# Patient Record
Sex: MALE | Race: WHITE | NOT HISPANIC OR LATINO | Employment: OTHER | URBAN - METROPOLITAN AREA
[De-identification: names, ages, dates, MRNs, and addresses within clinical notes are randomized per-mention and may not be internally consistent; named-entity substitution may affect disease eponyms.]

---

## 2023-02-27 LAB — SARS-COV-2 RESULT: DETECTED

## 2023-04-12 ENCOUNTER — OFFICE VISIT (OUTPATIENT)
Dept: PRIMARY CARE | Facility: CLINIC | Age: 63
End: 2023-04-12
Payer: COMMERCIAL

## 2023-04-12 VITALS
OXYGEN SATURATION: 98 % | WEIGHT: 209 LBS | HEIGHT: 73 IN | DIASTOLIC BLOOD PRESSURE: 80 MMHG | HEART RATE: 68 BPM | SYSTOLIC BLOOD PRESSURE: 132 MMHG | BODY MASS INDEX: 27.7 KG/M2 | RESPIRATION RATE: 14 BRPM

## 2023-04-12 DIAGNOSIS — Z12.5 SCREENING FOR PROSTATE CANCER: ICD-10-CM

## 2023-04-12 DIAGNOSIS — I25.10 ARTERIOSCLEROTIC CORONARY ARTERY DISEASE: ICD-10-CM

## 2023-04-12 DIAGNOSIS — I50.22 CHRONIC SYSTOLIC HEART FAILURE (MULTI): Primary | ICD-10-CM

## 2023-04-12 DIAGNOSIS — I10 PRIMARY HYPERTENSION: ICD-10-CM

## 2023-04-12 DIAGNOSIS — I48.91 ATRIAL FIBRILLATION, UNSPECIFIED TYPE (MULTI): ICD-10-CM

## 2023-04-12 PROBLEM — R03.0 ELEVATED BLOOD PRESSURE READING: Status: ACTIVE | Noted: 2023-04-12

## 2023-04-12 PROBLEM — R94.31 ABNORMAL EKG: Status: ACTIVE | Noted: 2023-04-12

## 2023-04-12 PROBLEM — K76.89 LIVER CYST: Status: ACTIVE | Noted: 2023-04-12

## 2023-04-12 PROBLEM — I21.9 SILENT MYOCARDIAL INFARCTION (MULTI): Status: ACTIVE | Noted: 2023-04-12

## 2023-04-12 PROBLEM — R05.9 COUGH: Status: ACTIVE | Noted: 2023-04-12

## 2023-04-12 PROBLEM — J06.9 VIRAL URI WITH COUGH: Status: ACTIVE | Noted: 2023-04-12

## 2023-04-12 PROBLEM — S80.10XA HEMATOMA OF LEG: Status: ACTIVE | Noted: 2023-04-12

## 2023-04-12 PROBLEM — M48.50XA VERTEBRAL COMPRESSION FRACTURE (MULTI): Status: ACTIVE | Noted: 2023-04-12

## 2023-04-12 PROBLEM — E78.2 COMBINED HYPERLIPIDEMIA: Status: ACTIVE | Noted: 2023-04-12

## 2023-04-12 PROBLEM — S91.309A WOUND OF FOOT: Status: ACTIVE | Noted: 2023-04-12

## 2023-04-12 PROBLEM — I49.9 IRREGULAR HEART BEAT: Status: ACTIVE | Noted: 2023-04-12

## 2023-04-12 PROBLEM — I34.0 MILD MITRAL REGURGITATION: Status: ACTIVE | Noted: 2023-04-12

## 2023-04-12 PROBLEM — J00 COMMON COLD: Status: ACTIVE | Noted: 2023-04-12

## 2023-04-12 PROBLEM — M79.606 LEG PAIN: Status: ACTIVE | Noted: 2023-04-12

## 2023-04-12 PROBLEM — R06.00 DYSPNEA: Status: ACTIVE | Noted: 2023-04-12

## 2023-04-12 PROBLEM — B34.9 NONSPECIFIC SYNDROME SUGGESTIVE OF VIRAL ILLNESS: Status: ACTIVE | Noted: 2023-04-12

## 2023-04-12 PROBLEM — M54.16 LUMBAR RADICULOPATHY, ACUTE: Status: ACTIVE | Noted: 2023-04-12

## 2023-04-12 PROBLEM — M54.9 BACK PAIN, ACUTE: Status: ACTIVE | Noted: 2023-04-12

## 2023-04-12 PROBLEM — I21.9 HEART ATTACK (MULTI): Status: ACTIVE | Noted: 2023-04-12

## 2023-04-12 PROBLEM — E55.9 MILD VITAMIN D DEFICIENCY: Status: ACTIVE | Noted: 2023-04-12

## 2023-04-12 PROCEDURE — 99396 PREV VISIT EST AGE 40-64: CPT | Performed by: INTERNAL MEDICINE

## 2023-04-12 PROCEDURE — 1036F TOBACCO NON-USER: CPT | Performed by: INTERNAL MEDICINE

## 2023-04-12 PROCEDURE — 3079F DIAST BP 80-89 MM HG: CPT | Performed by: INTERNAL MEDICINE

## 2023-04-12 PROCEDURE — 3075F SYST BP GE 130 - 139MM HG: CPT | Performed by: INTERNAL MEDICINE

## 2023-04-12 RX ORDER — ATORVASTATIN CALCIUM 20 MG/1
1 TABLET, FILM COATED ORAL NIGHTLY
COMMUNITY
Start: 2019-06-24 | End: 2023-04-17 | Stop reason: SDUPTHER

## 2023-04-12 RX ORDER — LISINOPRIL 10 MG/1
10 TABLET ORAL DAILY
COMMUNITY
End: 2023-04-12 | Stop reason: ALTCHOICE

## 2023-04-12 RX ORDER — NAPROXEN SODIUM 220 MG
2 TABLET ORAL DAILY
COMMUNITY

## 2023-04-12 RX ORDER — RIVAROXABAN 20 MG/1
1 TABLET, FILM COATED ORAL DAILY
COMMUNITY
Start: 2022-04-28 | End: 2023-04-17 | Stop reason: SDUPTHER

## 2023-04-12 RX ORDER — METOPROLOL SUCCINATE 50 MG/1
1 TABLET, EXTENDED RELEASE ORAL 2 TIMES DAILY
COMMUNITY
Start: 2019-06-13 | End: 2023-04-17 | Stop reason: SDUPTHER

## 2023-04-12 RX ORDER — MULTIVITAMIN
1 TABLET ORAL DAILY
COMMUNITY
End: 2023-12-04 | Stop reason: ALTCHOICE

## 2023-04-12 RX ORDER — SACUBITRIL AND VALSARTAN 24; 26 MG/1; MG/1
1 TABLET, FILM COATED ORAL 2 TIMES DAILY
COMMUNITY
Start: 2022-10-25 | End: 2023-04-17 | Stop reason: SDUPTHER

## 2023-04-12 RX ORDER — ASPIRIN 81 MG/1
TABLET ORAL
COMMUNITY
Start: 2023-02-01

## 2023-04-12 ASSESSMENT — PATIENT HEALTH QUESTIONNAIRE - PHQ9
2. FEELING DOWN, DEPRESSED OR HOPELESS: NOT AT ALL
SUM OF ALL RESPONSES TO PHQ9 QUESTIONS 1 AND 2: 0
1. LITTLE INTEREST OR PLEASURE IN DOING THINGS: NOT AT ALL

## 2023-04-12 NOTE — PATIENT INSTRUCTIONS
FASTING LABS ARE ORDERED FOR YOU    2.  WILL DEFER COLONOSCOPY UNTIL NEXT YEAR, DUE TO NEGATIVE SCOPE IN 2014    3.  PLEASE CALL IF REFILLS ARE NEEDED    4.  YEARLY EYE EXAMS AS YOU ARE DOING    5.  ONGOING FOLLOW UP CARDIOLOGY    6.  REGULAR PHYSICAL EXERCISE IS RECOMMENDED FOR GENERAL CARDIOVASCULAR HEALTH    6.  FOLLOW UP 1 YEAR OR AS NEEDED

## 2023-04-12 NOTE — PROGRESS NOTES
Subjective   Link Inman is a 62 y.o. male and is here for a comprehensive physical exam. The patient reports no problems. LEGS FEELING BETTER, GOT OFF THE FOG FROM COVID 19 FROM FEBRUARY    JUST SAW NEW CARDIOLOGIST, ECHO SCHEDULED    ABLE TO DO ACTIVITIES WITHOUT SHORT OF BREATH    COLONOSCOPY AT 55 Y.O. WAS DONE AT [facility]. 2014     GETS EYES CHECKED REGULAR;Y    Do you take any herbs or supplements that were not prescribed by a doctor? no  Are you taking calcium supplements? no  Are you taking aspirin daily? yes      History:  Patient receives prostate care outside our clinic  Date last prostate exam:  NO  Date last PSA:  2021    Do you have pain that bothers you in your daily life? yes  LEFT LEG STILL ACHES    Review of Systems   Constitutional:  Negative for chills, diaphoresis and fever.   Respiratory:  Negative for cough and shortness of breath.    Cardiovascular:  Negative for chest pain and leg swelling.   Gastrointestinal:  Negative for constipation, diarrhea, nausea and vomiting.   Musculoskeletal:  Negative for joint swelling and myalgias.        Objective   Physical Exam  Vitals reviewed.   Constitutional:       General: He is not in acute distress.     Appearance: He is not ill-appearing.   Cardiovascular:      Rate and Rhythm: Normal rate and regular rhythm.      Pulses: Normal pulses.      Heart sounds:      No gallop.   Pulmonary:      Breath sounds: Normal breath sounds. No wheezing, rhonchi or rales.   Abdominal:      General: Abdomen is flat. Bowel sounds are normal.      Palpations: Abdomen is soft.      Tenderness: There is no guarding or rebound.   Musculoskeletal:      Right lower leg: No edema.      Left lower leg: No edema.         Assessment/Plan   Healthy male exam. STABLE FROM CARDIAC STANDPOINT     1. SEE PATIENT INSTRUCTIONS  2. Patient Counseling:  --Nutrition: Stressed importance of moderation in sodium/caffeine intake, saturated fat and cholesterol, caloric balance,  sufficient intake of fresh fruits, vegetables, fiber, calcium, iron, and 1 mg of folate supplement per day (for females capable of pregnancy).  --Discussed the issue of estrogen replacement, calcium supplement, and the daily use of baby aspirin.  --Exercise: Stressed the importance of regular exercise.   --Substance Abuse: Discussed cessation/primary prevention of tobacco, alcohol, or other drug use; driving or other dangerous activities under the influence; availability of treatment for abuse.    --Sexuality: Discussed sexually transmitted diseases, partner selection, use of condoms, avoidance of unintended pregnancy  and contraceptive alternatives.   --Injury prevention: Discussed safety belts, safety helmets, smoke detector, smoking near bedding or upholstery.   --Dental health: Discussed importance of regular tooth brushing, flossing, and dental visits.  --Immunizations reviewed.  --Discussed benefits of screening colonoscopy.  --After hours service discussed with patient  3. Discussed the patient's BMI with him.  The BMI is in the acceptable range.  4. Follow up next physical in 1 year    Patient Instructions    FASTING LABS ARE ORDERED FOR YOU    2.  WILL DEFER COLONOSCOPY UNTIL NEXT YEAR, DUE TO NEGATIVE SCOPE IN 2014    3.  PLEASE CALL IF REFILLS ARE NEEDED    4.  YEARLY EYE EXAMS AS YOU ARE DOING    5.  ONGOING FOLLOW UP CARDIOLOGY    6.  REGULAR PHYSICAL EXERCISE IS RECOMMENDED FOR GENERAL CARDIOVASCULAR HEALTH    6.  FOLLOW UP 1 YEAR OR AS NEEDED

## 2023-04-17 ENCOUNTER — TELEPHONE (OUTPATIENT)
Dept: PRIMARY CARE | Facility: CLINIC | Age: 63
End: 2023-04-17

## 2023-04-17 ENCOUNTER — LAB (OUTPATIENT)
Dept: LAB | Facility: LAB | Age: 63
End: 2023-04-17
Payer: COMMERCIAL

## 2023-04-17 DIAGNOSIS — I25.10 ARTERIOSCLEROTIC CORONARY ARTERY DISEASE: ICD-10-CM

## 2023-04-17 DIAGNOSIS — I10 PRIMARY HYPERTENSION: ICD-10-CM

## 2023-04-17 DIAGNOSIS — I48.91 ATRIAL FIBRILLATION, UNSPECIFIED TYPE (MULTI): Primary | ICD-10-CM

## 2023-04-17 DIAGNOSIS — Z12.5 SCREENING FOR PROSTATE CANCER: ICD-10-CM

## 2023-04-17 DIAGNOSIS — I48.91 ATRIAL FIBRILLATION, UNSPECIFIED TYPE (MULTI): ICD-10-CM

## 2023-04-17 DIAGNOSIS — I50.22 CHRONIC SYSTOLIC HEART FAILURE (MULTI): ICD-10-CM

## 2023-04-17 LAB
ALANINE AMINOTRANSFERASE (SGPT) (U/L) IN SER/PLAS: 20 U/L (ref 10–52)
ALBUMIN (G/DL) IN SER/PLAS: 4.1 G/DL (ref 3.4–5)
ALKALINE PHOSPHATASE (U/L) IN SER/PLAS: 90 U/L (ref 33–136)
ANION GAP IN SER/PLAS: 12 MMOL/L (ref 10–20)
ASPARTATE AMINOTRANSFERASE (SGOT) (U/L) IN SER/PLAS: 19 U/L (ref 9–39)
BILIRUBIN TOTAL (MG/DL) IN SER/PLAS: 0.9 MG/DL (ref 0–1.2)
CALCIUM (MG/DL) IN SER/PLAS: 9 MG/DL (ref 8.6–10.3)
CARBON DIOXIDE, TOTAL (MMOL/L) IN SER/PLAS: 28 MMOL/L (ref 21–32)
CHLORIDE (MMOL/L) IN SER/PLAS: 105 MMOL/L (ref 98–107)
CHOLESTEROL (MG/DL) IN SER/PLAS: 147 MG/DL (ref 0–199)
CHOLESTEROL IN HDL (MG/DL) IN SER/PLAS: 46.6 MG/DL
CHOLESTEROL/HDL RATIO: 3.2
COBALAMIN (VITAMIN B12) (PG/ML) IN SER/PLAS: 463 PG/ML (ref 211–911)
CREATININE (MG/DL) IN SER/PLAS: 0.85 MG/DL (ref 0.5–1.3)
CREATININE (MG/DL) IN URINE: 136 MG/DL (ref 20–370)
ERYTHROCYTE DISTRIBUTION WIDTH (RATIO) BY AUTOMATED COUNT: 12.2 % (ref 11.5–14.5)
ERYTHROCYTE MEAN CORPUSCULAR HEMOGLOBIN CONCENTRATION (G/DL) BY AUTOMATED: 33.3 G/DL (ref 32–36)
ERYTHROCYTE MEAN CORPUSCULAR VOLUME (FL) BY AUTOMATED COUNT: 96 FL (ref 80–100)
ERYTHROCYTES (10*6/UL) IN BLOOD BY AUTOMATED COUNT: 5.26 X10E12/L (ref 4.5–5.9)
GFR MALE: >90 ML/MIN/1.73M2
GLUCOSE (MG/DL) IN SER/PLAS: 99 MG/DL (ref 74–99)
HEMATOCRIT (%) IN BLOOD BY AUTOMATED COUNT: 50.5 % (ref 41–52)
HEMOGLOBIN (G/DL) IN BLOOD: 16.8 G/DL (ref 13.5–17.5)
LDL: 78 MG/DL (ref 0–99)
LEUKOCYTES (10*3/UL) IN BLOOD BY AUTOMATED COUNT: 7.1 X10E9/L (ref 4.4–11.3)
PLATELETS (10*3/UL) IN BLOOD AUTOMATED COUNT: 280 X10E9/L (ref 150–450)
POTASSIUM (MMOL/L) IN SER/PLAS: 4.2 MMOL/L (ref 3.5–5.3)
PROSTATE SPECIFIC ANTIGEN,SCREEN: 1.09 NG/ML (ref 0–4)
PROTEIN (MG/DL) IN URINE: 11 MG/DL (ref 5–25)
PROTEIN TOTAL: 6.5 G/DL (ref 6.4–8.2)
PROTEIN/CREATININE (MG/MG) IN URINE: 0.08 MG/MG CREAT (ref 0–0.17)
SODIUM (MMOL/L) IN SER/PLAS: 141 MMOL/L (ref 136–145)
THYROTROPIN (MIU/L) IN SER/PLAS BY DETECTION LIMIT <= 0.05 MIU/L: 1.92 MIU/L (ref 0.44–3.98)
TRIGLYCERIDE (MG/DL) IN SER/PLAS: 112 MG/DL (ref 0–149)
UREA NITROGEN (MG/DL) IN SER/PLAS: 16 MG/DL (ref 6–23)
VLDL: 22 MG/DL (ref 0–40)

## 2023-04-17 PROCEDURE — 84153 ASSAY OF PSA TOTAL: CPT

## 2023-04-17 PROCEDURE — 82607 VITAMIN B-12: CPT

## 2023-04-17 PROCEDURE — 84443 ASSAY THYROID STIM HORMONE: CPT

## 2023-04-17 PROCEDURE — 85027 COMPLETE CBC AUTOMATED: CPT

## 2023-04-17 PROCEDURE — 80061 LIPID PANEL: CPT

## 2023-04-17 PROCEDURE — 80053 COMPREHEN METABOLIC PANEL: CPT

## 2023-04-17 PROCEDURE — 82570 ASSAY OF URINE CREATININE: CPT

## 2023-04-17 PROCEDURE — 36415 COLL VENOUS BLD VENIPUNCTURE: CPT

## 2023-04-17 PROCEDURE — 84156 ASSAY OF PROTEIN URINE: CPT

## 2023-04-17 RX ORDER — ATORVASTATIN CALCIUM 20 MG/1
20 TABLET, FILM COATED ORAL NIGHTLY
Qty: 90 TABLET | Refills: 3 | Status: SHIPPED | OUTPATIENT
Start: 2023-04-17 | End: 2024-01-22 | Stop reason: SDUPTHER

## 2023-04-17 RX ORDER — RIVAROXABAN 20 MG/1
20 TABLET, FILM COATED ORAL DAILY
Qty: 90 TABLET | Refills: 3 | Status: SHIPPED | OUTPATIENT
Start: 2023-04-17 | End: 2024-04-01 | Stop reason: SDUPTHER

## 2023-04-17 RX ORDER — SACUBITRIL AND VALSARTAN 24; 26 MG/1; MG/1
1 TABLET, FILM COATED ORAL 2 TIMES DAILY
Qty: 180 TABLET | Refills: 3 | Status: SHIPPED | OUTPATIENT
Start: 2023-04-17 | End: 2024-04-01 | Stop reason: SDUPTHER

## 2023-04-17 RX ORDER — METOPROLOL SUCCINATE 50 MG/1
50 TABLET, EXTENDED RELEASE ORAL 2 TIMES DAILY
Qty: 180 TABLET | Refills: 3 | Status: SHIPPED | OUTPATIENT
Start: 2023-04-17 | End: 2024-02-09 | Stop reason: SDUPTHER

## 2023-04-17 NOTE — TELEPHONE ENCOUNTER
Refill 90 day supply w/Refills  to [facility] MAIL ORDER      -Xarelto 20 mg tablet Take 1 tablet (20 mg) by mouth once daily.     -Entresto 24-26 mg tablet Take 1 tablet by mouth in the morning and 1 tablet before bedtime.     -metoprolol succinate XL (Toprol-XL) 50 mg 24 hr tablet Take 1 tablet (50 mg) by mouth in the morning and 1 tablet (50 mg) before bedtime.     -atorvastatin (Lipitor) 20 mg tablet Take 1 tablet (20 mg) by mouth once daily at bedtime.

## 2023-04-18 ASSESSMENT — ENCOUNTER SYMPTOMS
FEVER: 0
SHORTNESS OF BREATH: 0
COUGH: 0
MYALGIAS: 0
VOMITING: 0
NAUSEA: 0
CONSTIPATION: 0
DIAPHORESIS: 0
CHILLS: 0
JOINT SWELLING: 0
DIARRHEA: 0

## 2023-12-04 ENCOUNTER — OFFICE VISIT (OUTPATIENT)
Dept: PRIMARY CARE | Facility: CLINIC | Age: 63
End: 2023-12-04
Payer: COMMERCIAL

## 2023-12-04 DIAGNOSIS — M54.16 LUMBAR RADICULOPATHY, ACUTE: Primary | ICD-10-CM

## 2023-12-04 PROCEDURE — 99214 OFFICE O/P EST MOD 30 MIN: CPT | Performed by: INTERNAL MEDICINE

## 2023-12-04 PROCEDURE — 1036F TOBACCO NON-USER: CPT | Performed by: INTERNAL MEDICINE

## 2023-12-04 RX ORDER — PREDNISONE 20 MG/1
TABLET ORAL
Qty: 18 TABLET | Refills: 0 | Status: SHIPPED | OUTPATIENT
Start: 2023-12-04 | End: 2023-12-13

## 2023-12-04 RX ORDER — CYCLOBENZAPRINE HCL 10 MG
10 TABLET ORAL NIGHTLY PRN
Qty: 30 TABLET | Refills: 0 | Status: SHIPPED | OUTPATIENT
Start: 2023-12-04 | End: 2024-01-26 | Stop reason: ALTCHOICE

## 2023-12-04 ASSESSMENT — PATIENT HEALTH QUESTIONNAIRE - PHQ9
1. LITTLE INTEREST OR PLEASURE IN DOING THINGS: NOT AT ALL
2. FEELING DOWN, DEPRESSED OR HOPELESS: NOT AT ALL
SUM OF ALL RESPONSES TO PHQ9 QUESTIONS 1 AND 2: 0

## 2023-12-04 NOTE — PROGRESS NOTES
"Subjective   Link Inman is a 62 y.o. male who presents for NEW CONCERN LEFT OUTER LEG PAIN UNABLE TO TOLERATE WITH WALKS X 8 MONTHS    SEEN 2 YRS AGO FOR SAME ISSUES SAYS HAD us NO CLOT WENT ON STEROIDS HELPED    HAS NOT HAD FLU SHOT WILL THINK ABOUT IT       HPI   ILITY TO WALK    BETTER TODAY, HAVEN'T BEEN DOING MUCH    2 YEARS AGO, LOOKED FOR BLOOD CLOT, NEG, TRIED STEROIDS TWICE AND IT DIDN'T HELP    HAVE LIVED WITH IT, BUT SEEMS TO BE GETTING WORSE    BEGINNING NOVEMBER HELPED DAUGHTER WITH CHORES IN NEW HOUSE    HAS A COMPRESSION FX T11 OLD, FELL OFF LADDER IN THE PAST    TAKING ALEVE 2 PILLS DAILY.      SOMETIMES FEEL LEFT LEG IS WEAK    EVENTUALLY 2 YEARS AK DID SELF PT AND IT MADE IT GO AWAY    BACK NOT KEEPING HIM UP MUCH, BUT AFFECT ABILITY TO WALK.      Review of Systems   Constitutional:  Negative for chills, diaphoresis and fever.   Respiratory:  Negative for cough and shortness of breath.    Cardiovascular:  Negative for chest pain and leg swelling.   Gastrointestinal:  Negative for constipation, diarrhea, nausea and vomiting.   Musculoskeletal:  Positive for arthralgias, back pain and gait problem. Negative for joint swelling and myalgias.       Objective   /80   Pulse 87   Resp 14   Ht 1.854 m (6' 1\")   Wt 88 kg (194 lb)   SpO2 97%   BMI 25.60 kg/m²     Physical Exam  Vitals reviewed.   Constitutional:       General: He is not in acute distress.     Appearance: He is not ill-appearing.   Cardiovascular:      Rate and Rhythm: Normal rate and regular rhythm.      Pulses: Normal pulses.      Heart sounds:      No gallop.   Pulmonary:      Breath sounds: Normal breath sounds. No wheezing, rhonchi or rales.   Abdominal:      General: Abdomen is flat. Bowel sounds are normal.      Palpations: Abdomen is soft.      Tenderness: There is no guarding or rebound.   Musculoskeletal:      Right lower leg: No edema.      Left lower leg: No edema.         Assessment/Plan   Problem List Items " Addressed This Visit       Lumbar radiculopathy, acute - Primary    Relevant Medications    predniSONE (Deltasone) 20 mg tablet    cyclobenzaprine (Flexeril) 10 mg tablet     Patient Instructions    PREDNISONE TAPER AND CYCLOBENZAPRINE MUSCLE RELAXER FOR NIGHT USE ARE SENT IN FOR YOU    2.  HEAT FOR SHORT PERIODS, 1/2 HOUR, CAN BE HELPFUL TO LOOSEN THE BACK    3.  SELF-GUIDED BACK STRETCHING AND STRENGTHENING AS YOU DID BEFORE WILL LIKELY HELP YOU TO RECOVER.    4.  IF YOU FIND THAT YOU CANNOT RECOVER AND FIND YOURSELF STILL NEEDING REGULAR ALEVE, THEN PLEASE LET ME KNOW AND WE WILL NEED TO START YOU ON A REGULAR DAILY DOSE OF STOMACH-PROTECTING ACID BLOCKER TO PREVENT STOMACH ULCER SINCE YOU ARE TAKING XARELTO.  TYLENOL DOES NOT CARRY THE RISK OF STOMACH ISSUES, AND CAN BE USED LONGER TERM WITHOUT WORRY IF NEEDED.    5.  ALSO, IF YOUR BACK CANNOT GET BETTER, THEN LET ME KNOW SO I CAN CHECK AN EMG OF THE NERVES.  IF THIS SHOWS DEFINITE NERVE COMPRESSION THEN WILL NEED MRI AS SUGGESTED AS A POSSIBILITY BY THE ORTHOPEDIC SURGEON FROM 2 YEARS AGO    6.  FOLLOW UP ROUTINELY OR AS NEEDED IF NO BETTER

## 2023-12-04 NOTE — PATIENT INSTRUCTIONS
PREDNISONE TAPER AND CYCLOBENZAPRINE MUSCLE RELAXER FOR NIGHT USE ARE SENT IN FOR YOU    2.  HEAT FOR SHORT PERIODS, 1/2 HOUR, CAN BE HELPFUL TO LOOSEN THE BACK    3.  SELF-GUIDED BACK STRETCHING AND STRENGTHENING AS YOU DID BEFORE WILL LIKELY HELP YOU TO RECOVER.    4.  IF YOU FIND THAT YOU CANNOT RECOVER AND FIND YOURSELF STILL NEEDING REGULAR ALEVE, THEN PLEASE LET ME KNOW AND WE WILL NEED TO START YOU ON A REGULAR DAILY DOSE OF STOMACH-PROTECTING ACID BLOCKER TO PREVENT STOMACH ULCER SINCE YOU ARE TAKING XARELTO.  TYLENOL DOES NOT CARRY THE RISK OF STOMACH ISSUES, AND CAN BE USED LONGER TERM WITHOUT WORRY IF NEEDED.    5.  ALSO, IF YOUR BACK CANNOT GET BETTER, THEN LET ME KNOW SO I CAN CHECK AN EMG OF THE NERVES.  IF THIS SHOWS DEFINITE NERVE COMPRESSION THEN WILL NEED MRI AS SUGGESTED AS A POSSIBILITY BY THE ORTHOPEDIC SURGEON FROM 2 YEARS AGO    6.  FOLLOW UP ROUTINELY OR AS NEEDED IF NO BETTER

## 2023-12-07 VITALS
RESPIRATION RATE: 14 BRPM | SYSTOLIC BLOOD PRESSURE: 130 MMHG | WEIGHT: 194 LBS | HEART RATE: 87 BPM | HEIGHT: 73 IN | DIASTOLIC BLOOD PRESSURE: 80 MMHG | BODY MASS INDEX: 25.71 KG/M2 | OXYGEN SATURATION: 97 %

## 2023-12-07 ASSESSMENT — ENCOUNTER SYMPTOMS
MYALGIAS: 0
DIAPHORESIS: 0
BACK PAIN: 1
VOMITING: 0
ARTHRALGIAS: 1
JOINT SWELLING: 0
COUGH: 0
NAUSEA: 0
CHILLS: 0
SHORTNESS OF BREATH: 0
CONSTIPATION: 0
FEVER: 0
DIARRHEA: 0

## 2024-01-22 DIAGNOSIS — I25.10 ARTERIOSCLEROTIC CORONARY ARTERY DISEASE: ICD-10-CM

## 2024-01-22 RX ORDER — ATORVASTATIN CALCIUM 20 MG/1
20 TABLET, FILM COATED ORAL NIGHTLY
Qty: 90 TABLET | Refills: 3 | Status: SHIPPED | OUTPATIENT
Start: 2024-01-22

## 2024-01-24 ENCOUNTER — APPOINTMENT (OUTPATIENT)
Dept: RADIOLOGY | Facility: HOSPITAL | Age: 64
End: 2024-01-24
Payer: MEDICARE

## 2024-01-24 ENCOUNTER — APPOINTMENT (OUTPATIENT)
Dept: CARDIOLOGY | Facility: HOSPITAL | Age: 64
End: 2024-01-24
Payer: MEDICARE

## 2024-01-24 ENCOUNTER — OFFICE VISIT (OUTPATIENT)
Dept: PRIMARY CARE | Facility: CLINIC | Age: 64
End: 2024-01-24
Payer: MEDICARE

## 2024-01-24 ENCOUNTER — HOSPITAL ENCOUNTER (EMERGENCY)
Facility: HOSPITAL | Age: 64
Discharge: HOME | End: 2024-01-24
Attending: EMERGENCY MEDICINE
Payer: MEDICARE

## 2024-01-24 VITALS
RESPIRATION RATE: 18 BRPM | OXYGEN SATURATION: 94 % | BODY MASS INDEX: 28.1 KG/M2 | TEMPERATURE: 97.2 F | WEIGHT: 213 LBS | DIASTOLIC BLOOD PRESSURE: 78 MMHG | HEART RATE: 84 BPM | SYSTOLIC BLOOD PRESSURE: 132 MMHG

## 2024-01-24 VITALS
DIASTOLIC BLOOD PRESSURE: 91 MMHG | HEIGHT: 73 IN | OXYGEN SATURATION: 95 % | BODY MASS INDEX: 28.36 KG/M2 | RESPIRATION RATE: 20 BRPM | WEIGHT: 214 LBS | TEMPERATURE: 97 F | SYSTOLIC BLOOD PRESSURE: 141 MMHG | HEART RATE: 101 BPM

## 2024-01-24 DIAGNOSIS — J40 BRONCHITIS: Primary | ICD-10-CM

## 2024-01-24 DIAGNOSIS — R07.9 CHEST PAIN, UNSPECIFIED TYPE: ICD-10-CM

## 2024-01-24 DIAGNOSIS — R68.89 FLU-LIKE SYMPTOMS: Primary | ICD-10-CM

## 2024-01-24 LAB
ALBUMIN SERPL BCP-MCNC: 4.1 G/DL (ref 3.4–5)
ALP SERPL-CCNC: 89 U/L (ref 33–136)
ALT SERPL W P-5'-P-CCNC: 22 U/L (ref 10–52)
ANION GAP SERPL CALC-SCNC: 11 MMOL/L (ref 10–20)
AST SERPL W P-5'-P-CCNC: 20 U/L (ref 9–39)
BASOPHILS # BLD AUTO: 0.03 X10*3/UL (ref 0–0.1)
BASOPHILS NFR BLD AUTO: 0.4 %
BILIRUB SERPL-MCNC: 0.8 MG/DL (ref 0–1.2)
BUN SERPL-MCNC: 22 MG/DL (ref 6–23)
CALCIUM SERPL-MCNC: 8.9 MG/DL (ref 8.6–10.3)
CARDIAC TROPONIN I PNL SERPL HS: 3 NG/L (ref 0–20)
CARDIAC TROPONIN I PNL SERPL HS: 3 NG/L (ref 0–20)
CHLORIDE SERPL-SCNC: 108 MMOL/L (ref 98–107)
CO2 SERPL-SCNC: 25 MMOL/L (ref 21–32)
CREAT SERPL-MCNC: 1.14 MG/DL (ref 0.5–1.3)
EGFRCR SERPLBLD CKD-EPI 2021: 71 ML/MIN/1.73M*2
EOSINOPHIL # BLD AUTO: 0.41 X10*3/UL (ref 0–0.7)
EOSINOPHIL NFR BLD AUTO: 5.7 %
ERYTHROCYTE [DISTWIDTH] IN BLOOD BY AUTOMATED COUNT: 12.8 % (ref 11.5–14.5)
GLUCOSE SERPL-MCNC: 109 MG/DL (ref 74–99)
HCT VFR BLD AUTO: 52.5 % (ref 41–52)
HGB BLD-MCNC: 17.5 G/DL (ref 13.5–17.5)
IMM GRANULOCYTES # BLD AUTO: 0.01 X10*3/UL (ref 0–0.7)
IMM GRANULOCYTES NFR BLD AUTO: 0.1 % (ref 0–0.9)
LYMPHOCYTES # BLD AUTO: 1.47 X10*3/UL (ref 1.2–4.8)
LYMPHOCYTES NFR BLD AUTO: 20.3 %
MAGNESIUM SERPL-MCNC: 2.01 MG/DL (ref 1.6–2.4)
MCH RBC QN AUTO: 31.5 PG (ref 26–34)
MCHC RBC AUTO-ENTMCNC: 33.3 G/DL (ref 32–36)
MCV RBC AUTO: 95 FL (ref 80–100)
MONOCYTES # BLD AUTO: 0.82 X10*3/UL (ref 0.1–1)
MONOCYTES NFR BLD AUTO: 11.3 %
NEUTROPHILS # BLD AUTO: 4.51 X10*3/UL (ref 1.2–7.7)
NEUTROPHILS NFR BLD AUTO: 62.2 %
NRBC BLD-RTO: 0 /100 WBCS (ref 0–0)
PLATELET # BLD AUTO: 207 X10*3/UL (ref 150–450)
POC BINAX EXPIRATION: NORMAL
POC BINAX NOW COVID SERIAL NUMBER: NORMAL
POC RAPID INFLUENZA A: NEGATIVE
POC RAPID INFLUENZA B: NEGATIVE
POC SARS-COV-2 AG BINAX: NORMAL
POTASSIUM SERPL-SCNC: 3.8 MMOL/L (ref 3.5–5.3)
PROT SERPL-MCNC: 6.5 G/DL (ref 6.4–8.2)
RBC # BLD AUTO: 5.55 X10*6/UL (ref 4.5–5.9)
SODIUM SERPL-SCNC: 140 MMOL/L (ref 136–145)
WBC # BLD AUTO: 7.3 X10*3/UL (ref 4.4–11.3)

## 2024-01-24 PROCEDURE — 99285 EMERGENCY DEPT VISIT HI MDM: CPT | Performed by: EMERGENCY MEDICINE

## 2024-01-24 PROCEDURE — 85025 COMPLETE CBC W/AUTO DIFF WBC: CPT | Performed by: EMERGENCY MEDICINE

## 2024-01-24 PROCEDURE — 93000 ELECTROCARDIOGRAM COMPLETE: CPT | Performed by: NURSE PRACTITIONER

## 2024-01-24 PROCEDURE — 36415 COLL VENOUS BLD VENIPUNCTURE: CPT | Performed by: EMERGENCY MEDICINE

## 2024-01-24 PROCEDURE — 84484 ASSAY OF TROPONIN QUANT: CPT | Performed by: EMERGENCY MEDICINE

## 2024-01-24 PROCEDURE — 1159F MED LIST DOCD IN RCRD: CPT | Performed by: NURSE PRACTITIONER

## 2024-01-24 PROCEDURE — 87804 INFLUENZA ASSAY W/OPTIC: CPT | Performed by: NURSE PRACTITIONER

## 2024-01-24 PROCEDURE — 1036F TOBACCO NON-USER: CPT | Performed by: NURSE PRACTITIONER

## 2024-01-24 PROCEDURE — 94640 AIRWAY INHALATION TREATMENT: CPT

## 2024-01-24 PROCEDURE — 2500000002 HC RX 250 W HCPCS SELF ADMINISTERED DRUGS (ALT 637 FOR MEDICARE OP, ALT 636 FOR OP/ED): Performed by: EMERGENCY MEDICINE

## 2024-01-24 PROCEDURE — 93005 ELECTROCARDIOGRAM TRACING: CPT

## 2024-01-24 PROCEDURE — 87811 SARS-COV-2 COVID19 W/OPTIC: CPT | Performed by: NURSE PRACTITIONER

## 2024-01-24 PROCEDURE — 83735 ASSAY OF MAGNESIUM: CPT | Performed by: EMERGENCY MEDICINE

## 2024-01-24 PROCEDURE — 80053 COMPREHEN METABOLIC PANEL: CPT | Performed by: EMERGENCY MEDICINE

## 2024-01-24 PROCEDURE — 87636 SARSCOV2 & INF A&B AMP PRB: CPT

## 2024-01-24 PROCEDURE — 2500000005 HC RX 250 GENERAL PHARMACY W/O HCPCS: Performed by: STUDENT IN AN ORGANIZED HEALTH CARE EDUCATION/TRAINING PROGRAM

## 2024-01-24 PROCEDURE — 93010 ELECTROCARDIOGRAM REPORT: CPT | Performed by: EMERGENCY MEDICINE

## 2024-01-24 PROCEDURE — 99284 EMERGENCY DEPT VISIT MOD MDM: CPT | Mod: 25

## 2024-01-24 PROCEDURE — 71045 X-RAY EXAM CHEST 1 VIEW: CPT

## 2024-01-24 PROCEDURE — 1125F AMNT PAIN NOTED PAIN PRSNT: CPT | Performed by: NURSE PRACTITIONER

## 2024-01-24 PROCEDURE — 99214 OFFICE O/P EST MOD 30 MIN: CPT | Performed by: NURSE PRACTITIONER

## 2024-01-24 PROCEDURE — 71045 X-RAY EXAM CHEST 1 VIEW: CPT | Performed by: RADIOLOGY

## 2024-01-24 RX ORDER — IPRATROPIUM BROMIDE AND ALBUTEROL SULFATE 2.5; .5 MG/3ML; MG/3ML
3 SOLUTION RESPIRATORY (INHALATION)
Status: DISCONTINUED | OUTPATIENT
Start: 2024-01-24 | End: 2024-01-24 | Stop reason: HOSPADM

## 2024-01-24 RX ORDER — ALBUTEROL SULFATE 90 UG/1
2 AEROSOL, METERED RESPIRATORY (INHALATION) EVERY 6 HOURS PRN
Qty: 18 G | Refills: 0 | Status: SHIPPED | OUTPATIENT
Start: 2024-01-24 | End: 2025-01-23

## 2024-01-24 RX ORDER — MULTIVIT-MIN/IRON FUM/FOLIC AC 7.5 MG-4
1 TABLET ORAL DAILY
COMMUNITY
End: 2024-01-26 | Stop reason: ALTCHOICE

## 2024-01-24 RX ORDER — LIDOCAINE 560 MG/1
1 PATCH PERCUTANEOUS; TOPICAL; TRANSDERMAL ONCE
Status: DISCONTINUED | OUTPATIENT
Start: 2024-01-24 | End: 2024-01-24 | Stop reason: HOSPADM

## 2024-01-24 RX ADMIN — IPRATROPIUM BROMIDE AND ALBUTEROL SULFATE 3 ML: .5; 3 SOLUTION RESPIRATORY (INHALATION) at 19:44

## 2024-01-24 RX ADMIN — LIDOCAINE 1 PATCH: 4 PATCH TOPICAL at 19:00

## 2024-01-24 ASSESSMENT — ENCOUNTER SYMPTOMS
APPETITE CHANGE: 0
FATIGUE: 1
CHANGE IN BOWEL HABIT: 0
FLU SYMPTOMS: 1
SORE THROAT: 1
VISUAL CHANGE: 0
SINUS PAIN: 0
ABDOMINAL PAIN: 0
SLEEP DISTURBANCE: 1
SINUS PRESSURE: 1
FEVER: 0
CHILLS: 0
ACTIVITY CHANGE: 0
VOMITING: 0
DIARRHEA: 0
NAUSEA: 0
HEADACHES: 1
COUGH: 1

## 2024-01-24 ASSESSMENT — HEART SCORE
HEART SCORE: 4
TROPONIN: LESS THAN OR EQUAL TO NORMAL LIMIT
HISTORY: SLIGHTLY SUSPICIOUS
ECG: NORMAL
AGE: 65+
RISK FACTORS: >2 RISK FACTORS OR HX OF ATHEROSCLEROTIC DISEASE

## 2024-01-24 ASSESSMENT — COLUMBIA-SUICIDE SEVERITY RATING SCALE - C-SSRS
1. IN THE PAST MONTH, HAVE YOU WISHED YOU WERE DEAD OR WISHED YOU COULD GO TO SLEEP AND NOT WAKE UP?: NO
6. HAVE YOU EVER DONE ANYTHING, STARTED TO DO ANYTHING, OR PREPARED TO DO ANYTHING TO END YOUR LIFE?: NO
2. HAVE YOU ACTUALLY HAD ANY THOUGHTS OF KILLING YOURSELF?: NO

## 2024-01-24 ASSESSMENT — PAIN SCALES - GENERAL
PAINLEVEL_OUTOF10: 1
PAINLEVEL_OUTOF10: 5 - MODERATE PAIN

## 2024-01-24 ASSESSMENT — LIFESTYLE VARIABLES
EVER FELT BAD OR GUILTY ABOUT YOUR DRINKING: NO
REASON UNABLE TO ASSESS: NO
HAVE YOU EVER FELT YOU SHOULD CUT DOWN ON YOUR DRINKING: NO
EVER HAD A DRINK FIRST THING IN THE MORNING TO STEADY YOUR NERVES TO GET RID OF A HANGOVER: NO
HAVE PEOPLE ANNOYED YOU BY CRITICIZING YOUR DRINKING: NO

## 2024-01-24 ASSESSMENT — PAIN - FUNCTIONAL ASSESSMENT: PAIN_FUNCTIONAL_ASSESSMENT: 0-10

## 2024-01-24 NOTE — PROGRESS NOTES
Subjective   Patient ID: Link Inman is a 63 y.o. male who presents for Flu Symptoms.    Cold symptoms x4-5 days  (Started on Saturday night)  Flew home last night from [place]  Was skiing with family: did not feel well all of   Coughing  Nasal congestion  Nasal drainage  Sore throat  Fatigue  No fever or chills  No GI issues      OTC- cough drops/ aleve      Flu Symptoms  This is a new problem. The current episode started in the past 7 days. The problem occurs constantly. The problem has been gradually worsening. Associated symptoms include congestion, coughing, fatigue, headaches and a sore throat. Pertinent negatives include no abdominal pain, change in bowel habit, chills, fever, nausea, rash, urinary symptoms, visual change or vomiting. Treatments tried: Aleve. The treatment provided no relief.        Review of Systems   Constitutional:  Positive for fatigue. Negative for activity change, appetite change, chills and fever.   HENT:  Positive for congestion, ear pain, sinus pressure and sore throat. Negative for sinus pain.    Respiratory:  Positive for cough.    Gastrointestinal:  Negative for abdominal pain, change in bowel habit, diarrhea, nausea and vomiting.   Skin:  Negative for rash.   Neurological:  Positive for headaches.   Psychiatric/Behavioral:  Positive for sleep disturbance.        Objective   /78 (BP Location: Left arm, Patient Position: Sitting, BP Cuff Size: Adult)   Pulse 84   Temp 36.2 °C (97.2 °F) (Temporal)   Resp 18   Wt 96.6 kg (213 lb)   SpO2 94%   BMI 28.10 kg/m²     Physical Exam  Vitals reviewed.   Constitutional:       Appearance: Normal appearance.   HENT:      Head: Normocephalic.      Right Ear: Tympanic membrane, ear canal and external ear normal.      Left Ear: Tympanic membrane, ear canal and external ear normal.      Nose: Mucosal edema and congestion present.      Right Turbinates: Swollen.      Left Turbinates: Swollen.      Mouth/Throat:      Lips: Pink.       Mouth: Mucous membranes are moist.   Eyes:      Extraocular Movements: Extraocular movements intact.      Conjunctiva/sclera: Conjunctivae normal.      Pupils: Pupils are equal, round, and reactive to light.   Cardiovascular:      Rate and Rhythm: Normal rate. Rhythm irregular.      Pulses: Normal pulses.      Heart sounds: Normal heart sounds.   Pulmonary:      Effort: Pulmonary effort is normal.      Breath sounds: Normal breath sounds.   Musculoskeletal:      Cervical back: Normal range of motion.      Right lower leg: No edema.      Left lower leg: No edema.   Skin:     General: Skin is warm.      Capillary Refill: Capillary refill takes less than 2 seconds.   Neurological:      General: No focal deficit present.      Mental Status: He is alert and oriented to person, place, and time.   Psychiatric:         Mood and Affect: Mood normal.         Behavior: Behavior normal.         Assessment/Plan   Problem List Items Addressed This Visit             ICD-10-CM    Flu-like symptoms - Primary R68.89     IO ECG indicates possible Afib  Advised to go to hospital: Declines ambulance transport  Pt agreeable to Wife picking up to transport   Report called to [facility]         Relevant Orders    POCT Influenza A/B manually resulted (Completed)    POCT [facility] Covid-19 Ag Card manually resulted (Completed)    Influenza A, and B PCR (Completed)    Sars-CoV-2 PCR, Symptomatic (Completed)

## 2024-01-25 ENCOUNTER — TELEPHONE (OUTPATIENT)
Dept: PRIMARY CARE | Facility: CLINIC | Age: 64
End: 2024-01-25
Payer: MEDICARE

## 2024-01-25 PROBLEM — R68.89 FLU-LIKE SYMPTOMS: Status: ACTIVE | Noted: 2023-04-12

## 2024-01-25 LAB
FLUAV RNA RESP QL NAA+PROBE: NOT DETECTED
FLUBV RNA RESP QL NAA+PROBE: NOT DETECTED
SARS-COV-2 RNA RESP QL NAA+PROBE: NOT DETECTED

## 2024-01-25 NOTE — ASSESSMENT & PLAN NOTE
IO Flu/Covid negative  Flu/Covid PCR to be sent  IO ECG indicates possible Afib  Advised to go to hospital: Declines ambulance transport  Pt agreeable to Wife picking up to transport   Report called to [facility] ER

## 2024-01-25 NOTE — ED PROVIDER NOTES
HPI   Chief Complaint   Patient presents with    Chest Pain       63-year-old male presenting to the ED for evaluation of chest pain and cough.  Patient states 5 days ago he developed lightheadedness and nausea.  Over the weekend his family traveled to [facility] and at that time he developed difficulty breathing with the high altitude.  He also had left-sided dull chest pain that is nonradiating and cough/congestion that started at that time.  After returning home his dyspnea has improved but he continues to have a cough with headache and left-sided chest pain that has been persistent for the past few days.  His symptoms do not worsen with exertion.  He went to urgent care today and was sent to the ED for evaluation based on an abnormal EKG.  Patient has a history of atrial fibrillation and follows with Dr. Enamorado.  He is currently on beta-blockers and anticoagulation reports he is compliant.  He was cardioverted 1 year ago and has not had any complications with atrial fibrillation since then.      History provided by:  Patient                      Humble Coma Scale Score: 15   HEART Score: 4                Patient History   Past Medical History:   Diagnosis Date    Personal history of (healed) traumatic fracture 03/28/2021    History of compression fracture of spine    Personal history of other diseases of the circulatory system 05/11/2019    History of pericarditis     Past Surgical History:   Procedure Laterality Date    OTHER SURGICAL HISTORY  02/12/2021    No history of surgery     Family History   Problem Relation Name Age of Onset    Other (cardiac disorder) Other      Hypertension Other       Social History     Tobacco Use    Smoking status: Never    Smokeless tobacco: Never   Substance Use Topics    Alcohol use: Yes     Comment: MODERATE USE    Drug use: Not Currently       Physical Exam   ED Triage Vitals [01/24/24 1626]   Temperature Heart Rate Respirations BP   36.1 °C (97 °F) 68 18 161/86       Pulse Ox Temp Source Heart Rate Source Patient Position   99 % Temporal Monitor Sitting      BP Location FiO2 (%)     Right arm --       Physical Exam  Vitals and nursing note reviewed.   Constitutional:       General: He is not in acute distress.     Appearance: He is not ill-appearing or toxic-appearing.   HENT:      Head: Normocephalic and atraumatic.      Nose: Nose normal.      Mouth/Throat:      Mouth: Mucous membranes are moist.   Eyes:      Extraocular Movements: Extraocular movements intact.      Conjunctiva/sclera: Conjunctivae normal.   Cardiovascular:      Rate and Rhythm: Normal rate. Rhythm irregular.      Pulses: Normal pulses.           Radial pulses are 2+ on the right side and 2+ on the left side.        Dorsalis pedis pulses are 2+ on the right side and 2+ on the left side.      Heart sounds: Normal heart sounds.   Pulmonary:      Effort: Pulmonary effort is normal.      Breath sounds: Wheezing present.   Abdominal:      General: There is no distension.      Palpations: Abdomen is soft.      Tenderness: There is no abdominal tenderness.   Musculoskeletal:         General: Normal range of motion.      Cervical back: Normal range of motion and neck supple.      Right lower leg: No edema.      Left lower leg: No edema.   Skin:     General: Skin is warm and dry.      Capillary Refill: Capillary refill takes less than 2 seconds.   Neurological:      General: No focal deficit present.      Mental Status: He is alert and oriented to person, place, and time. Mental status is at baseline.         ED Course & MDM   Diagnoses as of 01/24/24 2107   Bronchitis   Chest pain, unspecified type       Medical Decision Making  63-year-old male the past medical history of atrial fibrillation presenting to the ED for cough, headache and chest pain for the past several days.  Patient was found to be in atrial fibrillation but rate is controlled.  He is hemodynamically stable.  Cardiac labs were obtained with  normal troponin x 2.  EKG does not show ischemic findings.  Electrolytes are normal.  No evidence of infection, pulmonary edema or cardiomegaly on chest x-ray.    Case discussed with patient's cardiologist, Dr. Enamorado.  Agree that he is stable to be discharged home at this time given that he is in rate controlled atrial fibrillation and is hemodynamically stable with normal cardiac enzymes.  Suspect that he developed a viral syndrome that triggered his atrial fibrillation.  He was treated with DuoNebs for wheezing on exam and felt better on reevaluation.  He did have some mild tachycardia but suspect this is due to the albuterol.  Patient will be discharged home with cardiology follow-up and was advised to return to the ED if he has worsening chest pain, dyspnea, dizziness or other concerning symptoms.  Patient and his wife are comfortable with this plan.  =================Attending note===============    The patient was seen by the resident/fellow.  I have personally performed a substantive portion of the encounter.  I have seen and examined the patient; agree with the workup, evaluation, MDM,   management and diagnosis.  The care plan has been discussed with the resident; I have reviewed the resident's note and agree with the documented findings.      This is a 63 y.o. male who presents to ER with chest pain he describes as pressure that started on Thursday.  He is also been having some lightheadedness and nausea.  He went to [place] on Friday or Saturday after the symptoms started.  He thought he may have had mountain sickness when he was out there.  Has been having some fatigue and he developed a cough on Saturday.  The cough is the primary concern today.  Patient does have a history of A-fib and he is on Xarelto and metoprolol.  He was cardioverted about a year and a half ago.  Does not think has been in A-fib since then.  He does have hypertension and hyperlipidemia.  Does have a family history of cardiac  disease.  No diabetes.  No tobacco.    Heart is regular.  There is a few scattered wheezes.  DuoNebs were ordered.  Abdomen soft and nontender.  Is awake and alert and conversant.    Initial troponin negative.  Repeat troponin negative.  Chemistry unremarkable.  No leukocytosis.  No significant anemia.  Chest x-ray is no acute abnormalities.    EKG: Atrial fibrillation with controlled rate of 84.  QTc 441.  No ST elevations.    Repeat EKG: Atrial fibrillation with a ventricular rate of 91.  QTc 440.  No ST elevation.  There is a PVC.    Wheezing did improve after DuoNeb.  Patient is given prescription for albuterol.  Heart rate did go up slightly with albuterol.    Case is discussed with Dr. Enamorado who is the patient's cardiologist.  We did discuss that he does have a heart score of 4, but he has 2 negative troponins and his symptoms started Thursday.  He will see the patient in the office tomorrow the next day.  Patient is comfortable being discharged home.  He feels improved after the breathing treatment.  He is given prescription for albuterol.  He will return to the nearest ER for any new or worsening symptoms.    ==========================================          Procedure  Procedures     Kavon Leblanc,   Resident  01/24/24 2107       Nahum Phillips,   01/24/24 2150

## 2024-01-25 NOTE — DISCHARGE INSTRUCTIONS
Seek immediate medical attention if you develop: worsening chest pain, new chest pain, racing heart, nausea, vomiting, weakness, numbness, tingling, excessive sweating, shortness of breath, difficulty breathing, loss of motion in your arms or legs, or any new or worsening symptoms.    Seek immediate medical attention if you develop: worsening cough, difficulty breathing, shortness of breath, nausea, vomiting, diarrhea, fever, weakness, dizziness,  chest pain, or any new or worsening symptoms.

## 2024-01-25 NOTE — TELEPHONE ENCOUNTER
Pt was seen in ER 1/23 for A fib. He has sciatica pain that makes it difficult to walk.  Was instructed to ask PCP for referral for PT.

## 2024-01-26 ENCOUNTER — TELEPHONE (OUTPATIENT)
Dept: CARDIOLOGY | Facility: CLINIC | Age: 64
End: 2024-01-26

## 2024-01-26 ENCOUNTER — OFFICE VISIT (OUTPATIENT)
Dept: CARDIOLOGY | Facility: CLINIC | Age: 64
End: 2024-01-26
Payer: MEDICARE

## 2024-01-26 VITALS
BODY MASS INDEX: 28.36 KG/M2 | HEIGHT: 73 IN | OXYGEN SATURATION: 96 % | WEIGHT: 214 LBS | SYSTOLIC BLOOD PRESSURE: 129 MMHG | HEART RATE: 81 BPM | DIASTOLIC BLOOD PRESSURE: 83 MMHG

## 2024-01-26 DIAGNOSIS — I48.19 PERSISTENT ATRIAL FIBRILLATION (MULTI): Primary | ICD-10-CM

## 2024-01-26 DIAGNOSIS — I25.10 ARTERIOSCLEROTIC CORONARY ARTERY DISEASE: ICD-10-CM

## 2024-01-26 DIAGNOSIS — I50.22 CHRONIC SYSTOLIC HEART FAILURE (MULTI): ICD-10-CM

## 2024-01-26 DIAGNOSIS — M54.30 SCIATICA, UNSPECIFIED LATERALITY: Primary | ICD-10-CM

## 2024-01-26 PROCEDURE — 1126F AMNT PAIN NOTED NONE PRSNT: CPT | Performed by: INTERNAL MEDICINE

## 2024-01-26 PROCEDURE — 1159F MED LIST DOCD IN RCRD: CPT | Performed by: INTERNAL MEDICINE

## 2024-01-26 PROCEDURE — 1036F TOBACCO NON-USER: CPT | Performed by: INTERNAL MEDICINE

## 2024-01-26 PROCEDURE — 99215 OFFICE O/P EST HI 40 MIN: CPT | Performed by: INTERNAL MEDICINE

## 2024-01-26 PROCEDURE — 1160F RVW MEDS BY RX/DR IN RCRD: CPT | Performed by: INTERNAL MEDICINE

## 2024-01-26 ASSESSMENT — ENCOUNTER SYMPTOMS
OCCASIONAL FEELINGS OF UNSTEADINESS: 1
LOSS OF SENSATION IN FEET: 0
DEPRESSION: 1

## 2024-01-26 ASSESSMENT — PAIN SCALES - GENERAL: PAINLEVEL: 0-NO PAIN

## 2024-01-26 NOTE — PROGRESS NOTES
Name : Link Inman   : 1960   MRN : [id number]   ENC Date : 2024      Assessment and Plan:  Persistent and recurrent atrial fibrillation: Patient's heart rate control is probably okay.  I made no medication changes today.  Given he is symptomatic and has a history of cardiomyopathy I would recommend we be fairly aggressive at getting him back into sinus rhythm.  I gave him multiple options including a cardioversion and seeing how he does with follow-up EP consultation, admission to the hospital for dofetilide loading followed by cardioversion if he does not spontaneously convert.  Patient would like to move ahead with a cardioversion and then see electrophysiology to consider ablation down the road.  This is perfectly reasonable.  We will set this up in the near future.  As patient has coronary disease we cannot use flecainide.  Given his relatively young age I do not think going directly to amiodarone is appropriate.  If he does want antiarrhythmic therapy as opposed to ablation dofetilide really is the best option for him.  Again he wants to move ahead with simply getting a cardioversion back to sinus rhythm and discuss further options with electrophysiology.  Chronic systolic heart failure: LVEF had normalized.  Once we get him back into sinus rhythm we will repeat an echocardiogram to make sure LVEF has not dropped.    Disp: Determine follow-up based on results of attempted cardioversion    HPI:  Patient is seen following ER visit yesterday for shortness of breath and chest discomfort.  He was on a trip to [place] and his family noticed that he was considerably more short of breath and fatigue.  When evaluated yesterday his cardiac enzymes were negative for infarct.  However he has gone back into atrial fibrillation.  He could not tell when he went into atrial fibrillation.  He has been taking his Xarelto regularly as well as his other medications.  In retrospect he is probably felt poorly for  more than a month or so.  He remains in atrial fibrillation today.    Problem list overview:   Patient Active Problem List   Diagnosis    Abnormal EKG    Arteriosclerotic coronary artery disease    Atrial fibrillation (CMS/HCC)    Back pain, acute    Chronic systolic heart failure (CMS/HCC)    Combined hyperlipidemia    Common cold    Viral URI with cough    Cough    Dyspnea    Elevated blood pressure reading    Hematoma of leg    Irregular heart beat    Leg pain    Liver cyst    Lumbar radiculopathy, acute    Mild mitral regurgitation    Mild vitamin D deficiency    Flu-like symptoms    Silent myocardial infarction (CMS/HCC)    Vertebral compression fracture (CMS/HCC)    Wound of foot    Heart attack (CMS/HCC)       Meds:   Current Outpatient Medications on File Prior to Visit   Medication Sig Dispense Refill    albuterol 90 mcg/actuation inhaler Inhale 2 puffs every 6 hours if needed for wheezing. 18 g 0    aspirin 81 mg EC tablet Take by mouth.      atorvastatin (Lipitor) 20 mg tablet Take 1 tablet (20 mg) by mouth once daily at bedtime. 90 tablet 3    Entresto 24-26 mg tablet Take 1 tablet by mouth in the morning and 1 tablet before bedtime. 180 tablet 3    metoprolol succinate XL (Toprol-XL) 50 mg 24 hr tablet Take 1 tablet (50 mg) by mouth in the morning and 1 tablet (50 mg) before bedtime. 180 tablet 3    naproxen sodium (Aleve) 220 mg tablet Take 2 tablets (440 mg) by mouth once daily.      Xarelto 20 mg tablet Take 1 tablet (20 mg) by mouth once daily. 90 tablet 3    [DISCONTINUED] atorvastatin (Lipitor) 20 mg tablet Take 1 tablet (20 mg) by mouth once daily at bedtime. 90 tablet 3    [DISCONTINUED] cyclobenzaprine (Flexeril) 10 mg tablet Take 1 tablet (10 mg) by mouth as needed at bedtime for muscle spasms. (Patient not taking: Reported on 1/26/2024) 30 tablet 0    [DISCONTINUED] multivitamin with minerals tablet Take 1 tablet by mouth once daily.       No current facility-administered medications on file  "prior to visit.        VS:  /83 (BP Location: Right arm, Patient Position: Sitting)   Pulse 81   Ht 1.854 m (6' 1\")   Wt 97.1 kg (214 lb)   SpO2 96%   BMI 28.23 kg/m²     Vitals reviewed.   Neck:      Vascular: No JVD.   Pulmonary:      Breath sounds: Normal breath sounds.   Cardiovascular:      Normal rate. Irregularly irregular rhythm.      Murmurs: There is no murmur.      No gallop.    Edema:     Peripheral edema absent.   Abdominal:      General: Abdomen is flat.      Palpations: Abdomen is soft.   Skin:     General: Skin is warm.           Umberto Enamorado MD  "

## 2024-01-26 NOTE — TELEPHONE ENCOUNTER
Please schedule outpatient cardioversion with anesthesia no SHERWIN needed as soon as possible.    SDH

## 2024-01-26 NOTE — H&P (VIEW-ONLY)
Name : Link Inman   : 1960   MRN : [id number]   ENC Date : 2024      Assessment and Plan:  Persistent and recurrent atrial fibrillation: Patient's heart rate control is probably okay.  I made no medication changes today.  Given he is symptomatic and has a history of cardiomyopathy I would recommend we be fairly aggressive at getting him back into sinus rhythm.  I gave him multiple options including a cardioversion and seeing how he does with follow-up EP consultation, admission to the hospital for dofetilide loading followed by cardioversion if he does not spontaneously convert.  Patient would like to move ahead with a cardioversion and then see electrophysiology to consider ablation down the road.  This is perfectly reasonable.  We will set this up in the near future.  As patient has coronary disease we cannot use flecainide.  Given his relatively young age I do not think going directly to amiodarone is appropriate.  If he does want antiarrhythmic therapy as opposed to ablation dofetilide really is the best option for him.  Again he wants to move ahead with simply getting a cardioversion back to sinus rhythm and discuss further options with electrophysiology.  Chronic systolic heart failure: LVEF had normalized.  Once we get him back into sinus rhythm we will repeat an echocardiogram to make sure LVEF has not dropped.    Disp: Determine follow-up based on results of attempted cardioversion    HPI:  Patient is seen following ER visit yesterday for shortness of breath and chest discomfort.  He was on a trip to [place] and his family noticed that he was considerably more short of breath and fatigue.  When evaluated yesterday his cardiac enzymes were negative for infarct.  However he has gone back into atrial fibrillation.  He could not tell when he went into atrial fibrillation.  He has been taking his Xarelto regularly as well as his other medications.  In retrospect he is probably felt poorly for  more than a month or so.  He remains in atrial fibrillation today.    Problem list overview:   Patient Active Problem List   Diagnosis    Abnormal EKG    Arteriosclerotic coronary artery disease    Atrial fibrillation (CMS/HCC)    Back pain, acute    Chronic systolic heart failure (CMS/HCC)    Combined hyperlipidemia    Common cold    Viral URI with cough    Cough    Dyspnea    Elevated blood pressure reading    Hematoma of leg    Irregular heart beat    Leg pain    Liver cyst    Lumbar radiculopathy, acute    Mild mitral regurgitation    Mild vitamin D deficiency    Flu-like symptoms    Silent myocardial infarction (CMS/HCC)    Vertebral compression fracture (CMS/HCC)    Wound of foot    Heart attack (CMS/HCC)       Meds:   Current Outpatient Medications on File Prior to Visit   Medication Sig Dispense Refill    albuterol 90 mcg/actuation inhaler Inhale 2 puffs every 6 hours if needed for wheezing. 18 g 0    aspirin 81 mg EC tablet Take by mouth.      atorvastatin (Lipitor) 20 mg tablet Take 1 tablet (20 mg) by mouth once daily at bedtime. 90 tablet 3    Entresto 24-26 mg tablet Take 1 tablet by mouth in the morning and 1 tablet before bedtime. 180 tablet 3    metoprolol succinate XL (Toprol-XL) 50 mg 24 hr tablet Take 1 tablet (50 mg) by mouth in the morning and 1 tablet (50 mg) before bedtime. 180 tablet 3    naproxen sodium (Aleve) 220 mg tablet Take 2 tablets (440 mg) by mouth once daily.      Xarelto 20 mg tablet Take 1 tablet (20 mg) by mouth once daily. 90 tablet 3    [DISCONTINUED] atorvastatin (Lipitor) 20 mg tablet Take 1 tablet (20 mg) by mouth once daily at bedtime. 90 tablet 3    [DISCONTINUED] cyclobenzaprine (Flexeril) 10 mg tablet Take 1 tablet (10 mg) by mouth as needed at bedtime for muscle spasms. (Patient not taking: Reported on 1/26/2024) 30 tablet 0    [DISCONTINUED] multivitamin with minerals tablet Take 1 tablet by mouth once daily.       No current facility-administered medications on file  "prior to visit.        VS:  /83 (BP Location: Right arm, Patient Position: Sitting)   Pulse 81   Ht 1.854 m (6' 1\")   Wt 97.1 kg (214 lb)   SpO2 96%   BMI 28.23 kg/m²     Vitals reviewed.   Neck:      Vascular: No JVD.   Pulmonary:      Breath sounds: Normal breath sounds.   Cardiovascular:      Normal rate. Irregularly irregular rhythm.      Murmurs: There is no murmur.      No gallop.    Edema:     Peripheral edema absent.   Abdominal:      General: Abdomen is flat.      Palpations: Abdomen is soft.   Skin:     General: Skin is warm.           Umberto Enamorado MD  "

## 2024-01-30 NOTE — TELEPHONE ENCOUNTER
I spoke with Mr. Inman and he is going to check on a supplemental insurance coverage as he only has Medicare at the moment.  I checked with Dr. Enamorado and he is ok with waiting a short time for the CV.

## 2024-02-01 ENCOUNTER — OFFICE VISIT (OUTPATIENT)
Dept: PRIMARY CARE | Facility: CLINIC | Age: 64
End: 2024-02-01
Payer: MEDICARE

## 2024-02-01 VITALS
DIASTOLIC BLOOD PRESSURE: 74 MMHG | SYSTOLIC BLOOD PRESSURE: 122 MMHG | HEART RATE: 84 BPM | BODY MASS INDEX: 28.37 KG/M2 | WEIGHT: 215 LBS | OXYGEN SATURATION: 98 % | TEMPERATURE: 98.2 F | RESPIRATION RATE: 18 BRPM

## 2024-02-01 DIAGNOSIS — J20.9 ACUTE BRONCHITIS WITH WHEEZING: Primary | ICD-10-CM

## 2024-02-01 PROCEDURE — 1160F RVW MEDS BY RX/DR IN RCRD: CPT | Performed by: FAMILY MEDICINE

## 2024-02-01 PROCEDURE — 99214 OFFICE O/P EST MOD 30 MIN: CPT | Performed by: FAMILY MEDICINE

## 2024-02-01 PROCEDURE — 1036F TOBACCO NON-USER: CPT | Performed by: FAMILY MEDICINE

## 2024-02-01 PROCEDURE — 1159F MED LIST DOCD IN RCRD: CPT | Performed by: FAMILY MEDICINE

## 2024-02-01 PROCEDURE — 1126F AMNT PAIN NOTED NONE PRSNT: CPT | Performed by: FAMILY MEDICINE

## 2024-02-01 RX ORDER — AMOXICILLIN AND CLAVULANATE POTASSIUM 875; 125 MG/1; MG/1
875 TABLET, FILM COATED ORAL 2 TIMES DAILY
Qty: 20 TABLET | Refills: 0 | Status: SHIPPED | OUTPATIENT
Start: 2024-02-01 | End: 2024-02-11

## 2024-02-01 ASSESSMENT — ENCOUNTER SYMPTOMS
DIARRHEA: 0
DIFFICULTY URINATING: 0
HEADACHES: 1
FEVER: 0
COUGH: 1
FATIGUE: 1
NAUSEA: 0
VOMITING: 0
WHEEZING: 1
SINUS PRESSURE: 1
MYALGIAS: 1
SORE THROAT: 1
SHORTNESS OF BREATH: 0
HEMATURIA: 0

## 2024-02-01 NOTE — ASSESSMENT & PLAN NOTE
Pt to take atbx as directed  Cont albuterol as needed  Avoid decongestants, may use claritin and flonase for nasal congestion  Call card office and inform of dx and they can decide on card ablation date  F/up if no improvement

## 2024-02-01 NOTE — PROGRESS NOTES
Subjective   Patient ID: Link Inman is a 63 y.o. male who presents for Sinusitis.    Sinusitis  This is a new problem. The current episode started 1 to 4 weeks ago. The problem is unchanged. There has been no fever. Associated symptoms include congestion, coughing, headaches, sinus pressure and a sore throat. Pertinent negatives include no ear pain or shortness of breath. (wheezing) Treatments tried: Sudafed. The treatment provided mild relief.        Review of Systems   Constitutional:  Positive for fatigue. Negative for fever.   HENT:  Positive for congestion, sinus pressure and sore throat. Negative for ear pain.    Respiratory:  Positive for cough and wheezing. Negative for shortness of breath.         Seen at [facility] 1/24/24  ,sent to [facility] er for a fib. Pt had nebulizer tx, card consult. Pt pau for card ablation.  Has Persistent cough, pt has mucousy cough  Pt has some wheezing   Gastrointestinal:  Negative for diarrhea, nausea and vomiting.   Genitourinary:  Negative for difficulty urinating and hematuria.   Musculoskeletal:  Positive for myalgias.   Neurological:  Positive for headaches.       Objective   /74   Pulse 84   Temp 36.8 °C (98.2 °F) (Temporal)   Resp 18   Wt 97.5 kg (215 lb)   SpO2 98%   BMI 28.37 kg/m²     Physical Exam  Vitals and nursing note reviewed.   Constitutional:       General: He is not in acute distress.     Appearance: Normal appearance.   HENT:      Head: Normocephalic and atraumatic.      Right Ear: Tympanic membrane, ear canal and external ear normal.      Left Ear: Tympanic membrane, ear canal and external ear normal.      Nose: Nose normal.      Mouth/Throat:      Mouth: Mucous membranes are moist.      Pharynx: Oropharynx is clear.   Cardiovascular:      Rate and Rhythm: Normal rate. Rhythm irregular.   Pulmonary:      Effort: Pulmonary effort is normal.      Breath sounds: Normal breath sounds.   Musculoskeletal:      Cervical back: Neck supple.    Lymphadenopathy:      Cervical: No cervical adenopathy.   Skin:     General: Skin is warm and dry.   Neurological:      Mental Status: He is alert.         Assessment/Plan   Problem List Items Addressed This Visit             ICD-10-CM    Acute bronchitis with wheezing - Primary J20.9     Pt to take atbx as directed  Cont albuterol as needed  Avoid decongestants, may use claritin and flonase for nasal congestion  Call card office and inform of dx and they can decide on card ablation date  F/up if no improvement         Relevant Medications    amoxicillin-pot clavulanate (Augmentin) 875-125 mg tablet

## 2024-02-06 ENCOUNTER — TELEPHONE (OUTPATIENT)
Dept: RADIOLOGY | Facility: HOSPITAL | Age: 64
End: 2024-02-06

## 2024-02-07 ENCOUNTER — HOSPITAL ENCOUNTER (OUTPATIENT)
Dept: CARDIOLOGY | Facility: HOSPITAL | Age: 64
Discharge: HOME | End: 2024-02-07
Payer: MEDICARE

## 2024-02-07 ENCOUNTER — ANESTHESIA EVENT (OUTPATIENT)
Dept: CARDIOLOGY | Facility: HOSPITAL | Age: 64
End: 2024-02-07
Payer: MEDICARE

## 2024-02-07 ENCOUNTER — ANESTHESIA (OUTPATIENT)
Dept: CARDIOLOGY | Facility: HOSPITAL | Age: 64
End: 2024-02-07
Payer: MEDICARE

## 2024-02-07 VITALS
HEIGHT: 73 IN | HEART RATE: 97 BPM | SYSTOLIC BLOOD PRESSURE: 137 MMHG | WEIGHT: 204 LBS | BODY MASS INDEX: 27.04 KG/M2 | RESPIRATION RATE: 18 BRPM | OXYGEN SATURATION: 97 % | DIASTOLIC BLOOD PRESSURE: 90 MMHG

## 2024-02-07 DIAGNOSIS — I48.19 PERSISTENT ATRIAL FIBRILLATION (MULTI): ICD-10-CM

## 2024-02-07 PROCEDURE — 93005 ELECTROCARDIOGRAM TRACING: CPT

## 2024-02-07 PROCEDURE — 3700000012 HC SEDATION LEVEL 5+ TIME - INITIAL 15 MINUTES 5/> YEARS

## 2024-02-07 PROCEDURE — 7100000010 HC PHASE TWO TIME - EACH INCREMENTAL 1 MINUTE

## 2024-02-07 PROCEDURE — 3700000002 HC GENERAL ANESTHESIA TIME - EACH INCREMENTAL 1 MINUTE

## 2024-02-07 PROCEDURE — 3700000001 HC GENERAL ANESTHESIA TIME - INITIAL BASE CHARGE

## 2024-02-07 PROCEDURE — A92960 PR CARDIOVERSION, ELECTIVE;EXTERN: Performed by: NURSE ANESTHETIST, CERTIFIED REGISTERED

## 2024-02-07 PROCEDURE — 7100000001 HC RECOVERY ROOM TIME - INITIAL BASE CHARGE

## 2024-02-07 PROCEDURE — 2500000004 HC RX 250 GENERAL PHARMACY W/ HCPCS (ALT 636 FOR OP/ED): Performed by: NURSE ANESTHETIST, CERTIFIED REGISTERED

## 2024-02-07 PROCEDURE — 93010 ELECTROCARDIOGRAM REPORT: CPT | Performed by: INTERNAL MEDICINE

## 2024-02-07 PROCEDURE — 92960 CARDIOVERSION ELECTRIC EXT: CPT | Performed by: INTERNAL MEDICINE

## 2024-02-07 PROCEDURE — 92960 CARDIOVERSION ELECTRIC EXT: CPT | Mod: 59

## 2024-02-07 PROCEDURE — 7100000009 HC PHASE TWO TIME - INITIAL BASE CHARGE

## 2024-02-07 PROCEDURE — A92960 PR CARDIOVERSION, ELECTIVE;EXTERN: Performed by: ANESTHESIOLOGY

## 2024-02-07 PROCEDURE — 7100000002 HC RECOVERY ROOM TIME - EACH INCREMENTAL 1 MINUTE

## 2024-02-07 RX ORDER — SODIUM CHLORIDE, SODIUM LACTATE, POTASSIUM CHLORIDE, CALCIUM CHLORIDE 600; 310; 30; 20 MG/100ML; MG/100ML; MG/100ML; MG/100ML
INJECTION, SOLUTION INTRAVENOUS CONTINUOUS PRN
Status: DISCONTINUED | OUTPATIENT
Start: 2024-02-07 | End: 2024-02-07

## 2024-02-07 RX ORDER — PROPOFOL 10 MG/ML
INJECTION, EMULSION INTRAVENOUS AS NEEDED
Status: DISCONTINUED | OUTPATIENT
Start: 2024-02-07 | End: 2024-02-07

## 2024-02-07 RX ADMIN — SODIUM CHLORIDE, POTASSIUM CHLORIDE, SODIUM LACTATE AND CALCIUM CHLORIDE: 600; 310; 30; 20 INJECTION, SOLUTION INTRAVENOUS at 07:38

## 2024-02-07 RX ADMIN — PROPOFOL 70 MG: 10 INJECTION, EMULSION INTRAVENOUS at 07:42

## 2024-02-07 SDOH — HEALTH STABILITY: MENTAL HEALTH: CURRENT SMOKER: 0

## 2024-02-07 ASSESSMENT — PAIN - FUNCTIONAL ASSESSMENT: PAIN_FUNCTIONAL_ASSESSMENT: 0-10

## 2024-02-07 ASSESSMENT — PAIN SCALES - GENERAL
PAINLEVEL_OUTOF10: 0 - NO PAIN

## 2024-02-07 ASSESSMENT — COLUMBIA-SUICIDE SEVERITY RATING SCALE - C-SSRS
2. HAVE YOU ACTUALLY HAD ANY THOUGHTS OF KILLING YOURSELF?: NO
1. IN THE PAST MONTH, HAVE YOU WISHED YOU WERE DEAD OR WISHED YOU COULD GO TO SLEEP AND NOT WAKE UP?: NO
6. HAVE YOU EVER DONE ANYTHING, STARTED TO DO ANYTHING, OR PREPARED TO DO ANYTHING TO END YOUR LIFE?: NO

## 2024-02-07 NOTE — ANESTHESIA PREPROCEDURE EVALUATION
Patient: Link Inman    Procedure Information       Date/Time: 02/07/24 0730    Procedure: CARDIOVERSION EXTERNAL    Location: [facility]            Relevant Problems   Cardiovascular   (+) Abnormal EKG   (+) Arteriosclerotic coronary artery disease   (+) Atrial fibrillation (CMS/HCC)   (+) Chronic systolic heart failure (CMS/HCC)   (+) Combined hyperlipidemia   (+) Heart attack (CMS/HCC)   (+) Mild mitral regurgitation   (+) Silent myocardial infarction (CMS/HCC)      /Renal   (+) Liver cyst      Neuro/Psych   (+) Lumbar radiculopathy, acute      Infectious Disease   (+) Common cold   (+) Viral URI with cough       Clinical information reviewed:   Tobacco  Allergies  Meds  Problems  Med Hx  Surg Hx   Fam Hx  Soc   Hx        NPO Detail:  NPO/Void Status  Date of Last Liquid: 02/07/24  Time of Last Liquid: 0600  Date of Last Solid: 02/06/24  Time of Last Solid: 1800         Physical Exam    Airway  Mallampati: II  TM distance: <3 FB  Neck ROM: full     Cardiovascular - normal exam  Rhythm: irregular     Dental    Pulmonary   (+) decreased breath sounds     Abdominal - normal exam  (+) obese             Anesthesia Plan    History of general anesthesia?: yes  History of complications of general anesthesia?: no    ASA 3     general   (TIVA)  The patient is not a current smoker.  Patient was previously instructed to abstain from smoking on day of procedure.  Patient did not smoke on day of procedure.    intravenous induction   Anesthetic plan and risks discussed with patient.  Use of blood products discussed with patient who.    Plan discussed with CRNA and attending.

## 2024-02-07 NOTE — POST-PROCEDURE NOTE
Physician Transition of Care Summary  Invasive Cardiovascular Lab    After adequate conscious sedation was achieved, we delivered one 200J biphasic synchronized shock successfully converting patient to NSR.    Umberto Enamorado MD

## 2024-02-07 NOTE — DISCHARGE INSTRUCTIONS
FOLLOW UP WITH     CALL FOR APPOINTMENT IF NEEDED.  NO DRIVING FOR 24 HOURS.  NO ALCOHOL FOR 24 HOURS.  MAY  SHOWER.

## 2024-02-07 NOTE — ANESTHESIA POSTPROCEDURE EVALUATION
Patient: Link Inman    Procedure Summary       Date: 02/07/24 Room / Location: [facility]    Anesthesia Start: 0740 Anesthesia Stop: 0755    Procedure: CARDIOVERSION EXTERNAL Diagnosis: Persistent atrial fibrillation (CMS/HCC)    Scheduled Providers:  Responsible Provider: Seun Calle MD    Anesthesia Type: general ASA Status: 3            Anesthesia Type: general    Vitals Value Taken Time   /71 02/07/24 0754   Temp 36.0 02/07/24 0759   Pulse 86 02/07/24 0754   Resp 16 02/07/24 0754   SpO2 96 % 02/07/24 0754       Anesthesia Post Evaluation    Patient location during evaluation: bedside  Patient participation: complete - patient participated  Level of consciousness: sleepy but conscious  Pain management: adequate  Airway patency: patent  Cardiovascular status: acceptable  Respiratory status: acceptable, nasal cannula, nonlabored ventilation, spontaneous ventilation and unassisted  Hydration status: acceptable  Postoperative Nausea and Vomiting: none  Comments: Handoff to Cath lab RN        No notable events documented.

## 2024-02-07 NOTE — INTERVAL H&P NOTE
H&P reviewed. The patient was examined and there are no changes to the H&P.  Umberto Enamorado MD

## 2024-02-07 NOTE — NURSING NOTE
Discharge instructions reviewed with patient and wife who verbalize understanding. Written copy received.

## 2024-02-09 DIAGNOSIS — I50.22 CHRONIC SYSTOLIC HEART FAILURE (MULTI): ICD-10-CM

## 2024-02-09 LAB
ATRIAL RATE: 78 BPM
P AXIS: 41 DEGREES
P OFFSET: 190 MS
P ONSET: 117 MS
PR INTERVAL: 192 MS
Q ONSET: 213 MS
QRS COUNT: 13 BEATS
QRS DURATION: 84 MS
QT INTERVAL: 386 MS
QTC CALCULATION(BAZETT): 440 MS
QTC FREDERICIA: 421 MS
R AXIS: 5 DEGREES
T AXIS: 27 DEGREES
T OFFSET: 406 MS
VENTRICULAR RATE: 78 BPM

## 2024-02-09 RX ORDER — METOPROLOL SUCCINATE 50 MG/1
50 TABLET, EXTENDED RELEASE ORAL 2 TIMES DAILY
Qty: 180 TABLET | Refills: 3 | Status: SHIPPED | OUTPATIENT
Start: 2024-02-09 | End: 2025-02-08

## 2024-02-12 LAB
ATRIAL RATE: 357 BPM
ATRIAL RATE: 416 BPM
Q ONSET: 217 MS
Q ONSET: 220 MS
QRS COUNT: 14 BEATS
QRS COUNT: 15 BEATS
QRS DURATION: 84 MS
QRS DURATION: 86 MS
QT INTERVAL: 358 MS
QT INTERVAL: 374 MS
QTC CALCULATION(BAZETT): 440 MS
QTC CALCULATION(BAZETT): 441 MS
QTC FREDERICIA: 411 MS
QTC FREDERICIA: 418 MS
R AXIS: 27 DEGREES
R AXIS: 46 DEGREES
T AXIS: 41 DEGREES
T AXIS: 46 DEGREES
T OFFSET: 396 MS
T OFFSET: 407 MS
VENTRICULAR RATE: 84 BPM
VENTRICULAR RATE: 91 BPM

## 2024-02-16 LAB — BODY SURFACE AREA: 2.18 M2

## 2024-02-19 ENCOUNTER — APPOINTMENT (OUTPATIENT)
Dept: CARDIOLOGY | Facility: HOSPITAL | Age: 64
End: 2024-02-19
Payer: MEDICARE

## 2024-03-07 ENCOUNTER — EVALUATION (OUTPATIENT)
Dept: PHYSICAL THERAPY | Facility: CLINIC | Age: 64
End: 2024-03-07
Payer: MEDICARE

## 2024-03-07 DIAGNOSIS — M54.9 BACK PAIN, ACUTE: Primary | ICD-10-CM

## 2024-03-07 DIAGNOSIS — M54.30 SCIATICA, UNSPECIFIED LATERALITY: ICD-10-CM

## 2024-03-07 PROCEDURE — 97110 THERAPEUTIC EXERCISES: CPT | Mod: GP

## 2024-03-07 PROCEDURE — 97161 PT EVAL LOW COMPLEX 20 MIN: CPT | Mod: GP

## 2024-03-07 ASSESSMENT — PAIN - FUNCTIONAL ASSESSMENT
PAIN_FUNCTIONAL_ASSESSMENT: 0-10
PAIN_FUNCTIONAL_ASSESSMENT: 0-10

## 2024-03-07 ASSESSMENT — PAIN SCALES - GENERAL: PAINLEVEL_OUTOF10: 3

## 2024-03-07 NOTE — PROGRESS NOTES
Physical Therapy Evaluation and Treatment      Patient Name: Link Inman  MRN: [id number]  Today's Date: 3/7/2024  Time Calculation  Start Time: 1253  Stop Time: 1338  Time Calculation (min): 45 min  Visit Number: 1  Approved Visits:SALINA Moon required: no  Medicare Certification Date Range: 3/7/2024 to 5/6/2024    Assessment:  PT Assessment  Rehab Prognosis: Good  Evaluation/Treatment Tolerance: Patient tolerated treatment well   Patient presents with chief complaint of back and BLE pain that has been present since he fell and suffered a T11 compression fracture in 2011. Patient notes that walking, working overhead tends to aggravate his sxs. We started him on a gentle lumbopelvic mobility and strength program, which he tolerated well today and reported improved discomfort post session. He will work on things at home and we will plan to keep his chart open until his certification period is up. Patient demonstrates decreased lumbar ROM, decreased lumbopelvic control, decreased hip strength, decreased functional strength, and increased back/leg pain, which limits his current functional ability. Patient would likely benefit from skilled outpatient PT in order to address the above deficits and return to PLOF.   Plan:  OP PT Plan  Treatment/Interventions: Cryotherapy, Education/ Instruction, Electrical stimulation, Gait training, Hot pack, Manual therapy, Neuromuscular re-education, Self care/ home management, Taping techniques, Therapeutic activities, Therapeutic exercises  PT Plan: Skilled PT  PT Frequency: 1 time per week  Duration: 6-8 weeks  Certification Period Start Date: 03/07/24  Certification Period End Date: 05/06/24  Number of Treatments Authorized: MN  Rehab Potential: Good  Plan of Care Agreement: Patient    Current Problem:   1. Back pain, acute  Follow Up In Physical Therapy      2. Sciatica, unspecified laterality  Referral to Physical Therapy          Subjective    General:  General  Reason for  "Referral: back and BLE pain  Referred By: Dr. Lopez  Chief complaint: back and BLE pain that has been present since he fractured his spine in . States that he fell off a ladder and landed on a hard floor and was found to have compression fracture of T11. States that he feels most of the leg sxs in the L leg along the lateral aspect down to the lower leg, but will also feel it in R side. Will also feel pain in the low back in the middle. Reports that he played pickleball recently and had increased pain for about 3 days post. States that working over his head, as well as increased activity really seems to aggravate his sxs. He notes that laying flat on the floor can sometimes help. Will take Aleve for pain occasionally. Will notice tingling in the leg. Feels weak with getting up out of low surface. He would like to be able to return to a regular walking program with his wife and daughters -- feels that he can only walk about .5 miles. Has tried steroids without much relief in the past. Has tried chiropractic care without much relief. Pain does not really affect sleep. 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 Imaging from  reveals compression fracture deformity T11, multilevel spondylosis and moderate facet disease  PMHx: A-Fib, CAD, heart failur, HLD, dyspnea, hx MI, hx compression fracture in   : verified with patient  PLOF: independent without restrictions  Medications: see chart for full medication list  Patient goals for PT: reduce pain, improve strength/activity tolerance  Medical Screening: Patient denies bowel/bladder changes, pulsatile mass in abdomen, recent infection/illness, drastic weight change, hx cancer, saddle anesthesia,    \"Benoit\"  Precautions:  Precautions  Precautions Comment: none; low fall risk  Pain:  Pain Assessment  Pain Assessment: 0-10  Pain Score: 3 (THerapist scored)      Objective   Functional Performance:   STS transfer: slight " increased effort with deeper range; increased trunk flexion to complete  Stairs negotiation: not assessed this date  Gait Analysis: WNL  Lumbar ROM:    Flexion: 50%   Extension: 40% (+) familiar pain   SB: R+L: 50% slight (+) pain  Lumbar quadrant: slight (+) pain B  Hip ROM: mod limitation all planes --slight (+) pain with IR B  Neuro Exam:   Dermatome exam: WNL   Myotome exam: WNL   Reflexes: not assessed this date  MMT:   Hip abduction: R:4-/5 L:4-/5 TFL contribution noted B   Prone hip extension: R:3+/5 L:3+/5 lumbar compensation noted B   Knee extension: R:5/5 L:5/5   Knee flexion: R:5/5 L:5/5  Special Tests:   SLR:    L: slight (+) ~45* with familiar pain    R: tightness, but no pain   BARBRA: (+) pain/tightness B    FADDIR: tightness, but no pain   External derotation test: (-) B  Palpation: hypertonicity noted L>R lumbar paraspinals  Joint Play Assessment: hypomobility noted throughout lumbar spine with no familiar sxs    Outcome Measures:  Other Measures  Lower Extremity Funtional Score (LEFS): 52  Oswestry Disablity Index (EVE): 19     Treatments:  LTR  Supine posterior capsule stretch  DL bridge GTB  FIS    EDUCATION:  Outpatient Education  Individual(s) Educated: Patient  Education Provided: Home Exercise Program  Risk and Benefits Discussed with Patient/Caregiver/Other: yes  Patient/Caregiver Demonstrated Understanding: yes  Plan of Care Discussed and Agreed Upon: yes  Patient Response to Education: Patient/Caregiver Verbalized Understanding of Information  Education Comment: review HEP    Goals:  Patient will demonstrate improvement in EVE by at least 12.8 points in order to meet MCID  Patient will demonstrate full lumbar AROM without pain or compensation  Patient will demonstrate negative SLR test  Patient will demonstrate full hip ROM without pain   Patient will demonstrate at least 4+/5 glute med/max strength without pain  Patient will be able to ambulate community distances without pain or  compensation  Patient will demonstrate I with HEP  Patient will be able to return to regular walking program and pickleball activities without pain or compensation  Patient will demonstrate good lumbopelvic control with dynamic movements/exercise

## 2024-03-22 ENCOUNTER — TREATMENT (OUTPATIENT)
Dept: PHYSICAL THERAPY | Facility: CLINIC | Age: 64
End: 2024-03-22
Payer: MEDICARE

## 2024-03-22 DIAGNOSIS — M54.9 BACK PAIN, ACUTE: Primary | ICD-10-CM

## 2024-03-22 PROCEDURE — 97110 THERAPEUTIC EXERCISES: CPT | Mod: GP

## 2024-03-22 ASSESSMENT — PAIN - FUNCTIONAL ASSESSMENT: PAIN_FUNCTIONAL_ASSESSMENT: 0-10

## 2024-03-22 ASSESSMENT — PAIN SCALES - GENERAL: PAINLEVEL_OUTOF10: 2

## 2024-03-22 NOTE — PROGRESS NOTES
"Physical Therapy Evaluation and Treatment      Patient Name: Link Inman  MRN: [id number]  Today's Date: 3/22/2024  Time Calculation  Start Time: 0745  Stop Time: 0828  Time Calculation (min): 43 min  Visit Number: 2  Approved Visits:SALINA Moon required: no  Medicare Certification Date Range: 3/7/2024 to 5/6/2024    Assessment:  Patient presents with reports of relatively adherence to HEP, but does note some relief when he does the exercises, particularly FIS. Discussed continuing with this and trying to ramp up exercises a bit to build on progress made thus far. Tolerates tx well this date with no increase in sxs. Requires some cueing to prevent pelvic rotation with clamshell exercise, but able to correct.   Plan:  Progress lumbopelvic stability/mobility as tolerated    Current Problem:   1. Back pain, acute  Follow Up In Physical Therapy          Subjective    Patient notes that he has not done his exercises are much as he probably should, but feels that they are helpful, especially the FIS exercise. Feels that he can walk a little bit longer at this time.  \"Benoit\"  Precautions:  Precautions  Precautions Comment: none; low fall risk  Pain:  Pain Assessment  Pain Assessment: 0-10  Pain Score: 2      Objective   Eval Objective:  Functional Performance:   STS transfer: slight increased effort with deeper range; increased trunk flexion to complete  Stairs negotiation: not assessed this date  Gait Analysis: WNL  Lumbar ROM:    Flexion: 50%   Extension: 40% (+) familiar pain   SB: R+L: 50% slight (+) pain  Lumbar quadrant: slight (+) pain B  Hip ROM: mod limitation all planes --slight (+) pain with IR B  Neuro Exam:   Dermatome exam: WNL   Myotome exam: WNL   Reflexes: not assessed this date  MMT:   Hip abduction: R:4-/5 L:4-/5 TFL contribution noted B   Prone hip extension: R:3+/5 L:3+/5 lumbar compensation noted B   Knee extension: R:5/5 L:5/5   Knee flexion: R:5/5 L:5/5  Special Tests:   SLR:    L: slight (+) ~45* with " familiar pain    R: tightness, but no pain   BARBRA: (+) pain/tightness B    FADDIR: tightness, but no pain   External derotation test: (-) B  Palpation: hypertonicity noted L>R lumbar paraspinals  Joint Play Assessment: hypomobility noted throughout lumbar spine with no familiar sxs    Outcome Measures:      Not assessed this date     Treatments:  Therapeutic Exercise:  LTR  SKTC  Supine posterior capsule stretch  Supine hip ER stretch  DL bridge BTB  Clamshells BTB  FIS  Lat pulldowns cable column 12.5lbs     EDUCATION:  Added clamshell BTB, supine hip ER stretch to HEP    Goals:  Patient will demonstrate improvement in EVE by at least 12.8 points in order to meet MCID  Patient will demonstrate full lumbar AROM without pain or compensation  Patient will demonstrate negative SLR test  Patient will demonstrate full hip ROM without pain   Patient will demonstrate at least 4+/5 glute med/max strength without pain  Patient will be able to ambulate community distances without pain or compensation  Patient will demonstrate I with HEP  Patient will be able to return to regular walking program and pickleball activities without pain or compensation  Patient will demonstrate good lumbopelvic control with dynamic movements/exercise

## 2024-04-01 ENCOUNTER — APPOINTMENT (OUTPATIENT)
Dept: PHYSICAL THERAPY | Facility: CLINIC | Age: 64
End: 2024-04-01
Payer: MEDICARE

## 2024-04-01 ENCOUNTER — TELEPHONE (OUTPATIENT)
Dept: CARDIOLOGY | Facility: CLINIC | Age: 64
End: 2024-04-01
Payer: MEDICARE

## 2024-04-01 DIAGNOSIS — I48.91 ATRIAL FIBRILLATION, UNSPECIFIED TYPE (MULTI): ICD-10-CM

## 2024-04-01 DIAGNOSIS — I50.22 CHRONIC SYSTOLIC HEART FAILURE (MULTI): ICD-10-CM

## 2024-04-01 RX ORDER — RIVAROXABAN 20 MG/1
20 TABLET, FILM COATED ORAL DAILY
Qty: 90 TABLET | Refills: 3 | Status: SHIPPED | OUTPATIENT
Start: 2024-04-01

## 2024-04-01 RX ORDER — SACUBITRIL AND VALSARTAN 24; 26 MG/1; MG/1
1 TABLET, FILM COATED ORAL 2 TIMES DAILY
Qty: 180 TABLET | Refills: 3 | Status: SHIPPED | OUTPATIENT
Start: 2024-04-01

## 2024-04-01 NOTE — TELEPHONE ENCOUNTER
Pt requesting refills for Entresto 24-26 mg tablet and Xarelto 20 mg tablet 90 day supply to be sent to [facility]

## 2024-04-05 ENCOUNTER — TELEPHONE (OUTPATIENT)
Dept: PRIMARY CARE | Facility: CLINIC | Age: 64
End: 2024-04-05
Payer: MEDICARE

## 2024-04-05 NOTE — TELEPHONE ENCOUNTER
TYLER---Pt stopped in office on Friday, 4/5/24 stating he did some work around the house and may have over did it and now he is not feeling well.  He has hx of AFIB.      Suggested to go to the ER or to our Urgent Care to be checked out.

## 2024-04-15 ENCOUNTER — OFFICE VISIT (OUTPATIENT)
Dept: PRIMARY CARE | Facility: CLINIC | Age: 64
End: 2024-04-15
Payer: MEDICARE

## 2024-04-15 VITALS
RESPIRATION RATE: 14 BRPM | WEIGHT: 209 LBS | BODY MASS INDEX: 27.7 KG/M2 | HEIGHT: 73 IN | OXYGEN SATURATION: 95 % | HEART RATE: 83 BPM | DIASTOLIC BLOOD PRESSURE: 78 MMHG | SYSTOLIC BLOOD PRESSURE: 130 MMHG

## 2024-04-15 DIAGNOSIS — G47.33 OSA (OBSTRUCTIVE SLEEP APNEA): ICD-10-CM

## 2024-04-15 DIAGNOSIS — Z23 NEED FOR VACCINATION: ICD-10-CM

## 2024-04-15 DIAGNOSIS — Z00.00 MEDICARE ANNUAL WELLNESS VISIT, INITIAL: Primary | ICD-10-CM

## 2024-04-15 DIAGNOSIS — E55.9 MILD VITAMIN D DEFICIENCY: ICD-10-CM

## 2024-04-15 DIAGNOSIS — E78.2 COMBINED HYPERLIPIDEMIA: ICD-10-CM

## 2024-04-15 DIAGNOSIS — R35.1 NOCTURIA: ICD-10-CM

## 2024-04-15 DIAGNOSIS — Z00.00 ROUTINE GENERAL MEDICAL EXAMINATION AT HEALTH CARE FACILITY: ICD-10-CM

## 2024-04-15 DIAGNOSIS — Z00.00 PREVENTATIVE HEALTH CARE: ICD-10-CM

## 2024-04-15 PROCEDURE — 1036F TOBACCO NON-USER: CPT | Performed by: INTERNAL MEDICINE

## 2024-04-15 PROCEDURE — 1124F ACP DISCUSS-NO DSCNMKR DOCD: CPT | Performed by: INTERNAL MEDICINE

## 2024-04-15 PROCEDURE — 1159F MED LIST DOCD IN RCRD: CPT | Performed by: INTERNAL MEDICINE

## 2024-04-15 PROCEDURE — 1170F FXNL STATUS ASSESSED: CPT | Performed by: INTERNAL MEDICINE

## 2024-04-15 PROCEDURE — 1160F RVW MEDS BY RX/DR IN RCRD: CPT | Performed by: INTERNAL MEDICINE

## 2024-04-15 PROCEDURE — 99213 OFFICE O/P EST LOW 20 MIN: CPT | Performed by: INTERNAL MEDICINE

## 2024-04-15 PROCEDURE — G0439 PPPS, SUBSEQ VISIT: HCPCS | Performed by: INTERNAL MEDICINE

## 2024-04-15 ASSESSMENT — ENCOUNTER SYMPTOMS
MYALGIAS: 0
JOINT SWELLING: 0
CONSTIPATION: 0
COUGH: 0
FEVER: 0
DIARRHEA: 0
VOMITING: 0
NAUSEA: 0
SHORTNESS OF BREATH: 0
DIAPHORESIS: 0
CHILLS: 0

## 2024-04-15 ASSESSMENT — ACTIVITIES OF DAILY LIVING (ADL)
TAKING_MEDICATION: INDEPENDENT
GROCERY_SHOPPING: INDEPENDENT
DRESSING: INDEPENDENT
MANAGING_FINANCES: INDEPENDENT
DOING_HOUSEWORK: INDEPENDENT
BATHING: INDEPENDENT

## 2024-04-15 NOTE — PATIENT INSTRUCTIONS
FASTING LABS ARE ORDERED FOR AFTER 4/17/24    2.  SHINGIX IMMUNIZATION IS RECOMMENDED FOR YOU, I PRINTED OUT SCRIPT FOR YOU TO GET AT LOCAL PHARMACY    3.  PREVNAR-20 PNEUMONIA IMMUNIZATION IS RECOMMENDED FOR YOU ALSO CAN BE GOTTEN AT THE LOCAL PHARMACY    4.  RECOMMEND TRIAL OF GETTING OFF NAPROXEN AND TRIAL TYLENOL AS NEEDED FOR BACK AND OVERALL PAIN.  IF YOU MUST REMAIN ON NAPROXEN TO REMAIN FUNCTIONAL, THEN WE CAN START LOW DOSE OF AN ACID BLOCKER TO PROTECT YOUR STOMACH FROM RISK OF STOMACH INFLAMMATION - SINCE YOU NEED TO REMAIN ON XARELTO BLOOD THINNER    5.  REGULAR EXERCISE, HEALTHY DIET RECOMMENDED FOR YOU    6.  REGULAR YEARLY EYE EXAMS RECOMMENDED    7.  FOLLOW UP 6-12 MONTHS OR AS NEEDED.    8.  ONGOING FOLLOW UP WITH DR. STONE.  PERHAPS IF YOUR HEART FUNCTION RECOVERS AS YOU GET FURTHER AWAY FROM AFIB, THEN PERHAPS ENTRESTO CAN BE DECREASED OR D/C'D    9.  CONSIDER CARDIA-MOBILE DEVICE THAT CAN BE USED TO DETECT ATRIAL FIBRILLATION AT A [facility].    10.  HOME SLEEP APNEA TEST IS ORDERED TO SEE IF SLEEP APNEA - SLEEP APNEA HAS A VERY CLOSE CORRELATION WITH ATRIAL FIBRILLATION.

## 2024-04-15 NOTE — PROGRESS NOTES
"SIDE Subjective   Reason for Visit: Link Inman is an 63 y.o. male here for a WELCOME TO  Medicare Wellness visit.   SEE EYE DR. YEARLY     Past Medical, Surgical, and Family History reviewed and updated in chart.    Reviewed all medications by prescribing practitioner or clinical pharmacist (such as prescriptions, OTCs, herbal therapies and supplements) and documented in the medical record.    HPI  HAD DONE SOME WORK EARLIER IN THE WEEK THAT STRETCHED CORE MUSCLES.  JUST HAD SOME ACHES AND PAINS.  HAD BEEN WORRIED ABOUT AFIB.  WENT TO URGENT CARE, AND THEY LISTENED TO HIM AND NO AFIB.      TAKE 2 NAPROXEN DAILY.  IF DON'T TAKE IT BACK HURTS A LITTLE.  CONSIDERING SWITCH TO TYLENOL.      SNORES SELF AWAKE    GETTING PT FOR BACK.  ALSO HIS LEG IS GETTING SOME BENEFIT FROM THE PT.    NEVER SMOKER    COLONSCOPY NEXT DUE 2028    HAD CARDIOVERSION EARLIER THIS YEAR, OCCASIONALLY CONCERNED THAT HE HAS GONE BACK IN TO AFIB.       Patient Care Team:  Sukhdeep Lopez MD as PCP - General (Internal Medicine)  Sukhdeep Lopez MD as PCP - [facility] PCP     Review of Systems   Constitutional:  Negative for chills, diaphoresis and fever.   Respiratory:  Negative for cough and shortness of breath.    Cardiovascular:  Negative for chest pain and leg swelling.        SEE HPI   Gastrointestinal:  Negative for constipation, diarrhea, nausea and vomiting.   Musculoskeletal:  Negative for joint swelling and myalgias.       Objective   Vitals:  /78   Pulse 83   Resp 14   Ht 1.854 m (6' 1\")   Wt 94.8 kg (209 lb)   SpO2 95%   BMI 27.57 kg/m²       Physical Exam  Vitals reviewed.   Constitutional:       General: He is not in acute distress.     Appearance: He is not ill-appearing.   Cardiovascular:      Rate and Rhythm: Normal rate and regular rhythm.      Pulses: Normal pulses.      Heart sounds:      No gallop.   Pulmonary:      Breath sounds: Normal breath sounds. No wheezing, rhonchi or rales.   Abdominal:     "  General: Abdomen is flat. Bowel sounds are normal.      Palpations: Abdomen is soft.      Tenderness: There is no guarding or rebound.   Musculoskeletal:      Right lower leg: No edema.      Left lower leg: No edema.   Skin:     General: Skin is warm and dry.   Neurological:      General: No focal deficit present.      Mental Status: He is oriented to person, place, and time.   Psychiatric:         Mood and Affect: Mood normal.         Behavior: Behavior normal.         Assessment/Plan   Problem List Items Addressed This Visit       Combined hyperlipidemia    Relevant Orders    Vitamin B12    Lipid Panel    TSH with reflex to Free T4 if abnormal    Comprehensive Metabolic Panel    CBC    Mild vitamin D deficiency    Relevant Orders    Vitamin D 25-Hydroxy,Total (for eval of Vitamin D levels)    Medicare annual wellness visit, initial - Primary     Other Visit Diagnoses       Nocturia        Relevant Orders    Prostate Specific Antigen, Screen    Preventative health care        Relevant Orders    Hepatitis C Antibody    Need for vaccination        Relevant Medications    zoster vaccine-recombinant adjuvanted (Shingrix) 50 mcg/0.5 mL vaccine    Routine general medical examination at health care facility        HECTOR (obstructive sleep apnea)        Relevant Orders    Home sleep apnea test (HSAT)          Patient Instructions    FASTING LABS ARE ORDERED FOR AFTER 4/17/24    2.  SHINGIX IMMUNIZATION IS RECOMMENDED FOR YOU, I PRINTED OUT SCRIPT FOR YOU TO GET AT LOCAL PHARMACY    3.  PREVNAR-20 PNEUMONIA IMMUNIZATION IS RECOMMENDED FOR YOU ALSO CAN BE GOTTEN AT THE LOCAL PHARMACY    4.  RECOMMEND TRIAL OF GETTING OFF NAPROXEN AND TRIAL TYLENOL AS NEEDED FOR BACK AND OVERALL PAIN.  IF YOU MUST REMAIN ON NAPROXEN TO REMAIN FUNCTIONAL, THEN WE CAN START LOW DOSE OF AN ACID BLOCKER TO PROTECT YOUR STOMACH FROM RISK OF STOMACH INFLAMMATION - SINCE YOU NEED TO REMAIN ON XARELTO BLOOD THINNER    5.  REGULAR EXERCISE, HEALTHY  DIET RECOMMENDED FOR YOU    6.  REGULAR YEARLY EYE EXAMS RECOMMENDED    7.  FOLLOW UP 6-12 MONTHS OR AS NEEDED.    8.  ONGOING FOLLOW UP WITH DR. STONE.  PERHAPS IF YOUR HEART FUNCTION RECOVERS AS YOU GET FURTHER AWAY FROM AFIB, THEN PERHAPS ENTRESTO CAN BE DECREASED OR D/C'D    9.  CONSIDER CARDIA-MOBILE DEVICE THAT CAN BE USED TO DETECT ATRIAL FIBRILLATION AT A [facility].    10.  HOME SLEEP APNEA TEST IS ORDERED TO SEE IF SLEEP APNEA - SLEEP APNEA HAS A VERY CLOSE CORRELATION WITH ATRIAL FIBRILLATION.

## 2024-04-29 ENCOUNTER — LAB (OUTPATIENT)
Dept: LAB | Facility: LAB | Age: 64
End: 2024-04-29
Payer: MEDICARE

## 2024-04-29 DIAGNOSIS — E55.9 MILD VITAMIN D DEFICIENCY: ICD-10-CM

## 2024-04-29 DIAGNOSIS — E78.2 COMBINED HYPERLIPIDEMIA: ICD-10-CM

## 2024-04-29 DIAGNOSIS — Z00.00 PREVENTATIVE HEALTH CARE: ICD-10-CM

## 2024-04-29 DIAGNOSIS — R35.1 NOCTURIA: ICD-10-CM

## 2024-04-29 LAB
25(OH)D3 SERPL-MCNC: 24 NG/ML (ref 30–100)
ALBUMIN SERPL BCP-MCNC: 4.2 G/DL (ref 3.4–5)
ALP SERPL-CCNC: 90 U/L (ref 33–136)
ALT SERPL W P-5'-P-CCNC: 27 U/L (ref 10–52)
ANION GAP SERPL CALC-SCNC: 9 MMOL/L (ref 10–20)
AST SERPL W P-5'-P-CCNC: 21 U/L (ref 9–39)
BILIRUB SERPL-MCNC: 0.9 MG/DL (ref 0–1.2)
BUN SERPL-MCNC: 16 MG/DL (ref 6–23)
CALCIUM SERPL-MCNC: 8.8 MG/DL (ref 8.6–10.3)
CHLORIDE SERPL-SCNC: 109 MMOL/L (ref 98–107)
CHOLEST SERPL-MCNC: 165 MG/DL (ref 0–199)
CHOLESTEROL/HDL RATIO: 3.7
CO2 SERPL-SCNC: 27 MMOL/L (ref 21–32)
CREAT SERPL-MCNC: 0.89 MG/DL (ref 0.5–1.3)
EGFRCR SERPLBLD CKD-EPI 2021: >90 ML/MIN/1.73M*2
ERYTHROCYTE [DISTWIDTH] IN BLOOD BY AUTOMATED COUNT: 12.5 % (ref 11.5–14.5)
GLUCOSE SERPL-MCNC: 101 MG/DL (ref 74–99)
HCT VFR BLD AUTO: 51 % (ref 41–52)
HCV AB SER QL: NONREACTIVE
HDLC SERPL-MCNC: 44.6 MG/DL
HGB BLD-MCNC: 17.2 G/DL (ref 13.5–17.5)
LDLC SERPL CALC-MCNC: 97 MG/DL
MCH RBC QN AUTO: 32.1 PG (ref 26–34)
MCHC RBC AUTO-ENTMCNC: 33.7 G/DL (ref 32–36)
MCV RBC AUTO: 95 FL (ref 80–100)
NON HDL CHOLESTEROL: 120 MG/DL (ref 0–149)
NRBC BLD-RTO: 0 /100 WBCS (ref 0–0)
PLATELET # BLD AUTO: 259 X10*3/UL (ref 150–450)
POTASSIUM SERPL-SCNC: 4.5 MMOL/L (ref 3.5–5.3)
PROT SERPL-MCNC: 6.5 G/DL (ref 6.4–8.2)
PSA SERPL-MCNC: 1.14 NG/ML
RBC # BLD AUTO: 5.36 X10*6/UL (ref 4.5–5.9)
SODIUM SERPL-SCNC: 140 MMOL/L (ref 136–145)
TRIGL SERPL-MCNC: 117 MG/DL (ref 0–149)
TSH SERPL-ACNC: 1.54 MIU/L (ref 0.44–3.98)
VIT B12 SERPL-MCNC: 297 PG/ML (ref 211–911)
VLDL: 23 MG/DL (ref 0–40)
WBC # BLD AUTO: 5.6 X10*3/UL (ref 4.4–11.3)

## 2024-04-29 PROCEDURE — 85027 COMPLETE CBC AUTOMATED: CPT

## 2024-04-29 PROCEDURE — 86803 HEPATITIS C AB TEST: CPT

## 2024-04-29 PROCEDURE — 80053 COMPREHEN METABOLIC PANEL: CPT

## 2024-04-29 PROCEDURE — 80061 LIPID PANEL: CPT

## 2024-04-29 PROCEDURE — 36415 COLL VENOUS BLD VENIPUNCTURE: CPT

## 2024-04-29 PROCEDURE — 84443 ASSAY THYROID STIM HORMONE: CPT

## 2024-04-29 PROCEDURE — 82607 VITAMIN B-12: CPT

## 2024-04-29 PROCEDURE — 82306 VITAMIN D 25 HYDROXY: CPT

## 2024-04-29 PROCEDURE — 84153 ASSAY OF PSA TOTAL: CPT

## 2024-07-09 ENCOUNTER — TELEPHONE (OUTPATIENT)
Dept: CARDIOLOGY | Facility: CLINIC | Age: 64
End: 2024-07-09
Payer: MEDICARE

## 2024-07-09 NOTE — TELEPHONE ENCOUNTER
Patient is calling because he is out of entresto and he hasn't had it since last Friday and he also needs xarelto. But he is calling because he is wondering if there is different medications that can replace those because they are too expensive. He is on medicare and they're asking for 490 for entresto, and for xarelto 406. The prices went up compared to when he first started the medication. He doesn't want to stop taking them but he can't afford those anymore.

## 2024-07-10 DIAGNOSIS — I50.22 CHRONIC SYSTOLIC HEART FAILURE (MULTI): Primary | ICD-10-CM

## 2024-07-10 NOTE — TELEPHONE ENCOUNTER
Clinical pharmacy referral order pended to Dr. Enamorado to assist in finding copay assistance for patient. Patient aware. Lucy Brennan RN

## 2024-07-12 NOTE — TELEPHONE ENCOUNTER
Spoke to patient. He has an appointment to speak to clinical pharmacy on 7/15. He will reach out to office with any further issues. Lucy Brennan RN

## 2024-07-15 ENCOUNTER — TELEPHONE (OUTPATIENT)
Dept: CARDIOLOGY | Facility: CLINIC | Age: 64
End: 2024-07-15

## 2024-07-15 ENCOUNTER — APPOINTMENT (OUTPATIENT)
Dept: PHARMACY | Facility: HOSPITAL | Age: 64
End: 2024-07-15
Payer: MEDICARE

## 2024-07-15 DIAGNOSIS — I50.22 CHRONIC SYSTOLIC HEART FAILURE (MULTI): ICD-10-CM

## 2024-07-15 NOTE — TELEPHONE ENCOUNTER
Spoke to patient. Advised him that per Dr. Enamorado's previous notes, patient's Entresto can be changed to Losartan. Advised patient that per Dr Enamorado, he must remain on anticoagulation, and advised patient that the only cheaper alternative to Xarelto and Eliquis would be warfarin. Patient wishes to think about his options, and will contact office at a later date. Lucy Brennan RN   Rhombic Flap Text: The defect edges were debeveled with a #15 scalpel blade.  Given the location of the defect and the proximity to free margins a rhombic flap was deemed most appropriate.  Using a sterile surgical marker, an appropriate rhombic flap was drawn incorporating the defect.    The area thus outlined was incised deep to adipose tissue with a #15 scalpel blade.  The skin margins were undermined to an appropriate distance in all directions utilizing iris scissors.

## 2024-07-15 NOTE — TELEPHONE ENCOUNTER
Pt says he does not qualify for the  Pharmacy program for Entresto and Xorelto. He's requesting something cheaper.

## 2024-07-15 NOTE — Clinical Note
Anthony Enamorado, I spoke with Link today regarding his Xarelto and Entresto being cost prohibitive. Unfortunately, he is not eligible for [facility] as his income is too high at this time. I did send him information regarding the Xarelto  coverage gap assistance to help decrease the copay to $89/30 days. Regarding the Entresto, I recommended he evaluate other Medicare part D plans during open enrollment later this year to determine if this is able to be more cost effective for him. We will follow up in 1 month to assess for any further needs. Thank you!

## 2024-07-15 NOTE — PROGRESS NOTES
Pharmacist Clinic: Cardiology Management    Link Inman is a 63 y.o. male was referred to Clinical Pharmacy Team for anticoagulation and heart failure/cardiomyopathy management.     Referring Provider: Umberto Enamorado MD    THIS IS A NEW PATIENT APPOINTMENT. PATIENT WILL BE ESTABLISHING CARE WITH CLINICAL PHARMACY.    Appointment was completed by the patient who was reached at .    Allergies Reviewed? Yes    Allergies   Allergen Reactions    Tree Nuts Anaphylaxis    Sulfa (Sulfonamide Antibiotics) Rash    Sulfamethazine Rash       Past Medical History:   Diagnosis Date    Arrhythmia     atrial fibrillation    Personal history of (healed) traumatic fracture 03/28/2021    History of compression fracture of spine    Personal history of other diseases of the circulatory system 05/11/2019    History of pericarditis       Current Outpatient Medications on File Prior to Visit   Medication Sig Dispense Refill    aspirin 81 mg EC tablet Take 1 tablet (81 mg) by mouth once daily.      atorvastatin (Lipitor) 20 mg tablet Take 1 tablet (20 mg) by mouth once daily at bedtime. 90 tablet 3    Entresto 24-26 mg tablet Take 1 tablet by mouth 2 times a day. 180 tablet 3    metoprolol succinate XL (Toprol-XL) 50 mg 24 hr tablet Take 1 tablet (50 mg) by mouth 2 times a day. 180 tablet 3    naproxen sodium (Aleve) 220 mg tablet Take 2 tablets (440 mg) by mouth once daily.      Xarelto 20 mg tablet Take 1 tablet (20 mg) by mouth once daily. 90 tablet 3    albuterol 90 mcg/actuation inhaler Inhale 2 puffs every 6 hours if needed for wheezing. (Patient not taking: Reported on 4/15/2024) 18 g 0     No current facility-administered medications on file prior to visit.         RELEVANT LAB RESULTS  Lab Results   Component Value Date    BILITOT 0.9 04/29/2024    CALCIUM 8.8 04/29/2024    CO2 27 04/29/2024     (H) 04/29/2024    CREATININE 0.89 04/29/2024    GLUCOSE 101 (H) 04/29/2024    ALKPHOS 90 04/29/2024    K 4.5  "04/29/2024    PROT 6.5 04/29/2024     04/29/2024    AST 21 04/29/2024    ALT 27 04/29/2024    BUN 16 04/29/2024    ANIONGAP 9 (L) 04/29/2024    MG 2.01 01/24/2024    ALBUMIN 4.2 04/29/2024    GFRMALE >90 04/17/2023     Lab Results   Component Value Date    TRIG 117 04/29/2024    CHOL 165 04/29/2024    LDLCALC 97 04/29/2024    HDL 44.6 04/29/2024     No results found for: \"BMCBC\", \"CBCDIF\"     PHARMACEUTICAL ASSESSMENT    MEDICATION RECONCILIATION    Home Pharmacy Reviewed? Yes, describe: [facility], [place], [place]    Changed:  - Aspirin 81 mg: once daily    Medication Documentation Review Audit       Reviewed by Kelli Rouse CMA (Medical Assistant) on 04/15/24 at 0921      Medication Order Taking? Sig Documenting Provider Last Dose Status   albuterol 90 mcg/actuation inhaler [id number] No Inhale 2 puffs every 6 hours if needed for wheezing.   Patient not taking: Reported on 4/15/2024    Nahum Phillips, DO Not Taking Active   aspirin 81 mg EC tablet [id number] Yes Take by mouth. Historical Provider, MD Taking Active   atorvastatin (Lipitor) 20 mg tablet [id number] Yes Take 1 tablet (20 mg) by mouth once daily at bedtime. Umberto Enamorado MD Taking Active   Entresto 24-26 mg tablet [id number] Yes Take 1 tablet by mouth 2 times a day. Umberto Enamorado MD Taking Active   metoprolol succinate XL (Toprol-XL) 50 mg 24 hr tablet [id number] Yes Take 1 tablet (50 mg) by mouth 2 times a day. Umberto Enamorado MD Taking Active   naproxen sodium (Aleve) 220 mg tablet [id number] Yes Take 2 tablets (440 mg) by mouth once daily. Historical Provider, MD Taking Active   Xarelto 20 mg tablet [id number] Yes Take 1 tablet (20 mg) by mouth once daily. Umberto Enamorado MD Taking Active                    DISEASE MANAGEMENT ASSESSMENT:     ANTICOAGULATION ASSESSMENT    The ASCVD Risk score (Braden DK, et al., 2019) failed to calculate for the following reasons:    The patient has a prior MI or stroke diagnosis    DIAGNOSIS: prevention of nonvalvular " atrial fibrilliation stroke and systemic embolism  - Patient is projected to be on anticoagulation long term  - [id number] Score: [3] (only included if diagnosis is atrial fibrillation)   Age: [<65 (0)] [65-74 (+1)] [> 75 (+2)]: 1  Sex: [Male/Female (+1)]: 0  CHF history: [No/Yes(+1)]: 1  Hypertension history: [No/Yes(+1)]: 0  Stroke/TIA/thromboembolism history: [No/Yes(+2)]: 0  Vascular disease history (prior MI, peripheral artery disease, aortic plaque): [No/Yes(+1)]: 1  Diabetes history: [No/Yes(+1)]: 0    CURRENT PHARMACOTHERAPY:    Xarelto 20 mg   Age 63 years  Weight 94.8 kg  Height 185.4 cm  Scr 0.89 mg/dL  CrCl 100 mL/min    RELEVANT PAST MEDICAL HISTORY:   CHF, A-fib, Hx of MI    Affordability/Accessibility: Entresto and Xarelto are cost [facility]  Adherence/Organization: No issues per patient uses medication box  Adverse Reactions: none per patient, broke blood vessel in his eye which happens occasionally per patient   Recent Hospitalizations: none  Recent Falls/Trauma: none  Tobacco or Alcohol Intake:   Tobacco: none  Alcohol: none    CHF ASSESSMENT     Symptom/Staging:  -Most recent ejection fraction: 50-55%  -NYHA Stage: Unknown    Results for orders placed in visit on 04/20/23    ECHOCARDIOGRAM    Narrative  Ordered by an unspecified provider.      Guideline-Directed Medical Therapy:  -ARNI: Yes, describe: Entresto 24-26 mg  -Beta Blocker: Yes, describe: Metoprolol succinate 50 mg  -MRA: No  -SGLT2i: No    Secondary Prevention:  -The ASCVD Risk score (Braden DK, et al., 2019) failed to calculate for the following reasons:    The patient has a prior MI or stroke diagnosis   -Aspirin 81mg? yes  -Statin?: Yes, describe: Atorvastatin 20 mg  -HTN?: No    CURRENT PHARMACOTHERAPY:   Entresto 24-26 mg BID  Metoprolol succinate 50 mg BID    Affordability: Entresto is cost prohibitive for the patient (>$400 at last fill due to coverage gap). Was able to get samples  from cardiology office for now  Adherence/Compliance: No concerns at this time  Adverse Effects: None per patient    Monitoring Weights at Home: Yes  Home Weight Recordings: 208 lbs (consistent per patient, no acute fluctuations)    Past In Office Weight Readings:   Wt Readings from Last 6 Encounters:   04/15/24 94.8 kg (209 lb)   02/07/24 92.5 kg (204 lb)   02/01/24 97.5 kg (215 lb)   01/26/24 97.1 kg (214 lb)   01/24/24 97.1 kg (214 lb)   01/24/24 96.6 kg (213 lb)       Monitoring Blood Pressure at Home: No  Home BP Recordings: None    Past In Office BP Readings:   BP Readings from Last 6 Encounters:   04/15/24 130/78   02/07/24 137/90   02/01/24 122/74   01/26/24 129/83   01/24/24 (!) 141/91   01/24/24 132/78       HEALTH MANAGEMENT    Maintaining fluid restriction (<2 L/day): N/A  Edema/swelling: No  Shortness of breath: No  Trouble sleeping/lying down: No  Dry/hacking cough: No  Recent Hospitalizations: No    EDUCATION/COUNSELING:   - Counseled patient on MOA, expectations, duration of therapy, contraindications, administration, and monitoring parameters  - Counseled patient on lifestyle modifications that can decrease your risk of having complications (smoking cessation, losing weight, daily weights, vaccines)  - Counseled patient on fluid intake and weight management. Recommended to not consume more than 2 liters of fliuids per day. If they have gained more than 2-3 pounds within a 24 hours period (or 5 pounds in a week), contact their cardiologist  - Answered all patient questions and concerns   - Counseled patient on MOA, expectations, duration of therapy, contraindications, administration, and monitoring parameters  - Counseled patient of side effects that are indicative of bleeding such as dark tarry stool, unexplainable bruising, or vomiting up a coffee ground like substance    Unfortunately, at this time, Link HECTOR Jo-Annivania is ineligible to receive financial assistance through  Patient Assistance Program  because income is too high.    Patient was notified of ineligibility and given the following options: evaluate other Medicare part D plans during open enrollment for cost efficacy, and discuss alternatives with cardiologist. Patient was also emailed information for XareltoWithMe coverage gap assistance ($89 per 30 day supply through ).     Referring provider will be notified of denial.        DISCUSSION/NOTES:   Patient is doing well on heart failure and anticoagulation pharmacotherapy at this time. Does note recently breaking a blood vessel in his eye, but patient states this occurs every now and again and is not concerned. Denies active eye bleeding or other symptoms of bleeding. Advised to have eyes evaluated.   Unfortunately, patient is not eligible for [facility] PAP at this time due to income being too high. Information regarding XareltoWithMe copay assistance has been emailed to the patient. Advised to evaluate other Medicare Part D plans as well for Entresto and Xarelto during open enrollment. Will follow up in 1 month to ensure coverage gap assistance for Xarelto is affordable for patient.    ASSESSMENT AND PLAN:    Assessment/Plan   Problem List Items Addressed This Visit       Chronic systolic heart failure (Multi)    Relevant Orders    Follow Up In Clinical Pharmacy         RECOMMENDATIONS/PLAN    Continue:   Xarelto 20 mg every day   Entresto 24-26 mg BID   Follow Up: 1 month    Next Cardiology Appointment: To be scheduled, last appointment 1/26/2024  Clinical Pharmacist follow up: 8/26/2024  VAF/Application Expiration: No  Type of Encounter: Sushila Patrick PharmD    Verbal consent to manage patient's drug therapy was obtained from the patient . They were informed they may decline to participate or withdraw from participation in pharmacy services at any time.    Continue all meds under the continuation of care with the referring provider and clinical pharmacy team.

## 2024-08-26 ENCOUNTER — APPOINTMENT (OUTPATIENT)
Dept: PHARMACY | Facility: HOSPITAL | Age: 64
End: 2024-08-26
Payer: MEDICARE

## 2024-08-26 DIAGNOSIS — I50.22 CHRONIC SYSTOLIC HEART FAILURE (MULTI): ICD-10-CM

## 2024-08-26 DIAGNOSIS — I48.91 ATRIAL FIBRILLATION, UNSPECIFIED TYPE (MULTI): Primary | ICD-10-CM

## 2024-08-26 NOTE — ASSESSMENT & PLAN NOTE
No adjustment to current medication list needed based on patient's kidney and liver function.  Potential change in therapy to ACEi/ARB if needed for assistance with cost.

## 2024-08-26 NOTE — Clinical Note
Hi Dr. Enamorado.  · Patient report that he did not sign up for the assistance with XareltoWithMe. Reminded this can help bring cost down to $89 a month. Also discussed switching medication therapy to Pradaxa ($73 for 90 day supply) with the patient and Dr Umberto Enamorado. Patient reports he is more comfortable staying on Xarelto therapy at this time. · Discussed that lisinopril or another ACEi/ARB therapy would be used in place of Entresto to help with cost if needed. Reported he will discuss with his cardiology team and decide if he will need to stop Entresto in the future.

## 2024-08-26 NOTE — PROGRESS NOTES
Pharmacist Clinic: Cardiology Management    Link Inman is a 63 y.o. male was referred to Clinical Pharmacy Team for Anticoagulation and Heart Failure management.     Referring Provider: Umberto Enamorado MD    THIS IS A FOLLOW UP PATIENT APPOINTMENT. AT LAST VISIT ON 7/15/24 WITH PHARMACIST (Annamaria Patrick).    Appointment was completed by Link who was reached at primary number.    REVIEW OF PAST APPNT (IF APPLICABLE):   During last appointment Link was screened for [facility] P[facility] and did not qualify due to his income being over the limit.  Given information about XareltoWithMe program to help with medication cost when in coverage gap.    Allergies   Allergen Reactions    Tree Nuts Anaphylaxis    Sulfa (Sulfonamide Antibiotics) Rash    Sulfamethazine Rash       Past Medical History:   Diagnosis Date    Arrhythmia     atrial fibrillation    Personal history of (healed) traumatic fracture 03/28/2021    History of compression fracture of spine    Personal history of other diseases of the circulatory system 05/11/2019    History of pericarditis       Current Outpatient Medications on File Prior to Visit   Medication Sig Dispense Refill    albuterol 90 mcg/actuation inhaler Inhale 2 puffs every 6 hours if needed for wheezing. (Patient not taking: Reported on 4/15/2024) 18 g 0    aspirin 81 mg EC tablet Take 1 tablet (81 mg) by mouth once daily.      atorvastatin (Lipitor) 20 mg tablet Take 1 tablet (20 mg) by mouth once daily at bedtime. 90 tablet 3    Entresto 24-26 mg tablet Take 1 tablet by mouth 2 times a day. 180 tablet 3    metoprolol succinate XL (Toprol-XL) 50 mg 24 hr tablet Take 1 tablet (50 mg) by mouth 2 times a day. 180 tablet 3    naproxen sodium (Aleve) 220 mg tablet Take 2 tablets (440 mg) by mouth once daily.      Xarelto 20 mg tablet Take 1 tablet (20 mg) by mouth once daily. 90 tablet 3     No current facility-administered medications on file prior to visit.         RELEVANT LAB RESULTS  Lab Results  "  Component Value Date    BILITOT 0.9 04/29/2024    CALCIUM 8.8 04/29/2024    CO2 27 04/29/2024     (H) 04/29/2024    CREATININE 0.89 04/29/2024    GLUCOSE 101 (H) 04/29/2024    ALKPHOS 90 04/29/2024    K 4.5 04/29/2024    PROT 6.5 04/29/2024     04/29/2024    AST 21 04/29/2024    ALT 27 04/29/2024    BUN 16 04/29/2024    ANIONGAP 9 (L) 04/29/2024    MG 2.01 01/24/2024    ALBUMIN 4.2 04/29/2024    GFRMALE >90 04/17/2023     Lab Results   Component Value Date    TRIG 117 04/29/2024    CHOL 165 04/29/2024    LDLCALC 97 04/29/2024    HDL 44.6 04/29/2024     No results found for: \"BMCBC\", \"CBCDIF\"     PHARMACEUTICAL ASSESSMENT    Medication Documentation Review Audit       Reviewed by Kelli Rouse CMA (Medical Assistant) on 04/15/24 at 0921      Medication Order Taking? Sig Documenting Provider Last Dose Status   albuterol 90 mcg/actuation inhaler [id number] No Inhale 2 puffs every 6 hours if needed for wheezing.   Patient not taking: Reported on 4/15/2024    Nahum Phillips, DO Not Taking Active   aspirin 81 mg EC tablet [id number] Yes Take by mouth. Historical Provider, MD Taking Active   atorvastatin (Lipitor) 20 mg tablet [id number] Yes Take 1 tablet (20 mg) by mouth once daily at bedtime. Umberto Enamorado MD Taking Active   Entresto 24-26 mg tablet [id number] Yes Take 1 tablet by mouth 2 times a day. Umberto Enamorado MD Taking Active   metoprolol succinate XL (Toprol-XL) 50 mg 24 hr tablet [id number] Yes Take 1 tablet (50 mg) by mouth 2 times a day. Umberto Enamorado MD Taking Active   naproxen sodium (Aleve) 220 mg tablet [id number] Yes Take 2 tablets (440 mg) by mouth once daily. Historical Provider, MD Taking Active   Xarelto 20 mg tablet [id number] Yes Take 1 tablet (20 mg) by mouth once daily. Umberto Enamorado MD Taking Active                    DISEASE MANAGEMENT ASSESSMENT:     ANTICOAGULATION ASSESSMENT    The ASCVD Risk score (Vienna DK, et al., 2019) failed to calculate for the following reasons:    The " patient has a prior MI or stroke diagnosis    DIAGNOSIS: prevention of nonvalvular atrial fibrilliation stroke and systemic embolism  - Patient is projected to be on anticoagulation long term  - [id number] Score: [3] (only included if diagnosis is atrial fibrillation)   Age: [<65 (0)] [65-74 (+1)] [> 75 (+2)]: 1  Sex: [Male/Female (+1)]: 0  CHF history: [No/Yes(+1)]: 1  Hypertension history: [No/Yes(+1)]: 0  Stroke/TIA/thromboembolism history: [No/Yes(+2)]: 0  Vascular disease history (prior MI, peripheral artery disease, aortic plaque): [No/Yes(+1)]: 1  Diabetes history: [No/Yes(+1)]: 0    CURRENT PHARMACOTHERAPY:    Xarelto 20mg daily which is appropriate for a patient whose CrCl is >100ml/min    Affordability/Accessibility: Xarelto[employer]iquid[employer] copay assistance for coverage gap.  Adherence/Organization: reports adherence  Adverse Reactions: bruises easily  Recent Hospitalizations: none reported  Recent Falls/Trauma: none reported  Changes in Tobacco or Alcohol Intake:   Tobacco: does not use  Alcohol: does not use    EDUCATION/COUNSELING:   - Counseled patient on MOA, expectations, duration of therapy, contraindications, administration, and monitoring parameters  - Counseled patient of side effects that are indicative of bleeding such as dark tarry stool, unexplainable bruising, or vomiting up a coffee ground like substance    CHF ASSESSMENT     Symptom/Staging:  -Most recent ejection fraction: 50-55%  -NYHA Stage: unknown    Results for orders placed in visit on 04/20/23    ECHOCARDIOGRAM    Narrative  Ordered by an unspecified provider.      Guideline-Directed Medical Therapy:  -ARNI: Yes, describe: Entresto 24-26 mg  -Beta Blocker: Yes, describe: Metoprolol succinate 50 mg  -MRA: No  -SGLT2i: No     Secondary Prevention:  -The ASCVD Risk score (Braden MOLINA, et al., 2019) failed to calculate for the following reasons:    The patient has a prior MI or stroke diagnosis   -Aspirin 81mg? yes  -Statin?: Yes, describe:  Atorvastatin 20 mg  -HTN?: No    CURRENT PHARMACOTHERAPY:    Entresto 24-26mg BID  Metoprolol succinate 50mg daily    Affordability: Currently paying for Entresto  Adherence/Compliance: reports adherence  Adverse Effects: none reported    Monitoring Weights at Home: Yes  Home Weight Recordings: 208-209lbs.    Past In Office Weight Readings:   Wt Readings from Last 6 Encounters:   04/15/24 94.8 kg (209 lb)   02/07/24 92.5 kg (204 lb)   02/01/24 97.5 kg (215 lb)   01/26/24 97.1 kg (214 lb)   01/24/24 97.1 kg (214 lb)   01/24/24 96.6 kg (213 lb)       Monitoring Blood Pressure at Home: No    Past In Office BP Readings:   BP Readings from Last 6 Encounters:   04/15/24 130/78   02/07/24 137/90   02/01/24 122/74   01/26/24 129/83   01/24/24 (!) 141/91   01/24/24 132/78       HEALTH MANAGEMENT    Maintaining fluid restriction (<2 L/day): N/A  Edema/swelling: No  Shortness of breath: No  Trouble sleeping/lying down: No  Dry/hacking cough: No  Recent Hospitalizations: No    EDUCATION/COUNSELING:   - Counseled patient on MOA, expectations, duration of therapy, contraindications, administration, and monitoring parameters  - Counseled patient on lifestyle modifications that can decrease your risk of having complications (smoking cessation, losing weight, daily weights, vaccines)  - Counseled patient on fluid intake and weight management. Recommended to not consume more than 2 liters of fliuids per day. If they have gained more than 2-3 pounds within a 24 hours period (or 5 pounds in a week), contact their cardiologist  - Answered all patient questions and concerns       DISCUSSION/NOTES:   Patient report that he did not sign up for the assistance with XareltoWithMe. Reminded this can help bring cost down to $89 a month. Also discussed switching medication therapy to Pradaxa ($73 for 90 day supply) with the patient and Dr Umberto Enamorado. Patient reports he is more comfortable staying on Xarelto therapy at this time.  Discussed  that lisinopril or another ACEi/ARB therapy would be used in place of Entresto to help with cost if needed. Reported he will discuss with his cardiology team and decide if he will need to stop Entresto in the future.    ASSESSMENT AND PLAN:    Assessment/Plan   Problem List Items Addressed This Visit       Atrial fibrillation (Multi) - Primary     No dose adjustment needed for Xarelto based on patient's age, weight, and kidney function.         Chronic systolic heart failure (Multi)     No adjustment to current medication list needed based on patient's kidney and liver function.  Potential change in therapy to ACEi/ARB if needed for assistance with cost.              RECOMMENDATIONS/PLAN    CONTINUE  Xarelto 20mg daily  Entresto 24-26 BID  Metoprolol succinate 50mg daily    Next Cardiology Appointment: 4/16/25  Clinical Pharmacist follow up: as needed per patient or provider request.  VAF/Application Expiration: No  Type of Encounter: Sushila Mckeon PharmD    Verbal consent to manage patient's drug therapy was obtained from the patient . They were informed they may decline to participate or withdraw from participation in pharmacy services at any time.    Continue all meds under the continuation of care with the referring provider and clinical pharmacy team.

## 2024-08-26 NOTE — PROGRESS NOTES
I reviewed the progress note and agree with the resident’s findings and plans as written.       Chyna Shaver, PharmD

## 2024-09-16 ENCOUNTER — OFFICE VISIT (OUTPATIENT)
Dept: CARDIOLOGY | Facility: CLINIC | Age: 64
End: 2024-09-16
Payer: MEDICARE

## 2024-09-16 VITALS
DIASTOLIC BLOOD PRESSURE: 88 MMHG | WEIGHT: 220 LBS | OXYGEN SATURATION: 95 % | HEART RATE: 85 BPM | BODY MASS INDEX: 29.16 KG/M2 | HEIGHT: 73 IN | SYSTOLIC BLOOD PRESSURE: 130 MMHG

## 2024-09-16 DIAGNOSIS — I50.22 CHRONIC SYSTOLIC HEART FAILURE: Primary | ICD-10-CM

## 2024-09-16 PROCEDURE — 1159F MED LIST DOCD IN RCRD: CPT | Performed by: INTERNAL MEDICINE

## 2024-09-16 PROCEDURE — 99214 OFFICE O/P EST MOD 30 MIN: CPT | Performed by: INTERNAL MEDICINE

## 2024-09-16 PROCEDURE — 1036F TOBACCO NON-USER: CPT | Performed by: INTERNAL MEDICINE

## 2024-09-16 PROCEDURE — 3008F BODY MASS INDEX DOCD: CPT | Performed by: INTERNAL MEDICINE

## 2024-09-16 NOTE — PROGRESS NOTES
Name : Link Inman   : 1960   MRN : [id number]   ENC Date : 2024      Assessment and Plan:  Chronic systolic heart failure with recovered EF: Patient's symptoms seem out of proportion to his LVEF.  This could suggest a noncardiac cause for his symptoms or possibly some component of overmedication especially with the beta-blocker.  Artery going against this is the fact that his heart rate is not particularly low.  I offered to repeat echocardiogram to make sure we were not missing worsening cardiomyopathy or new valvular heart disease but due to cost patient would like to defer on this for right now.  This is certainly reasonable as I think it is probably going to look okay.  We again discussed the cost of his medications and I would have no problem switching Entresto to losartan but for now he wants to stick with the Entresto.  Lightheadedness: This is almost certainly a side effect of the Entresto and metoprolol.  He has not had any syncopal events.  I do not think he needs a cardiac monitor.  Cautioned him on quick changes in position and to make sure he stays as hydrated as possible.  Myalgias: Symptoms do not seem consistent with peripheral arterial disease.  It could be a side effect of atorvastatin or some other musculoskeletal issue.  I told him to stop the atorvastatin for 30 days to see if there is any change in his symptoms.  Disp: MyChart message regarding symptoms in a month.    HPI:  Patient returns a doing reasonably well.  He has a number of complaints including shortness of breath when exerting himself.  No chest discomfort.  He does state that he has some stiffness in his hips and thighs that sometimes gets better with motion and sometimes gets worse with activity.  No syncopal events.  He does have some lightheadedness with quick changes in position but again no syncopal events.  No palpitations.  No orthopnea nor PND.  No lower extremity edema.    Problem list overview:   Patient  "Active Problem List   Diagnosis    Abnormal EKG    Arteriosclerotic coronary artery disease    Atrial fibrillation (Multi)    Back pain, acute    Chronic systolic heart failure (Multi)    Combined hyperlipidemia    Common cold    Viral URI with cough    Cough    Dyspnea    Elevated blood pressure reading    Hematoma of leg    Irregular heart beat    Leg pain    Liver cyst    Lumbar radiculopathy, acute    Mild mitral regurgitation    Mild vitamin D deficiency    Flu-like symptoms    Silent myocardial infarction (Multi)    Vertebral compression fracture (Multi)    Wound of foot    Heart attack (Multi)    Acute bronchitis with wheezing    Medicare annual wellness visit, initial       Meds:   Current Outpatient Medications on File Prior to Visit   Medication Sig Dispense Refill    aspirin 81 mg EC tablet Take 1 tablet (81 mg) by mouth once daily.      atorvastatin (Lipitor) 20 mg tablet Take 1 tablet (20 mg) by mouth once daily at bedtime. 90 tablet 3    Entresto 24-26 mg tablet Take 1 tablet by mouth 2 times a day. 180 tablet 3    metoprolol succinate XL (Toprol-XL) 50 mg 24 hr tablet Take 1 tablet (50 mg) by mouth 2 times a day. 180 tablet 3    naproxen sodium (Aleve) 220 mg tablet Take 2 tablets (440 mg) by mouth once daily.      Xarelto 20 mg tablet Take 1 tablet (20 mg) by mouth once daily. 90 tablet 3    [DISCONTINUED] albuterol 90 mcg/actuation inhaler Inhale 2 puffs every 6 hours if needed for wheezing. (Patient not taking: Reported on 4/15/2024) 18 g 0     No current facility-administered medications on file prior to visit.        VS:  /88 (BP Location: Left arm, Patient Position: Sitting)   Pulse 85   Ht 1.854 m (6' 1\")   Wt 99.8 kg (220 lb)   SpO2 95%   BMI 29.03 kg/m²     Vitals reviewed.   Neck:      Vascular: No JVD.   Pulmonary:      Breath sounds: Normal breath sounds.   Cardiovascular:      Normal rate. Regular rhythm.      Murmurs: There is no murmur.      No gallop.    Edema:     " Peripheral edema absent.   Abdominal:      General: Abdomen is flat.      Palpations: Abdomen is soft.   Skin:     General: Skin is warm.           Umberto Enamorado MD

## 2024-10-08 ENCOUNTER — HOSPITAL ENCOUNTER (OUTPATIENT)
Dept: RADIOLOGY | Facility: CLINIC | Age: 64
Discharge: HOME | End: 2024-10-08
Payer: MEDICARE

## 2024-10-08 ENCOUNTER — OFFICE VISIT (OUTPATIENT)
Dept: PRIMARY CARE | Facility: CLINIC | Age: 64
End: 2024-10-08
Payer: MEDICARE

## 2024-10-08 VITALS
DIASTOLIC BLOOD PRESSURE: 80 MMHG | HEART RATE: 107 BPM | SYSTOLIC BLOOD PRESSURE: 130 MMHG | TEMPERATURE: 98.6 F | BODY MASS INDEX: 29.03 KG/M2 | WEIGHT: 220 LBS | OXYGEN SATURATION: 97 % | RESPIRATION RATE: 20 BRPM

## 2024-10-08 DIAGNOSIS — M54.16 LUMBAR RADICULOPATHY, ACUTE: ICD-10-CM

## 2024-10-08 DIAGNOSIS — M54.16 LUMBAR RADICULOPATHY, ACUTE: Primary | ICD-10-CM

## 2024-10-08 DIAGNOSIS — S22.080A COMPRESSION FRACTURE OF T11 VERTEBRA, INITIAL ENCOUNTER (MULTI): ICD-10-CM

## 2024-10-08 PROCEDURE — 1159F MED LIST DOCD IN RCRD: CPT | Performed by: NURSE PRACTITIONER

## 2024-10-08 PROCEDURE — 72120 X-RAY BEND ONLY L-S SPINE: CPT

## 2024-10-08 PROCEDURE — 72110 X-RAY EXAM L-2 SPINE 4/>VWS: CPT | Performed by: RADIOLOGY

## 2024-10-08 PROCEDURE — 1036F TOBACCO NON-USER: CPT | Performed by: NURSE PRACTITIONER

## 2024-10-08 PROCEDURE — 1160F RVW MEDS BY RX/DR IN RCRD: CPT | Performed by: NURSE PRACTITIONER

## 2024-10-08 PROCEDURE — 72072 X-RAY EXAM THORAC SPINE 3VWS: CPT | Performed by: RADIOLOGY

## 2024-10-08 PROCEDURE — 72072 X-RAY EXAM THORAC SPINE 3VWS: CPT

## 2024-10-08 PROCEDURE — 99213 OFFICE O/P EST LOW 20 MIN: CPT | Performed by: NURSE PRACTITIONER

## 2024-10-08 ASSESSMENT — ENCOUNTER SYMPTOMS
FEVER: 0
DIAPHORESIS: 0
CONSTITUTIONAL NEGATIVE: 1
MYALGIAS: 1
APPETITE CHANGE: 0
GASTROINTESTINAL NEGATIVE: 1
FATIGUE: 0
RESPIRATORY NEGATIVE: 1
BACK PAIN: 1
CARDIOVASCULAR NEGATIVE: 1

## 2024-10-08 NOTE — PROGRESS NOTES
Patient came back from [facility] and was there for ten days and he did a lot of walking. Pt is struggling with a weak right leg. Patient says that he feels like he loses stability in the right leg. 13 years ago; pt had a 30% compression fracture between T11 and T12. Pt has gone through physical therapy and has not seen orthopedic surgery. Pt takes aleve. No loss of bowel or bladder.     Review of Systems   Constitutional: Negative.  Negative for appetite change, diaphoresis, fatigue and fever.   HENT: Negative.     Respiratory: Negative.     Cardiovascular: Negative.    Gastrointestinal: Negative.    Genitourinary: Negative.    Musculoskeletal:  Positive for back pain and myalgias.     Objective   /80   Pulse 107   Temp 37 °C (98.6 °F)   Resp 20   Wt 99.8 kg (220 lb)   SpO2 97%   BMI 29.03 kg/m²     Physical Exam  Vitals reviewed.   Constitutional:       General: He is not in acute distress.     Appearance: Normal appearance. He is not ill-appearing or toxic-appearing.   HENT:      Head: Atraumatic.   Cardiovascular:      Rate and Rhythm: Normal rate and regular rhythm.      Heart sounds: Normal heart sounds. No murmur heard.  Pulmonary:      Effort: Pulmonary effort is normal.      Breath sounds: Normal breath sounds. No wheezing or rhonchi.   Musculoskeletal:         General: No tenderness.      Thoracic back: No spasms or tenderness. Normal range of motion.      Lumbar back: No spasms. Normal range of motion. Negative right straight leg raise test and negative left straight leg raise test.      Right lower leg: No edema.      Left lower leg: No edema.      Comments: Strength against resistance (all directions) lower extremities 5/5 bilaterally    Skin:     General: Skin is warm and dry.   Neurological:      General: No focal deficit present.      Mental Status: He is alert.     Assessment/Plan   Problem List Items Addressed This Visit             ICD-10-CM    Lumbar radiculopathy, acute - Primary M54.16  "   Relevant Orders    Referral to Orthopaedic Surgery    XR thoracic spine complete 4+ views    XR lumbar spine 4+ views w flexion extension    Vertebral compression fracture (Multi) M48.50XA    Relevant Orders    Referral to Orthopaedic Surgery    XR thoracic spine complete 4+ views    XR lumbar spine 4+ views w flexion extension     Patient with a history of T11 fracture thirteen years ago. He has been to PT over the years and chiropractors as well but he has not seen orthopedic surgery. Last xrays were is 2021. Will get updated xrays of the lumbar and thoracic spine. Patient encouraged to follow up with orthopedic surgery but states that he \"would like to speak to his neighbor.\" Discussed with patient that he is to go to the ER for any worsening back pain, leg pain, difficulty ambulating, weakness, loss of bowel or bladder function or new/concerning symptoms; he agreed.        "

## 2024-10-10 ENCOUNTER — TELEPHONE (OUTPATIENT)
Dept: PRIMARY CARE | Facility: CLINIC | Age: 64
End: 2024-10-10
Payer: MEDICARE

## 2024-10-10 NOTE — TELEPHONE ENCOUNTER
Spoke to patient and relayed results of lumbar and thoracic xrays showing the chronic T11 fracture and mild arthritis in the lumbar spine. Patient encouraged to follow up with physical therapy again and remain active. Pt advised to follow up with any questions or concerns in the meantime.

## 2024-11-05 ENCOUNTER — OFFICE VISIT (OUTPATIENT)
Dept: ORTHOPEDIC SURGERY | Facility: CLINIC | Age: 64
End: 2024-11-05
Payer: MEDICARE

## 2024-11-05 VITALS — BODY MASS INDEX: 28.89 KG/M2 | HEIGHT: 73 IN | WEIGHT: 218 LBS

## 2024-11-05 DIAGNOSIS — M54.50 LUMBAR PAIN: ICD-10-CM

## 2024-11-05 DIAGNOSIS — M54.16 LUMBAR RADICULOPATHY, ACUTE: Primary | ICD-10-CM

## 2024-11-05 DIAGNOSIS — S22.080A COMPRESSION FRACTURE OF T11 VERTEBRA, INITIAL ENCOUNTER (MULTI): ICD-10-CM

## 2024-11-05 PROCEDURE — 99204 OFFICE O/P NEW MOD 45 MIN: CPT | Performed by: ORTHOPAEDIC SURGERY

## 2024-11-05 PROCEDURE — 1036F TOBACCO NON-USER: CPT | Performed by: ORTHOPAEDIC SURGERY

## 2024-11-05 PROCEDURE — 1159F MED LIST DOCD IN RCRD: CPT | Performed by: ORTHOPAEDIC SURGERY

## 2024-11-05 PROCEDURE — 3008F BODY MASS INDEX DOCD: CPT | Performed by: ORTHOPAEDIC SURGERY

## 2024-11-05 PROCEDURE — 99214 OFFICE O/P EST MOD 30 MIN: CPT | Performed by: ORTHOPAEDIC SURGERY

## 2024-11-05 ASSESSMENT — PAIN - FUNCTIONAL ASSESSMENT: PAIN_FUNCTIONAL_ASSESSMENT: 0-10

## 2024-11-05 ASSESSMENT — PAIN SCALES - GENERAL: PAINLEVEL_OUTOF10: 5 - MODERATE PAIN

## 2024-11-05 NOTE — PROGRESS NOTES
Link Inman is a 63 y.o. male who presents for New Patient Visit of the Lower Back (Low back and bilat leg pain/Has tried therapy /Did chiropractic 3 years ago/X-rays at ).    HPI:  63-year-old gentleman here for new patient evaluation of low back and bilateral leg pain.  He denies any fever chills nausea vomiting night sweats.  He has no bowel or bladder complaints.    Physical exam:  Well-nourished, well kept.No lymphangitis or lymphadenopathy in the examined extremities.  Gait normal.  Can stand on heels and toes.   Examination of the back shows tenderness in the paraspinous musculature.  There is decreased range of motion in all directions due to guarding/muscle spasms and pain at extremes.  There is good strength and no instability.  Examination of the lower extremities reveals no point tenderness, swelling, or deformity.  Range of motion of the hips, knees, and ankles are full without crepitance, instability, or exacerbation of pain except with range of motion of his hips bilaterally.  Strength is 5/5 throughout.  No redness, abrasions, or lesions on extremities  Gross sensation intact in the extremities.  Deep tendon reflexes 2+ bilateral. Clonus negative.  Affect normal.  Alert and oriented ×3.  Coordination normal.  Pedal pulses bilaterally are somewhat weak but palpable.  Brisk capillary refill bilaterally.    Imaging studies:  AP lateral flexion-extension x-rays from October 8, 2024 were reviewed today.  AP lateral thoracic x-rays were reviewed from October 8, 2024.    Assessment:  63-year-old gentleman here for evaluation of back pain and bilateral leg pain and weakness.  This has been going on for about 13 years, he fell landed on his back and got a compression fracture of T11.  Ever since then he has had some back problems, he recently went on vacation out of the country and did a lot of walking, and now he is back and legs have flared up quite considerably.  His chief complaint is his legs 80%  versus 20% low back pain.  He feels like they are weak he has difficulty walking and going up stairs, he is describing claudication symptoms.  A shopping cart helps when he is out of the store.  He did some physical therapy in June 2024 but he does not think it helped.  Otherwise the last conservative treatment that he did was 3 years ago when he saw a chiropractor.  No history of back surgery or injections.  His x-rays show some mild to moderate degenerative changes, he does have that compression fracture at T11.    We have reviewed tests today, x-rays x 2, we ordered tests today, MRI.  I reviewed the notes from his primary care, CNP Courtney Adrian from October 8, 2024 that discusses his back and leg issue.  This is an exacerbation of a chronic problem with severe inhibition of bodily function.    For complete plan and/or surgical details, please refer to Dr. Herman's portion of this split dictation.    -Nahum Crystal PA-C    In a face-to-face encounter, I performed a history and physical examination, discussed pertinent diagnostic studies if indicated, and discussed diagnosis and management strategies with both the patient and the midlevel provider.  I reviewed the midlevel's note and agree with the documented findings and plan of care.    Patient with a long history of back pain.  He has a known T11 compression fracture from about 13 years ago.  He started having progressive leg symptoms over the past couple years but about 3 months ago things significantly increased to the point where now he cannot walk any sort of distance without stopping and sitting in a chair or a bench and leaning forward for a little bit before he can get up and walk farther.  He definitely walks farther in the grocery store with a shopping cart.  He has no fevers chills nausea vomiting.  He definitely has difficulty putting his shoes on bilaterally.  He has no history of spine surgery.  On exam he does have some decreased range of  motion of bilateral hips left greater than right which does reproduce some of his leg symptoms.  He does not have any weakness in his legs.  His feet are pink and warm with brisk capillary refill.    X-rays of the spine were reviewed from October 8 and it shows a T11 compression fracture which is presumably chronic as the patient known he has had that for 13 years.  There is no significant instability.  There is a grade 1 spondylolisthesis at L4-5.  He has severe left and moderate to severe right hip degenerative changes.    Assessment/plan: Patient with bilateral leg symptoms consistent with neurogenic claudication but also has pretty significant bilateral hip degenerative changes.  It is unclear how much of this is coming from his hips and how much this coming from his back.  He does have some back pain as well.  We are going to get him an MRI of his lumbar spine and see him back after that to get a better idea how much of this is back versus hips.  This is a severe exacerbation of a chronic problem.  We have reviewed thoracic x-rays and lumbar x-rays today.    Link Herman MD  Orthopedic surgery

## 2024-11-06 ENCOUNTER — HOSPITAL ENCOUNTER (OUTPATIENT)
Dept: RADIOLOGY | Facility: CLINIC | Age: 64
Discharge: HOME | End: 2024-11-06
Payer: MEDICARE

## 2024-11-06 DIAGNOSIS — M54.50 LUMBAR PAIN: ICD-10-CM

## 2024-11-06 DIAGNOSIS — M54.16 LUMBAR RADICULOPATHY, ACUTE: ICD-10-CM

## 2024-11-06 PROCEDURE — 72148 MRI LUMBAR SPINE W/O DYE: CPT | Performed by: STUDENT IN AN ORGANIZED HEALTH CARE EDUCATION/TRAINING PROGRAM

## 2024-11-06 PROCEDURE — 72148 MRI LUMBAR SPINE W/O DYE: CPT

## 2024-11-12 ENCOUNTER — OFFICE VISIT (OUTPATIENT)
Dept: ORTHOPEDIC SURGERY | Facility: CLINIC | Age: 64
End: 2024-11-12
Payer: MEDICARE

## 2024-11-12 DIAGNOSIS — M54.16 LUMBAR RADICULOPATHY, ACUTE: ICD-10-CM

## 2024-11-12 DIAGNOSIS — M54.50 LUMBAR PAIN: ICD-10-CM

## 2024-11-12 PROCEDURE — 99215 OFFICE O/P EST HI 40 MIN: CPT | Performed by: ORTHOPAEDIC SURGERY

## 2024-11-12 NOTE — PROGRESS NOTES
Chief complaint: MRI follow-up    HPI: Patient is here with his wife was a necessary and helpful historian.  He has neurogenic claudication symptoms as well as back pain.  He had an MRI of his lumbar spine that I ordered.  He is not getting any better.  See previous note for details.  No bowel or bladder changes.  No significant pain at rest.  No significant weakness at rest only when he has prolonged walking.    Physical exam: Back is clear.  There is decreased range of motion.  He has good strength with manual testing on his legs.    MRI was reviewed.  At L2-3 there is moderate to severe central stenosis.  At L3-4 there is severe central stenosis.  At L4-5 there is severe central stenosis.    Assessment/plan: Patient severely inhibited with bodily function with the above described imaging studies.  I had a long discussion with the patient and his wife and explained to them that their options are 1) live with the symptoms and see how they evolve, 2) physical therapy, 3) pain management or 4) surgery.    At this point he is can to try some physical therapy.  I gave him the name of a book to work on an anti-inflammatory diet.  He is can to follow-up on a as needed basis if physical therapy and chiropractic do not help him.  An operation on him would be an L2-L5 midline laminectomy.  We would likely do a TLIF at L4-5 for his grade 1 spondylolisthesis.  However he does have stress reaction in the pars posteriorly at L5.  I am not sure if that would preclude pedicle screw placement

## 2024-12-02 ENCOUNTER — EVALUATION (OUTPATIENT)
Dept: PHYSICAL THERAPY | Facility: CLINIC | Age: 64
End: 2024-12-02
Payer: MEDICARE

## 2024-12-02 DIAGNOSIS — M54.50 LUMBAR PAIN: ICD-10-CM

## 2024-12-02 DIAGNOSIS — M54.16 LUMBAR RADICULOPATHY, ACUTE: ICD-10-CM

## 2024-12-02 PROCEDURE — 97162 PT EVAL MOD COMPLEX 30 MIN: CPT | Mod: GP

## 2024-12-02 PROCEDURE — 97110 THERAPEUTIC EXERCISES: CPT | Mod: GP

## 2024-12-02 ASSESSMENT — PAIN DESCRIPTION - DESCRIPTORS: DESCRIPTORS: SHOOTING

## 2024-12-02 ASSESSMENT — PAIN - FUNCTIONAL ASSESSMENT: PAIN_FUNCTIONAL_ASSESSMENT: 0-10

## 2024-12-02 ASSESSMENT — PAIN SCALES - GENERAL: PAINLEVEL_OUTOF10: 5 - MODERATE PAIN
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
## 2024-12-09 ENCOUNTER — TREATMENT (OUTPATIENT)
Dept: PHYSICAL THERAPY | Facility: CLINIC | Age: 64
End: 2024-12-09
Payer: MEDICARE

## 2024-12-09 DIAGNOSIS — M54.16 LUMBAR RADICULOPATHY, ACUTE: Primary | ICD-10-CM

## 2024-12-09 DIAGNOSIS — M54.9 BACK PAIN, ACUTE: ICD-10-CM

## 2024-12-09 PROCEDURE — 97140 MANUAL THERAPY 1/> REGIONS: CPT | Mod: GP

## 2024-12-09 PROCEDURE — 97110 THERAPEUTIC EXERCISES: CPT | Mod: GP

## 2024-12-09 ASSESSMENT — PAIN - FUNCTIONAL ASSESSMENT: PAIN_FUNCTIONAL_ASSESSMENT: 0-10

## 2024-12-09 ASSESSMENT — PAIN DESCRIPTION - DESCRIPTORS: DESCRIPTORS: SHOOTING

## 2024-12-09 ASSESSMENT — PAIN SCALES - GENERAL: PAINLEVEL_OUTOF10: 2

## 2024-12-09 NOTE — PROGRESS NOTES
Physical Therapy    Physical Therapy Treatment    Patient Name: Link Inman  MRN: [id number]  Today's Date: 12/9/2024    Time Entry:   Time Calculation  Start Time: 1345  Stop Time: 1440  Time Calculation (min): 55 min     PT Therapeutic Procedures Time Entry  Manual Therapy Time Entry: 10  Therapeutic Exercise Time Entry: 45                     Reason for Visit:  Referred by: Link Herman MD (11/12/2024)   Referral DX: Lumbar radiculopathy, acute [M54.16], Lumbar pain [M54.50]      Insurance:  Visit: #2  Authorized: to be determined  Payor/Plan:   MEDICARE MEDICARE PART A AND B   MEDICARE A&B MED % COVERAGE, AARP SUPP. MED NEC NO AUTH, $186.06 USED FOR PT/ST   Certification Period: 12/02/2024 to 03/02/2024       Current Problem  1. Lumbar radiculopathy, acute        2. Back pain, acute  Follow Up In Physical Therapy          Subjective   Reports he was fairly active with walking.   Did exercises a few times.   Sitting in plane did me in a little, right leg continues to be difficult hard to engage.       Precautions  Precautions  STEADI Fall Risk Score (The score of 4 or more indicates an increased risk of falling): 0  Precautions Comment: hx cancer, heart disease, HTN, headaches  Pain  Pain Assessment  Pain Assessment: 0-10  0-10 (Numeric) Pain Score: 2  Pain Type: Neuropathic pain  Pain Location: Back  Pain Orientation: Right, Left  Pain Radiating Towards: knees  Pain Descriptors: Shooting  Pain Frequency: Intermittent    Objective     TREATMENT  Educated patient on course of treatment.     MANUAL: x1  Lumbar distraction; patient overpressure into lumbar distraction as well; 10 minutes.      THERAPEUTIC EXERCISE: x3  Supine lower trunk rotations; 30 reps.   Supine bridge; double leg; 30 reps. Cues to separate knees slightly and engage core and glutes.   Sciatic nerve glide; 20 reps each side  Pallof press; green band; each side, 30 reps.  Standing lumbar extension; 20 reps.   Discussed updates to  HEP. Answered all questions in full.        OP Education: HEP: Patient verbalizes and demonstrates understanding of home exercises. Patient will contact writer if with questions or if condition worsens.       Access Code: [email or web address]  URL: [email or web address]  Date: 12/09/2024  Prepared by: Shelia Farmer    Home Exercises: Updated   - Lower Trunk Rotations  - 2 x daily - 7 x weekly - 3 sets - 10 reps  - Standing Lumbar Extension  - 3 x daily - 7 x weekly - 2 sets - 10 reps  - Supine Transversus Abdominis Bracing - Hands on Stomach  - 1 x daily - 7 x weekly - 3 sets - 10 reps  - Supine Bridge  - 1 x daily - 7 x weekly - 3 sets - 10 reps  - Standing Anti-Rotation Press with Anchored Resistance  - 2 x daily - 7 x weekly - 3 sets - 10 reps (green band pallof press)     Charges: 97110x3, [id number]         Assessment:  PT Diagnosis (Initial Evaluation 12/02) : Patient presents with pain, weakness, decreased mobility and function secondary to signs/symptoms of severe stenosis and persistent low back pain with radicular findings. Patient demonstrates radicular pain shooting down into legs (R>L) in response to lumbar flexion or knee to chest movements. Patient is able to tolerate gentle activation of core and extension bias exercises without increase in pain. Skilled PT required to maximize functional outcome and reduce pain prior to opting in or out of surgical intervention.     Today: Patient demonstrates ability to progress gently today, patient became symptomatic down left leg following pallof press but standing lumbar extension relieved symptoms back down to 0-1/10. Patient continues to benefit from skilled care to address radicular nerve pain related to low back dysfunction. Gait observed to be improved today since initial visit.         Plan:  Next Visit: Progress an appropriate and tolerated.         Goals:  Patient will identify two exercises/movements he can utilize to modulate pain independently in  4 weeks.   Patient will demonstrate sit to stand without use of BUE or pain >3/10 in 8 weeks.   Patient will demonstrate non antalgic walk in 8 weeks, indicating improved function.   Patient will report Modified Oswestry score of <25% in 12 weeks.   Patient will be independent with HEP.        Anisha Farmer PT, DPT

## 2024-12-17 ENCOUNTER — TREATMENT (OUTPATIENT)
Dept: PHYSICAL THERAPY | Facility: CLINIC | Age: 64
End: 2024-12-17
Payer: MEDICARE

## 2024-12-17 DIAGNOSIS — M54.42 ACUTE MIDLINE LOW BACK PAIN WITH BILATERAL SCIATICA: Primary | ICD-10-CM

## 2024-12-17 DIAGNOSIS — M54.9 BACK PAIN, ACUTE: ICD-10-CM

## 2024-12-17 DIAGNOSIS — M54.41 ACUTE MIDLINE LOW BACK PAIN WITH BILATERAL SCIATICA: Primary | ICD-10-CM

## 2024-12-17 PROCEDURE — 97140 MANUAL THERAPY 1/> REGIONS: CPT | Mod: GP

## 2024-12-17 PROCEDURE — 97110 THERAPEUTIC EXERCISES: CPT | Mod: GP

## 2024-12-17 ASSESSMENT — PAIN DESCRIPTION - DESCRIPTORS: DESCRIPTORS: SHOOTING

## 2024-12-17 ASSESSMENT — PAIN SCALES - GENERAL: PAINLEVEL_OUTOF10: 2

## 2024-12-17 ASSESSMENT — PAIN - FUNCTIONAL ASSESSMENT: PAIN_FUNCTIONAL_ASSESSMENT: 0-10

## 2024-12-17 NOTE — PROGRESS NOTES
Physical Therapy    Physical Therapy Treatment    Patient Name: Link Inman  MRN: [id number]  Today's Date: 12/17/2024    Time Entry:   Time Calculation  Start Time: 1101  Stop Time: 1146  Time Calculation (min): 45 min     PT Therapeutic Procedures Time Entry  Manual Therapy Time Entry: 15  Therapeutic Exercise Time Entry: 30                   Reason for Visit:  Referred by: Link Herman MD (11/12/2024)   Referral DX: Lumbar radiculopathy, acute [M54.16], Lumbar pain [M54.50]      Insurance:  Visit: #3  Authorized: to be determined  Payor/Plan:   MEDICARE MEDICARE PART A AND B   MEDICARE A&B MED % COVERAGE, AARP SUPP. MED NEC NO AUTH, $186.06 USED FOR PT/ST   Certification Period: 12/02/2024 to 03/02/2024       Current Problem  1. Acute midline low back pain with bilateral sciatica        2. Back pain, acute  Follow Up In Physical Therapy          Subjective   Standing extension do seem to help a little bit.   I did raking  Would heated seat in car do good?    Precautions  Precautions  STEADI Fall Risk Score (The score of 4 or more indicates an increased risk of falling): 0  Precautions Comment: hx cancer, heart disease, HTN, headaches  Pain  Pain Assessment  Pain Assessment: 0-10  0-10 (Numeric) Pain Score: 2  Pain Location: Back  Pain Orientation: Right, Left  Pain Radiating Towards: knees  Pain Descriptors: Shooting  Pain Frequency: Intermittent  Position dependent.    Objective     TREATMENT  Educated patient on course of treatment.     MANUAL: x1  Gentle grade II PA mobilization L1-S1; 15 minutes. Patient in prone. Progressed to prone on elbows for last 5 minutes.        THERAPEUTIC EXERCISE: x2  Prone on elbows with gentle TA engagement on exhale; 5 minutes.   Alternating arm lift; TA engagement; 20 reps.   Standing lumbar extension forearms on the wall; 30 reps.   Demonstrated and discussed use of lumbar roll for traveling in car and plane over holidays.   Answered all patient questions.        OP Education: HEP: Patient verbalizes and demonstrates understanding of home exercises. Patient will contact writer if with questions or if condition worsens.       Access Code: [email or web address]  URL: [email or web address]  Date: 12/17/2024  Prepared by: Shelia Farmer    Home Exercises: Updated HEP  - Lower Trunk Rotations  - 2 x daily - 7 x weekly - 3 sets - 10 reps  - Standing Lumbar Extension  - 3 x daily - 7 x weekly - 2 sets - 10 reps  - Supine Transversus Abdominis Bracing - Hands on Stomach  - 1 x daily - 7 x weekly - 3 sets - 10 reps  - Supine Bridge  - 1 x daily - 7 x weekly - 3 sets - 10 reps  - Standing Anti-Rotation Press with Anchored Resistance  - 2 x daily - 7 x weekly - 3 sets - 10 reps  - Standing Lumbar Extension at Wall - Forearms  - 1 x daily - 7 x weekly - 3 sets - 10 reps  - Prone Press Up On Elbows  - 1 x daily - 7 x weekly - 3 sets - 10 reps     Charges: 97110x2, [id number]         Assessment:  PT Diagnosis (Initial Evaluation 12/02) : Patient presents with pain, weakness, decreased mobility and function secondary to signs/symptoms of severe stenosis and persistent low back pain with radicular findings. Patient demonstrates radicular pain shooting down into legs (R>L) in response to lumbar flexion or knee to chest movements. Patient is able to tolerate gentle activation of core and extension bias exercises without increase in pain. Skilled PT required to maximize functional outcome and reduce pain prior to opting in or out of surgical intervention.     Today: Patient continues to demonstrate improved symptoms following exercise with extension bias focus. Reports pain decreased to 2/10 following prone on elbow and manual from 7/10. Patient continues to progress well and as expected. Benefits from skilled care.      Plan:  Next Visit: Progress an appropriate and tolerated.         Goals:  Patient will identify two exercises/movements he can utilize to modulate pain independently  in 4 weeks.   Patient will demonstrate sit to stand without use of BUE or pain >3/10 in 8 weeks.   Patient will demonstrate non antalgic walk in 8 weeks, indicating improved function.   Patient will report Modified Oswestry score of <25% in 12 weeks.   Patient will be independent with HEP.        Anisha Farmer PT, DPT

## 2025-01-06 ENCOUNTER — OFFICE VISIT (OUTPATIENT)
Dept: PRIMARY CARE | Facility: CLINIC | Age: 65
End: 2025-01-06
Payer: MEDICARE

## 2025-01-06 ENCOUNTER — HOSPITAL ENCOUNTER (OUTPATIENT)
Dept: RADIOLOGY | Facility: CLINIC | Age: 65
Discharge: HOME | End: 2025-01-06
Payer: MEDICARE

## 2025-01-06 VITALS
SYSTOLIC BLOOD PRESSURE: 130 MMHG | TEMPERATURE: 97.5 F | RESPIRATION RATE: 18 BRPM | HEART RATE: 78 BPM | OXYGEN SATURATION: 97 % | WEIGHT: 223 LBS | BODY MASS INDEX: 29.42 KG/M2 | DIASTOLIC BLOOD PRESSURE: 80 MMHG

## 2025-01-06 DIAGNOSIS — R05.3 CHRONIC COUGH: Primary | ICD-10-CM

## 2025-01-06 DIAGNOSIS — R05.3 CHRONIC COUGH: ICD-10-CM

## 2025-01-06 PROCEDURE — 87637 SARSCOV2&INF A&B&RSV AMP PRB: CPT

## 2025-01-06 PROCEDURE — 71046 X-RAY EXAM CHEST 2 VIEWS: CPT

## 2025-01-06 PROCEDURE — 99214 OFFICE O/P EST MOD 30 MIN: CPT | Performed by: FAMILY MEDICINE

## 2025-01-06 PROCEDURE — 1036F TOBACCO NON-USER: CPT | Performed by: FAMILY MEDICINE

## 2025-01-06 PROCEDURE — 1160F RVW MEDS BY RX/DR IN RCRD: CPT | Performed by: FAMILY MEDICINE

## 2025-01-06 PROCEDURE — 71046 X-RAY EXAM CHEST 2 VIEWS: CPT | Performed by: RADIOLOGY

## 2025-01-06 PROCEDURE — 1159F MED LIST DOCD IN RCRD: CPT | Performed by: FAMILY MEDICINE

## 2025-01-06 RX ORDER — BENZONATATE 100 MG/1
100 CAPSULE ORAL 3 TIMES DAILY PRN
Qty: 42 CAPSULE | Refills: 0 | Status: SHIPPED | OUTPATIENT
Start: 2025-01-06 | End: 2025-02-05

## 2025-01-06 RX ORDER — AZITHROMYCIN 250 MG/1
TABLET, FILM COATED ORAL
Qty: 6 TABLET | Refills: 0 | Status: SHIPPED | OUTPATIENT
Start: 2025-01-06 | End: 2025-01-11

## 2025-01-06 ASSESSMENT — ENCOUNTER SYMPTOMS
FEVER: 0
DYSPHORIC MOOD: 1
FATIGUE: 1
CONSTIPATION: 0
DIARRHEA: 0
WHEEZING: 0
NAUSEA: 0
HEADACHES: 0
RHINORRHEA: 1
DIFFICULTY URINATING: 0
SHORTNESS OF BREATH: 1
COUGH: 1
NERVOUS/ANXIOUS: 1
MYALGIAS: 1
VOMITING: 0
SORE THROAT: 0

## 2025-01-06 NOTE — PROGRESS NOTES
Subjective   Patient ID: Link Inman is a 64 y.o. male who presents for Cough.    Cough  Episode onset: 2 months. Cough characteristics: mixture of dry and thick mucus production. Associated symptoms include myalgias, postnasal drip, rhinorrhea and shortness of breath. Pertinent negatives include no chest pain, ear pain, fever, headaches, sore throat or wheezing.        Review of Systems   Constitutional:  Positive for fatigue. Negative for fever.   HENT:  Positive for postnasal drip and rhinorrhea. Negative for congestion, ear pain and sore throat.    Respiratory:  Positive for cough and shortness of breath. Negative for wheezing.         Duration of cough 2 months, is intermittent productive  Non smoker  No family ill.   [place] 1 yr ago.   Cardiovascular:  Negative for chest pain.   Gastrointestinal:  Negative for constipation, diarrhea, nausea and vomiting.   Genitourinary:  Negative for difficulty urinating.   Musculoskeletal:  Positive for myalgias.   Neurological:  Negative for headaches.        Pt wife wants him eval for cognitive impairment  Advised we will make appt for pt with pcp to eval   Psychiatric/Behavioral:  Positive for dysphoric mood. The patient is nervous/anxious.         Wife would like pt eval for anx /depression  Advised we will make appt for pt to be eval by pcp       Objective   /80   Pulse 78   Temp 36.4 °C (97.5 °F)   Resp 18   Wt 101 kg (223 lb)   SpO2 97%   BMI 29.42 kg/m²     Physical Exam  Vitals and nursing note reviewed.   Constitutional:       General: He is not in acute distress.     Appearance: Normal appearance.   HENT:      Head: Normocephalic and atraumatic.      Right Ear: Tympanic membrane, ear canal and external ear normal.      Left Ear: Tympanic membrane, ear canal and external ear normal.      Nose: Nose normal.      Mouth/Throat:      Mouth: Mucous membranes are moist.      Pharynx: Oropharynx is clear.   Cardiovascular:      Rate and Rhythm: Normal rate  and regular rhythm.      Heart sounds: Normal heart sounds.   Pulmonary:      Effort: Pulmonary effort is normal.      Breath sounds: Normal breath sounds.   Musculoskeletal:      Cervical back: Neck supple.   Lymphadenopathy:      Cervical: No cervical adenopathy.   Skin:     General: Skin is warm and dry.   Neurological:      General: No focal deficit present.      Mental Status: He is alert.   Psychiatric:         Mood and Affect: Mood normal.         Behavior: Behavior normal.         Assessment/Plan   Problem List Items Addressed This Visit             ICD-10-CM    Chronic cough - Primary R05.3     Advised pt we will get cxr, flu, rsv, covid pcr since pt will be traveling.  Will notify if any positive results  Pt to take meds as directed, rest and increase flds  F/up with pcp , appt will be made for pt before leaving office  Needs eval for anx /depression and possible cognitive dysfunction         Relevant Medications    azithromycin (Zithromax) 250 mg tablet    benzonatate (Tessalon) 100 mg capsule    Other Relevant Orders    XR chest 2 views    Sars-CoV-2 and Influenza A/B PCR    RSV PCR

## 2025-01-06 NOTE — Clinical Note
12/30 started with congestion and sneezing  No cough  No fever  No respiratory distress or breathing difficulties. No wheezing.  Sleeping well  Nursing great  Wetting diapers as usual.  Acting normal otherwise  Mom concerned that he still has thick, green snot x1 week now  Gets worse after laying down for awhile  Not super congested during the day    Discussed symptoms as per peds office protocol manual per Dr. Chas Veliz's book, Pediatric Telephone Protocols 16th Edition.  Since no fever, sleeping well and no resp distress.. continue symptomatic care  Saline and suction. Humidifier. Keep slightly elevated. Etc...  2mo wcc 1/17... call back prior if any new/worse symptoms    Mom verbalized understanding and knows to call if condition changes, worsens, does not improve and prn.     Patient reached deep sedation.

## 2025-01-06 NOTE — ASSESSMENT & PLAN NOTE
Advised pt we will get cxr, flu, rsv, covid pcr since pt will be traveling.  Will notify if any positive results  Pt to take meds as directed, rest and increase flds  F/up with pcp , appt will be made for pt before leaving office  Needs eval for anx /depression and possible cognitive dysfunction

## 2025-01-07 ENCOUNTER — TREATMENT (OUTPATIENT)
Dept: PHYSICAL THERAPY | Facility: CLINIC | Age: 65
End: 2025-01-07
Payer: MEDICARE

## 2025-01-07 ENCOUNTER — OFFICE VISIT (OUTPATIENT)
Dept: PRIMARY CARE | Facility: CLINIC | Age: 65
End: 2025-01-07
Payer: MEDICARE

## 2025-01-07 VITALS
OXYGEN SATURATION: 99 % | DIASTOLIC BLOOD PRESSURE: 78 MMHG | SYSTOLIC BLOOD PRESSURE: 126 MMHG | HEART RATE: 85 BPM | RESPIRATION RATE: 14 BRPM | HEIGHT: 73 IN | WEIGHT: 216 LBS | BODY MASS INDEX: 28.63 KG/M2

## 2025-01-07 DIAGNOSIS — F69 BEHAVIOR CONCERN IN ADULT: ICD-10-CM

## 2025-01-07 DIAGNOSIS — M54.9 BACK PAIN, ACUTE: ICD-10-CM

## 2025-01-07 DIAGNOSIS — R41.9 COGNITIVE COMPLAINTS: Primary | ICD-10-CM

## 2025-01-07 DIAGNOSIS — I50.22 CHRONIC SYSTOLIC HEART FAILURE: ICD-10-CM

## 2025-01-07 DIAGNOSIS — I48.91 ATRIAL FIBRILLATION, UNSPECIFIED TYPE (MULTI): ICD-10-CM

## 2025-01-07 DIAGNOSIS — M54.16 LUMBAR RADICULOPATHY, ACUTE: Primary | ICD-10-CM

## 2025-01-07 LAB
FLUAV RNA RESP QL NAA+PROBE: NOT DETECTED
FLUBV RNA RESP QL NAA+PROBE: NOT DETECTED
RSV RNA RESP QL NAA+PROBE: NOT DETECTED
SARS-COV-2 ORF1AB RESP QL NAA+PROBE: NOT DETECTED

## 2025-01-07 PROCEDURE — 99215 OFFICE O/P EST HI 40 MIN: CPT | Performed by: INTERNAL MEDICINE

## 2025-01-07 PROCEDURE — 97140 MANUAL THERAPY 1/> REGIONS: CPT | Mod: GP

## 2025-01-07 PROCEDURE — 3008F BODY MASS INDEX DOCD: CPT | Performed by: INTERNAL MEDICINE

## 2025-01-07 PROCEDURE — 97110 THERAPEUTIC EXERCISES: CPT | Mod: GP

## 2025-01-07 PROCEDURE — 1036F TOBACCO NON-USER: CPT | Performed by: INTERNAL MEDICINE

## 2025-01-07 PROCEDURE — 1159F MED LIST DOCD IN RCRD: CPT | Performed by: INTERNAL MEDICINE

## 2025-01-07 ASSESSMENT — ENCOUNTER SYMPTOMS
SHORTNESS OF BREATH: 0
CHILLS: 0
DIARRHEA: 0
COUGH: 0
JOINT SWELLING: 0
NAUSEA: 0
DIZZINESS: 0
DIAPHORESIS: 0
CONSTIPATION: 0
VOMITING: 0
MYALGIAS: 0
TREMORS: 0
FEVER: 0
WEAKNESS: 0
SPEECH DIFFICULTY: 0

## 2025-01-07 ASSESSMENT — COLUMBIA-SUICIDE SEVERITY RATING SCALE - C-SSRS
6. HAVE YOU EVER DONE ANYTHING, STARTED TO DO ANYTHING, OR PREPARED TO DO ANYTHING TO END YOUR LIFE?: NO
2. HAVE YOU ACTUALLY HAD ANY THOUGHTS OF KILLING YOURSELF?: NO
1. IN THE PAST MONTH, HAVE YOU WISHED YOU WERE DEAD OR WISHED YOU COULD GO TO SLEEP AND NOT WAKE UP?: NO

## 2025-01-07 ASSESSMENT — PAIN - FUNCTIONAL ASSESSMENT: PAIN_FUNCTIONAL_ASSESSMENT: 0-10

## 2025-01-07 ASSESSMENT — PATIENT HEALTH QUESTIONNAIRE - PHQ9
2. FEELING DOWN, DEPRESSED OR HOPELESS: SEVERAL DAYS
1. LITTLE INTEREST OR PLEASURE IN DOING THINGS: SEVERAL DAYS
SUM OF ALL RESPONSES TO PHQ9 QUESTIONS 1 AND 2: 2

## 2025-01-07 ASSESSMENT — PAIN SCALES - GENERAL: PAINLEVEL_OUTOF10: 2

## 2025-01-07 ASSESSMENT — PAIN DESCRIPTION - DESCRIPTORS: DESCRIPTORS: SHOOTING

## 2025-01-07 NOTE — PATIENT INSTRUCTIONS
SUBACUTE TO CHRONIC REPORTED BEHAVIORAL CONCERNS THAT ARE PROGRESSING PER SPOUSE AND NOW HAVE INVOLVED CIVIL AUTHORITIES.  THIS IS DISTINCTLY UNUSUAL FOR THIS PATIENT A FORMER BUSINESS .    2.  MMSE TODAY NORMAL 30/30    3.  OF CONCERN IS FRONTAL LOBE LESION OR DISEASE.  UNLIKELY ENCEPHALOPATHY WITH THIS TIME FRAME, AND HE IS NOT EXPERIENCING DELIRIUM SO MUCH AS LOSS OF CONTROL OF HIS EMOTIONS AND INHIBITION.    4.  REFER URGENTLY TO NEUROLOGY FOR FURTHER WORKUP.  IF NEUROLOGICALLY HE CHECKS OUT TO BE SOUND, THEN LIKELY PSYCHIATRIC REFERRAL FOR COMPLICATED AFFECTIVE DISORDER.    5.  FOLLOW UP AFTER WORKUPS

## 2025-01-07 NOTE — PROGRESS NOTES
Subjective   Link Inman is a 64 y.o. male who presents for   NEW CONCERN DEPRESSION DENIES SUICIDAL THOUGHTS   DEPRESSED /IRRITATED WITH INSURANCE BILLS X 7 MONTHS    MEMORY CHANGE X 2 YRS PER WIFE PT HAS NOTICED LAST 5 MONTHS   MISPLACING THINGS NAMES  ( MINI MENTAL TODAY )  WITH WIFE CHRIS TODAY      WAS SEEN YESTERDAY AT URGENT CARE FOR ON GOING COUGH FEELS BETTER AFTER FIRST ROUND OF ATB     HPI   HAD A COUGH FOR 2 MONTHS    BREATHING MUCH BETTER TODAY THAN YESTERDAY.    FORCED INTO skilled nursing BECAUSE UNABLE TO PERFORM JOB FROM BACK AND LEGS.    HAS HAD DEVELOPMENT OF AFIB IN [place] LAST YEAR.    HAVING HOUSE ISSUES AS WELL AND SOME FINANCIAL PRESSURES     HAVING DIFFICULTY WITH INSURANCE CLAIMS AS WELL    PER SPOUSE BEGAN 5.5 YEARS AGO INTERMITTENT SEVERE MOOD SWINGS.  BIZAARE BEHAVIOR.  SOME RECENT EPISODES HAVE BEEN MORE EXTREME.      HAS HAD SOME SUICIDAL THINKING BUT NOT ANY ACTIVE PLANNING.    GET DEFENSIVE AND AGGRESSIVE AT TIMES.      PER PT/SPOUSE  HE WAS SERVED A LETTER WARNING HIM HE WAS NO LONGER ALLOWED TO BE PRESENT IN THE POLICE STATION OR [place]?  APPARENTLY GOT IN ARGUMENT WITH  ABOUT SOME WATER ISSUES?    WAS SEEN BY ORTHO/BACK FOUND SIGNIFICANT SPINAL STENOSIS OFFERRED BACK SURGERY, HESITANT TO MOVE IN THAT DIRECTION, THEREFORE SAW ANOTHER ORTHO, FOUND TO HAVE SEVERE HIP ARTHRITIS AND IS MOVING NOW IN DIRECTION OF BILATERAL HIP REPLACEMENTS AND IF THAT DOESN'T CORRECT THE PROBLEMS TO CONSIDER BACK SURGERY.  IS IN PT NOW        Review of Systems   Constitutional:  Negative for chills, diaphoresis and fever.   Respiratory:  Negative for cough and shortness of breath.    Cardiovascular:  Negative for chest pain and leg swelling.   Gastrointestinal:  Negative for constipation, diarrhea, nausea and vomiting.   Musculoskeletal:  Negative for joint swelling and myalgias.   Neurological:  Negative for dizziness, tremors, speech difficulty and weakness.  "  Psychiatric/Behavioral:  Positive for behavioral problems.        Objective   /78   Pulse 85   Resp 14   Ht 1.854 m (6' 1\")   Wt 98 kg (216 lb)   SpO2 99%   BMI 28.50 kg/m²     Physical Exam  Vitals reviewed.   Constitutional:       General: He is not in acute distress.     Appearance: Normal appearance. He is not ill-appearing.   Cardiovascular:      Rate and Rhythm: Normal rate and regular rhythm.      Pulses: Normal pulses.      Heart sounds:      No gallop.   Pulmonary:      Breath sounds: Normal breath sounds. No wheezing, rhonchi or rales.   Abdominal:      General: Abdomen is flat. Bowel sounds are normal.      Palpations: Abdomen is soft.      Tenderness: There is no guarding or rebound.   Musculoskeletal:      Right lower leg: No edema.      Left lower leg: No edema.   Skin:     General: Skin is warm and dry.   Neurological:      General: No focal deficit present.      Mental Status: He is oriented to person, place, and time.      Coordination: Coordination normal.      Gait: Gait normal.   Psychiatric:         Mood and Affect: Mood normal.         Behavior: Behavior normal.         Thought Content: Thought content normal.         Judgment: Judgment normal.         Assessment/Plan   Problem List Items Addressed This Visit       Atrial fibrillation (Multi)     FOLLOWS WITH CARDIOLOGY         Chronic systolic heart failure     FOLLOWS WITH CARDIOLOGY         Behavior concern in adult     Other Visit Diagnoses       Cognitive complaints    -  Primary    Relevant Orders    Referral to Neurology          "
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
## 2025-01-09 ENCOUNTER — TELEPHONE (OUTPATIENT)
Dept: PRIMARY CARE | Facility: CLINIC | Age: 65
End: 2025-01-09

## 2025-01-09 ENCOUNTER — TREATMENT (OUTPATIENT)
Dept: PHYSICAL THERAPY | Facility: CLINIC | Age: 65
End: 2025-01-09
Payer: MEDICARE

## 2025-01-09 ENCOUNTER — TELEPHONE (OUTPATIENT)
Dept: PRIMARY CARE | Facility: CLINIC | Age: 65
End: 2025-01-09
Payer: MEDICARE

## 2025-01-09 DIAGNOSIS — M54.9 BACK PAIN, ACUTE: ICD-10-CM

## 2025-01-09 PROCEDURE — 97140 MANUAL THERAPY 1/> REGIONS: CPT | Mod: CQ,GP

## 2025-01-09 PROCEDURE — 97110 THERAPEUTIC EXERCISES: CPT | Mod: GP,CQ

## 2025-01-09 ASSESSMENT — PAIN - FUNCTIONAL ASSESSMENT: PAIN_FUNCTIONAL_ASSESSMENT: 0-10

## 2025-01-09 NOTE — TELEPHONE ENCOUNTER
----- Message from Nicki HANSON sent at 1/9/2025  8:38 AM EST -----    ----- Message -----  From: Megan Sheldon MD  Sent: 1/8/2025   8:36 PM EST  To: Nicki Rajput CMA    Call pt cxr shows lungs are clear, there is a comression fx of vert, this is a known condition in med rec

## 2025-01-09 NOTE — PROGRESS NOTES
"      Physical Therapy Treatment    Patient Name: Link Inman  MRN: [id number]  Today's Date: 1/9/2025  Time Calculation  Start Time: 0849  Stop Time: 0936  Time Calculation (min): 47 min  PT Therapeutic Procedures Time Entry  Manual Therapy Time Entry: 10  Therapeutic Exercise Time Entry: 34     Insurance   Visit #5  Authorized: to be determined  Payor/Plan:   MEDICARE MEDICARE PART A AND B   MEDICARE A&B MED % COVERAGE, AARP SUPP. MED NEC NO AUTH, $186.06 USED FOR PT/ST   Certification Period: 12/02/2024 to 03/02/2024  Current Problem:  1. Back pain, acute  Follow Up In Physical Therapy          Subjective:  I feel overall about the same. I saw Dr. Cristobal and I have a L THR scheduled on 5-19-25 and the in August my  R hip is tentatively scheduled. The doctor wants me to add the hip into my current therapy program. My back was about 8/10 after walking the dog ~ 20 min.  Starts in my legs posterior  up to my back.  Pain:  Pain Assessment: 0-10    Objective:  Static Balance  R SLS~2 sec  L SLS~ 3 sec  Mild dynamic valgus present  Hypo mobility noted L SI joint  Precautions  Precautions  Precautions Comment: hx of CA, heart disease, HTN, headaches  Treatments:  Therapeutic Exercise 49480: Unit(s)-2  Supine PPT x 15 x 6\" hold  Bridge -3 position x 10  Supine Hipflexor stretch x 30sec -omitted hip discomfort.  SKTC x 15\" x 3 R/L  SL Clamshell x 15 R/L  Reviewed and Updated HEP  Manual Therapy 89913: Unit(s)-1  MFR- B Leg Pull  MFR- R/L Leg Pull   MET to B LE R Hip Ext/L Hip flexion  Assessment: Patient was able to perform session of exercise with no report of increased symptoms into legs.   Patient was challenged with bridge and required cuing at times to maintain from rolling trunk during clamshells.   Plan: Continue to work on TA engagement and continue progression of hip mobility and strengthening. Will communicate with evaluating therapist regarding addition of hip to program as asked by surgeon.   "

## 2025-01-21 ENCOUNTER — TREATMENT (OUTPATIENT)
Dept: PHYSICAL THERAPY | Facility: CLINIC | Age: 65
End: 2025-01-21
Payer: MEDICARE

## 2025-01-21 DIAGNOSIS — M79.605 PAIN IN BOTH LOWER EXTREMITIES: ICD-10-CM

## 2025-01-21 DIAGNOSIS — M79.604 PAIN IN BOTH LOWER EXTREMITIES: ICD-10-CM

## 2025-01-21 DIAGNOSIS — M54.9 BACK PAIN, ACUTE: ICD-10-CM

## 2025-01-21 DIAGNOSIS — M54.16 LUMBAR RADICULOPATHY, ACUTE: Primary | ICD-10-CM

## 2025-01-21 PROCEDURE — 97140 MANUAL THERAPY 1/> REGIONS: CPT | Mod: GP

## 2025-01-21 PROCEDURE — 97110 THERAPEUTIC EXERCISES: CPT | Mod: GP

## 2025-01-21 ASSESSMENT — PAIN SCALES - GENERAL: PAINLEVEL_OUTOF10: 6

## 2025-01-21 ASSESSMENT — PAIN - FUNCTIONAL ASSESSMENT: PAIN_FUNCTIONAL_ASSESSMENT: 0-10

## 2025-01-21 NOTE — PROGRESS NOTES
Physical Therapy    Physical Therapy Treatment    Patient Name: Link Inman  MRN: [id number]  Today's Date: 1/21/2025    Time Entry:   Time Calculation  Start Time: 0800  Stop Time: 0842  Time Calculation (min): 42 min     PT Therapeutic Procedures Time Entry  Manual Therapy Time Entry: 10  Therapeutic Exercise Time Entry: 32                   Reason for Visit:  Referred by: Link Herman MD (11/12/2024)   Referral DX: Lumbar radiculopathy, acute [M54.16], Lumbar pain [M54.50]      Insurance:  Visit: #6  Authorized: to be determined  Payor/Plan:   MEDICARE MEDICARE PART A AND B   MEDICARE A&B MED % COVERAGE, AARP SUPP. MED NEC NO AUTH, $186.06 USED FOR PT/ST   Certification Period: 12/02/2024 to 03/02/2024       Current Problem  1. Lumbar radiculopathy, acute        2. Back pain, acute  Follow Up In Physical Therapy      3. Pain in both lower extremities            Subjective     Reports he has been really really sore and in pain the last two days, having trouble lifting feet off the ground due to pain down bilateral lateral legs.   He reports both hips legs start to cramp and spasm in the cold.     May 12th, 2025 is when my hip replacement is.     Precautions  Precautions  STEADI Fall Risk Score (The score of 4 or more indicates an increased risk of falling): 0  Precautions Comment: hx of CA, heart disease, HTN, headaches  Pain  Pain Assessment  Pain Assessment: 0-10  0-10 (Numeric) Pain Score: 6      Objective     TREATMENT  Educated patient on course of treatment.     MANUAL: x1  PROM bilateral hips; 10 minutes.     THERAPEUTIC EXERCISE: x2  Lower trunk rotations; 30 reps, small range warm up.   Hip adduction isometric; red ball; 20, 5 seconds.   Hip abduction blue band; small range; 20 reps, no hold.   Glute bridge supine; 20 reps, pain-free range.   Attempted standing hip isometric into wall. Unable to do for home.   Patient provided an updated copy of HEP.    OP Education: HEP: Patient verbalizes  "and demonstrates understanding of home exercises. Patient will contact writer if with questions or if condition worsens.       Access Code: [email or web address]  URL: [email or web address]  Date: 12/17/2024  Prepared by: Shelia Farmer       Charges: [id number], [id number]       Assessment:  The patient presented with decreased tolerance to exercise today, with isometric movements being the primary pain-free motion. Bilateral restrictions in trunk, lumbar, and hip range of motion persist. The patient also demonstrated difficulty \"relaxing\" the bilateral lower extremities for passive range of motion. Despite these challenges, ongoing therapy remains beneficial to address these limitations and improve overall mobility.    Plan:  Progress as appropriate and tolerated.   Address symptoms as able.        Goals:  Patient will identify two exercises/movements he can utilize to modulate pain independently in 4 weeks.   Patient will demonstrate sit to stand without use of BUE or pain >3/10 in 8 weeks.   Patient will demonstrate non antalgic walk in 8 weeks, indicating improved function.   Patient will report Modified Oswestry score of <25% in 12 weeks.   Patient will be independent with HEP.        Anisha Farmer PT, DPT   "

## 2025-01-25 DIAGNOSIS — I50.22 CHRONIC SYSTOLIC HEART FAILURE: ICD-10-CM

## 2025-01-26 DIAGNOSIS — I25.10 ARTERIOSCLEROTIC CORONARY ARTERY DISEASE: ICD-10-CM

## 2025-01-27 RX ORDER — METOPROLOL SUCCINATE 50 MG/1
50 TABLET, EXTENDED RELEASE ORAL 2 TIMES DAILY
Qty: 180 TABLET | Refills: 3 | Status: SHIPPED | OUTPATIENT
Start: 2025-01-27

## 2025-01-27 RX ORDER — ATORVASTATIN CALCIUM 20 MG/1
20 TABLET, FILM COATED ORAL NIGHTLY
Qty: 90 TABLET | Refills: 3 | OUTPATIENT
Start: 2025-01-27

## 2025-02-05 ENCOUNTER — OFFICE VISIT (OUTPATIENT)
Dept: PRIMARY CARE | Facility: CLINIC | Age: 65
End: 2025-02-05
Payer: MEDICARE

## 2025-02-05 ENCOUNTER — TELEPHONE (OUTPATIENT)
Dept: PRIMARY CARE | Facility: CLINIC | Age: 65
End: 2025-02-05

## 2025-02-05 VITALS
RESPIRATION RATE: 20 BRPM | BODY MASS INDEX: 29.16 KG/M2 | OXYGEN SATURATION: 96 % | SYSTOLIC BLOOD PRESSURE: 120 MMHG | DIASTOLIC BLOOD PRESSURE: 78 MMHG | WEIGHT: 221 LBS | HEART RATE: 68 BPM | TEMPERATURE: 97.9 F

## 2025-02-05 DIAGNOSIS — J40 SINOBRONCHITIS: ICD-10-CM

## 2025-02-05 DIAGNOSIS — R05.3 PERSISTENT COUGH: Primary | ICD-10-CM

## 2025-02-05 DIAGNOSIS — J32.9 SINOBRONCHITIS: ICD-10-CM

## 2025-02-05 LAB
POC RAPID INFLUENZA A: NEGATIVE
POC RAPID INFLUENZA B: NEGATIVE
POC SARS-COV-2 AG BINAX: NORMAL

## 2025-02-05 PROCEDURE — 1160F RVW MEDS BY RX/DR IN RCRD: CPT | Performed by: NURSE PRACTITIONER

## 2025-02-05 PROCEDURE — 87804 INFLUENZA ASSAY W/OPTIC: CPT | Performed by: NURSE PRACTITIONER

## 2025-02-05 PROCEDURE — 1036F TOBACCO NON-USER: CPT | Performed by: NURSE PRACTITIONER

## 2025-02-05 PROCEDURE — 99213 OFFICE O/P EST LOW 20 MIN: CPT | Performed by: NURSE PRACTITIONER

## 2025-02-05 PROCEDURE — 87811 SARS-COV-2 COVID19 W/OPTIC: CPT | Performed by: NURSE PRACTITIONER

## 2025-02-05 PROCEDURE — 1159F MED LIST DOCD IN RCRD: CPT | Performed by: NURSE PRACTITIONER

## 2025-02-05 RX ORDER — DOXYCYCLINE 100 MG/1
100 CAPSULE ORAL 2 TIMES DAILY
Qty: 20 CAPSULE | Refills: 0 | Status: SHIPPED | OUTPATIENT
Start: 2025-02-05 | End: 2025-02-15

## 2025-02-05 RX ORDER — ALBUTEROL SULFATE 90 UG/1
2 INHALANT RESPIRATORY (INHALATION) EVERY 4 HOURS PRN
Qty: 8.5 G | Refills: 0 | Status: SHIPPED | OUTPATIENT
Start: 2025-02-05 | End: 2025-03-07

## 2025-02-05 RX ORDER — FLUTICASONE PROPIONATE 50 MCG
1 SPRAY, SUSPENSION (ML) NASAL DAILY
Qty: 16 G | Refills: 0 | Status: SHIPPED | OUTPATIENT
Start: 2025-02-05 | End: 2025-03-07

## 2025-02-05 RX ORDER — ATORVASTATIN CALCIUM 20 MG/1
20 TABLET, FILM COATED ORAL DAILY
COMMUNITY

## 2025-02-05 ASSESSMENT — ENCOUNTER SYMPTOMS
COUGH: 1
MYALGIAS: 0
DIARRHEA: 0
CHEST TIGHTNESS: 1
FATIGUE: 0
NAUSEA: 0
SINUS PRESSURE: 1
SHORTNESS OF BREATH: 1
SINUS PAIN: 1
WHEEZING: 0
HEADACHES: 1
APPETITE CHANGE: 0
RHINORRHEA: 1
ABDOMINAL PAIN: 0
FEVER: 0
SORE THROAT: 1
VOMITING: 0

## 2025-02-05 NOTE — TELEPHONE ENCOUNTER
TYLER......Pt advised me that he had a very bad experience today at the [facility].  Said he was put in a room for over 30 minutes and no one came in to let him know what was going on or why it was taking so long.  So, he walked out.  The  panicked and called him to come back in, which he did.      After waiting another 10 minutes, Courtney came in.  He said she was very insistent that he do a follow up with Dr. Lopez even though he does not feel that it was necessary.  He told her he was going to be out of town leaving in a few days through next week, and she kept insisting that he come to see Dr. Lopez.      I scheduled him for Feb 20th, and he said he will call to cancel if he is feeling better by then.

## 2025-02-05 NOTE — PROGRESS NOTES
Subjective   Patient ID: Link Inman is a 64 y.o. male who presents for Cough (Lots of phlegm/Out of breath easily/Fatigue/Headache/Unable to sleep well/).    Cough  This is a new problem. Episode onset: 1/26/25. The problem has been gradually worsening. The problem occurs every few minutes. The cough is Productive of sputum (milky). Associated symptoms include headaches, nasal congestion, postnasal drip and rhinorrhea. The symptoms are aggravated by lying down.        Review of Systems   HENT:  Positive for postnasal drip and rhinorrhea.    Respiratory:  Positive for cough.    Neurological:  Positive for headaches.       Objective   /78   Pulse 68   Temp 36.6 °C (97.9 °F) (Temporal)   Resp 20   Wt 100 kg (221 lb)   SpO2 96%   BMI 29.16 kg/m²     Physical Exam    Assessment/Plan

## 2025-02-05 NOTE — PROGRESS NOTES
Patient has had a cough since November. Patient was here on January 1/6/25 and treated with tessalon perles and a zpack. Pt had a chest xray on 1/6/25 and it was negative. Since his antibiotics, his symptoms improved initially but he is still feeling the same. Pt has a headache, cough. Pt's cough is occasionally productive. Patient has tried cough drops and DM; they are slightly helping. No fevers. Patient is seeing someone immunocompromised, next week. Pt was in [place] in 1/26/25.         Review of Systems   Constitutional:  Negative for appetite change, fatigue and fever.   HENT:  Positive for congestion, postnasal drip, rhinorrhea, sinus pressure, sinus pain and sore throat.    Respiratory:  Positive for cough, chest tightness and shortness of breath. Negative for wheezing.    Cardiovascular:  Negative for chest pain.   Gastrointestinal:  Negative for abdominal pain, diarrhea, nausea and vomiting.   Musculoskeletal:  Negative for myalgias.   Neurological:  Positive for headaches.     Objective   /78   Pulse 68   Temp 36.6 °C (97.9 °F) (Temporal)   Resp 20   Wt 100 kg (221 lb)   SpO2 96%   BMI 29.16 kg/m²     Physical Exam  Vitals reviewed.   Constitutional:       General: He is not in acute distress.     Appearance: Normal appearance. He is not toxic-appearing.   HENT:      Head: Atraumatic.      Right Ear: Tympanic membrane, ear canal and external ear normal.      Left Ear: Tympanic membrane, ear canal and external ear normal.      Nose: Congestion present. No rhinorrhea.      Right Sinus: Frontal sinus tenderness present.      Left Sinus: Frontal sinus tenderness present.      Mouth/Throat:      Pharynx: No oropharyngeal exudate or posterior oropharyngeal erythema.      Comments: Uvula midline, moderate post nasal drainage  Cardiovascular:      Rate and Rhythm: Normal rate and regular rhythm.      Heart sounds: Normal heart sounds. No murmur heard.  Pulmonary:      Effort: Pulmonary effort is  normal.      Breath sounds: Normal breath sounds. No wheezing or rhonchi.   Abdominal:      General: Bowel sounds are normal.      Palpations: Abdomen is soft.      Tenderness: There is no abdominal tenderness. There is no guarding.   Skin:     General: Skin is warm and dry.   Neurological:      General: No focal deficit present.      Mental Status: He is alert.   Psychiatric:         Mood and Affect: Mood normal.     Assessment/Plan   Problem List Items Addressed This Visit    None  Visit Diagnoses         Codes    Persistent cough    -  Primary R05.3    Relevant Orders    POCT BinaxNOW Covid-19 Ag Card manually resulted (Completed)    POCT Influenza A/B manually resulted (Completed)    Sars-CoV-2 and Influenza A/B PCR    Sinobronchitis     J32.9, J40    Relevant Medications    doxycycline (Vibramycin) 100 mg capsule    albuterol (ProAir HFA) 90 mcg/actuation inhaler    fluticasone (Flonase) 50 mcg/actuation nasal spray        Patient's covid and flu testing was negative. Will get PCR COVID/flu testing.    Patient is concerned as he is visiting an immunocompromised pt next week. I advised patient that if he is feeling sick, he should not meet with someone who is immunocompromised and he verbalized understanding.     Patient started on doxycycline, proair inhaler and flonase. Patient to use inhaler every four to six hours as needed. Advised patient on use of humidifier and hot steam treatments. Discussed that patient is to drink plenty of fluids and stay well hydrated. Discussed that patient is to go to the ER for any chest pain, difficulty breathing, shortness of breath or new/concerning symptoms; he agreed. Will call pt when results become available. Patient is to follow up with PCP next week to ensure improvement.

## 2025-02-13 ENCOUNTER — APPOINTMENT (OUTPATIENT)
Dept: PRIMARY CARE | Facility: CLINIC | Age: 65
End: 2025-02-13
Payer: MEDICARE

## 2025-02-14 ENCOUNTER — APPOINTMENT (OUTPATIENT)
Dept: NEUROLOGY | Facility: CLINIC | Age: 65
End: 2025-02-14
Payer: MEDICARE

## 2025-02-17 ENCOUNTER — TREATMENT (OUTPATIENT)
Dept: PHYSICAL THERAPY | Facility: CLINIC | Age: 65
End: 2025-02-17
Payer: MEDICARE

## 2025-02-17 DIAGNOSIS — M25.552 CHRONIC HIP PAIN, BILATERAL: ICD-10-CM

## 2025-02-17 DIAGNOSIS — G89.29 CHRONIC HIP PAIN, BILATERAL: ICD-10-CM

## 2025-02-17 DIAGNOSIS — M25.551 CHRONIC HIP PAIN, BILATERAL: ICD-10-CM

## 2025-02-17 DIAGNOSIS — M54.50 ACUTE BILATERAL LOW BACK PAIN WITHOUT SCIATICA: Primary | ICD-10-CM

## 2025-02-17 DIAGNOSIS — M54.9 BACK PAIN, ACUTE: ICD-10-CM

## 2025-02-17 PROCEDURE — 97140 MANUAL THERAPY 1/> REGIONS: CPT | Mod: GP

## 2025-02-17 PROCEDURE — 97110 THERAPEUTIC EXERCISES: CPT | Mod: GP

## 2025-02-17 ASSESSMENT — PAIN - FUNCTIONAL ASSESSMENT: PAIN_FUNCTIONAL_ASSESSMENT: 0-10

## 2025-02-17 ASSESSMENT — PAIN DESCRIPTION - DESCRIPTORS: DESCRIPTORS: ACHING

## 2025-02-17 ASSESSMENT — PAIN SCALES - GENERAL: PAINLEVEL_OUTOF10: 4

## 2025-02-17 NOTE — PROGRESS NOTES
Physical Therapy    Physical Therapy Treatment    Patient Name: Link Inman  MRN: [id number]  Today's Date: 2/17/2025    Time Entry:   Time Calculation  Start Time: 1702  Stop Time: 1743  Time Calculation (min): 41 min     PT Therapeutic Procedures Time Entry  Manual Therapy Time Entry: 15  Therapeutic Exercise Time Entry: 26                     Insurance:  Visit: #7  Authorized: to be determined  Payor/Plan:   MEDICARE MEDICARE PART A AND B   MEDICARE A&B MED % COVERAGE, AARP SUPP. MED NEC NO AUTH, $186.06 USED FOR PT/ST   Certification Period: 12/02/2024 to 03/02/2024       Current Problem  1. Acute bilateral low back pain without sciatica        2. Back pain, acute  Follow Up In Physical Therapy      3. Chronic hip pain, bilateral            Subjective   Drove back from [place] on Saturday, which flared up back and hips.   Also drove to [place] couple weeks ago, which was really painful first 2-3 hours but fine after that.   Was able to be on jet ski, which didn't hurt too bad.   I've had a rough couple of days recently, just hip pain being really bad in bilaterally hips wrapping from back to around the front.   May 12th, 2025 is when my hip replacement is. May be moving it earlier if possible.     Precautions  Precautions  STEADI Fall Risk Score (The score of 4 or more indicates an increased risk of falling): 0  Precautions Comment: hx of CA, heart disease, HTN, headaches  Pain  Pain Assessment  Pain Assessment: 0-10  0-10 (Numeric) Pain Score: 4  Pain Type: Chronic pain  Pain Location: Hip  Pain Orientation: Posterior  Pain Radiating Towards: Anterior  Pain Descriptors: Aching    No pain in lower back today.       Objective     TREATMENT  Educated patient on course of treatment.     MANUAL: x1  PROM bilateral hips; 10 minutes.  Gentle grade II long hip distraction; 5 minutes each leg; 10 minutes total.      THERAPEUTIC EXERCISE: x2  Recumbent Bike; 8 minutes. Warm up.   Standing lumbar extension,  pain-free range; 3 x 10 reps.  Lower trunk rotations; 30 reps, small range.   Glute bridge; with hip abduction into strap; 20 reps, no hold.   Glute bridge; with hip adduction into ball; 20 reps, no hold.   Heel slides; left then right; 20 reps each side.   Clamshell; 3 x 15 reps, each side.     OP Education: HEP: Patient verbalizes and demonstrates understanding of home exercises. Patient will contact writer if with questions or if condition worsens.       Access Code: [email or web address]  URL: [email or web address]  Date: 12/17/2024  Prepared by: Shelia Farmer       Charges: [id number], [id number]       Assessment:  The patient presents today with significant improvement in low back symptoms, reporting that the low back pain has been either minimal or absent recently. However, the patient now experiences increased bilateral hip pain, with the left hip being more affected than the right. Despite this, the patient was able to tolerate gentle activation and strengthening exercises targeting the lateral hips. Continued skilled care remains beneficial in supporting the patient’s progress and addressing ongoing musculoskeletal concerns.    Plan:  Progress as appropriate and tolerated.   Further address hips.      Goals:  Patient will identify two exercises/movements he can utilize to modulate pain independently in 4 weeks.   Patient will demonstrate sit to stand without use of BUE or pain >3/10 in 8 weeks.   Patient will demonstrate non antalgic walk in 8 weeks, indicating improved function.   Patient will report Modified Oswestry score of <25% in 12 weeks.   Patient will be independent with HEP.        Anisha Farmer PT, DPT

## 2025-02-20 ENCOUNTER — APPOINTMENT (OUTPATIENT)
Dept: PRIMARY CARE | Facility: CLINIC | Age: 65
End: 2025-02-20
Payer: MEDICARE

## 2025-02-20 VITALS
DIASTOLIC BLOOD PRESSURE: 80 MMHG | HEART RATE: 67 BPM | WEIGHT: 222 LBS | SYSTOLIC BLOOD PRESSURE: 126 MMHG | RESPIRATION RATE: 14 BRPM | BODY MASS INDEX: 29.29 KG/M2 | OXYGEN SATURATION: 98 %

## 2025-02-20 DIAGNOSIS — F69 BEHAVIOR CONCERN IN ADULT: ICD-10-CM

## 2025-02-20 DIAGNOSIS — J06.9 VIRAL URI WITH COUGH: Primary | ICD-10-CM

## 2025-02-20 PROCEDURE — 99212 OFFICE O/P EST SF 10 MIN: CPT | Performed by: INTERNAL MEDICINE

## 2025-02-20 PROCEDURE — 1159F MED LIST DOCD IN RCRD: CPT | Performed by: INTERNAL MEDICINE

## 2025-02-20 ASSESSMENT — ENCOUNTER SYMPTOMS
COUGH: 1
CHILLS: 0
TREMORS: 0
NAUSEA: 0
WEAKNESS: 0
FEVER: 0
DIZZINESS: 0
JOINT SWELLING: 0
SPEECH DIFFICULTY: 0
DIARRHEA: 0
CONSTIPATION: 0
DIAPHORESIS: 0
VOMITING: 0
SHORTNESS OF BREATH: 0
MYALGIAS: 0

## 2025-02-20 ASSESSMENT — PATIENT HEALTH QUESTIONNAIRE - PHQ9
SUM OF ALL RESPONSES TO PHQ9 QUESTIONS 1 AND 2: 0
1. LITTLE INTEREST OR PLEASURE IN DOING THINGS: NOT AT ALL
2. FEELING DOWN, DEPRESSED OR HOPELESS: NOT AT ALL

## 2025-02-20 NOTE — PROGRESS NOTES
Subjective   Link Inman is a 64 y.o. male who presents for [facility] follow up  still has cough better     HPI   HAVING BOTH HIPS REPLACED IN COMING MONTHS    SAW [facility]    WAS NEG FOR VIRUSES FLU/RSV/COVID    GETTING MENTAL CHECK UP WITH NEUROLOGIST TOMORROW    STILL FIGHTING WITH [employer] ABOUT HIS WATER QUALITY?    TRYING TO LOSE WEIGHT TO HELP BACK  Review of Systems   Constitutional:  Negative for chills, diaphoresis and fever.   HENT:  Positive for congestion.    Respiratory:  Positive for cough. Negative for shortness of breath.    Cardiovascular:  Negative for chest pain and leg swelling.   Gastrointestinal:  Negative for constipation, diarrhea, nausea and vomiting.   Musculoskeletal:  Negative for joint swelling and myalgias.   Neurological:  Negative for dizziness, tremors, speech difficulty and weakness.       Objective   /80   Pulse 67   Resp 14   Wt 101 kg (222 lb)   SpO2 98%   BMI 29.29 kg/m²     Physical Exam  Vitals reviewed.   Constitutional:       General: He is not in acute distress.     Appearance: Normal appearance. He is not ill-appearing.   Cardiovascular:      Rate and Rhythm: Normal rate and regular rhythm.      Pulses: Normal pulses.      Heart sounds:      No gallop.   Pulmonary:      Breath sounds: Normal breath sounds. No wheezing, rhonchi or rales.   Abdominal:      General: Abdomen is flat. Bowel sounds are normal.      Palpations: Abdomen is soft.      Tenderness: There is no guarding or rebound.   Musculoskeletal:      Right lower leg: No edema.      Left lower leg: No edema.   Skin:     General: Skin is warm and dry.   Neurological:      General: No focal deficit present.      Mental Status: He is oriented to person, place, and time.      Coordination: Coordination normal.      Gait: Gait normal.   Psychiatric:         Mood and Affect: Mood normal.         Behavior: Behavior normal.         Thought Content: Thought content normal.         Judgment:  Judgment normal.         Assessment/Plan   Problem List Items Addressed This Visit       Viral URI with cough - Primary    Behavior concern in adult     Patient Instructions    YOUR UPPER RESP INFECTION APPEARS TO BE RESOLVING    2.  CONTINUE FLONASE AND INHALER AS NEEDED    3.  FOLLOW UP AS PREVIOUSLY SCHEDULED    4.  I'LL AWAIT REPORT FROM THE NEUROLOGIST, AND THEN PROCEED FROM THERE IF NEEDED

## 2025-02-20 NOTE — PATIENT INSTRUCTIONS
YOUR UPPER RESP INFECTION APPEARS TO BE RESOLVING    2.  CONTINUE FLONASE AND INHALER AS NEEDED    3.  FOLLOW UP AS PREVIOUSLY SCHEDULED    4.  I'LL AWAIT REPORT FROM THE NEUROLOGIST, AND THEN PROCEED FROM THERE IF NEEDED

## 2025-02-21 ENCOUNTER — APPOINTMENT (OUTPATIENT)
Dept: NEUROLOGY | Facility: CLINIC | Age: 65
End: 2025-02-21
Payer: MEDICARE

## 2025-02-21 DIAGNOSIS — F69 BEHAVIOR CONCERN IN ADULT: ICD-10-CM

## 2025-02-21 DIAGNOSIS — R41.3 MEMORY DIFFICULTY: Primary | ICD-10-CM

## 2025-02-21 LAB — FLUAV RNA RESP QL NAA+PROBE: NORMAL

## 2025-02-21 PROCEDURE — 99204 OFFICE O/P NEW MOD 45 MIN: CPT | Performed by: PSYCHIATRY & NEUROLOGY

## 2025-02-21 NOTE — PROGRESS NOTES
An interactive audio and video telecommunication system which permits real time communications between the patient (at the originating site) and provider (at the distant site) was utilized to provide this telehealth service.      Verbal consent was requested and obtained from the patient on 2/21/2025    Consulting Physician: Dr. Lopez    Chief Complaint: Memory Difficulty    History Of Present Illness  Link Inman is a 64 y.o. male presenting with memory difficulty.    The patient retired from work about 14 months ago.  The patient does not feel like he's had any significant trouble with memory.  He notes that sometimes, a name does not come to him right off the bat.      History taken from his wife.  Over the years, his wife has noted cognitive changes over the years.  His wife notes that the patient has trouble remembering recent conversations.  There has been no issues with forgetting recent events.  His wife notes that he does tend to repeat himself quite a bit.  His wife notes that he will repeat stories quite often about him being 'slated.'  His wife has noted some slippage in remembering names of faces.  He does drive.  His wife feels like his normal driving is largely okay.  However, he does have moments of aggression during driving.      His wife is also concerned about depression.  He is noted to be more irritable.  HE has anger outburst - where he will yell, swear, and becomes aggressive ( he is very aggressive against people who he feels like have wronged him).  At times, he will make threats to people - about burning flags on people's lawn.  There are times where he has been so aggressive that the [facility] does not allow him in public places.      The patient admits to being more irritable.  He attributes this to poor mobility (due to hip issues).  He does admit to some depression - due to this lack of mobility.       Past Medical History  Past Medical History:   Diagnosis Date     Arrhythmia     atrial fibrillation    Personal history of (healed) traumatic fracture 03/28/2021    History of compression fracture of spine    Personal history of other diseases of the circulatory system 05/11/2019    History of pericarditis       Surgical History  Past Surgical History:   Procedure Laterality Date    OTHER SURGICAL HISTORY  02/12/2021    No history of surgery       Family History  Family History   Problem Relation Name Age of Onset    Other (cardiac disorder) Other      Hypertension Other          Social History   reports that he has never smoked. He has never used smokeless tobacco. He reports current alcohol use. He reports that he does not currently use drugs.     Allergies  Peanut, Tree nuts, [place], Sulfa (sulfonamide antibiotics), and Sulfamethazine    Medications    Current Outpatient Medications:     albuterol (ProAir HFA) 90 mcg/actuation inhaler, Inhale 2 puffs every 4 hours if needed for wheezing or shortness of breath., Disp: 8.5 g, Rfl: 0    aspirin 81 mg EC tablet, Take 1 tablet (81 mg) by mouth once daily., Disp: , Rfl:     atorvastatin (Lipitor) 20 mg tablet, Take 1 tablet (20 mg) by mouth once daily., Disp: , Rfl:     Entresto 24-26 mg tablet, Take 1 tablet by mouth 2 times a day., Disp: 180 tablet, Rfl: 3    fluticasone (Flonase) 50 mcg/actuation nasal spray, Administer 1 spray into each nostril once daily. Shake gently. Before first use, prime pump. After use, clean tip and replace cap., Disp: 16 g, Rfl: 0    metoprolol succinate XL (Toprol-XL) 50 mg 24 hr tablet, TAKE 1 TABLET BY MOUTH TWICE A DAY, Disp: 180 tablet, Rfl: 3    naproxen sodium (Aleve) 220 mg tablet, Take 2 tablets (440 mg) by mouth once daily., Disp: , Rfl:     Xarelto 20 mg tablet, Take 1 tablet (20 mg) by mouth once daily., Disp: 90 tablet, Rfl: 3      Last Recorded Vitals   There were no vitals taken for this visit.    Objective:  Gen: NAD  Neuro:  Mini-Mental Status Exam:  Orientation to Time (Year, Season,  "Month, Date, Day of Week): 5  Orientation to Place (State, County, City, Name of Place, Floor):  5  Registration (Apple, Table, [place]): 3  Attention (Spell 'World' backwards): 4  Delayed Verbal Recall: 3  Naming (Watch, Pen): 2  Repetition (No Ifs, Ands, or Buts): 1  Follows command with crossover: 3  Read a sentence: 1  Write a sentence: 1  Copy a figure: 1  Total Score: 29      Relevant Results  Lab Results   Component Value Date    WBC 5.6 04/29/2024    HGB 17.2 04/29/2024    HCT 51.0 04/29/2024    MCV 95 04/29/2024     04/29/2024       Lab Results   Component Value Date    GLUCOSE 101 (H) 04/29/2024    CALCIUM 8.8 04/29/2024     04/29/2024    K 4.5 04/29/2024    CO2 27 04/29/2024     (H) 04/29/2024    BUN 16 04/29/2024    CREATININE 0.89 04/29/2024       No results found for: \"HGBA1C\"    Lab Results   Component Value Date    CHOL 165 04/29/2024    CHOL 147 04/17/2023    CHOL 183 03/17/2021     Lab Results   Component Value Date    HDL 44.6 04/29/2024    HDL 46.6 04/17/2023    HDL 51.0 03/17/2021     Lab Results   Component Value Date    LDLCALC 97 04/29/2024     Lab Results   Component Value Date    TRIG 117 04/29/2024    TRIG 112 04/17/2023    TRIG 192 (H) 03/17/2021     No components found for: \"CHOLHDL\"    Vitamin B12 297  TSH 1.54       Assessment:  This is a 64 year old male presenting for evaluation of memory difficulty.  He does note some mild memory difficulty.  His wife is concerned not just about memory but behavioral changes.  He will have anger outburst.  He often will yell, scream, and threaten people that he feels have wronged him.  The patient admits to feeling depressed - not enjoying things like he used to.    Memory Difficulty  Behavioral Changes concerning for Depression    Plan:  - MRI Brain with and without contrast  - Psychiatry evaluation        Christopher Root MD  [facility]  Department of Neurology      A copy of this note was sent to the referring " provider.

## 2025-02-25 ENCOUNTER — TELEPHONE (OUTPATIENT)
Dept: PRIMARY CARE | Facility: CLINIC | Age: 65
End: 2025-02-25
Payer: MEDICARE

## 2025-02-25 ENCOUNTER — TREATMENT (OUTPATIENT)
Dept: PHYSICAL THERAPY | Facility: CLINIC | Age: 65
End: 2025-02-25
Payer: MEDICARE

## 2025-02-25 DIAGNOSIS — M54.9 BACK PAIN, ACUTE: ICD-10-CM

## 2025-02-25 DIAGNOSIS — M25.551 CHRONIC HIP PAIN, BILATERAL: Primary | ICD-10-CM

## 2025-02-25 DIAGNOSIS — G89.29 CHRONIC HIP PAIN, BILATERAL: Primary | ICD-10-CM

## 2025-02-25 DIAGNOSIS — M25.552 CHRONIC HIP PAIN, BILATERAL: Primary | ICD-10-CM

## 2025-02-25 PROCEDURE — 97140 MANUAL THERAPY 1/> REGIONS: CPT | Mod: GP

## 2025-02-25 PROCEDURE — 97110 THERAPEUTIC EXERCISES: CPT | Mod: GP

## 2025-02-25 ASSESSMENT — PAIN DESCRIPTION - DESCRIPTORS: DESCRIPTORS: ACHING;CRAMPING;NAGGING

## 2025-02-25 ASSESSMENT — PAIN SCALES - GENERAL: PAINLEVEL_OUTOF10: 6

## 2025-02-25 ASSESSMENT — PAIN - FUNCTIONAL ASSESSMENT: PAIN_FUNCTIONAL_ASSESSMENT: 0-10

## 2025-02-25 NOTE — PROGRESS NOTES
Physical Therapy    Physical Therapy Treatment    Patient Name: Link Inman  MRN: [id number]  Today's Date: 2/25/2025    Time Entry:   Time Calculation  Start Time: 0830  Stop Time: 0915  Time Calculation (min): 45 min     PT Therapeutic Procedures Time Entry  Manual Therapy Time Entry: 10  Therapeutic Exercise Time Entry: 35                   Insurance:  Visit: #8  Authorized: to be determined  Payor/Plan:   MEDICARE MEDICARE PART A AND B   MEDICARE A&B MED % COVERAGE, AARP SUPP. MED NEC NO AUTH, $186.06 USED FOR PT/ST   Certification Period: 12/02/2024 to 03/02/2024    Current Problem  1. Chronic hip pain, bilateral        2. Back pain, acute  Follow Up In Physical Therapy          Subjective    Patient reports he is doing okay, having a lot of pain the last few days.   Feels he is  not doing well emotionally or physically this week, will have to be filing for a divorce this week after 44 years of marriage.   Also has to go get a brain MRI does not expand on why.     Has been able to still walk dog in the park, but reports legs are way more restless than normal jenniffer. with sleeping.   Has been somewhat compliant with HEP.     Precautions  Precautions  STEADI Fall Risk Score (The score of 4 or more indicates an increased risk of falling): 0  Precautions Comment: hx of CA, heart disease, HTN, headaches  Pain  Pain Assessment  Pain Assessment: 0-10  0-10 (Numeric) Pain Score: 6  Pain Type: Chronic pain  Pain Location: Back  Pain Orientation: Posterior  Pain Radiating Towards: Right Hip  Pain Descriptors: Aching, Cramping, Nagging    Objective       TREATMENT  Educated patient on course of treatment.      MANUAL: x1  PROM bilateral hips; 10 minutes; 5 minutes each leg.      THERAPEUTIC EXERCISE: x2  Recumbent Bike; 8 minutes. Warm up. Raised seat for comfort.   Hook lying LTRs; 2 x 30 reps.   Hook lying hip abduction with green band resistance; 2 x 15 reps. Small pain-free range.  Supine double leg bridges; 3 x  15 reps. 1 minute break between. Cues to lift only as far as comfortable.  Standing forward bend on raised plinth with return TA/glute isometric; 2 x 10 reps.   Standing lumbar extension; 2 x 10 reps, small pain-free range     OP Education: HEP: Patient verbalizes and demonstrates understanding of home exercises. Patient will contact writer if with questions or if condition worsens.       Access Code: [email or web address]  URL: [email or web address]  Date: 12/17/2024  Prepared by: Shelia Farmer        Charges: [id number], [id number]     Assessment:  The patient presents today experiencing a flare-up with increased pain but is unable to identify a specific activity that may have triggered it. He demonstrates difficulty with sit-to-stand transitions due to pain but is able to tolerate some gentle exercises, though no progression was made during this session. Pain is alleviated in a hook-lying position and with gentle lumbar extension. He continues to benefit from skilled care as needed.    Plan:  Progress as appropriate and tolerated.   Address lumbar spine, bilateral hips.      Goals:  Patient will identify two exercises/movements he can utilize to modulate pain independently in 4 weeks.   Patient will demonstrate sit to stand without use of BUE or pain >3/10 in 8 weeks.   Patient will demonstrate non antalgic walk in 8 weeks, indicating improved function.   Patient will report Modified Oswestry score of <25% in 12 weeks.   Patient will be independent with HEP.        Anisha Farmer PT, DPT

## 2025-03-01 DIAGNOSIS — E78.2 COMBINED HYPERLIPIDEMIA: Primary | ICD-10-CM

## 2025-03-02 ENCOUNTER — HOSPITAL ENCOUNTER (OUTPATIENT)
Dept: RADIOLOGY | Facility: HOSPITAL | Age: 65
Discharge: HOME | End: 2025-03-02
Payer: MEDICARE

## 2025-03-02 DIAGNOSIS — R41.3 MEMORY DIFFICULTY: ICD-10-CM

## 2025-03-02 DIAGNOSIS — F69 BEHAVIOR CONCERN IN ADULT: ICD-10-CM

## 2025-03-02 PROCEDURE — 70553 MRI BRAIN STEM W/O & W/DYE: CPT

## 2025-03-02 PROCEDURE — A9575 INJ GADOTERATE MEGLUMI 0.1ML: HCPCS | Performed by: PSYCHIATRY & NEUROLOGY

## 2025-03-02 PROCEDURE — 70553 MRI BRAIN STEM W/O & W/DYE: CPT | Performed by: RADIOLOGY

## 2025-03-02 PROCEDURE — 2550000001 HC RX 255 CONTRASTS: Performed by: PSYCHIATRY & NEUROLOGY

## 2025-03-02 RX ORDER — GADOTERATE MEGLUMINE 376.9 MG/ML
20 INJECTION INTRAVENOUS
Status: COMPLETED | OUTPATIENT
Start: 2025-03-02 | End: 2025-03-02

## 2025-03-02 RX ADMIN — GADOTERATE MEGLUMINE 20 ML: 376.9 INJECTION INTRAVENOUS at 17:31

## 2025-03-03 ENCOUNTER — DOCUMENTATION (OUTPATIENT)
Dept: CARDIOLOGY | Facility: CLINIC | Age: 65
End: 2025-03-03
Payer: MEDICARE

## 2025-03-03 NOTE — TELEPHONE ENCOUNTER
Pt called regarding his cardiac clearance form for his hip surgery.  He has an up coming appointment with Dr Enamorado on March 7 th at 4:00pm Clearance form is filled out and placed on Dr kaur desk for review for medications to be held and level of perioperative cardiovascular risk for the procedure is considered pending up coming exam.

## 2025-03-04 ENCOUNTER — TELEPHONE (OUTPATIENT)
Dept: NEUROLOGY | Facility: CLINIC | Age: 65
End: 2025-03-04

## 2025-03-04 ENCOUNTER — TREATMENT (OUTPATIENT)
Dept: PHYSICAL THERAPY | Facility: CLINIC | Age: 65
End: 2025-03-04
Payer: MEDICARE

## 2025-03-04 DIAGNOSIS — G89.29 CHRONIC HIP PAIN, BILATERAL: Primary | ICD-10-CM

## 2025-03-04 DIAGNOSIS — M25.551 CHRONIC HIP PAIN, BILATERAL: Primary | ICD-10-CM

## 2025-03-04 DIAGNOSIS — M79.604 PAIN IN BOTH LOWER EXTREMITIES: ICD-10-CM

## 2025-03-04 DIAGNOSIS — M79.605 PAIN IN BOTH LOWER EXTREMITIES: ICD-10-CM

## 2025-03-04 DIAGNOSIS — M25.552 CHRONIC HIP PAIN, BILATERAL: Primary | ICD-10-CM

## 2025-03-04 PROCEDURE — 97140 MANUAL THERAPY 1/> REGIONS: CPT | Mod: GP

## 2025-03-04 PROCEDURE — 97110 THERAPEUTIC EXERCISES: CPT | Mod: GP

## 2025-03-04 RX ORDER — ATORVASTATIN CALCIUM 20 MG/1
20 TABLET, FILM COATED ORAL NIGHTLY
Qty: 90 TABLET | Refills: 3 | Status: SHIPPED | OUTPATIENT
Start: 2025-03-04

## 2025-03-04 ASSESSMENT — PAIN - FUNCTIONAL ASSESSMENT: PAIN_FUNCTIONAL_ASSESSMENT: 0-10

## 2025-03-04 ASSESSMENT — PAIN DESCRIPTION - DESCRIPTORS: DESCRIPTORS: ACHING;CRAMPING

## 2025-03-04 ASSESSMENT — PAIN SCALES - GENERAL: PAINLEVEL_OUTOF10: 8

## 2025-03-04 NOTE — PROGRESS NOTES
Physical Therapy    Physical Therapy Treatment    Patient Name: Link Inman  MRN: [id number]  Today's Date: 3/4/2025    Time Entry:   Time Calculation  Start Time: 0828  Stop Time: 0910  Time Calculation (min): 42 min     PT Therapeutic Procedures Time Entry  Manual Therapy Time Entry: 15  Therapeutic Exercise Time Entry: 27                   Insurance:  Visit: #9  Authorized: to be determined  Payor/Plan:   MEDICARE MEDICARE PART A AND B   MEDICARE A&B MED % COVERAGE, AARP SUPP. MED NEC NO AUTH, $186.06 USED FOR PT/ST   Certification Period: 12/02/2024 to 03/02/2024    Current Problem  1. Chronic hip pain, bilateral [M25.551, M25.552, G89.29]        2. Pain in both lower extremities [M79.604, M79.605]            Subjective    Patient reports pain is as high as 9-10/10 today down bilateral hips, even towards feet.     Has been less mobile last week or so. Will be moving left hip surgery up to April 7th.   Will going for pre operative visit next Tuesday.     Brain MRI last week looks good, no concerns there.     Precautions  Precautions  STEADI Fall Risk Score (The score of 4 or more indicates an increased risk of falling): 0  Precautions Comment: hx of CA, heart disease, HTN, headaches  Pain  Pain Assessment  Pain Assessment: 0-10  0-10 (Numeric) Pain Score: 8  Pain Type: Other (Comment), Chronic pain  Pain Location: Back (Bilateral Hips)  Pain Orientation: Anterior  Pain Radiating Towards: Bilateral Legs  Pain Descriptors: Aching, Cramping    Objective     TREATMENT  Educated patient on course of treatment.      MANUAL: x 1  PROM bilateral hips; 10 minutes; 5 minutes each leg     THERAPEUTIC EXERCISE: x 2  Recumbent Bike; 6 minutes. Warm up. Raised seat for comfort.   Hook lying hip abduction; yellow band; 30 reps.  Bent knee fall outs; alternating legs, TA contraction; 3 x 10 reps  TA marches alternating marches; TA contraction; 2 x 10 reps  Supine double leg bridges; 2 x 20 reps  Lower trunk rotations; 30  reps  AROM IR/ER legs out straight; 2 minutes each leg, pain-free- could not continue pain increased.      OP Education: HEP: Patient verbalizes and demonstrates understanding of home exercises. Patient will contact writer if with questions or if condition worsens.       Access Code: [email or web address]  URL: [email or web address]  Date: 12/17/2024  Prepared by: Shelia Farmer        Charges: [id number], [id number]     Assessment:  Patient continues to tolerate gentle exercise primarily in hook lying position, unable to progress to closed chain exercises due to high pain levels. Continues to demonstrate very restricted hip mobility in bilateral hips L>R. Will benefit from skilled care as appropriate.     Patient unable to tolerate progression of PT at this time to improve functional mobility and likely to benefit from surgical intervention as proposed by Dr. Kiko Cristobal.     Plan:  Progress as appropriate and tolerated.   Core/hip strengthening/mobility.     Goals:  Patient will identify two exercises/movements he can utilize to modulate pain independently in 4 weeks.   Patient will demonstrate sit to stand without use of BUE or pain >3/10 in 8 weeks.   Patient will demonstrate non antalgic walk in 8 weeks, indicating improved function.   Patient will report Modified Oswestry score of <25% in 12 weeks.   Patient will be independent with HEP.        Anisha Farmer PT, DPT

## 2025-03-07 ENCOUNTER — OFFICE VISIT (OUTPATIENT)
Dept: CARDIOLOGY | Facility: CLINIC | Age: 65
End: 2025-03-07
Payer: MEDICARE

## 2025-03-07 VITALS
HEART RATE: 98 BPM | DIASTOLIC BLOOD PRESSURE: 86 MMHG | HEIGHT: 73 IN | WEIGHT: 220 LBS | SYSTOLIC BLOOD PRESSURE: 148 MMHG | BODY MASS INDEX: 29.16 KG/M2 | OXYGEN SATURATION: 95 %

## 2025-03-07 DIAGNOSIS — I50.22 CHRONIC SYSTOLIC HEART FAILURE: Primary | ICD-10-CM

## 2025-03-07 LAB
ATRIAL RATE: 91 BPM
P AXIS: 32 DEGREES
P OFFSET: 192 MS
P ONSET: 135 MS
PR INTERVAL: 168 MS
Q ONSET: 219 MS
QRS COUNT: 15 BEATS
QRS DURATION: 82 MS
QT INTERVAL: 340 MS
QTC CALCULATION(BAZETT): 418 MS
QTC FREDERICIA: 391 MS
R AXIS: -11 DEGREES
T AXIS: -1 DEGREES
T OFFSET: 389 MS
VENTRICULAR RATE: 91 BPM

## 2025-03-07 PROCEDURE — 93005 ELECTROCARDIOGRAM TRACING: CPT | Performed by: INTERNAL MEDICINE

## 2025-03-07 PROCEDURE — 1036F TOBACCO NON-USER: CPT | Performed by: INTERNAL MEDICINE

## 2025-03-07 PROCEDURE — 3008F BODY MASS INDEX DOCD: CPT | Performed by: INTERNAL MEDICINE

## 2025-03-07 PROCEDURE — 1159F MED LIST DOCD IN RCRD: CPT | Performed by: INTERNAL MEDICINE

## 2025-03-07 PROCEDURE — 99214 OFFICE O/P EST MOD 30 MIN: CPT | Performed by: INTERNAL MEDICINE

## 2025-03-07 NOTE — PROGRESS NOTES
Name : Link Inman   : 1960   MRN : [id number]   ENC Date : 2025      Assessment and Plan:  Cardiac risk assessment for hip surgery: Patient is clinically stable.  EF has normalized.  He is in sinus rhythm.  Okay to hold Xarelto for the surgery.  Prior to his hip causing him significant pain he was able to do greater than 4 METS of activity without any angina.  I do not think he needs stress testing prior to surgery.  He is low risk for cardiac complications.  Atrial fibrillation: No clinical recurrence.  Resume Xarelto postoperatively.  Dyslipidemia: Tolerating statin therapy well recommend no changes.  Chronic systolic heart failure with normalized LVEF: Patient volume status is excellent.  Recommend no changes.  Continue metoprolol and Entresto indefinitely.  Should take metoprolol the morning of the surgery but can hold Entresto the morning of surgery  Disp: RTO in 6-12 months    HPI:  Patient returns today doing well from a cardiac standpoint.  He is going to have hip surgery done in the near future.  He is asymptomatic from a cardiac standpoint.  He continues to have some orthostatic lightheadedness with changes in position.  No syncopal events.  No chest pain.  Prior to his hip causing significant discomfort he was exercising on a regular basis with no anginal symptoms.  No orthopnea nor PND.  No TIA or CVA-like symptoms.  No recurrence of his atrial fibrillation.    Problem list overview:   Patient Active Problem List   Diagnosis    Abnormal EKG    Arteriosclerotic coronary artery disease    Atrial fibrillation (Multi)    Back pain, acute    Chronic systolic heart failure    Combined hyperlipidemia    Common cold    Viral URI with cough    Cough    Dyspnea    Elevated blood pressure reading    Hematoma of leg    Irregular heart beat    Leg pain    Liver cyst    Lumbar radiculopathy, acute    Mild mitral regurgitation    Mild vitamin D deficiency    Flu-like symptoms    Silent myocardial  "infarction (Multi)    Vertebral compression fracture (Multi)    Wound of foot    Heart attack    Acute bronchitis with wheezing    Medicare annual wellness visit, initial    Chronic cough    Behavior concern in adult    Chronic hip pain, bilateral       Meds:   Current Outpatient Medications on File Prior to Visit   Medication Sig Dispense Refill    albuterol (ProAir HFA) 90 mcg/actuation inhaler Inhale 2 puffs every 4 hours if needed for wheezing or shortness of breath. 8.5 g 0    aspirin 81 mg EC tablet Take 1 tablet (81 mg) by mouth once daily.      atorvastatin (Lipitor) 20 mg tablet TAKE 1 TABLET (20 MG) BY MOUTH ONCE DAILY AT BEDTIME. 90 tablet 3    Entresto 24-26 mg tablet Take 1 tablet by mouth 2 times a day. 180 tablet 3    fluticasone (Flonase) 50 mcg/actuation nasal spray Administer 1 spray into each nostril once daily. Shake gently. Before first use, prime pump. After use, clean tip and replace cap. 16 g 0    metoprolol succinate XL (Toprol-XL) 50 mg 24 hr tablet TAKE 1 TABLET BY MOUTH TWICE A  tablet 3    naproxen sodium (Aleve) 220 mg tablet Take 2 tablets (440 mg) by mouth once daily.      Xarelto 20 mg tablet Take 1 tablet (20 mg) by mouth once daily. 90 tablet 3    [DISCONTINUED] atorvastatin (Lipitor) 20 mg tablet Take 1 tablet (20 mg) by mouth once daily.       No current facility-administered medications on file prior to visit.        VS:  /86 (BP Location: Right arm, Patient Position: Sitting)   Pulse 98   Ht 1.854 m (6' 1\")   Wt 99.8 kg (220 lb)   SpO2 95%   BMI 29.03 kg/m²     Vitals reviewed.   Neck:      Vascular: No JVD.   Pulmonary:      Breath sounds: Normal breath sounds.   Cardiovascular:      Normal rate. Regular rhythm.      Murmurs: There is no murmur.      No gallop.    Edema:     Peripheral edema absent.   Abdominal:      General: Abdomen is flat.      Palpations: Abdomen is soft.   Skin:     General: Skin is warm.         ECG: 3.7.25: Normal sinus rhythm.  Normal " ECG.    Umberto Enamorado MD

## 2025-03-11 ENCOUNTER — APPOINTMENT (OUTPATIENT)
Dept: PHYSICAL THERAPY | Facility: CLINIC | Age: 65
End: 2025-03-11
Payer: MEDICARE

## 2025-03-17 ENCOUNTER — HOSPITAL ENCOUNTER (OUTPATIENT)
Dept: RADIOLOGY | Facility: CLINIC | Age: 65
Discharge: HOME | End: 2025-03-17
Payer: MEDICARE

## 2025-03-17 ENCOUNTER — OFFICE VISIT (OUTPATIENT)
Dept: PRIMARY CARE | Facility: CLINIC | Age: 65
End: 2025-03-17
Payer: MEDICARE

## 2025-03-17 VITALS
TEMPERATURE: 99.1 F | SYSTOLIC BLOOD PRESSURE: 136 MMHG | OXYGEN SATURATION: 97 % | BODY MASS INDEX: 28.23 KG/M2 | HEART RATE: 90 BPM | RESPIRATION RATE: 18 BRPM | WEIGHT: 214 LBS | DIASTOLIC BLOOD PRESSURE: 82 MMHG

## 2025-03-17 DIAGNOSIS — J06.9 URI WITH COUGH AND CONGESTION: Primary | ICD-10-CM

## 2025-03-17 DIAGNOSIS — J06.9 URI WITH COUGH AND CONGESTION: ICD-10-CM

## 2025-03-17 LAB
POC RAPID INFLUENZA A: NEGATIVE
POC RAPID INFLUENZA B: NEGATIVE
POC RSV RAPID ANTIGEN: NEGATIVE
POC SARS-COV-2 AG BINAX: NORMAL

## 2025-03-17 PROCEDURE — 87811 SARS-COV-2 COVID19 W/OPTIC: CPT | Performed by: FAMILY MEDICINE

## 2025-03-17 PROCEDURE — 87807 RSV ASSAY W/OPTIC: CPT | Performed by: FAMILY MEDICINE

## 2025-03-17 PROCEDURE — 71046 X-RAY EXAM CHEST 2 VIEWS: CPT | Performed by: RADIOLOGY

## 2025-03-17 PROCEDURE — 1036F TOBACCO NON-USER: CPT | Performed by: FAMILY MEDICINE

## 2025-03-17 PROCEDURE — 1160F RVW MEDS BY RX/DR IN RCRD: CPT | Performed by: FAMILY MEDICINE

## 2025-03-17 PROCEDURE — 1159F MED LIST DOCD IN RCRD: CPT | Performed by: FAMILY MEDICINE

## 2025-03-17 PROCEDURE — 87804 INFLUENZA ASSAY W/OPTIC: CPT | Performed by: FAMILY MEDICINE

## 2025-03-17 PROCEDURE — 99214 OFFICE O/P EST MOD 30 MIN: CPT | Performed by: FAMILY MEDICINE

## 2025-03-17 PROCEDURE — 71046 X-RAY EXAM CHEST 2 VIEWS: CPT

## 2025-03-17 RX ORDER — BENZONATATE 100 MG/1
100 CAPSULE ORAL 3 TIMES DAILY PRN
Qty: 42 CAPSULE | Refills: 0 | Status: SHIPPED | OUTPATIENT
Start: 2025-03-17 | End: 2025-04-16

## 2025-03-17 RX ORDER — AZITHROMYCIN 250 MG/1
TABLET, FILM COATED ORAL
Qty: 6 TABLET | Refills: 0 | Status: SHIPPED | OUTPATIENT
Start: 2025-03-17 | End: 2025-03-22

## 2025-03-17 ASSESSMENT — ENCOUNTER SYMPTOMS
FEVER: 0
MYALGIAS: 1
NAUSEA: 0
BLOOD IN STOOL: 0
SORE THROAT: 0
HEADACHES: 0
FATIGUE: 1
RHINORRHEA: 1
DIFFICULTY URINATING: 0
SHORTNESS OF BREATH: 1
VOMITING: 1
CONSTIPATION: 0
WHEEZING: 0
LIGHT-HEADEDNESS: 1
COUGH: 1
DIARRHEA: 0

## 2025-03-17 NOTE — PROGRESS NOTES
Subjective   Patient ID: Link Inman is a 64 y.o. male who presents for Cough.    Cough  Episode onset: 4 months. Associated symptoms include myalgias, rhinorrhea and shortness of breath. Pertinent negatives include no chest pain, ear pain, fever, headaches, sore throat or wheezing.        Review of Systems   Constitutional:  Positive for fatigue. Negative for fever.        Pt seen 2/5/25 for bronchitis, rx doxy and albuterol  Seen pcp 2 1/2 wk ago  told to cont flonase  Pt just saw card 3/7/25  for operative clearance. Pt was doing well.   HENT:  Positive for rhinorrhea. Negative for congestion, ear discharge, ear pain and sore throat.         Has seasonal allergies   Respiratory:  Positive for cough and shortness of breath. Negative for wheezing.    Cardiovascular:  Negative for chest pain.   Gastrointestinal:  Positive for vomiting. Negative for blood in stool, constipation, diarrhea and nausea.        Vomited due to cough   Genitourinary:  Negative for difficulty urinating.   Musculoskeletal:  Positive for myalgias.   Neurological:  Positive for light-headedness. Negative for headaches.       Objective   /82   Pulse 90   Temp 37.3 °C (99.1 °F)   Resp 18   Wt 97.1 kg (214 lb)   SpO2 97%   BMI 28.23 kg/m²     Physical Exam  Vitals and nursing note reviewed.   Constitutional:       General: He is not in acute distress.  HENT:      Head: Normocephalic and atraumatic.      Right Ear: Tympanic membrane, ear canal and external ear normal.      Left Ear: Tympanic membrane, ear canal and external ear normal.      Ears:      Comments: Small abrasion left ext ear     Nose: Nose normal.      Mouth/Throat:      Mouth: Mucous membranes are moist.      Pharynx: Oropharynx is clear.   Cardiovascular:      Rate and Rhythm: Normal rate and regular rhythm.      Heart sounds: Normal heart sounds.   Pulmonary:      Effort: Pulmonary effort is normal.      Breath sounds: Normal breath sounds.      Comments: Pt coughing  with deep breaths  Musculoskeletal:      Cervical back: Neck supple. No tenderness.   Lymphadenopathy:      Cervical: No cervical adenopathy.   Skin:     General: Skin is warm and dry.   Neurological:      Mental Status: He is alert.         Assessment/Plan   Problem List Items Addressed This Visit             ICD-10-CM    URI with cough and congestion - Primary J06.9    Relevant Medications    benzonatate (Tessalon) 100 mg capsule    azithromycin (Zithromax) 250 mg tablet    Other Relevant Orders    POCT respiratory syncytial virus manually resulted (Completed)    POCT Influenza A/B manually resulted (Completed)    POCT BinaxNOW Covid-19 Ag Card manually resulted (Completed)    XR chest 2 views    [facility] MISCELLANEOUS TEST (ROOM TEMPERATURE)

## 2025-03-18 ENCOUNTER — TREATMENT (OUTPATIENT)
Dept: PHYSICAL THERAPY | Facility: CLINIC | Age: 65
End: 2025-03-18
Payer: MEDICARE

## 2025-03-18 DIAGNOSIS — M25.551 CHRONIC HIP PAIN, BILATERAL: Primary | ICD-10-CM

## 2025-03-18 DIAGNOSIS — M25.552 CHRONIC HIP PAIN, BILATERAL: Primary | ICD-10-CM

## 2025-03-18 DIAGNOSIS — G89.29 CHRONIC HIP PAIN, BILATERAL: Primary | ICD-10-CM

## 2025-03-18 LAB — QUEST FLEXITEST1 RESULTS:: NORMAL

## 2025-03-18 PROCEDURE — 97110 THERAPEUTIC EXERCISES: CPT | Mod: GP

## 2025-03-18 PROCEDURE — 97140 MANUAL THERAPY 1/> REGIONS: CPT | Mod: GP

## 2025-03-18 ASSESSMENT — PAIN SCALES - GENERAL: PAINLEVEL_OUTOF10: 8

## 2025-03-18 ASSESSMENT — PAIN DESCRIPTION - DESCRIPTORS: DESCRIPTORS: ACHING;CRAMPING;NAGGING

## 2025-03-18 ASSESSMENT — PAIN - FUNCTIONAL ASSESSMENT: PAIN_FUNCTIONAL_ASSESSMENT: 0-10

## 2025-03-18 NOTE — PROGRESS NOTES
"Physical Therapy    Physical Therapy Treatment    Patient Name: Link Inman  MRN: [id number]  Today's Date: 3/18/2025    Time Entry:   Time Calculation  Start Time: 0830  Stop Time: 0913  Time Calculation (min): 43 min     PT Therapeutic Procedures Time Entry  Manual Therapy Time Entry: 10  Therapeutic Exercise Time Entry: 33                   Insurance:  Visit: #10  Authorized: to be determined  Payor/Plan:   MEDICARE MEDICARE PART A AND B   MEDICARE A&B MED % COVERAGE, AARP SUPP. MED NEC NO AUTH, $186.06 USED FOR PT/ST   Certification Period: 12/02/2024 to 03/02/2024    Current Problem  1. Chronic hip pain, bilateral [M25.551, M25.552, G89.29]            Subjective    \"I am having a hard time today, moving slow\"    Patient walks into clinic slow with SPC.   Reports pain last couple of weeks has been pretty severe, walking is hard, he has a lot of muscle twitching/cramps down both legs.   Keep up with exercises as tolerated.     Precautions  Precautions  STEADI Fall Risk Score (The score of 4 or more indicates an increased risk of falling): 0  Precautions Comment: hx of CA, cardiovascular disease, headaches, HTN  Pain  Pain Assessment  Pain Assessment: 0-10  0-10 (Numeric) Pain Score: 8  Pain Type: Chronic pain  Pain Location: Hip  Pain Orientation: Anterior  Pain Radiating Towards: Bilateral legs/back  Pain Descriptors: Aching, Cramping, Nagging    Objective     TREATMENT  Educated patient on course of treatment.      MANUAL: x 1  PROM bilateral hips; 10 minutes; 5 minutes each leg     THERAPEUTIC EXERCISE: x 2  Recumbent Bike; 3 minutes. Warm up. Raised seat for comfort. Unable to do more than 3 minutes.  Standing staggered stance, gentle hip flexor stretch; unable to stagger feet more than a few inches; 1 minute each l;eg forward  Hook lying hip abduction; green; 30 reps.  Bent knee fall outs; alternating legs, TA contraction; 3 x 10 reps  Hip adduction isometric with ball between legs; 20 reps  Supine " double leg bridges with ball for hip abduction; 2 x 20 reps  Lower trunk rotations; 30 reps    OP Education: HEP: Patient verbalizes and demonstrates understanding of home exercises. Patient will contact writer if with questions or if condition worsens.       Access Code: [email or web address]  URL: [email or web address]  Date: 12/17/2024  Prepared by: Shelia Farmer        Charges: [id number], [id number]     Assessment:  Patient continues to tolerate gentle exercise primarily in hook lying position, unable to progress to closed chain exercises due to high pain levels. Continues to demonstrate very restricted hip mobility in bilateral hips L>R. Will benefit from skilled care as appropriate.     Patient had pain relief today from hip adduction isometrics and will continue those at home.   Patient unable to tolerate progression of PT into higher level exercise at this time to improve functional mobility and likely to benefit from surgical intervention as proposed by Dr. iKko Cristobal.     Plan:  Progress as appropriate and tolerated.   Core/hip strengthening/mobility.     Goals:  Patient will identify two exercises/movements he can utilize to modulate pain independently in 4 weeks.   Patient will demonstrate sit to stand without use of BUE or pain >3/10 in 8 weeks.   Patient will demonstrate non antalgic walk in 8 weeks, indicating improved function.   Patient will report Modified Oswestry score of <25% in 12 weeks.   Patient will be independent with HEP.        Anisha Farmer PT, DPT

## 2025-03-20 ENCOUNTER — APPOINTMENT (OUTPATIENT)
Dept: BEHAVIORAL HEALTH | Facility: CLINIC | Age: 65
End: 2025-03-20
Payer: MEDICARE

## 2025-03-20 DIAGNOSIS — G31.84 MILD COGNITIVE IMPAIRMENT: ICD-10-CM

## 2025-03-20 DIAGNOSIS — F69 BEHAVIOR CONCERN IN ADULT: ICD-10-CM

## 2025-03-20 PROCEDURE — 90792 PSYCH DIAG EVAL W/MED SRVCS: CPT | Performed by: PSYCHIATRY & NEUROLOGY

## 2025-03-20 RX ORDER — SERTRALINE HYDROCHLORIDE 25 MG/1
25 TABLET, FILM COATED ORAL DAILY
Qty: 30 TABLET | Refills: 1 | Status: SHIPPED | OUTPATIENT
Start: 2025-03-20 | End: 2025-05-19

## 2025-03-20 ASSESSMENT — ENCOUNTER SYMPTOMS
DYSPHORIC MOOD: 1
TREMORS: 1
SEIZURES: 0
BACK PAIN: 1
NUMBNESS: 0
SLEEP DISTURBANCE: 1
AGITATION: 0
NERVOUS/ANXIOUS: 0
APPETITE CHANGE: 0
FATIGUE: 1
DECREASED CONCENTRATION: 0
HALLUCINATIONS: 0
HYPERACTIVE: 0
CONFUSION: 0

## 2025-03-20 NOTE — PROGRESS NOTES
"Subjective   Patient ID: iLnk Inman is a 64 y.o. male who presents for memory and personality changes     HPI  Referred by neurology   MSSE 29/30 done by neurology, Dr Root a month ago   MRI in February: Unremarkable   From neurology note: Memory difficulties and increased depression and aggression   B12, TSH normal   Xarelto history of heart attack 6 years ago. EKG Qtc 418  No history of stroke     Patient stated:  He has had rough 2 years, leg and back pain. Would be on steroids, stated they make him anxious. 2 hips replacement surgeries scheduled   He retired approximately 2 years ago.   Due to pain, no longer able to play pickle ball or walk his dog   Wife has been out of the country for 3 months and relationship not as it was     Mood is described as \" intolerance, just to get through the day\".   Feels \" left out of life\".   No thoughts of suicide   Sleep is impaired, due to pain   Concentration is \" average\", declined 1.5 year ago. No issues in activities of daily living.   He denied any behavioral concerns. Neurology`s note reflects otherwise. That was brought to neurology`s attention by his wife. He mentioned some dynamics and changes in their 40 + year relationship     Does not have guns.     There has been issues, banned from some properties in his town due to increased irritability.         Current Outpatient Medications on File Prior to Visit   Medication Sig Dispense Refill    albuterol (ProAir HFA) 90 mcg/actuation inhaler Inhale 2 puffs every 4 hours if needed for wheezing or shortness of breath. 8.5 g 0    aspirin 81 mg EC tablet Take 1 tablet (81 mg) by mouth once daily.      atorvastatin (Lipitor) 20 mg tablet TAKE 1 TABLET (20 MG) BY MOUTH ONCE DAILY AT BEDTIME. 90 tablet 3    azithromycin (Zithromax) 250 mg tablet Take 2 tablets (500 mg) by mouth once daily for 1 day, THEN 1 tablet (250 mg) once daily for 4 days. Take 2 tabs (500 mg) by mouth today, than 1 daily for 4 days.. 6 tablet 0    " benzonatate (Tessalon) 100 mg capsule Take 1 capsule (100 mg) by mouth 3 times a day as needed for cough. Do not crush or chew. 42 capsule 0    Entresto 24-26 mg tablet Take 1 tablet by mouth 2 times a day. 180 tablet 3    fluticasone (Flonase) 50 mcg/actuation nasal spray Administer 1 spray into each nostril once daily. Shake gently. Before first use, prime pump. After use, clean tip and replace cap. 16 g 0    metoprolol succinate XL (Toprol-XL) 50 mg 24 hr tablet TAKE 1 TABLET BY MOUTH TWICE A  tablet 3    naproxen sodium (Aleve) 220 mg tablet Take 2 tablets (440 mg) by mouth once daily.      Xarelto 20 mg tablet Take 1 tablet (20 mg) by mouth once daily. 90 tablet 3     No current facility-administered medications on file prior to visit.      Past Psychiatric History:  Past: Marriage counselor   No past inpatient admissions, no history of suicide or self   No past trials   Substance Abuse History:  4-6 drinks a week mostly beer   No drugs   Family History:  Family History   Problem Relation Name Age of Onset    Other (cardiac disorder) Other      Hypertension Other      On mother`s side depression   Social History:  Social History     Socioeconomic History    Marital status:      Spouse name: Not on file    Number of children: Not on file    Years of education: Not on file    Highest education level: Not on file   Occupational History    Not on file   Tobacco Use    Smoking status: Never    Smokeless tobacco: Never   Substance and Sexual Activity    Alcohol use: Yes     Comment: MODERATE USE    Drug use: Not Currently    Sexual activity: Not on file   Other Topics Concern    Not on file   Social History Narrative    Not on file     Social Drivers of Health     Financial Resource Strain: Not on file   Food Insecurity: Not on file   Transportation Needs: Not on file   Physical Activity: Not on file   Stress: Not on file   Social Connections: Not on file   Intimate Partner Violence: Not on file    Housing Stability: Not on file       for 45 years, 3 children.   No history of childhood trauma.           Review of Systems   Constitutional:  Positive for fatigue. Negative for appetite change.   Musculoskeletal:  Positive for back pain and gait problem.        Leg pain   Neurological:  Positive for tremors. Negative for seizures and numbness.        In his leg   Psychiatric/Behavioral:  Positive for dysphoric mood and sleep disturbance. Negative for agitation, behavioral problems, confusion, decreased concentration, hallucinations, self-injury and suicidal ideas. The patient is not nervous/anxious and is not hyperactive.        Objective   There were no vitals filed for this visit.   Physical Exam  Psychiatric:         Attention and Perception: Attention normal.         Mood and Affect: Mood normal.         Speech: Speech normal.         Behavior: Behavior is cooperative.         Thought Content: Thought content normal.         Cognition and Memory: Cognition normal.         Judgment: Judgment normal.      Comments: Somewhat circumstantial   Some humor     MoCA:   Visuospatial: 3/5  Namin/3  Attention:   Language: 1/3  Abstraction:   Delayed recall:   Orientation:            Lab Review:   Office Visit on 2025   Component Date Value    RSV Rapid Ag 2025 Negative     POC Rapid Influenza A 2025 Negative     POC Rapid Influenza B 2025 Negative     POC GUILLERMINA-COV-2 AG 2025 Presumptive negative test for SARS-CoV-2 (no antigen detected)     RESULTS: 2025 See Note    Office Visit on 2025   Component Date Value    Ventricular Rate 2025 91     Atrial Rate 2025 91     CO Interval 2025 168     QRS Duration 2025 82     QT Interval 2025 340     QTC Calculation(Bazett) 2025 418     P Axis 2025 32     R [place] 2025 -11     T Axis 2025 -1     QRS Count 2025 15     Q Onset 2025 219     P Onset  03/07/2025 135     P Offset 03/07/2025 192     T Offset 03/07/2025 389     QTC Fredericia 03/07/2025 391    Office Visit on 02/05/2025   Component Date Value    POC GUILLERMINA-COV-2 AG 02/05/2025 Presumptive negative test for SARS-CoV-2 (no antigen detected)     POC Rapid Influenza A 02/05/2025 Negative     POC Rapid Influenza B 02/05/2025 Negative     FLU A 02/05/2025 TNP    Office Visit on 01/06/2025   Component Date Value    Flu A Result 01/06/2025 Not Detected     Flu B Result 01/06/2025 Not Detected     Coronavirus 2019, PCR 01/06/2025 Not Detected     RSV PCR 01/06/2025 Not Detected      Lab Results   Component Value Date     04/29/2024     01/24/2024     04/17/2023     10/05/2022     03/17/2021    K 4.5 04/29/2024    K 3.8 01/24/2024    K 4.2 04/17/2023    K 4.3 10/05/2022    K 4.3 03/17/2021    CO2 27 04/29/2024    CO2 25 01/24/2024    CO2 28 04/17/2023    CO2 28 10/05/2022    CO2 24 03/17/2021    BUN 16 04/29/2024    BUN 22 01/24/2024    BUN 16 04/17/2023    BUN 20 10/05/2022    BUN 22 03/17/2021    CREATININE 0.89 04/29/2024    CREATININE 1.14 01/24/2024    CREATININE 0.85 04/17/2023    CREATININE 0.92 10/05/2022    CREATININE 0.86 03/17/2021    GLUCOSE 101 (H) 04/29/2024    GLUCOSE 109 (H) 01/24/2024    GLUCOSE 99 04/17/2023    GLUCOSE 91 10/05/2022    GLUCOSE 96 03/17/2021    CALCIUM 8.8 04/29/2024    CALCIUM 8.9 01/24/2024    CALCIUM 9.0 04/17/2023    CALCIUM 9.2 10/05/2022    CALCIUM 9.1 03/17/2021     Lab Results   Component Value Date    WBC 5.6 04/29/2024    HGB 17.2 04/29/2024    HCT 51.0 04/29/2024    MCV 95 04/29/2024     04/29/2024     Lab Results   Component Value Date    CHOL 165 04/29/2024    TRIG 117 04/29/2024    HDL 44.6 04/29/2024     Current Outpatient Medications on File Prior to Visit   Medication Sig Dispense Refill    albuterol (ProAir HFA) 90 mcg/actuation inhaler Inhale 2 puffs every 4 hours if needed for wheezing or shortness of breath. 8.5 g 0     aspirin 81 mg EC tablet Take 1 tablet (81 mg) by mouth once daily.      atorvastatin (Lipitor) 20 mg tablet TAKE 1 TABLET (20 MG) BY MOUTH ONCE DAILY AT BEDTIME. 90 tablet 3    azithromycin (Zithromax) 250 mg tablet Take 2 tablets (500 mg) by mouth once daily for 1 day, THEN 1 tablet (250 mg) once daily for 4 days. Take 2 tabs (500 mg) by mouth today, than 1 daily for 4 days.. 6 tablet 0    benzonatate (Tessalon) 100 mg capsule Take 1 capsule (100 mg) by mouth 3 times a day as needed for cough. Do not crush or chew. 42 capsule 0    Entresto 24-26 mg tablet Take 1 tablet by mouth 2 times a day. 180 tablet 3    fluticasone (Flonase) 50 mcg/actuation nasal spray Administer 1 spray into each nostril once daily. Shake gently. Before first use, prime pump. After use, clean tip and replace cap. 16 g 0    metoprolol succinate XL (Toprol-XL) 50 mg 24 hr tablet TAKE 1 TABLET BY MOUTH TWICE A  tablet 3    naproxen sodium (Aleve) 220 mg tablet Take 2 tablets (440 mg) by mouth once daily.      Xarelto 20 mg tablet Take 1 tablet (20 mg) by mouth once daily. 90 tablet 3     No current facility-administered medications on file prior to visit.      Patient Active Problem List   Diagnosis    Abnormal EKG    Arteriosclerotic coronary artery disease    Atrial fibrillation (Multi)    Back pain, acute    Chronic systolic heart failure    Combined hyperlipidemia    Common cold    Viral URI with cough    Cough    Dyspnea    Elevated blood pressure reading    Hematoma of leg    Irregular heart beat    Leg pain    Liver cyst    Lumbar radiculopathy, acute    Mild mitral regurgitation    Mild vitamin D deficiency    Flu-like symptoms    Silent myocardial infarction (Multi)    Vertebral compression fracture (Multi)    Wound of foot    Heart attack    Acute bronchitis with wheezing    Medicare annual wellness visit, initial    Chronic cough    Behavior concern in adult    Chronic hip pain, bilateral    URI with cough and congestion       Assessment/Plan   Problem List Items Addressed This Visit       Behavior concern in adult     Other Visit Diagnoses       Memory difficulty              Antidepressant might be of a benefit, will start Zoloft 25 mg po daily   Offered PET scan, offered neuropsychological testing, he agreed   Follow up in one month or sooner if needed        Yvon Hernandez MD

## 2025-03-21 ENCOUNTER — LAB REQUISITION (OUTPATIENT)
Dept: LAB | Facility: HOSPITAL | Age: 65
End: 2025-03-21
Payer: MEDICARE

## 2025-03-21 ENCOUNTER — HOSPITAL ENCOUNTER (EMERGENCY)
Facility: HOSPITAL | Age: 65
Discharge: HOME | End: 2025-03-22
Attending: EMERGENCY MEDICINE
Payer: MEDICARE

## 2025-03-21 ENCOUNTER — TELEPHONE (OUTPATIENT)
Dept: CARDIOLOGY | Facility: CLINIC | Age: 65
End: 2025-03-21
Payer: MEDICARE

## 2025-03-21 DIAGNOSIS — D69.6 THROMBOCYTOPENIA, UNSPECIFIED (CMS-HCC): ICD-10-CM

## 2025-03-21 DIAGNOSIS — D69.6 THROMBOCYTOPENIA (CMS-HCC): Primary | ICD-10-CM

## 2025-03-21 DIAGNOSIS — D61.818 PANCYTOPENIA: Primary | ICD-10-CM

## 2025-03-21 LAB
ALBUMIN SERPL BCP-MCNC: 4 G/DL (ref 3.4–5)
ALP SERPL-CCNC: 97 U/L (ref 33–136)
ALT SERPL W P-5'-P-CCNC: 17 U/L (ref 10–52)
ANION GAP SERPL CALC-SCNC: 13 MMOL/L (ref 10–20)
APTT PPP: 34 SECONDS (ref 26–36)
AST SERPL W P-5'-P-CCNC: 14 U/L (ref 9–39)
BASOPHILS # BLD MANUAL: 0 X10*3/UL (ref 0–0.1)
BASOPHILS # BLD MANUAL: 0 X10*3/UL (ref 0–0.1)
BASOPHILS NFR BLD MANUAL: 0 %
BASOPHILS NFR BLD MANUAL: 0 %
BILIRUB SERPL-MCNC: 0.6 MG/DL (ref 0–1.2)
BUN SERPL-MCNC: 19 MG/DL (ref 6–23)
CALCIUM SERPL-MCNC: 9.1 MG/DL (ref 8.6–10.3)
CHLORIDE SERPL-SCNC: 105 MMOL/L (ref 98–107)
CO2 SERPL-SCNC: 24 MMOL/L (ref 21–32)
CREAT SERPL-MCNC: 0.91 MG/DL (ref 0.5–1.3)
DACRYOCYTES BLD QL SMEAR: ABNORMAL
DACRYOCYTES BLD QL SMEAR: ABNORMAL
EGFRCR SERPLBLD CKD-EPI 2021: >90 ML/MIN/1.73M*2
EOSINOPHIL # BLD MANUAL: 0.05 X10*3/UL (ref 0–0.7)
EOSINOPHIL # BLD MANUAL: 0.07 X10*3/UL (ref 0–0.7)
EOSINOPHIL NFR BLD MANUAL: 2 %
EOSINOPHIL NFR BLD MANUAL: 3 %
ERYTHROCYTE [DISTWIDTH] IN BLOOD BY AUTOMATED COUNT: 15.2 % (ref 11.5–14.5)
ERYTHROCYTE [DISTWIDTH] IN BLOOD BY AUTOMATED COUNT: 15.3 % (ref 11.5–14.5)
GLUCOSE SERPL-MCNC: 112 MG/DL (ref 74–99)
HCT VFR BLD AUTO: 22.8 % (ref 41–52)
HCT VFR BLD AUTO: 24.4 % (ref 41–52)
HGB BLD-MCNC: 8.1 G/DL (ref 13.5–17.5)
HGB BLD-MCNC: 8.5 G/DL (ref 13.5–17.5)
IMM GRANULOCYTES # BLD AUTO: 0.01 X10*3/UL (ref 0–0.7)
IMM GRANULOCYTES # BLD AUTO: 0.04 X10*3/UL (ref 0–0.7)
IMM GRANULOCYTES NFR BLD AUTO: 0.4 % (ref 0–0.9)
IMM GRANULOCYTES NFR BLD AUTO: 1.6 % (ref 0–0.9)
INR PPP: 1.1 (ref 0.9–1.1)
LDH SERPL L TO P-CCNC: 215 U/L (ref 84–246)
LYMPHOCYTES # BLD MANUAL: 0.98 X10*3/UL (ref 1.2–4.8)
LYMPHOCYTES # BLD MANUAL: 1.03 X10*3/UL (ref 1.2–4.8)
LYMPHOCYTES NFR BLD MANUAL: 41 %
LYMPHOCYTES NFR BLD MANUAL: 41 %
MAGNESIUM SERPL-MCNC: 2.13 MG/DL (ref 1.6–2.4)
MCH RBC QN AUTO: 39.1 PG (ref 26–34)
MCH RBC QN AUTO: 39.2 PG (ref 26–34)
MCHC RBC AUTO-ENTMCNC: 34.8 G/DL (ref 32–36)
MCHC RBC AUTO-ENTMCNC: 35.5 G/DL (ref 32–36)
MCV RBC AUTO: 110 FL (ref 80–100)
MCV RBC AUTO: 112 FL (ref 80–100)
MONOCYTES # BLD MANUAL: 0 X10*3/UL (ref 0.1–1)
MONOCYTES # BLD MANUAL: 0.12 X10*3/UL (ref 0.1–1)
MONOCYTES NFR BLD MANUAL: 0 %
MONOCYTES NFR BLD MANUAL: 5 %
NEUTROPHILS # BLD MANUAL: 0.28 X10*3/UL (ref 1.2–7.7)
NEUTS BAND # BLD MANUAL: 0.03 X10*3/UL (ref 0–0.7)
NEUTS BAND NFR BLD MANUAL: 1 %
NEUTS SEG # BLD MANUAL: 0.17 X10*3/UL (ref 1.2–7)
NEUTS SEG # BLD MANUAL: 0.25 X10*3/UL (ref 1.2–7)
NEUTS SEG NFR BLD MANUAL: 10 %
NEUTS SEG NFR BLD MANUAL: 7 %
NON-UH HIE APPEARANCE, U: NORMAL
NON-UH HIE BILIRUBIN, U: NORMAL
NON-UH HIE BLOOD, U: NORMAL
NON-UH HIE BUN/CREAT RATIO: 17.3
NON-UH HIE BUN: 19 MG/DL (ref 9–23)
NON-UH HIE CALCIUM: 8.8 MG/DL (ref 8.7–10.4)
NON-UH HIE CALCULATED OSMOLALITY: 286 MOSM/KG (ref 275–295)
NON-UH HIE CHLORIDE: 106 MMOL/L (ref 98–107)
NON-UH HIE CO2, VENOUS: 27 MMOL/L (ref 20–31)
NON-UH HIE COLOR, U: NORMAL
NON-UH HIE CREATININE: 1.1 MG/DL (ref 0.5–0.8)
NON-UH HIE DXH ACTIONS: ABNORMAL
NON-UH HIE FERRITIN: 311 NG/ML (ref 10–291)
NON-UH HIE FOLATE: 15.4 NG/ML (ref 5.4–17.5)
NON-UH HIE GLUCOSE QUAL, U: NORMAL
NON-UH HIE GLUCOSE: 103 MG/DL (ref 74–106)
NON-UH HIE HCT: 23.3 % (ref 41–52)
NON-UH HIE HGB: 8.4 G/DL (ref 13.5–17.5)
NON-UH HIE INSTR WBC ND: 2.2
NON-UH HIE IRON: 136 UG/DL (ref 50–170)
NON-UH HIE K: 4.2 MMOL/L (ref 3.5–5.1)
NON-UH HIE KETONES, U: NORMAL
NON-UH HIE LEUKOCYTE ESTERASE, U: NORMAL
NON-UH HIE MCH: 40.1 PG (ref 27–34)
NON-UH HIE MCHC: 35.8 G/DL (ref 32–37)
NON-UH HIE MCV: 111.8 FL (ref 80–100)
NON-UH HIE MPV: 6.8 FL (ref 7.4–10.4)
NON-UH HIE NA: 142 MMOL/L (ref 135–145)
NON-UH HIE NITRITE, U: NORMAL
NON-UH HIE PH, U: 6.5 (ref 4.5–8)
NON-UH HIE PLATELET: 54 X10 (ref 150–450)
NON-UH HIE PROTEIN, U: NORMAL
NON-UH HIE RBC: 2.09 X10 (ref 4.7–6.1)
NON-UH HIE RDW: 16.6 % (ref 11.5–14.5)
NON-UH HIE SATURATION: 58.4 % (ref 20–50)
NON-UH HIE SPECIFIC GRAVITY, U: 1.01 (ref 1–1.03)
NON-UH HIE TIBC: 233 UG/ML (ref 250–425)
NON-UH HIE U MICRO: NORMAL
NON-UH HIE UROBILINOGEN QUAL, U: NORMAL
NON-UH HIE VITAMIN B12: 1094 PG/ML (ref 211–911)
NON-UH HIE WBC: 2.2 X10 (ref 4.5–11)
NRBC BLD-RTO: 0 /100 WBCS (ref 0–0)
NRBC BLD-RTO: 0 /100 WBCS (ref 0–0)
PHOSPHATE SERPL-MCNC: 4.2 MG/DL (ref 2.5–4.9)
PLATELET # BLD AUTO: 53 X10*3/UL (ref 150–450)
PLATELET # BLD AUTO: 54 X10*3/UL (ref 150–450)
POLYCHROMASIA BLD QL SMEAR: ABNORMAL
POLYCHROMASIA BLD QL SMEAR: ABNORMAL
POTASSIUM SERPL-SCNC: 4.1 MMOL/L (ref 3.5–5.3)
PROT SERPL-MCNC: 7.2 G/DL (ref 6.4–8.2)
PROTHROMBIN TIME: 12.6 SECONDS (ref 9.8–12.4)
RBC # BLD AUTO: 2.07 X10*6/UL (ref 4.5–5.9)
RBC # BLD AUTO: 2.17 X10*6/UL (ref 4.5–5.9)
RBC MORPH BLD: ABNORMAL
RBC MORPH BLD: ABNORMAL
SODIUM SERPL-SCNC: 138 MMOL/L (ref 136–145)
TOTAL CELLS COUNTED BLD: 100
TOTAL CELLS COUNTED BLD: 100
URATE SERPL-MCNC: 4.8 MG/DL (ref 4–7.5)
VARIANT LYMPHS # BLD MANUAL: 1.06 X10*3/UL (ref 0–0.5)
VARIANT LYMPHS # BLD MANUAL: 1.15 X10*3/UL (ref 0–0.5)
VARIANT LYMPHS NFR BLD: 44 %
VARIANT LYMPHS NFR BLD: 46 %
WBC # BLD AUTO: 2.4 X10*3/UL (ref 4.4–11.3)
WBC # BLD AUTO: 2.5 X10*3/UL (ref 4.4–11.3)

## 2025-03-21 PROCEDURE — 85610 PROTHROMBIN TIME: CPT | Performed by: EMERGENCY MEDICINE

## 2025-03-21 PROCEDURE — 84100 ASSAY OF PHOSPHORUS: CPT | Performed by: EMERGENCY MEDICINE

## 2025-03-21 PROCEDURE — 84550 ASSAY OF BLOOD/URIC ACID: CPT | Performed by: EMERGENCY MEDICINE

## 2025-03-21 PROCEDURE — 99283 EMERGENCY DEPT VISIT LOW MDM: CPT | Performed by: EMERGENCY MEDICINE

## 2025-03-21 PROCEDURE — 85027 COMPLETE CBC AUTOMATED: CPT | Performed by: EMERGENCY MEDICINE

## 2025-03-21 PROCEDURE — 85027 COMPLETE CBC AUTOMATED: CPT

## 2025-03-21 PROCEDURE — 36415 COLL VENOUS BLD VENIPUNCTURE: CPT | Performed by: EMERGENCY MEDICINE

## 2025-03-21 PROCEDURE — 83735 ASSAY OF MAGNESIUM: CPT | Performed by: EMERGENCY MEDICINE

## 2025-03-21 PROCEDURE — 85007 BL SMEAR W/DIFF WBC COUNT: CPT

## 2025-03-21 PROCEDURE — 36415 COLL VENOUS BLD VENIPUNCTURE: CPT

## 2025-03-21 PROCEDURE — 83615 LACTATE (LD) (LDH) ENZYME: CPT | Performed by: EMERGENCY MEDICINE

## 2025-03-21 PROCEDURE — 85730 THROMBOPLASTIN TIME PARTIAL: CPT | Performed by: EMERGENCY MEDICINE

## 2025-03-21 PROCEDURE — 85007 BL SMEAR W/DIFF WBC COUNT: CPT | Performed by: EMERGENCY MEDICINE

## 2025-03-21 PROCEDURE — 99285 EMERGENCY DEPT VISIT HI MDM: CPT | Performed by: EMERGENCY MEDICINE

## 2025-03-21 PROCEDURE — 80053 COMPREHEN METABOLIC PANEL: CPT | Performed by: EMERGENCY MEDICINE

## 2025-03-21 ASSESSMENT — PAIN - FUNCTIONAL ASSESSMENT: PAIN_FUNCTIONAL_ASSESSMENT: 0-10

## 2025-03-21 ASSESSMENT — PAIN DESCRIPTION - PAIN TYPE: TYPE: CHRONIC PAIN

## 2025-03-21 ASSESSMENT — PAIN DESCRIPTION - ORIENTATION: ORIENTATION: RIGHT;LEFT

## 2025-03-21 ASSESSMENT — PAIN DESCRIPTION - PROGRESSION: CLINICAL_PROGRESSION: NOT CHANGED

## 2025-03-21 ASSESSMENT — PAIN DESCRIPTION - LOCATION: LOCATION: HIP

## 2025-03-21 ASSESSMENT — PAIN SCALES - GENERAL: PAINLEVEL_OUTOF10: 9

## 2025-03-21 NOTE — TELEPHONE ENCOUNTER
Pt called concerned with his blood work that he had just gotten done at [facility]. The lab called him and told him to call his cardiologist. He would like Dr Enamorado to review the lab results and what steps he needs to take.

## 2025-03-21 NOTE — TELEPHONE ENCOUNTER
I called pt and informed him his labs show severe anemia, leukopenia, and thrombocytopenia.  This is all new .  I ordreded a repeat stat cbc in the hope this is lab error.    SDH

## 2025-03-22 VITALS
OXYGEN SATURATION: 99 % | HEIGHT: 73 IN | TEMPERATURE: 97 F | DIASTOLIC BLOOD PRESSURE: 58 MMHG | HEART RATE: 90 BPM | RESPIRATION RATE: 20 BRPM | BODY MASS INDEX: 28.36 KG/M2 | SYSTOLIC BLOOD PRESSURE: 125 MMHG | WEIGHT: 214 LBS

## 2025-03-22 ASSESSMENT — PAIN SCALES - GENERAL: PAINLEVEL_OUTOF10: 0 - NO PAIN

## 2025-03-22 ASSESSMENT — PAIN - FUNCTIONAL ASSESSMENT: PAIN_FUNCTIONAL_ASSESSMENT: 0-10

## 2025-03-22 NOTE — DISCHARGE INSTRUCTIONS
Please follow-up with Dr. Eckert's office for further evaluation management.  Please return to close ED for any worsening lightheadedness, dizziness, chest pain, shortness of breath, weakness, loss of function.  Please discuss with Dr. Enamorado continuing your blood thinners medication for your heart condition tomorrow in the morning.  Get in touch with your hematology oncology consultant.  We sent Dr. Eckert a message and we sent her your facesheet and likely you will be contacted very soon.

## 2025-03-22 NOTE — ED PROVIDER NOTES
HPI   Chief Complaint   Patient presents with    Labs Only     Pt enhallieorsfarhan he was seen by his PCP for preop labs and was informed his WBC was low. Pt denies CP/SOB       This is a 64 years old male patient brought into the emergency department for abnormal labs.  His outpatient preoperative labs were concerning for pancytopenia/leukemia.  He was referred to the emergency department for further labs to rule out tumor lysis syndrome.  He denies any headaches, lightheadedness, dizziness, neck pain, chest pain, shortness of breath, abdominal pain, weakness, numbness.  His only complaint was fatigue for the past 2 to 3 days.    Review of system: As stated above in the HPI section.              Patient History   Past Medical History:   Diagnosis Date    Arrhythmia     atrial fibrillation    Personal history of (healed) traumatic fracture 03/28/2021    History of compression fracture of spine    Personal history of other diseases of the circulatory system 05/11/2019    History of pericarditis     Past Surgical History:   Procedure Laterality Date    OTHER SURGICAL HISTORY  02/12/2021    No history of surgery     Family History   Problem Relation Name Age of Onset    Other (cardiac disorder) Other      Hypertension Other       Social History     Tobacco Use    Smoking status: Never    Smokeless tobacco: Never   Substance Use Topics    Alcohol use: Yes     Comment: MODERATE USE    Drug use: Not Currently       Physical Exam   ED Triage Vitals [03/21/25 2114]   Temperature Heart Rate Respirations BP   36.1 °C (97 °F) (!) 118 16 127/69      Pulse Ox Temp src Heart Rate Source Patient Position   97 % -- -- --      BP Location FiO2 (%)     Right arm --       Physical Exam  Vitals and nursing note reviewed.   Constitutional:       General: He is not in acute distress.     Appearance: He is well-developed.   HENT:      Head: Normocephalic and atraumatic.   Eyes:      Conjunctiva/sclera: Conjunctivae normal.   Cardiovascular:       Rate and Rhythm: Normal rate and regular rhythm.      Heart sounds: No murmur heard.  Pulmonary:      Effort: Pulmonary effort is normal. No respiratory distress.      Breath sounds: Normal breath sounds.   Abdominal:      Palpations: Abdomen is soft.      Tenderness: There is no abdominal tenderness.   Musculoskeletal:         General: No swelling.      Cervical back: Neck supple.   Skin:     General: Skin is warm and dry.      Capillary Refill: Capillary refill takes less than 2 seconds.   Neurological:      Mental Status: He is alert.   Psychiatric:         Mood and Affect: Mood normal.           ED Course & MDM   Diagnoses as of 03/22/25 0100   Pancytopenia                 No data recorded     [place] Coma Scale Score: 15 (03/21/25 2117 : Joe Young RN)                           Medical Decision Making  Patient seen and examined, labs reviewed, we contacted Dr. Eckert the hematology oncology consultant who recommended obtaining uric acid level, LDH, and basic labs.  Labs were unremarkable.  Patient is currently asymptomatic.  Will be able to discharge the patient home to follow-up with hematology oncology team.  Patient was concerned about his oral anticoagulant and I advised the patient to follow-up with his cardiologist Dr. Enamorado and to contact him in the morning regarding continuing anticoagulation given the new pancytopenia.  Patient is discharged in stable condition to follow-up with heme-onc team and to return to the emergency department if alarming symptoms arise.      I saw and evaluated the patient. I personally obtained the key and critical portions of the history and physical exam or was physically present for key and critical portions performed by the resident/fellow. I reviewed the resident/fellow's documentation and discussed the patient with the resident/fellow. I agree with the resident/fellow's medical decision making as documented in the note.    Roland Oquendo,           Procedure  Procedures     Roland Oquendo,   03/22/25 0107

## 2025-03-24 ENCOUNTER — TELEPHONE (OUTPATIENT)
Dept: HEMATOLOGY/ONCOLOGY | Facility: CLINIC | Age: 65
End: 2025-03-24
Payer: MEDICARE

## 2025-03-24 DIAGNOSIS — C91.00 ALL (ACUTE LYMPHOBLASTIC LEUKEMIA OF INFANT) (MULTI): Primary | ICD-10-CM

## 2025-03-24 LAB — PATH REVIEW-CBC DIFFERENTIAL: NORMAL

## 2025-03-24 NOTE — PROGRESS NOTES
Clinical update note    Received haiku message from Dr. Enamorado regarding concern for lymphoblasts [facility] work on 3/21/2025.  I did review the slide as well approximately 7:30 PM that night and did also see lymphoblasts in the peripheral smear.  Reviewed case with Dr. Behzad Gomez whose recommendation was to obtain labs including LDH, coags, CBC, CMP to assess for any coagulopathy, tumor lysis concerning for blast crisis.  Patient was advised to go to the emergency room and he did arrive to the emergency room 3/21/2025.  Emergency room doctor, Dr Juárez been aware prior to his arrival.  CBC appears to have stable cytopenias, LDH normal, PT/INR and PTT also normal.  No evidence of tumor lysis and patient was discharged.    Reviewed with malignant hematology team this morning, including Dr. Magallanes who is covering for Dr. Gomez who is away this week.  Will plan for bone marrow biopsy with KEENAN Momin 3/27 with follow-up with Dr. Gomez the following week.  Patient is already scheduled for a PET CT of the brain from neuropsychology on 331 and will add a PET/CT body to assess for any lymphadenopathy or disease outside of the marrow.      April Eckert MD  Hematology/Oncology  [facility] at [address]  [place] [address] [phone] ext [phone]

## 2025-03-24 NOTE — TELEPHONE ENCOUNTER
Per Dr. Eckert to set patient up for a bone marrow biopsy asap and provider follow up with Dr. Gomez.     I called and spoke with patient, explained the plan of care - pt verbalized understanding and agreeable. We scheduled a bone marrow biopsy for first available 3/27/25 at 2PM and a new patient visit with Dr. Gomez for 4/3/25 @ 2PM. Patient aware to obtain lab work at walk-in lab before procedure. Education provided on bone marrow biopsy procedure. Location of appointments provided. Patient had no further questions at this time. I provided our office contact information and encouraged him to call with questions.

## 2025-03-25 ENCOUNTER — TREATMENT (OUTPATIENT)
Dept: PHYSICAL THERAPY | Facility: CLINIC | Age: 65
End: 2025-03-25
Payer: MEDICARE

## 2025-03-25 DIAGNOSIS — M25.551 CHRONIC HIP PAIN, BILATERAL: Primary | ICD-10-CM

## 2025-03-25 DIAGNOSIS — G89.29 CHRONIC HIP PAIN, BILATERAL: Primary | ICD-10-CM

## 2025-03-25 DIAGNOSIS — M25.552 CHRONIC HIP PAIN, BILATERAL: Primary | ICD-10-CM

## 2025-03-25 PROCEDURE — 97110 THERAPEUTIC EXERCISES: CPT | Mod: GP

## 2025-03-25 PROCEDURE — 97140 MANUAL THERAPY 1/> REGIONS: CPT | Mod: GP

## 2025-03-25 ASSESSMENT — PAIN SCALES - GENERAL: PAINLEVEL_OUTOF10: 6

## 2025-03-25 ASSESSMENT — PAIN - FUNCTIONAL ASSESSMENT: PAIN_FUNCTIONAL_ASSESSMENT: 0-10

## 2025-03-25 NOTE — PROGRESS NOTES
"Physical Therapy    Physical Therapy Treatment    Patient Name: Link Inman  MRN: [id number]  Today's Date: 3/25/2025    Time Entry:   Time Calculation  Start Time: 0832  Stop Time: 0915  Time Calculation (min): 43 min     PT Therapeutic Procedures Time Entry  Manual Therapy Time Entry: 15  Therapeutic Exercise Time Entry: 28                   Insurance:  Visit: #11  Authorized: to be determined  Payor/Plan:   MEDICARE MEDICARE PART A AND B   MEDICARE A&B MED % COVERAGE, AARP SUPP. MED NEC NO AUTH, $186.06 USED FOR PT/ST   Certification Period: 12/02/2024 to 03/02/2024    Current Problem  1. Chronic hip pain, bilateral [M25.551, M25.552, G89.29]            Subjective    \"I am having a hard time today, moving slow\"    Patient walks into clinic slow with SPC. Is with wife today.     He reports he went and go pre operative blood work done last Friday, was called immediatly after and they were concerned for leukemia.  Will be getting PET scan on Monday and visit wit oncologist to get further diagnosis and medical plan.       Precautions  Precautions  STEADI Fall Risk Score (The score of 4 or more indicates an increased risk of falling): 0  Precautions Comment: hx of CA, cardiovascular disease, headaches, HTN  Pain  Pain Assessment  Pain Assessment: 0-10  0-10 (Numeric) Pain Score: 6  Pain Type: Chronic pain  Pain Location: Hip  Pain Orientation: Anterior    Objective     TREATMENT  Educated patient on course of treatment.      MANUAL: x 1 (15 mintues)  PROM bilateral hips IR, ER, Flexion, Abduction     THERAPEUTIC EXERCISE: x 2  Lower trunk rotations; 2 x 20 reps  Hook lying hip abduction into blue band; 2 x 20 reps  Hook lying hip adduction into ball; 20 reps  Glute bridge; 2 x 20 reps  Heel slides; 15 reps each side    Reviewed POC and next steps moving forward.     OP Education: HEP: Patient verbalizes and demonstrates understanding of home exercises. Patient will contact writer if with questions or if " condition worsens.       Access Code: [email or web address]  URL: [email or web address]  Date: 12/17/2024  Prepared by: Shelia Farmer        Charges: [id number], [id number]     Assessment:  Patient continues to tolerate gentle exercise primarily in hook lying position, unable to progress to closed chain exercises due to high pain levels. Continues to demonstrate very restricted hip mobility in bilateral hips L>R.     Patient received new CA diagnosis and will focus on treatment and plan with other medical providers. Therapy will pause until appropriate a later date. Patient has HEP to continue at home if appropriate and able.     Plan:  Pause visits until medically appropriate.      Goals:  Patient will identify two exercises/movements he can utilize to modulate pain independently in 4 weeks.   Patient will demonstrate sit to stand without use of BUE or pain >3/10 in 8 weeks.   Patient will demonstrate non antalgic walk in 8 weeks, indicating improved function.   Patient will report Modified Oswestry score of <25% in 12 weeks.   Patient will be independent with HEP.        Anisha Farmer PT, DPT

## 2025-03-27 ENCOUNTER — PROCEDURE VISIT (OUTPATIENT)
Dept: HEMATOLOGY/ONCOLOGY | Facility: CLINIC | Age: 65
End: 2025-03-27
Payer: MEDICARE

## 2025-03-27 ENCOUNTER — LAB (OUTPATIENT)
Dept: LAB | Facility: CLINIC | Age: 65
End: 2025-03-27
Payer: MEDICARE

## 2025-03-27 VITALS
OXYGEN SATURATION: 96 % | DIASTOLIC BLOOD PRESSURE: 73 MMHG | BODY MASS INDEX: 28.65 KG/M2 | SYSTOLIC BLOOD PRESSURE: 121 MMHG | RESPIRATION RATE: 16 BRPM | WEIGHT: 217.15 LBS | HEART RATE: 93 BPM | TEMPERATURE: 98.1 F

## 2025-03-27 DIAGNOSIS — C91.00 ALL (ACUTE LYMPHOBLASTIC LEUKEMIA OF INFANT) (MULTI): ICD-10-CM

## 2025-03-27 DIAGNOSIS — I50.22 CHRONIC SYSTOLIC HEART FAILURE: Primary | ICD-10-CM

## 2025-03-27 DIAGNOSIS — D61.818 PANCYTOPENIA: Primary | ICD-10-CM

## 2025-03-27 LAB
BASOPHILS # BLD MANUAL: 0 X10*3/UL (ref 0–0.1)
BASOPHILS # BLD MANUAL: 0 X10*3/UL (ref 0–0.1)
BASOPHILS NFR BLD MANUAL: 0 %
BASOPHILS NFR BLD MANUAL: 0 %
BLASTS # BLD MANUAL: 0.34 X10*3/UL
BLASTS NFR BLD MANUAL: 14 %
DACRYOCYTES BLD QL SMEAR: ABNORMAL
DACRYOCYTES BLD QL SMEAR: ABNORMAL
EOSINOPHIL # BLD MANUAL: 0 X10*3/UL (ref 0–0.7)
EOSINOPHIL # BLD MANUAL: 0.07 X10*3/UL (ref 0–0.7)
EOSINOPHIL NFR BLD MANUAL: 0 %
EOSINOPHIL NFR BLD MANUAL: 3 %
ERYTHROCYTE [DISTWIDTH] IN BLOOD BY AUTOMATED COUNT: 14.6 % (ref 11.5–14.5)
ERYTHROCYTE [DISTWIDTH] IN BLOOD BY AUTOMATED COUNT: 15.3 % (ref 11.5–14.5)
HCT VFR BLD AUTO: 22 % (ref 41–52)
HCT VFR BLD AUTO: 22.8 % (ref 41–52)
HGB BLD-MCNC: 7.5 G/DL (ref 13.5–17.5)
HGB BLD-MCNC: 8.1 G/DL (ref 13.5–17.5)
HOLD SPECIMEN: NORMAL
IMM GRANULOCYTES # BLD AUTO: 0 X10*3/UL (ref 0–0.7)
IMM GRANULOCYTES # BLD AUTO: 0.01 X10*3/UL (ref 0–0.7)
IMM GRANULOCYTES NFR BLD AUTO: 0 % (ref 0–0.9)
IMM GRANULOCYTES NFR BLD AUTO: 0.4 % (ref 0–0.9)
LYMPHOCYTES # BLD MANUAL: 0.98 X10*3/UL (ref 1.2–4.8)
LYMPHOCYTES # BLD MANUAL: 1.7 X10*3/UL (ref 1.2–4.8)
LYMPHOCYTES NFR BLD MANUAL: 41 %
LYMPHOCYTES NFR BLD MANUAL: 71 %
MCH RBC QN AUTO: 38.9 PG (ref 26–34)
MCH RBC QN AUTO: 39.1 PG (ref 26–34)
MCHC RBC AUTO-ENTMCNC: 34.1 G/DL (ref 32–36)
MCHC RBC AUTO-ENTMCNC: 35.5 G/DL (ref 32–36)
MCV RBC AUTO: 110 FL (ref 80–100)
MCV RBC AUTO: 114 FL (ref 80–100)
MONOCYTES # BLD MANUAL: 0.12 X10*3/UL (ref 0.1–1)
MONOCYTES # BLD MANUAL: 0.22 X10*3/UL (ref 0.1–1)
MONOCYTES NFR BLD MANUAL: 5 %
MONOCYTES NFR BLD MANUAL: 9 %
NEUTS SEG # BLD MANUAL: 0.14 X10*3/UL (ref 1.2–7)
NEUTS SEG # BLD MANUAL: 0.17 X10*3/UL (ref 1.2–7)
NEUTS SEG NFR BLD MANUAL: 6 %
NEUTS SEG NFR BLD MANUAL: 7 %
NRBC BLD-RTO: 0 /100 WBCS (ref 0–0)
NRBC BLD-RTO: ABNORMAL /100{WBCS}
PLATELET # BLD AUTO: 47 X10*3/UL (ref 150–450)
PLATELET # BLD AUTO: 54 X10*3/UL (ref 150–450)
POLYCHROMASIA BLD QL SMEAR: ABNORMAL
POLYCHROMASIA BLD QL SMEAR: ABNORMAL
RBC # BLD AUTO: 1.93 X10*6/UL (ref 4.5–5.9)
RBC # BLD AUTO: 2.07 X10*6/UL (ref 4.5–5.9)
RBC MORPH BLD: ABNORMAL
RBC MORPH BLD: ABNORMAL
TOTAL CELLS COUNTED BLD: 100
TOTAL CELLS COUNTED BLD: 100
VARIANT LYMPHS # BLD MANUAL: 1.06 X10*3/UL (ref 0–0.5)
VARIANT LYMPHS NFR BLD: 44 %
WBC # BLD AUTO: 2.4 X10*3/UL (ref 4.4–11.3)
WBC # BLD AUTO: 2.4 X10*3/UL (ref 4.4–11.3)

## 2025-03-27 PROCEDURE — 85027 COMPLETE CBC AUTOMATED: CPT | Performed by: INTERNAL MEDICINE

## 2025-03-27 PROCEDURE — 36415 COLL VENOUS BLD VENIPUNCTURE: CPT

## 2025-03-27 PROCEDURE — 85007 BL SMEAR W/DIFF WBC COUNT: CPT | Performed by: INTERNAL MEDICINE

## 2025-03-27 PROCEDURE — 38222 DX BONE MARROW BX & ASPIR: CPT

## 2025-03-27 ASSESSMENT — PAIN SCALES - GENERAL: PAINLEVEL_OUTOF10: 7

## 2025-03-27 NOTE — PROGRESS NOTES
Patient ID: Link Inman is a 64 y.o. male.    Biopsy bone marrow    Date/Time: 3/27/2025 2:35 PM    Performed by: SAULO Alanis  Authorized by: SAULO Alanis    Consent:     Consent obtained:  Verbal and written    Consent given by:  Patient    Risks, benefits, and alternatives were discussed: yes      Risks discussed:  Bleeding, infection and pain  Universal protocol:     Procedure explained and questions answered to patient or proxy's satisfaction: yes      Relevant documents present and verified: yes      Test results available: yes      Imaging studies available: yes      Required blood products, implants, devices, and special equipment available: yes      Site/side marked: yes      Immediately prior to procedure, a time out was called: yes      Patient identity confirmed:  Verbally with patient  Indications:     Indications:  Pancytopenia  Pre-procedure details:     Skin preparation:  Chlorhexidine    Preparation: Patient was prepped and draped in the usual sterile fashion    Sedation:     Sedation type:  None  Anesthesia:     Anesthesia method:  Local infiltration    Local anesthetic:  Lidocaine 1% w/o epi  Procedure specific details:      The procedure was explained and potential complications were reviewed with the patient including the risks for bleeding, infection, & discomfort/pain at the bone marrow biopsy site. The patient was given time for questions regarding the procedure. The patient has agreed to proceed and an electronically signed consent was obtained.  The patient's most recent H&P were reviewed and relevant findings were noted.  The patient's allergies and relevant medications were reviewed. A pre-procedure time out was performed to properly identify the patient and the procedure to be performed.  The patient was placed in the prone position and the left posterior iliac crest bone marrow biopsy site was identified & marked.  Next, the skin at the marked bone  marrow biopsy site was cleansed with Chlorhexidine solution and sterile drapes were placed.  The skin, subcutaneous tissue, & periosteum below the marked bone marrow biopsy site were anesthetized using 10 ml of 1% Lidocaine solution.  Next, a Jamshidi needle was inserted at the marked biopsy site and slowly advanced into the posterior iliac crest.  Marrow aspirate and core biopsy were obtained and sent to pathology for review and testing. The needle was removed and sterile dressing was applied to the biopsy site. The patient tolerated the procedure well without incident. Prior to discharge, the biopsy site was inspected and the patient was given written post procedure care instructions. Patient instructed to leave bandage on for 24 hours and monitor site for the next 2-3 days for signs of infection or bleeding (fever, chills, pain, purulent drainage, warmth, swelling, redness, bleeding or bruise larger than a half dollar). Instructed to call the ordering provider for any concerns post procedure. Confirmed follow up appointment is scheduled with ordering provider to review results.     Post-procedure details:     Procedure completion:  Tolerated well, no immediate complications    Nikia Dillon MSN, APRN, A-GNP-C, AOCNP  [facility]  Division of Hematology & Medical Oncology   [place] [address]  Phone: [phone]  Available via [email or web address] Secure Chat  [email or web address]

## 2025-03-31 ENCOUNTER — LAB (OUTPATIENT)
Dept: LAB | Facility: CLINIC | Age: 65
End: 2025-03-31
Payer: MEDICARE

## 2025-03-31 ENCOUNTER — TELEPHONE (OUTPATIENT)
Dept: BEHAVIORAL HEALTH | Facility: CLINIC | Age: 65
End: 2025-03-31
Payer: MEDICARE

## 2025-03-31 ENCOUNTER — HOSPITAL ENCOUNTER (OUTPATIENT)
Dept: RADIOLOGY | Facility: CLINIC | Age: 65
Discharge: HOME | End: 2025-03-31
Payer: MEDICARE

## 2025-03-31 ENCOUNTER — TELEPHONE (OUTPATIENT)
Dept: HEMATOLOGY/ONCOLOGY | Facility: CLINIC | Age: 65
End: 2025-03-31
Payer: MEDICARE

## 2025-03-31 DIAGNOSIS — F69 BEHAVIOR CONCERN IN ADULT: ICD-10-CM

## 2025-03-31 DIAGNOSIS — G31.84 MILD COGNITIVE IMPAIRMENT: ICD-10-CM

## 2025-03-31 DIAGNOSIS — D61.818 PANCYTOPENIA: ICD-10-CM

## 2025-03-31 DIAGNOSIS — C92.00 ACUTE MYELOID LEUKEMIA NOT HAVING ACHIEVED REMISSION (MULTI): ICD-10-CM

## 2025-03-31 DIAGNOSIS — D61.818 PANCYTOPENIA: Primary | ICD-10-CM

## 2025-03-31 DIAGNOSIS — C91.00 ALL (ACUTE LYMPHOBLASTIC LEUKEMIA OF INFANT) (MULTI): ICD-10-CM

## 2025-03-31 LAB
ALBUMIN SERPL BCP-MCNC: 3.9 G/DL (ref 3.4–5)
ALP SERPL-CCNC: 98 U/L (ref 33–136)
ALT SERPL W P-5'-P-CCNC: 14 U/L (ref 10–52)
ANION GAP SERPL CALC-SCNC: 12 MMOL/L (ref 10–20)
AST SERPL W P-5'-P-CCNC: 12 U/L (ref 9–39)
BASOPHILS # BLD AUTO: 0.02 X10*3/UL (ref 0–0.1)
BASOPHILS NFR BLD AUTO: 1.2 %
BILIRUB SERPL-MCNC: 0.7 MG/DL (ref 0–1.2)
BUN SERPL-MCNC: 26 MG/DL (ref 6–23)
CALCIUM SERPL-MCNC: 8.9 MG/DL (ref 8.6–10.3)
CHLORIDE SERPL-SCNC: 108 MMOL/L (ref 98–107)
CO2 SERPL-SCNC: 25 MMOL/L (ref 21–32)
CREAT SERPL-MCNC: 0.89 MG/DL (ref 0.5–1.3)
DACRYOCYTES BLD QL SMEAR: NORMAL
EGFRCR SERPLBLD CKD-EPI 2021: >90 ML/MIN/1.73M*2
EOSINOPHIL # BLD AUTO: 0.01 X10*3/UL (ref 0–0.7)
EOSINOPHIL NFR BLD AUTO: 0.6 %
ERYTHROCYTE [DISTWIDTH] IN BLOOD BY AUTOMATED COUNT: 14.6 % (ref 11.5–14.5)
GLUCOSE SERPL-MCNC: 161 MG/DL (ref 74–99)
HCT VFR BLD AUTO: 20.8 % (ref 41–52)
HGB BLD-MCNC: 7.2 G/DL (ref 13.5–17.5)
HOLD SPECIMEN: NORMAL
IMM GRANULOCYTES # BLD AUTO: 0 X10*3/UL (ref 0–0.7)
IMM GRANULOCYTES NFR BLD AUTO: 0 % (ref 0–0.9)
LYMPHOCYTES # BLD AUTO: 1.25 X10*3/UL (ref 1.2–4.8)
LYMPHOCYTES NFR BLD AUTO: 75.3 %
MAGNESIUM SERPL-MCNC: 2.2 MG/DL (ref 1.6–2.4)
MCH RBC QN AUTO: 39.8 PG (ref 26–34)
MCHC RBC AUTO-ENTMCNC: 34.6 G/DL (ref 32–36)
MCV RBC AUTO: 115 FL (ref 80–100)
MONOCYTES # BLD AUTO: 0.31 X10*3/UL (ref 0.1–1)
MONOCYTES NFR BLD AUTO: 18.7 %
NEUTROPHILS # BLD AUTO: 0.07 X10*3/UL (ref 1.2–7.7)
NEUTROPHILS NFR BLD AUTO: 4.2 %
NRBC BLD-RTO: ABNORMAL /100{WBCS}
OVALOCYTES BLD QL SMEAR: NORMAL
PLATELET # BLD AUTO: 43 X10*3/UL (ref 150–450)
POTASSIUM SERPL-SCNC: 4 MMOL/L (ref 3.5–5.3)
PROT SERPL-MCNC: 6.7 G/DL (ref 6.4–8.2)
RBC # BLD AUTO: 1.81 X10*6/UL (ref 4.5–5.9)
RBC MORPH BLD: NORMAL
SODIUM SERPL-SCNC: 141 MMOL/L (ref 136–145)
WBC # BLD AUTO: 1.7 X10*3/UL (ref 4.4–11.3)

## 2025-03-31 PROCEDURE — 78815 PET IMAGE W/CT SKULL-THIGH: CPT | Mod: PET TUMOR INIT TX STRAT | Performed by: RADIOLOGY

## 2025-03-31 PROCEDURE — 82607 VITAMIN B-12: CPT

## 2025-03-31 PROCEDURE — A9552 F18 FDG: HCPCS | Performed by: INTERNAL MEDICINE

## 2025-03-31 PROCEDURE — 36415 COLL VENOUS BLD VENIPUNCTURE: CPT

## 2025-03-31 PROCEDURE — 86900 BLOOD TYPING SEROLOGIC ABO: CPT

## 2025-03-31 PROCEDURE — 84075 ASSAY ALKALINE PHOSPHATASE: CPT

## 2025-03-31 PROCEDURE — 85025 COMPLETE CBC W/AUTO DIFF WBC: CPT

## 2025-03-31 PROCEDURE — 78815 PET IMAGE W/CT SKULL-THIGH: CPT | Mod: PI

## 2025-03-31 PROCEDURE — 78608 BRAIN IMAGING (PET): CPT

## 2025-03-31 PROCEDURE — 83735 ASSAY OF MAGNESIUM: CPT

## 2025-03-31 PROCEDURE — 3430000001 HC RX 343 DIAGNOSTIC RADIOPHARMACEUTICALS: Performed by: INTERNAL MEDICINE

## 2025-03-31 RX ORDER — FLUDEOXYGLUCOSE F 18 200 MCI/ML
9.5 INJECTION, SOLUTION INTRAVENOUS
Status: COMPLETED | OUTPATIENT
Start: 2025-03-31 | End: 2025-03-31

## 2025-03-31 RX ADMIN — FLUDEOXYGLUCOSE F 18 9.5 MILLICURIE: 200 INJECTION, SOLUTION INTRAVENOUS at 10:47

## 2025-03-31 NOTE — TELEPHONE ENCOUNTER
BMbx results pending. Based on preliminary results 2x weekly count checks are recommended. Placing orders for labs today and Thursday. Patient instructed to go to [facility] lab today and again before Thursday's visit with Dr. Gomez. I will follow and reach out if intervention is required.   Nikia Dillon, APRN-CNP

## 2025-04-01 ENCOUNTER — TELEPHONE (OUTPATIENT)
Dept: HEMATOLOGY/ONCOLOGY | Facility: CLINIC | Age: 65
End: 2025-04-01
Payer: MEDICARE

## 2025-04-01 DIAGNOSIS — D61.818 PANCYTOPENIA: Primary | ICD-10-CM

## 2025-04-01 LAB
ABO GROUP (TYPE) IN BLOOD: NORMAL
ANTIBODY SCREEN: NORMAL
CELL COUNT (BLOOD): 4.22 X10*3/UL
CELL POPULATIONS: NORMAL
DIAGNOSIS: NORMAL
FLOW DIFFERENTIAL: NORMAL
FLOW TEST ORDERED: NORMAL
LAB TEST METHOD: NORMAL
NUMBER OF CELLS COLLECTED: NORMAL PER TUBE
PATH REPORT.COMMENTS IMP SPEC: NORMAL
PATH REPORT.FINAL DX SPEC: NORMAL
PATH REPORT.GROSS SPEC: NORMAL
PATH REPORT.MICROSCOPIC SPEC OTHER STN: NORMAL
PATH REPORT.RELEVANT HX SPEC: NORMAL
PATH REPORT.TOTAL CANCER: NORMAL
PATH REPORT.TOTAL CANCER: NORMAL
RH FACTOR (ANTIGEN D): NORMAL
SIGNATURE COMMENT: NORMAL
SPECIMEN VIABILITY: NORMAL

## 2025-04-01 RX ORDER — LEVOFLOXACIN 500 MG/1
500 TABLET, FILM COATED ORAL DAILY
Qty: 30 TABLET | Refills: 0 | Status: SHIPPED | OUTPATIENT
Start: 2025-04-01

## 2025-04-01 RX ORDER — FLUCONAZOLE 200 MG/1
400 TABLET ORAL DAILY
Qty: 60 TABLET | Refills: 0 | Status: SHIPPED | OUTPATIENT
Start: 2025-04-01 | End: 2025-05-01

## 2025-04-01 RX ORDER — ACYCLOVIR 400 MG/1
400 TABLET ORAL 2 TIMES DAILY
Qty: 60 TABLET | Refills: 0 | Status: SHIPPED | OUTPATIENT
Start: 2025-04-01

## 2025-04-01 NOTE — TELEPHONE ENCOUNTER
Internal Med Progress Note


Date of Service:


Oct 5, 2017.


Provider Documentation:





SUBJECTIVE:





The patient was seen and examined 


Feels very weak and lethargic today 


Pain seems to be controlled 


Denies any other symptoms





Remains Drowsy 


Lacks motivation to participating in PT














OBJECTIVE:





Vital Signs-as noted below





Exam:


General-No distress at rest


Eyes-normal


ENT-normal


Neck-supple


Lungs-clear to auscultate bilaterally 


Heart-Regular,no murmur appreciated


Abdomen-Benign,no masses,bowel sound present 


Extremities-Right Ankle is bandaged  


and Left ankle has brace 


Neuro-AAOx3


Generally weak and lethargic





Lab data as noted below.


ASSESSMENT & PLAN:








This is an 84 year old male with a PMH of paroxysmal atrial fibrillation on 

Coumadin, HTN, Hypothyroidism, CKD stage 3, Essential tremor, with mechanical 

fall and R posterior malleolar fracture





R Posterior Malleolar Fracture


Patient presented status post mechanical fall


Fracture R posterior malleolar fracture


This was also dislocated and subsequently reduced by the ER physician


Appreciate Ortho input 


S/P ORIF POD # 4


Doing well following surgery 


PT/OT -requested .needs to participate more in PT 


Clinically not ready to be discharged today 


Will try tomorrow








Paroxysmal Atrial Fibrillation


Currently in NSR


Continue Amiodarone


Coumadin was on Hold for today 





Leukocytosis


Secondary to use of Steroid


Will monitor -improving





Hypothyroidism


continue Synthroid





CKD stage 3


Avoid nephrotoxic agents when able


Gentle hydration


Remains stable at 1.5 





DVT ppx


On Coumadin 


INR 2.2 today 





FULL CODE





Disposition


Likely discharge tomorrow


Vital Signs:











  Date Time  Temp Pulse Resp B/P (MAP) Pulse Ox O2 Delivery O2 Flow Rate FiO2


 


10/5/17 15:06 36.6 59 18 98/59 (72) 96 Room Air  


 


10/5/17 07:30      Room Air  


 


10/5/17 07:08 36.4 64 19 94/59 (71) 94 Room Air  


 


10/4/17 23:15      Room Air  


 


10/4/17 22:45 36.6 71 18 102/62 (75) 97 Room Air  


 


10/4/17 15:45      Room Air  








Lab Results:





Results Past 24 Hours








Test


  10/5/17


06:20 Range/Units


 


 


White Blood Count 13.20 4.8-10.8  K/uL


 


Red Blood Count 3.83 4.7-6.1  M/uL


 


Hemoglobin 11.1 14.0-18.0  g/dL


 


Hematocrit 34.6 42-52  %


 


Mean Corpuscular Volume 90.3   fL


 


Mean Corpuscular Hemoglobin 29.0 25-34  pg


 


Mean Corpuscular Hemoglobin


Concent 32.1


  32-36  g/dl


 


 


RDW Standard Deviation 54.4 36.4-46.3  fL


 


RDW Coefficient of Variation 16.5 11.5-14.5  %


 


Platelet Count 232 130-400  K/uL


 


Mean Platelet Volume 10.0 7.4-10.4  fL


 


Prothrombin Time


  30.0


  9.0-12.0


SECONDS


 


Prothromb Time International


Ratio 2.7


  0.9-1.1  


 


 


Sodium Level 137 136-145  mmol/L


 


Potassium Level 4.0 3.5-5.1  mmol/L


 


Chloride Level 101   mmol/L


 


Carbon Dioxide Level 33 21-32  mmol/L


 


Anion Gap 3.0 3-11  mmol/L


 


Blood Urea Nitrogen 37 7-18  mg/dl


 


Creatinine


  1.50


  0.60-1.40


mg/dl


 


Est Creatinine Clear Calc


Drug Dose 41.3


   ml/min


 


 


Estimated GFR (


American) 48.2


   


 


 


Estimated GFR (Non-


American 41.6


   


 


 


BUN/Creatinine Ratio 24.9 10-20  


 


Random Glucose 94 70-99  mg/dl


 


Calcium Level 8.8 8.5-10.1  mg/dl Reviewed with patient that is he is neutropenic and needs prophylaxis treatment with acyclovir, fluconazole, and levofloxacin. He asked that I send these to the [facility] in [place] as he is currently staying at his sister's home. I reviewed briefly neutropenic precautions and instructed him to go to the ED for any signs of infection, fevers, or chills.   Nikia Dillon, APRN-CNP

## 2025-04-01 NOTE — PROGRESS NOTES
"  03/31/2025  per Dr. Hernandez request, made wife aware that he suggested to take pt to ER or call 911 based on radiology call that Dr. Hernandez received regarding pt being agressive. The wife stated that pt is \"neutral now\" and was wondering if she should still take pt to ER or wait for something to trigger him. RN made wife aware that she should probably still take pt to ER incase his behavior issue becomes more severe, but also made her aware that David would be contacted again to see what he suggests.   "

## 2025-04-02 LAB
CHROM ANALY OVERALL INTERP-IMP: NORMAL
ELECTRONICALLY COSIGNED BY CYTOGENETICS: NORMAL
ELECTRONICALLY SIGNED BY CYTOGENETICS: NORMAL
FISH ISCN RESULTS: NORMAL

## 2025-04-03 ENCOUNTER — TELEPHONE (OUTPATIENT)
Dept: HEMATOLOGY/ONCOLOGY | Facility: CLINIC | Age: 65
End: 2025-04-03
Payer: MEDICARE

## 2025-04-03 ENCOUNTER — PATIENT OUTREACH (OUTPATIENT)
Dept: HEMATOLOGY/ONCOLOGY | Facility: CLINIC | Age: 65
End: 2025-04-03
Payer: MEDICARE

## 2025-04-03 ENCOUNTER — LAB (OUTPATIENT)
Dept: LAB | Facility: CLINIC | Age: 65
End: 2025-04-03
Payer: MEDICARE

## 2025-04-03 ENCOUNTER — OFFICE VISIT (OUTPATIENT)
Dept: HEMATOLOGY/ONCOLOGY | Facility: CLINIC | Age: 65
End: 2025-04-03
Payer: MEDICARE

## 2025-04-03 VITALS
HEART RATE: 101 BPM | SYSTOLIC BLOOD PRESSURE: 139 MMHG | RESPIRATION RATE: 17 BRPM | WEIGHT: 218.48 LBS | BODY MASS INDEX: 28.82 KG/M2 | OXYGEN SATURATION: 100 % | TEMPERATURE: 97.7 F | DIASTOLIC BLOOD PRESSURE: 72 MMHG

## 2025-04-03 DIAGNOSIS — Z71.1 CONCERN ABOUT MEMORY: ICD-10-CM

## 2025-04-03 DIAGNOSIS — M25.551 CHRONIC HIP PAIN, BILATERAL: ICD-10-CM

## 2025-04-03 DIAGNOSIS — D61.818 PANCYTOPENIA: ICD-10-CM

## 2025-04-03 DIAGNOSIS — I25.10 ARTERIOSCLEROTIC CORONARY ARTERY DISEASE: ICD-10-CM

## 2025-04-03 DIAGNOSIS — I50.22 CHRONIC SYSTOLIC HEART FAILURE: ICD-10-CM

## 2025-04-03 DIAGNOSIS — M25.552 CHRONIC HIP PAIN, BILATERAL: ICD-10-CM

## 2025-04-03 DIAGNOSIS — G89.29 CHRONIC HIP PAIN, BILATERAL: ICD-10-CM

## 2025-04-03 DIAGNOSIS — I48.0 PAROXYSMAL ATRIAL FIBRILLATION (MULTI): ICD-10-CM

## 2025-04-03 DIAGNOSIS — Z76.82 STEM CELL TRANSPLANT CANDIDATE: ICD-10-CM

## 2025-04-03 DIAGNOSIS — C92.00 ACUTE MYELOID LEUKEMIA NOT HAVING ACHIEVED REMISSION (MULTI): Primary | ICD-10-CM

## 2025-04-03 PROBLEM — M54.9 BACK PAIN, ACUTE: Status: RESOLVED | Noted: 2023-04-12 | Resolved: 2025-04-03

## 2025-04-03 LAB
ALBUMIN SERPL BCP-MCNC: 4 G/DL (ref 3.4–5)
ALP SERPL-CCNC: 104 U/L (ref 33–136)
ALT SERPL W P-5'-P-CCNC: 16 U/L (ref 10–52)
ANION GAP SERPL CALC-SCNC: 13 MMOL/L (ref 10–20)
AST SERPL W P-5'-P-CCNC: 11 U/L (ref 9–39)
BASOPHILS # BLD MANUAL: 0 X10*3/UL (ref 0–0.1)
BASOPHILS NFR BLD MANUAL: 0 %
BILIRUB SERPL-MCNC: 0.4 MG/DL (ref 0–1.2)
BLASTS # BLD MANUAL: 0.41 X10*3/UL
BLASTS NFR BLD MANUAL: 17 %
BUN SERPL-MCNC: 17 MG/DL (ref 6–23)
CALCIUM SERPL-MCNC: 8.7 MG/DL (ref 8.6–10.3)
CHLORIDE SERPL-SCNC: 103 MMOL/L (ref 98–107)
CO2 SERPL-SCNC: 25 MMOL/L (ref 21–32)
CREAT SERPL-MCNC: 0.9 MG/DL (ref 0.5–1.3)
DACRYOCYTES BLD QL SMEAR: ABNORMAL
EGFRCR SERPLBLD CKD-EPI 2021: >90 ML/MIN/1.73M*2
EOSINOPHIL # BLD MANUAL: 0.05 X10*3/UL (ref 0–0.7)
EOSINOPHIL NFR BLD MANUAL: 2 %
ERYTHROCYTE [DISTWIDTH] IN BLOOD BY AUTOMATED COUNT: 14.6 % (ref 11.5–14.5)
GLUCOSE SERPL-MCNC: 146 MG/DL (ref 74–99)
HCT VFR BLD AUTO: 19.2 % (ref 41–52)
HGB BLD-MCNC: 6.6 G/DL (ref 13.5–17.5)
IMM GRANULOCYTES # BLD AUTO: 0 X10*3/UL (ref 0–0.7)
IMM GRANULOCYTES NFR BLD AUTO: 0 % (ref 0–0.9)
LYMPHOCYTES # BLD MANUAL: 1.51 X10*3/UL (ref 1.2–4.8)
LYMPHOCYTES NFR BLD MANUAL: 63 %
MAGNESIUM SERPL-MCNC: 1.9 MG/DL (ref 1.6–2.4)
MCH RBC QN AUTO: 39.8 PG (ref 26–34)
MCHC RBC AUTO-ENTMCNC: 34.4 G/DL (ref 32–36)
MCV RBC AUTO: 116 FL (ref 80–100)
MONOCYTES # BLD MANUAL: 0.14 X10*3/UL (ref 0.1–1)
MONOCYTES NFR BLD MANUAL: 6 %
NEUTS SEG # BLD MANUAL: 0.29 X10*3/UL (ref 1.2–7)
NEUTS SEG NFR BLD MANUAL: 12 %
NRBC BLD-RTO: ABNORMAL /100{WBCS}
OVALOCYTES BLD QL SMEAR: ABNORMAL
PLATELET # BLD AUTO: 39 X10*3/UL (ref 150–450)
POTASSIUM SERPL-SCNC: 3.5 MMOL/L (ref 3.5–5.3)
PROT SERPL-MCNC: 6.7 G/DL (ref 6.4–8.2)
RBC # BLD AUTO: 1.66 X10*6/UL (ref 4.5–5.9)
RBC MORPH BLD: ABNORMAL
SODIUM SERPL-SCNC: 137 MMOL/L (ref 136–145)
TOTAL CELLS COUNTED BLD: 100
WBC # BLD AUTO: 2.4 X10*3/UL (ref 4.4–11.3)

## 2025-04-03 PROCEDURE — 86901 BLOOD TYPING SEROLOGIC RH(D): CPT

## 2025-04-03 PROCEDURE — 99215 OFFICE O/P EST HI 40 MIN: CPT | Performed by: INTERNAL MEDICINE

## 2025-04-03 PROCEDURE — 86900 BLOOD TYPING SEROLOGIC ABO: CPT

## 2025-04-03 PROCEDURE — 1160F RVW MEDS BY RX/DR IN RCRD: CPT | Performed by: INTERNAL MEDICINE

## 2025-04-03 PROCEDURE — 99205 OFFICE O/P NEW HI 60 MIN: CPT | Performed by: INTERNAL MEDICINE

## 2025-04-03 PROCEDURE — 36415 COLL VENOUS BLD VENIPUNCTURE: CPT

## 2025-04-03 PROCEDURE — 85007 BL SMEAR W/DIFF WBC COUNT: CPT

## 2025-04-03 PROCEDURE — 80053 COMPREHEN METABOLIC PANEL: CPT

## 2025-04-03 PROCEDURE — 85027 COMPLETE CBC AUTOMATED: CPT

## 2025-04-03 PROCEDURE — 1125F AMNT PAIN NOTED PAIN PRSNT: CPT | Performed by: INTERNAL MEDICINE

## 2025-04-03 PROCEDURE — G2212 PROLONG OUTPT/OFFICE VIS: HCPCS | Performed by: INTERNAL MEDICINE

## 2025-04-03 PROCEDURE — 1159F MED LIST DOCD IN RCRD: CPT | Performed by: INTERNAL MEDICINE

## 2025-04-03 PROCEDURE — 83735 ASSAY OF MAGNESIUM: CPT

## 2025-04-03 PROCEDURE — G2211 COMPLEX E/M VISIT ADD ON: HCPCS | Performed by: INTERNAL MEDICINE

## 2025-04-03 RX ORDER — ALBUTEROL SULFATE 0.83 MG/ML
3 SOLUTION RESPIRATORY (INHALATION) AS NEEDED
Status: CANCELLED | OUTPATIENT
Start: 2025-04-03

## 2025-04-03 RX ORDER — ALBUTEROL SULFATE 0.83 MG/ML
3 SOLUTION RESPIRATORY (INHALATION) AS NEEDED
OUTPATIENT
Start: 2025-04-03

## 2025-04-03 RX ORDER — DIPHENHYDRAMINE HYDROCHLORIDE 50 MG/ML
50 INJECTION, SOLUTION INTRAMUSCULAR; INTRAVENOUS AS NEEDED
OUTPATIENT
Start: 2025-04-03

## 2025-04-03 RX ORDER — EPINEPHRINE 0.3 MG/.3ML
0.3 INJECTION SUBCUTANEOUS EVERY 5 MIN PRN
OUTPATIENT
Start: 2025-04-03

## 2025-04-03 RX ORDER — FAMOTIDINE 10 MG/ML
20 INJECTION, SOLUTION INTRAVENOUS ONCE AS NEEDED
OUTPATIENT
Start: 2025-04-03

## 2025-04-03 RX ORDER — FAMOTIDINE 10 MG/ML
20 INJECTION, SOLUTION INTRAVENOUS ONCE AS NEEDED
Status: CANCELLED | OUTPATIENT
Start: 2025-04-03

## 2025-04-03 RX ORDER — EPINEPHRINE 0.3 MG/.3ML
0.3 INJECTION SUBCUTANEOUS EVERY 5 MIN PRN
Status: CANCELLED | OUTPATIENT
Start: 2025-04-03

## 2025-04-03 RX ORDER — DIPHENHYDRAMINE HYDROCHLORIDE 50 MG/ML
50 INJECTION, SOLUTION INTRAMUSCULAR; INTRAVENOUS AS NEEDED
Status: CANCELLED | OUTPATIENT
Start: 2025-04-03

## 2025-04-03 ASSESSMENT — PAIN SCALES - GENERAL: PAINLEVEL_OUTOF10: 8

## 2025-04-03 NOTE — PROGRESS NOTES
I met with patient and his family (spouse, sister and daughter) after visit with Dr. Gomez. We reviewed patients lab work today - pt aware Dr. Gomez has ordered a blood transfusion for Hgb <7 (6.6). Blood transfusion set up for tomorrow morning at infusion center - pt agreeable. Consent completed. Patient given educational handouts on blood transfusion and we discussed what to expect. Patient is aware we are scheduling 2x week count checks going forward to monitor blood counts.     Patient aware Dr. Gomez will be calling him tomorrow to discuss plan of care. Provided patient and his family basic education about infusion center/clinic, how to contact office with questions/concerns and upcoming blood work/referral orders placed. Educational booklets on AML and education red bag given to patient and family. Handouts on community resources, financial counselor and our social work services provided. I let patient know I will be reaching out to provide additional education after his visit with Dr. Gomez tomorrow. Pt appreciative and had no further questions at this time.

## 2025-04-03 NOTE — PROGRESS NOTES
Patient ID: Link Inman is a 64 y.o. male.  Referring Physician: No referring provider defined for this encounter.  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  With his white count under 10, as long as there is an absence of disseminated intravascular coagulation and/or infection/fever we can try to keep him out of the hospital.  In terms of treatment options, I discussed that we are still missing critical genetic data from his bone marrow biopsy.  This includes the presence or absence of IDH 1 and NPM1 and FLT3 mutations which may all impact our initial treatment selections.    At the same time we discussed basic principles of acute leukemia management, in general or for step is to try to induce remission and restore normal hematopoiesis, hopefully improving anemia, leukopenia, thrombocytopenia.  I discussed principles of supportive care, which include prophylactic antimicrobials (patient already on acyclovir, fluconazole, levofloxacin), routine supportive transfusions.  During his initial treatment he will highly likely need minimum twice weekly visits to infusion center to observe for need for transfusions.  I also discussed that many patients will opt for a central venous access    We also discussed lifestyle modifications.  I recommended complete abstention from alcohol, avoiding crowds, caution with visits into the public and route rigorous handwashing at home.  People that are sick should probably not visit.  There is a paucity of strong data on exactly how isolated someone should be, but in general I do not recommend that patients completely isolate themselves and it is more important to take a cautious approach.  I also discussed that many causes of neutropenic fever are not things that are  infectious from other people, but instead from inherent risks of becoming immunosuppressed.  (E.g. translocation of gut bacteria and into the bloodstream.)    He may also be potentially eligible for 1 clinical trial on the MYELOMATCH study, if his genetics suggest he may be eligible I will discuss this with him.  His FISH testing demonstrates that he does not have a core binding factor AML, and therefore even if he is mutated NPM1, I would likely more strongly consider azacitidine and venetoclax based therapy over induction chemotherapy which would like ly be outpatient.  My goal will be to start as soon as possible next week.  We are anticipating his NGS results tomorrow, therefore I scheduled him for a follow-up virtual visit tomorrow afternoon to make final treatment plans and review his final genetics.  Diagnostics:  -Follow-up karyotype  -Follow-up next-generation sequencing  Treatment:  -Final treatment options to be determined,  Disease Monitoring:  -Minimum twice weekly CBCs while pending treatment start, low threshold to consider admission in the setting of complication  Supportive Care:  -Twice weekly infusion visits for supportive transfusions  Antimicrobial Prophylaxis:  -Acyclovir  -Fluconazole  -Levofloxacin  IV access:  -We discussed options for central venous access, no decisions were made today, could consider PICC versus tunneled catheter.  Stem cell transplant candidate  He has some potential barriers to transplant, including a history of chronic systolic heart failure.  However his EF appears normal and his heart failure appears very well manage.  His chronic pain and overall performance status could be an issue, however he has multiple siblings as potential mesh related donors.  We do not yet know his final restratification, but a potential need for stem cell transplant is likely.    As noted below, in the last few months he has been worked up for concern for memory issue, I did recommend that  we try to complete that workup to fully understand if he is at risk for developing Alzheimer's or has a true cognitive impairment.  This could wait on the decision of the long-term risks and benefits of considering a transplant.    At the same time I do not recommend we delay workup for donor search I will refer him to the transplant program for education.  Arteriosclerotic coronary artery disease  His platelet counts are now under 50, we could continue this until they are 30, but I anticipate he will likely have profoundly low platelets with the initiation of treatment.  I recommend we hold aspirin unless absolutely necessary until we can hopefully achieve a remission for his leukemia and see sustained platelet counts.  Paroxysmal atrial fibrillation (Multi)  With his platelet count under 50, and likely to fall much further with initiation of treatment, I strongly recommended holding/discontinuing his rivaroxaban for the time being.  Once he returns to remission, if we are able to demonstrate sustained platelet counts over 50, we can and should consider restarting this medication.  Chronic systolic heart failure  This appears to be well compensated, his most recent echocardiogram was in 2023.  If we need to do cardiotoxic medications, I will reach out to his cardiologist who saw him recently, and try to obtain an interval echocardiogram.  As he does not have a core binding factor acute leukemia based on the FISH testing, I will more likely pursue a hypomethylating agent venetoclax approach, and so hopefully avoiding potentially cardiotoxic drugs.  Nevertheless, we will make a final decision once we have his genetic testing resulted.  Concern about memory  Patient was evaluated by neurology with concerns for short-term memory issues.  He had a brain PET/CT that is potentially concerning for reduced activity, and was referred to psychiatry for evaluation for depression.  Was prescribed sertraline, but did not  initiate it as he was subsequently in the ED emergency department quickly and was informed that it could potentially contribute to blood thinning.  Will importantly, was referred for neuropsychiatric testing.    My general impression today does not seem to suggest a significant cognitive impairment.  He seems to have excellent recollection of his symptoms and diagnosis, what he has been explained before, and this is affirmed by his spouse.  He does occasionally return to similar phrases.    I recommended to him that given the concern for issues, and that we are considering treatments that could exacerbate memory issues or cognitive impairment that we try to complete his neuropsychiatric testing.  (Stem cell transplant survivors often report some increased memory issues) I do not know if this can be expedited, but will reach out to his neurologist and psychiatrist to see if they could potentially ask about expediting this workup.  Chronic hip pain, bilateral  Unfortunately any surgery will need to be put off at this time.  Indeed timing of future surgery could be quite difficult.  Patient is reporting uncontrolled pain at home since stopping his naproxen.  I reviewed that we generally do not favor nonsteroidal anti-inflammatory medications due to the increased risk of GI bleeding, which can be much more dangerous with the coagulation and thrombocytopenia seen with active leukemia.  Moreover, we are often using medications that can be nephrotoxic which can also be a problem with nonsteroidals.    Acetaminophen is a reasonable option for him.  I did discuss the importance of having a working thermometer at home and monitoring closely for fever; we do have some concerns that acetaminophen can mask a fever but I think if he is checking routinely whenever he is feeling poorly which acetaminophen is still probably the safest choice of medication for him at this time.  He is only using about 1000 mg a day, and I discussed  "he could safely use up to 3 g/day chronically without too many issues.    If his pain control is still an adequate, I have occasionally considered celecoxib for patients like him as this is not felt to have the same risk of GI bleeding and platelet dysfunction, but this is still somewhat controversial.  We could also consider chronic opioids, but these are also high risk medications.  He reported he is going to have a follow-up with his primary care physician in the near future as well.                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  On review with patient has increasing fatigue and reduced activity since November 2024.  Underwent bone marrow biopsy 3/27/2025 that demonstrated:   BONE MARROW, LEFT ILIAC CREST, ASPIRATE WITH CLOT AND BIOPSY WITH TOUCH PREPARATION:   -- ACUTE MYELOID LEUKEMIA (estimated at 80% of marrow cellularity).  -- MARKEDLY DECREASED MATURING TRILINEAGE HEMATOPOIESIS.  Karyotype: pending  FISH: FISH NEGATIVE for inverted 3/t(3;3), t(6;9), t(8;21), gain of RUNX1 or BJMV4N0, MLL (KMT2A) rearrangement, t(15;17), inverted 16/t(16;16), TP53 deletion and monosomy 17   NGS: pending                 Subjective       History of Present Illness:  Mr. Inman is here to    He noted that he feels that \"it was an evironmental issue\" [place]  He noted that he was shaving in muddy in water.  Feels he's built lots of homes and his water.    Started to tell a story about water color, and getting a  to work on this (all of this fairly unrelated).  Had a nasty hot water tank, and reports that the \"city [facility]\" and is worried these toxins are what caused this. Notes that he's asked about having his spouse blood " tested. He is worried that the water might have caused this.      He had a CBC getting ready for a surgery.  Overall the last several months, been hard to say that he's been dealing with back pain.  Had some difficulties with weight up and down.  His chiropractor, and looked at his X-ray and looked like it may have been his hips - held off on surgery, and opted against to have back.     On ROS, had noticed increased fatigue.  He noted that in October, he was walking around [employer] [place] then, but in November, started to get harder and had to stop and rest.  By December, was only doing half of that; now he's only making it out of the year.  Reports the dog has been well behaved.  Both his breathing will become labored and he will feel very drained overall .  He will feel like his hips and knees will feel like they've been on the beach and just over done everything.      For the last three months, he's stomach will get upset.  He's had episodes of nausea and vomiting, and has not been consistently losing weight. No early sattiety.  Does report some night sweats, but has some nocuturia.  Thinks sleeping 3.5 hours or sow.     Notes two bruises on his flank. No gum bleeding.  Does have a bit of a cough - noted that he was in [place], [place] for a ski trip, where he was babysitting his granddaughter.  Had a recent visit. Was put on two medications and an inhaler.  All the flu and covid were negative.              Past Medical History:    Atrial fibrillation - occurred after heart attack.  1st afib, previously managed with rhythm control   Heart failure, suspected ischemic -most recently appears well compensated  Coronary artery disease, will manage medically    Chronic back pain: trying to substitute acetaminophen:     Past Surgical History:  Last surgery was a R thigh sweat gland - 2019  Mohs for basal cell -     Family History:   Mother and father - coronary artery disease    Social History:  Daughter in law works in the  Oncology department at [employer].   No tobacco history  Alcohol in moderation - 8-10 drinks a week, usually [employer], guiness, Margeritas  No drug use history  Retired - was a ; flipped homes   Lives with spouse and their collie  Son who lives locally with daughter-in-law with three grandkids  Daughter with son and granddaughter   Youngest daughter in [place]     Has three sisters (youngest is here today), 9 years younger  Has another two years younger  Has an older sister who will be 72 - (recently lost her  of 33 years)     Had a brother who passed away from [facility] at age 24.         Review of Systems    Home Medications and Adherence Reviewed with Patient.       Objective      VS:  /72 (BP Location: Right arm, Patient Position: Sitting, BP Cuff Size: Adult)   Pulse 101   Temp 36.5 °C (97.7 °F) (Temporal)   Resp 17   Wt 99.1 kg (218 lb 7.6 oz)   SpO2 100%   BMI 28.82 kg/m²   BSA: 2.26 meters squared    Physical Exam    Laboratory:  Lab on 04/03/2025   Component Date Value Ref Range Status    WBC 04/03/2025 2.4 (L)  4.4 - 11.3 x10*3/uL Final    nRBC 04/03/2025    Final    RBC 04/03/2025 1.66 (L)  4.50 - 5.90 x10*6/uL Final    Hemoglobin 04/03/2025 6.6 (L)  13.5 - 17.5 g/dL Final    Hematocrit 04/03/2025 19.2 (L)  41.0 - 52.0 % Final    MCV 04/03/2025 116 (H)  80 - 100 fL Final    MCH 04/03/2025 39.8 (H)  26.0 - 34.0 pg Final    MCHC 04/03/2025 34.4  32.0 - 36.0 g/dL Final    RDW 04/03/2025 14.6 (H)  11.5 - 14.5 % Final    Platelets 04/03/2025 39 (LL)  150 - 450 x10*3/uL Final    Immature Granulocytes %, Automated 04/03/2025 0.0  0.0 - 0.9 % Final    Immature Granulocytes Absolute, Au* 04/03/2025 0.00  0.00 - 0.70 x10*3/uL Final    Glucose 04/03/2025 146 (H)  74 - 99 mg/dL Final    Sodium 04/03/2025 137  136 - 145 mmol/L Final    Potassium 04/03/2025 3.5  3.5 - 5.3 mmol/L Final    Chloride 04/03/2025 103  98 - 107 mmol/L Final    Bicarbonate 04/03/2025 25  21 - 32 mmol/L Final    Anion  Gap 04/03/2025 13  10 - 20 mmol/L Final    Urea Nitrogen 04/03/2025 17  6 - 23 mg/dL Final    Creatinine 04/03/2025 0.90  0.50 - 1.30 mg/dL Final    eGFR 04/03/2025 >90  >60 mL/min/1.73m*2 Final    Calcium 04/03/2025 8.7  8.6 - 10.3 mg/dL Final    Albumin 04/03/2025 4.0  3.4 - 5.0 g/dL Final    Alkaline Phosphatase 04/03/2025 104  33 - 136 U/L Final    Total Protein 04/03/2025 6.7  6.4 - 8.2 g/dL Final    AST 04/03/2025 11  9 - 39 U/L Final    Bilirubin, Total 04/03/2025 0.4  0.0 - 1.2 mg/dL Final    ALT 04/03/2025 16  10 - 52 U/L Final    Magnesium 04/03/2025 1.90  1.60 - 2.40 mg/dL Final    ABO TYPE 04/03/2025 O   Final    Rh TYPE 04/03/2025 POS   Final    ANTIBODY SCREEN 04/03/2025 NEG   Final    Neutrophils %, Manual 04/03/2025 12.0  40.0 - 80.0 % Final    Lymphocytes %, Manual 04/03/2025 63.0  13.0 - 44.0 % Final    Monocytes %, Manual 04/03/2025 6.0  2.0 - 10.0 % Final    Eosinophils %, Manual 04/03/2025 2.0  0.0 - 6.0 % Final    Basophils %, Manual 04/03/2025 0.0  0.0 - 2.0 % Final    Blasts %, Manual 04/03/2025 17.0  0.0 - 0.0 % Final    Seg Neutrophils Absolute, Manual 04/03/2025 0.29 (L)  1.20 - 7.00 x10*3/uL Final    Lymphocytes Absolute, Manual 04/03/2025 1.51  1.20 - 4.80 x10*3/uL Final    Monocytes Absolute, Manual 04/03/2025 0.14  0.10 - 1.00 x10*3/uL Final    Eosinophils Absolute, Manual 04/03/2025 0.05  0.00 - 0.70 x10*3/uL Final    Basophils Absolute, Manual 04/03/2025 0.00  0.00 - 0.10 x10*3/uL Final    Blasts Absolute, Manual 04/03/2025 0.41  0.00 - 0.00 x10*3/uL Final    Total Cells Counted 04/03/2025 100   Final    RBC Morphology 04/03/2025 See Below   Final    Ovalocytes 04/03/2025 Few   Final    Teardrop Cells 04/03/2025 Few   Final         Pathology:  The pertinent pathology results were reviewed and discussed with the patient.  Notably,   Bone Marrow Biopsy:   FINAL DIAGNOSIS   Date Value Ref Range Status   03/27/2025   Final      BONE MARROW, LEFT ILIAC CREST, ASPIRATE WITH CLOT AND BIOPSY  WITH TOUCH PREPARATION:   -- ACUTE MYELOID LEUKEMIA (estimated at 80% of marrow cellularity).  -- MARKEDLY DECREASED MATURING TRILINEAGE HEMATOPOIESIS.  -- See note.    Note:        The patient presented with circulating blasts. Evaluation of the marrow reveals a predominance of immunophenotypically abnormal myeloid blasts (approximately 80% of cellularity) in a normocellular marrow. Findings are indicative of Acute Myeloid Leukemia (AML).        Correlation with pending results of karyotype, AML FISH panel, myeloid NGS panel, and CEBPA mutation analysis is required for further classification and management.          Comment   Date Value Ref Range Status   03/27/2025   Final    Dr. Maya provided an update on preliminary findings to Dr. Eckert via Epic text message at about 1250 on 31 March 2025, immediate acknowledgement received.       Bone Marrow Differential   Date Value Ref Range Status   03/27/2025   Final      Cell Type Value Reference Range   Granulocytic cells 6.2 Not provided        Lymphocytes 10.3 5-25 %   Monocytes 1.7 0-2 %   Plasma Cells 0.4 0-2 %        Blasts 80.6 0-1 %        Total Erythroid 0.8 17-35 %        Total Cells Counted 242           Microscopic Description   Date Value Ref Range Status   03/27/2025   Final    PERIPHERAL SMEAR:       Please see same day hemogram and differential, [id number].              Red cells: Macrocytic anemia (Hgb = 7.5 g/dL).         Granulocytes: Decreased in number (0.1 k/uL). Mature       Lymphocytes: Normal in number. Mature       Monocytes: Rare. Mature       Platelets: Decreased in number (47 k/uL). Normal in appearance.         Other: Blasts (14% of leukocytes). Large with high N:C ratios, fine          chromatin, and prominent nucleoli.        ASPIRATE SMEAR and TOUCH PREP:                      Aspirate: Spicular.        Touch Prep: Adequate        Erythropoiesis: Rare erythroid precursors seen. No overt dysplasia.       Granulopoiesis: Rare maturing  granulocytes. No overt dysplasia.       Megakaryocytes: Several normal appearing forms.        Monocytes: Rare. Mature.       Plasma cells: Rare Mature.       Lymphocytes: Mature.       Blasts: Large to intermediate in size with High N:C ratios, round to           irregular nuclear contours, fine chromatin, prominent nucleoli,           scant to abundant azurophilic granulation. No Bridget rods seen.    ASPIRATE CLOT and CORE BIOPSY:                                      Clot: Spicular.         Core: Adequate. With substantial aspiration artifact.       Cellularity: 30-40%.              Estimated M:E ratio: Not evaluable due to disease.         Erythropoiesis: Decreased. Maturing. Focal islands of maturing           red cell precursors       Granulopoiesis: Decreased. Infrequent, scattered recognizable           maturing granulocytes.       Megakaryocytes: Decreased. Individually distributed.        Lymphocytes: Inconspicuous on H+E stained sections.       Plasma cells: Scattered forms seen.       Granulomas: Absent.         Metastatic Disease: Absent.       Bony trabeculae: Normal.        Other: There is a predominant interstitial infiltrate of large to           intermediate cells with high N:C ratios, round to clefted          nuclear contours, finely granular chromatin, and prominent           nucleoli.    SPECIAL STAINS:    -- Iron (clot, part A): None seen.  -- All controls are appropriately reactive    IMMUNOHISTOCHEMISTRY: Performed.  -- CD7: Positive in the blasts  -- : Moderate positive in the blasts.  -- CD34: Positive in blasts, estimated at 80% of marrow cellularity.  -- All controls are appropriately reactive.    Immunostains were performed in addition to flow cytometry to fully characterize the phenotype, architecture, and extent of the marrow population(s).  This was medically necessary for the best possible diagnosis.      FLOW CYTOMETRY ([id number], see separate report for details):    BONE MARROW  ASPIRATE, FLOW CYTOMETRY:  -- ABNORMAL MYELOID BLASTS PRESENT.  -- See note.     Note:       Abnormal myeloid blasts with atypical CD7 expression compose about 50% of total events. Background T-cells and B-cells are polyclonal. Clinical and morphologic correlation is required. Please refer to concurrent marrow case [id number]         Gross Description   Date Value Ref Range Status   03/27/2025   Final    A:  Received in formalin, labeled with the patient's name and hospital number, is an irregular fragment of blood clot measuring 0.9 x 0.5 x 0.2 cm. The specimen is submitted in toto in one cassette.   JEK  B:  Received in B-plus fixative, labeled with the patient's name and hospital number, is a cylindrical segment of bone measuring 2.0 x 0.2 x 0.2 cm. The specimen is submitted in toto in one cassette following decalcification in [facility] [employer].   JEK            .    Imaging:  The pertinent imaging results were reviewed and discussed with the patient.  Notably, we reviewed both his recent PET/CT and brain PET/CT, his 2019 cardiac scoring, his 2023 echocardiogram, and his past back imaging.          Riky Gomez MD

## 2025-04-03 NOTE — LETTER
April 4, 2025     Sukhdeep Lopez MD  [address]  [address]  [facility] [address]    Patient: Link Inman   YOB: 1960   Date of Visit: 4/3/2025       Dear Dr. John MD, Dr. Enamorado, Dr. Hernandez, and Dr. Root:    I met our mutual patient Link Inman yesterday in the malignant hematology clinic. Below are my notes for this consultation.    Unfortunately, he has been diagnosed with acute myeloid leukemia.  We are still pending some genetic testing to fully with stratified his disease.  I have a follow-up appointment with him today we are hoping to have next ration sequencing results to finalize treatment plans.  This was identified incidentally by Dr. Enamorado on a CBC for preoperative evaluation, and he subsequently under went a bone marrow biopsy on 3/27.    Dr. Enamorado: His platelets are already under 50, and with treatment they will likely fall to transfusion dependent, at least transiently.  I recommended that he hold both the aspirin and rivaroxaban at this time, and we can restart once he has recovered blood counts and is sustaining them over 50.  If you feel the aspirin is critically necessary with his atherosclerotic disease, we can try to continue it and try to maintain a platelet transfusion threshold of 30, though this can be difficult.    Dr. Hernandez, and Dr. Root: On review, I see that he has seen both of you recently with concern for some memory and potential mild cognitive impairment.  I see his PET/CT of the brain that was also concerning for findings that can be seen in Alzheimer's disease.  My impressions today suggest that he has a excellent understanding of his disease, and good recollection of events leading up to this as well as previous discussions with other physicians.  I think he may be a candidate for bone marrow transplant, but I also recommended that we try to complete his neuropsychiatric testing to fully understand any potential cognitive concerns.   Would either of you know if it is possible to have these test expedited?  He indicated that they are scheduled but upwards of 6 months into the future.    If you have questions, please do not hesitate to call me. I look forward to following your patient along with you.       Sincerely,     Riky Gomez MD      CC: MD Umberto Tucker, MD Christopher Acosta MD  ______________________________________________________________________________________    Patient ID: Link Inman is a 64 y.o. male.  Referring Physician: No referring provider defined for this encounter.  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  With his white count under 10, as long as there is an absence of disseminated intravascular coagulation and/or infection/fever we can try to keep him out of the hospital.  In terms of treatment options, I discussed that we are still missing critical genetic data from his bone marrow biopsy.  This includes the presence or absence of IDH 1 and NPM1 and FLT3 mutations which may all impact our initial treatment selections.    At the same time we discussed basic principles of acute leukemia management, in general or for step is to try to induce remission and restore normal hematopoiesis, hopefully improving anemia, leukopenia, thrombocytopenia.  I discussed principles of supportive care, which include prophylactic antimicrobials (patient already on acyclovir, fluconazole, levofloxacin), routine supportive transfusions.  During his initial treatment he will highly likely need minimum twice weekly visits to infusion center to observe for need for transfusions.  I also discussed that many patients will opt for a central venous access    We also discussed lifestyle  modifications.  I recommended complete abstention from alcohol, avoiding crowds, caution with visits into the public and route rigorous handwashing at home.  People that are sick should probably not visit.  There is a paucity of strong data on exactly how isolated someone should be, but in general I do not recommend that patients completely isolate themselves and it is more important to take a cautious approach.  I also discussed that many causes of neutropenic fever are not things that are infectious from other people, but instead from inherent risks of becoming immunosuppressed.  (E.g. translocation of gut bacteria and into the bloodstream.)    He may also be potentially eligible for 1 clinical trial on the MYELOMATCH study, if his genetics suggest he may be eligible I will discuss this with him.  His FISH testing demonstrates that he does not have a core binding factor AML, and therefore even if he is mutated NPM1, I would likely more strongly consider azacitidine and venetoclax based therapy over induction chemotherapy which would like ly be outpatient.  My goal will be to start as soon as possible next week.  We are anticipating his NGS results tomorrow, therefore I scheduled him for a follow-up virtual visit tomorrow afternoon to make final treatment plans and review his final genetics.  Diagnostics:  -Follow-up karyotype  -Follow-up next-generation sequencing  Treatment:  -Final treatment options to be determined,  Disease Monitoring:  -Minimum twice weekly CBCs while pending treatment start, low threshold to consider admission in the setting of complication  Supportive Care:  -Twice weekly infusion visits for supportive transfusions  Antimicrobial Prophylaxis:  -Acyclovir  -Fluconazole  -Levofloxacin  IV access:  -We discussed options for central venous access, no decisions were made today, could consider PICC versus tunneled catheter.  Stem cell transplant candidate  He has some potential barriers to  transplant, including a history of chronic systolic heart failure.  However his EF appears normal and his heart failure appears very well manage.  His chronic pain and overall performance status could be an issue, however he has multiple siblings as potential mesh related donors.  We do not yet know his final restratification, but a potential need for stem cell transplant is likely.    As noted below, in the last few months he has been worked up for concern for memory issue, I did recommend that we try to complete that workup to fully understand if he is at risk for developing Alzheimer's or has a true cognitive impairment.  This could wait on the decision of the long-term risks and benefits of considering a transplant.    At the same time I do not recommend we delay workup for donor search I will refer him to the transplant program for education.  Arteriosclerotic coronary artery disease  His platelet counts are now under 50, we could continue this until they are 30, but I anticipate he will likely have profoundly low platelets with the initiation of treatment.  I recommend we hold aspirin unless absolutely necessary until we can hopefully achieve a remission for his leukemia and see sustained platelet counts.  Paroxysmal atrial fibrillation (Multi)  With his platelet count under 50, and likely to fall much further with initiation of treatment, I strongly recommended holding/discontinuing his rivaroxaban for the time being.  Once he returns to remission, if we are able to demonstrate sustained platelet counts over 50, we can and should consider restarting this medication.  Chronic systolic heart failure  This appears to be well compensated, his most recent echocardiogram was in 2023.  If we need to do cardiotoxic medications, I will reach out to his cardiologist who saw him recently, and try to obtain an interval echocardiogram.  As he does not have a core binding factor acute leukemia based on the FISH testing, I  will more likely pursue a hypomethylating agent venetoclax approach, and so hopefully avoiding potentially cardiotoxic drugs.  Nevertheless, we will make a final decision once we have his genetic testing resulted.  Concern about memory  Patient was evaluated by neurology with concerns for short-term memory issues.  He had a brain PET/CT that is potentially concerning for reduced activity, and was referred to psychiatry for evaluation for depression.  Was prescribed sertraline, but did not initiate it as he was subsequently in the ED emergency department quickly and was informed that it could potentially contribute to blood thinning.  Will importantly, was referred for neuropsychiatric testing.    My general impression today does not seem to suggest a significant cognitive impairment.  He seems to have excellent recollection of his symptoms and diagnosis, what he has been explained before, and this is affirmed by his spouse.  He does occasionally return to similar phrases.    I recommended to him that given the concern for issues, and that we are considering treatments that could exacerbate memory issues or cognitive impairment that we try to complete his neuropsychiatric testing.  (Stem cell transplant survivors often report some increased memory issues) I do not know if this can be expedited, but will reach out to his neurologist and psychiatrist to see if they could potentially ask about expediting this workup.  Chronic hip pain, bilateral  Unfortunately any surgery will need to be put off at this time.  Indeed timing of future surgery could be quite difficult.  Patient is reporting uncontrolled pain at home since stopping his naproxen.  I reviewed that we generally do not favor nonsteroidal anti-inflammatory medications due to the increased risk of GI bleeding, which can be much more dangerous with the coagulation and thrombocytopenia seen with active leukemia.  Moreover, we are often using medications that can  be nephrotoxic which can also be a problem with nonsteroidals.    Acetaminophen is a reasonable option for him.  I did discuss the importance of having a working thermometer at home and monitoring closely for fever; we do have some concerns that acetaminophen can mask a fever but I think if he is checking routinely whenever he is feeling poorly which acetaminophen is still probably the safest choice of medication for him at this time.  He is only using about 1000 mg a day, and I discussed he could safely use up to 3 g/day chronically without too many issues.    If his pain control is still an adequate, I have occasionally considered celecoxib for patients like him as this is not felt to have the same risk of GI bleeding and platelet dysfunction, but this is still somewhat controversial.  We could also consider chronic opioids, but these are also high risk medications.  He reported he is going to have a follow-up with his primary care physician in the near future as well.                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  On review with patient has increasing fatigue and reduced activity since November 2024.  Underwent bone marrow biopsy 3/27/2025 that demonstrated:   BONE MARROW, LEFT ILIAC CREST, ASPIRATE WITH CLOT AND BIOPSY WITH TOUCH PREPARATION:   -- ACUTE MYELOID LEUKEMIA (estimated at 80% of marrow cellularity).  -- MARKEDLY DECREASED MATURING TRILINEAGE HEMATOPOIESIS.  Karyotype: pending  FISH: FISH NEGATIVE for inverted 3/t(3;3), t(6;9), t(8;21), gain of RUNX1 or YWSY1K8, MLL (KMT2A) rearrangement, t(15;17), inverted 16/t(16;16), TP53 deletion and monosomy 17   NGS: pending                 Subjective      History of Present Illness:    "Storm is here to    He noted that he feels that \"it was an evironmental issue\" [place].  He noted that he was shaving in muddy in water.  Feels he's built lots of homes and his water.    Started to tell a story about water color, and getting a  to work on this (all of this fairly unrelated).  Had a nasty hot water tank, and reports that the \"[facility]\" and is worried these toxins are what caused this. Notes that he's asked about having his spouse blood tested. He is worried that the water might have caused this.      He had a CBC getting ready for a surgery.  Overall the last several months, been hard to say that he's been dealing with back pain.  Had some difficulties with weight up and down.  His chiropractor, and looked at his X-ray and looked like it may have been his hips - held off on surgery, and opted against to have back.     On ROS, had noticed increased fatigue.  He noted that in October, he was walking around [facility] then, but in November, started to get harder and had to stop and rest.  By December, was only doing half of that; now he's only making it out of the year.  Reports the dog has been well behaved.  Both his breathing will become labored and he will feel very drained overall .  He will feel like his hips and knees will feel like they've been on the beach and just over done everything.      For the last three months, he's stomach will get upset.  He's had episodes of nausea and vomiting, and has not been consistently losing weight. No early sattiety.  Does report some night sweats, but has some nocuturia.  Thinks sleeping 3.5 hours or sow.     Notes two bruises on his flank. No gum bleeding.  Does have a bit of a cough - noted that he was in [place], [place] for a ski trip, where he was babysitting his granddaughter.  Had a recent visit. Was put on two medications and an inhaler.  All the flu and covid were negative.              Past Medical " History:    Atrial fibrillation - occurred after heart attack.  1st afib, previously managed with rhythm control   Heart failure, suspected ischemic -most recently appears well compensated  Coronary artery disease, will manage medically    Chronic back pain: trying to substitute acetaminophen:     Past Surgical History:  Last surgery was a R thigh sweat gland - 2019  Mohs for basal cell -     Family History:   Mother and father - coronary artery disease    Social History:  Daughter in law works in the Oncology department at [employer].   No tobacco history  Alcohol in moderation - 8-10 drinks a week, usually [employer], guiness, Margeritas  No drug use history  Retired - was a ; "[employer]   Lives with spouse and their collie  Son who lives locally with daughter-in-law with three grandkids  Daughter with son and granddaughter   Youngest daughter in [place]     Has three sisters (youngest is here today), 9 years younger  Has another two years younger  Has an older sister who will be 72 - (recently lost her  of 33 years)     Had a brother who passed away from [facility] at age 24.         Review of Systems    Home Medications and Adherence Reviewed with Patient.       Objective     VS:  /72 (BP Location: Right arm, Patient Position: Sitting, BP Cuff Size: Adult)   Pulse 101   Temp 36.5 °C (97.7 °F) (Temporal)   Resp 17   Wt 99.1 kg (218 lb 7.6 oz)   SpO2 100%   BMI 28.82 kg/m²   BSA: 2.26 meters squared    Physical Exam    Laboratory:  Lab on 04/03/2025   Component Date Value Ref Range Status   • WBC 04/03/2025 2.4 (L)  4.4 - 11.3 x10*3/uL Final   • nRBC 04/03/2025    Final   • RBC 04/03/2025 1.66 (L)  4.50 - 5.90 x10*6/uL Final   • Hemoglobin 04/03/2025 6.6 (L)  13.5 - 17.5 g/dL Final   • Hematocrit 04/03/2025 19.2 (L)  41.0 - 52.0 % Final   • MCV 04/03/2025 116 (H)  80 - 100 fL Final   • MCH 04/03/2025 39.8 (H)  26.0 - 34.0 pg Final   • MCHC 04/03/2025 34.4  32.0 - 36.0 g/dL Final   • RDW  04/03/2025 14.6 (H)  11.5 - 14.5 % Final   • Platelets 04/03/2025 39 (LL)  150 - 450 x10*3/uL Final   • Immature Granulocytes %, Automated 04/03/2025 0.0  0.0 - 0.9 % Final   • Immature Granulocytes Absolute, Au* 04/03/2025 0.00  0.00 - 0.70 x10*3/uL Final   • Glucose 04/03/2025 146 (H)  74 - 99 mg/dL Final   • Sodium 04/03/2025 137  136 - 145 mmol/L Final   • Potassium 04/03/2025 3.5  3.5 - 5.3 mmol/L Final   • Chloride 04/03/2025 103  98 - 107 mmol/L Final   • Bicarbonate 04/03/2025 25  21 - 32 mmol/L Final   • Anion Gap 04/03/2025 13  10 - 20 mmol/L Final   • Urea Nitrogen 04/03/2025 17  6 - 23 mg/dL Final   • Creatinine 04/03/2025 0.90  0.50 - 1.30 mg/dL Final   • eGFR 04/03/2025 >90  >60 mL/min/1.73m*2 Final   • Calcium 04/03/2025 8.7  8.6 - 10.3 mg/dL Final   • Albumin 04/03/2025 4.0  3.4 - 5.0 g/dL Final   • Alkaline Phosphatase 04/03/2025 104  33 - 136 U/L Final   • Total Protein 04/03/2025 6.7  6.4 - 8.2 g/dL Final   • AST 04/03/2025 11  9 - 39 U/L Final   • Bilirubin, Total 04/03/2025 0.4  0.0 - 1.2 mg/dL Final   • ALT 04/03/2025 16  10 - 52 U/L Final   • Magnesium 04/03/2025 1.90  1.60 - 2.40 mg/dL Final   • ABO TYPE 04/03/2025 O   Final   • Rh TYPE 04/03/2025 POS   Final   • ANTIBODY SCREEN 04/03/2025 NEG   Final   • Neutrophils %, Manual 04/03/2025 12.0  40.0 - 80.0 % Final   • Lymphocytes %, Manual 04/03/2025 63.0  13.0 - 44.0 % Final   • Monocytes %, Manual 04/03/2025 6.0  2.0 - 10.0 % Final   • Eosinophils %, Manual 04/03/2025 2.0  0.0 - 6.0 % Final   • Basophils %, Manual 04/03/2025 0.0  0.0 - 2.0 % Final   • Blasts %, Manual 04/03/2025 17.0  0.0 - 0.0 % Final   • Seg Neutrophils Absolute, Manual 04/03/2025 0.29 (L)  1.20 - 7.00 x10*3/uL Final   • Lymphocytes Absolute, Manual 04/03/2025 1.51  1.20 - 4.80 x10*3/uL Final   • Monocytes Absolute, Manual 04/03/2025 0.14  0.10 - 1.00 x10*3/uL Final   • Eosinophils Absolute, Manual 04/03/2025 0.05  0.00 - 0.70 x10*3/uL Final   • Basophils Absolute, Manual  04/03/2025 0.00  0.00 - 0.10 x10*3/uL Final   • Blasts Absolute, Manual 04/03/2025 0.41  0.00 - 0.00 x10*3/uL Final   • Total Cells Counted 04/03/2025 100   Final   • RBC Morphology 04/03/2025 See Below   Final   • Ovalocytes 04/03/2025 Few   Final   • Teardrop Cells 04/03/2025 Few   Final         Pathology:  The pertinent pathology results were reviewed and discussed with the patient.  Notably,   Bone Marrow Biopsy:   FINAL DIAGNOSIS   Date Value Ref Range Status   03/27/2025   Final      BONE MARROW, LEFT ILIAC CREST, ASPIRATE WITH CLOT AND BIOPSY WITH TOUCH PREPARATION:   -- ACUTE MYELOID LEUKEMIA (estimated at 80% of marrow cellularity).  -- MARKEDLY DECREASED MATURING TRILINEAGE HEMATOPOIESIS.  -- See note.    Note:        The patient presented with circulating blasts. Evaluation of the marrow reveals a predominance of immunophenotypically abnormal myeloid blasts (approximately 80% of cellularity) in a normocellular marrow. Findings are indicative of Acute Myeloid Leukemia (AML).        Correlation with pending results of karyotype, AML FISH panel, myeloid NGS panel, and CEBPA mutation analysis is required for further classification and management.          Comment   Date Value Ref Range Status   03/27/2025   Final    Dr. Maya provided an update on preliminary findings to Dr. Eckert via Epic text message at about 1250 on 31 March 2025, immediate acknowledgement received.       Bone Marrow Differential   Date Value Ref Range Status   03/27/2025   Final      Cell Type Value Reference Range   Granulocytic cells 6.2 Not provided        Lymphocytes 10.3 5-25 %   Monocytes 1.7 0-2 %   Plasma Cells 0.4 0-2 %        Blasts 80.6 0-1 %        Total Erythroid 0.8 17-35 %        Total Cells Counted 242           Microscopic Description   Date Value Ref Range Status   03/27/2025   Final    PERIPHERAL SMEAR:       Please see same day hemogram and differential, [id number].              Red cells: Macrocytic anemia (Hgb  = 7.5 g/dL).         Granulocytes: Decreased in number (0.1 k/uL). Mature       Lymphocytes: Normal in number. Mature       Monocytes: Rare. Mature       Platelets: Decreased in number (47 k/uL). Normal in appearance.         Other: Blasts (14% of leukocytes). Large with high N:C ratios, fine          chromatin, and prominent nucleoli.        ASPIRATE SMEAR and TOUCH PREP:                      Aspirate: Spicular.        Touch Prep: Adequate        Erythropoiesis: Rare erythroid precursors seen. No overt dysplasia.       Granulopoiesis: Rare maturing granulocytes. No overt dysplasia.       Megakaryocytes: Several normal appearing forms.        Monocytes: Rare. Mature.       Plasma cells: Rare Mature.       Lymphocytes: Mature.       Blasts: Large to intermediate in size with High N:C ratios, round to           irregular nuclear contours, fine chromatin, prominent nucleoli,           scant to abundant azurophilic granulation. No Bridget rods seen.    ASPIRATE CLOT and CORE BIOPSY:                                      Clot: Spicular.         Core: Adequate. With substantial aspiration artifact.       Cellularity: 30-40%.              Estimated M:E ratio: Not evaluable due to disease.         Erythropoiesis: Decreased. Maturing. Focal islands of maturing           red cell precursors       Granulopoiesis: Decreased. Infrequent, scattered recognizable           maturing granulocytes.       Megakaryocytes: Decreased. Individually distributed.        Lymphocytes: Inconspicuous on H+E stained sections.       Plasma cells: Scattered forms seen.       Granulomas: Absent.         Metastatic Disease: Absent.       Bony trabeculae: Normal.        Other: There is a predominant interstitial infiltrate of large to           intermediate cells with high N:C ratios, round to clefted          nuclear contours, finely granular chromatin, and prominent           nucleoli.    SPECIAL STAINS:    -- Iron (clot, part A): None seen.  -- All  controls are appropriately reactive    IMMUNOHISTOCHEMISTRY: Performed.  -- CD7: Positive in the blasts  -- : Moderate positive in the blasts.  -- CD34: Positive in blasts, estimated at 80% of marrow cellularity.  -- All controls are appropriately reactive.    Immunostains were performed in addition to flow cytometry to fully characterize the phenotype, architecture, and extent of the marrow population(s).  This was medically necessary for the best possible diagnosis.      FLOW CYTOMETRY ([id number], see separate report for details):    BONE MARROW ASPIRATE, FLOW CYTOMETRY:  -- ABNORMAL MYELOID BLASTS PRESENT.  -- See note.     Note:       Abnormal myeloid blasts with atypical CD7 expression compose about 50% of total events. Background T-cells and B-cells are polyclonal. Clinical and morphologic correlation is required. Please refer to concurrent marrow case [id number]         Gross Description   Date Value Ref Range Status   03/27/2025   Final    A:  Received in formalin, labeled with the patient's name and hospital number, is an irregular fragment of blood clot measuring 0.9 x 0.5 x 0.2 cm. The specimen is submitted in toto in one cassette.   JEK  B:  Received in B-plus fixative, labeled with the patient's name and hospital number, is a cylindrical segment of bone measuring 2.0 x 0.2 x 0.2 cm. The specimen is submitted in toto in one cassette following decalcification in [facility].   JEK            .    Imaging:  The pertinent imaging results were reviewed and discussed with the patient.  Notably, we reviewed both his recent PET/CT and brain PET/CT, his 2019 cardiac scoring, his 2023 echocardiogram, and his past back imaging.          Riky Gomez MD

## 2025-04-03 NOTE — PATIENT INSTRUCTIONS
We would like to welcome you to our clinic and introduce you to our core team members.  We are here to assist you through your care at  [facility].      Dr. Riky Gomez is a hematologist/oncologist who is an expert with hematologic malignancies such as myeloid neoplasms and acute leukemias. He will be your primary provider for hematologic and oncologic care.  Some of our patients will proceed to a Hematopoietic Stem Cell Transplant, and in those cases, Dr. Gomez will hand off your care to a transplant specific provider at the time of transplant.       Ms. Nikki Kuhlenschmidt, CNP is a Nurse Practitioner who is a part of Dr. Gomez's team. She will work collaboratively with Dr. Gomez to meet your goals. This often may include seeing you for more urgent appointments or follow-up visits. Frequently, Ms. Patel will meet with you regularly once we have implement treatment or follow up plans. They follow the same protocols and guidelines for your condition and communicate closely with Dr. Gomez.       Ms. Yarelis Rios RN is the primary Nurse Coordinator at the [facility] [place] who works directly with Dr. Gomez and Ms. Patel. She can be reached by calling the office as well. Yarelis is in clinic Monday through Friday, off on Wednesdays. Non-urgent calls will be returned within 24 hours of the call.      Ms. Sandhya Cao RN is the primary Nurse Coordinator at the [facility] working with Dr. Gomez and Ms. Patel on Thursdays.    The [facility] phone number is [phone]. Please use this number to contact his care team regardless of which office you use to access care. This number is the most direct way to communicate with all the members of the care team.  We are part of a larger malignant hematology team that includes several physician specialists, and other nurses and team members will  also be available to help you with your care.     For urgent clinical issues, we ask that you reach out to our team at [phone] (option 5, then option 2) to reach our dedicated triage phone nurse line. They are available to assist with urgent clinical issues even on weekends and after clinic hours.     For non-urgent clinical questions and refill requests please consider reaching out to our team via [employer]. For your awareness, [employer] messages are routed to a nursing pool of hematology nurses to ensure they are screened in a timely fashion and then routed to primary team members.  We do our best to answer [employer] queries within 72 hours, and If you do not hear back from us, please feel empowered to call or reach out again or call. Please do NOT use [employer] messages if you experiencing symptoms (how you feel) even if it seems minor.  Symptoms often warrant follow up questions from an expert nurse to fully triage the issue.     For your convenience, Dr. Gomez sees patients at different [facility] locations including both [facility] and [facility]. While we try to make your appointments as convenient as possible, occasionally a visit to another location may be necessary to provide the best care for you.    We look forward to working with you to meet your healthcare goals.

## 2025-04-04 ENCOUNTER — TELEMEDICINE (OUTPATIENT)
Dept: HEMATOLOGY/ONCOLOGY | Facility: HOSPITAL | Age: 65
End: 2025-04-04
Payer: MEDICARE

## 2025-04-04 ENCOUNTER — SPECIALTY PHARMACY (OUTPATIENT)
Dept: PHARMACY | Facility: CLINIC | Age: 65
End: 2025-04-04

## 2025-04-04 ENCOUNTER — INFUSION (OUTPATIENT)
Dept: HEMATOLOGY/ONCOLOGY | Facility: CLINIC | Age: 65
End: 2025-04-04
Payer: MEDICARE

## 2025-04-04 ENCOUNTER — TELEPHONE (OUTPATIENT)
Dept: HEMATOLOGY/ONCOLOGY | Facility: CLINIC | Age: 65
End: 2025-04-04

## 2025-04-04 VITALS
SYSTOLIC BLOOD PRESSURE: 120 MMHG | RESPIRATION RATE: 18 BRPM | TEMPERATURE: 97.5 F | WEIGHT: 217.04 LBS | DIASTOLIC BLOOD PRESSURE: 71 MMHG | BODY MASS INDEX: 28.64 KG/M2 | OXYGEN SATURATION: 95 % | HEART RATE: 80 BPM

## 2025-04-04 DIAGNOSIS — C92.00 ACUTE MYELOID LEUKEMIA NOT HAVING ACHIEVED REMISSION (MULTI): Primary | ICD-10-CM

## 2025-04-04 DIAGNOSIS — M25.552 CHRONIC HIP PAIN, BILATERAL: ICD-10-CM

## 2025-04-04 DIAGNOSIS — C92.00 ACUTE MYELOID LEUKEMIA NOT HAVING ACHIEVED REMISSION (MULTI): ICD-10-CM

## 2025-04-04 DIAGNOSIS — Z76.82 STEM CELL TRANSPLANT CANDIDATE: ICD-10-CM

## 2025-04-04 DIAGNOSIS — M25.551 CHRONIC HIP PAIN, BILATERAL: ICD-10-CM

## 2025-04-04 DIAGNOSIS — G89.29 CHRONIC HIP PAIN, BILATERAL: ICD-10-CM

## 2025-04-04 PROBLEM — S80.10XA HEMATOMA OF LEG: Status: RESOLVED | Noted: 2023-04-12 | Resolved: 2025-04-04

## 2025-04-04 PROBLEM — Z71.1 CONCERN ABOUT MEMORY: Status: ACTIVE | Noted: 2025-03-20

## 2025-04-04 PROBLEM — R06.00 DYSPNEA: Status: RESOLVED | Noted: 2023-04-12 | Resolved: 2025-04-04

## 2025-04-04 PROBLEM — J20.9 ACUTE BRONCHITIS WITH WHEEZING: Status: RESOLVED | Noted: 2024-02-01 | Resolved: 2025-04-04

## 2025-04-04 PROBLEM — S91.309A WOUND OF FOOT: Status: RESOLVED | Noted: 2023-04-12 | Resolved: 2025-04-04

## 2025-04-04 PROBLEM — M79.606 LEG PAIN: Status: RESOLVED | Noted: 2023-04-12 | Resolved: 2025-04-04

## 2025-04-04 PROBLEM — R68.89 FLU-LIKE SYMPTOMS: Status: RESOLVED | Noted: 2023-04-12 | Resolved: 2025-04-04

## 2025-04-04 PROBLEM — J06.9 VIRAL URI WITH COUGH: Status: RESOLVED | Noted: 2023-04-12 | Resolved: 2025-04-04

## 2025-04-04 PROBLEM — Z00.00 MEDICARE ANNUAL WELLNESS VISIT, INITIAL: Status: RESOLVED | Noted: 2024-04-15 | Resolved: 2025-04-04

## 2025-04-04 PROBLEM — J00 COMMON COLD: Status: RESOLVED | Noted: 2023-04-12 | Resolved: 2025-04-04

## 2025-04-04 PROBLEM — Z87.81 HISTORY OF VERTEBRAL COMPRESSION FRACTURE: Status: ACTIVE | Noted: 2023-04-12

## 2025-04-04 PROBLEM — R05.3 CHRONIC COUGH: Status: ACTIVE | Noted: 2023-04-12

## 2025-04-04 PROBLEM — R03.0 ELEVATED BLOOD PRESSURE READING: Status: RESOLVED | Noted: 2023-04-12 | Resolved: 2025-04-04

## 2025-04-04 PROBLEM — K76.89 LIVER CYST: Status: RESOLVED | Noted: 2023-04-12 | Resolved: 2025-04-04

## 2025-04-04 PROBLEM — F69 BEHAVIOR CONCERN IN ADULT: Status: RESOLVED | Noted: 2025-01-07 | Resolved: 2025-04-04

## 2025-04-04 PROBLEM — I21.9 SILENT MYOCARDIAL INFARCTION (MULTI): Status: RESOLVED | Noted: 2023-04-12 | Resolved: 2025-04-04

## 2025-04-04 PROBLEM — J06.9 URI WITH COUGH AND CONGESTION: Status: RESOLVED | Noted: 2025-03-17 | Resolved: 2025-04-04

## 2025-04-04 LAB
ABO GROUP (TYPE) IN BLOOD: NORMAL
ANTIBODY SCREEN: NORMAL
BAND RESOLUTION: 400 BANDS
BLOOD EXPIRATION DATE: NORMAL
CHROM ANALY OVERALL INTERP-IMP: NORMAL
CHROMOSOME ANALYSIS CELLS ANALYZED: 20 CELLS
CHROMOSOME ANALYSIS CELLS IMAGED: 4 CELLS
CHROMOSOME ANALYSIS HYPERMODAL CELL COUNT: 0 CELLS
CHROMOSOME ANALYSIS HYPOMODAL CELL COUNT: 0 CELLS
CHROMOSOME ANALYSIS MODAL CHROMOSOME NO: 46 CHROMOSOMES
CHROMOSOME ANALYSIS STAINING METHOD: NORMAL
DISPENSE STATUS: NORMAL
ELECTRONICALLY SIGNED BY CYTOGENETICS: NORMAL
ELECTRONICALLY SIGNED BY: NORMAL
KARYOTYP MAR: 2 CELLS
MYELOID NGS RESULTS: NORMAL
PRODUCT BLOOD TYPE: 5100
PRODUCT CODE: NORMAL
RH FACTOR (ANTIGEN D): NORMAL
TOTAL CELLS COUNTED MAR: 20 CELLS
UNIT ABO: NORMAL
UNIT NUMBER: NORMAL
UNIT RH: NORMAL
UNIT VOLUME: 350
VIT B12 SERPL-MCNC: 1279 PG/ML (ref 211–911)
XM INTEP: NORMAL

## 2025-04-04 PROCEDURE — 36430 TRANSFUSION BLD/BLD COMPNT: CPT

## 2025-04-04 RX ORDER — DIPHENHYDRAMINE HYDROCHLORIDE 50 MG/ML
50 INJECTION, SOLUTION INTRAMUSCULAR; INTRAVENOUS AS NEEDED
Status: DISCONTINUED | OUTPATIENT
Start: 2025-04-04 | End: 2025-04-04 | Stop reason: HOSPADM

## 2025-04-04 RX ORDER — FAMOTIDINE 10 MG/ML
20 INJECTION, SOLUTION INTRAVENOUS ONCE AS NEEDED
Status: DISCONTINUED | OUTPATIENT
Start: 2025-04-04 | End: 2025-04-04 | Stop reason: HOSPADM

## 2025-04-04 RX ORDER — ALBUTEROL SULFATE 0.83 MG/ML
3 SOLUTION RESPIRATORY (INHALATION) AS NEEDED
OUTPATIENT
Start: 2025-04-16

## 2025-04-04 RX ORDER — DIPHENHYDRAMINE HYDROCHLORIDE 50 MG/ML
50 INJECTION, SOLUTION INTRAMUSCULAR; INTRAVENOUS AS NEEDED
OUTPATIENT
Start: 2025-04-11

## 2025-04-04 RX ORDER — PROCHLORPERAZINE MALEATE 10 MG
10 TABLET ORAL EVERY 6 HOURS PRN
OUTPATIENT
Start: 2025-04-11

## 2025-04-04 RX ORDER — PROCHLORPERAZINE MALEATE 10 MG
10 TABLET ORAL EVERY 6 HOURS PRN
OUTPATIENT
Start: 2025-04-14

## 2025-04-04 RX ORDER — FAMOTIDINE 10 MG/ML
20 INJECTION, SOLUTION INTRAVENOUS ONCE AS NEEDED
OUTPATIENT
Start: 2025-04-17

## 2025-04-04 RX ORDER — DIPHENHYDRAMINE HYDROCHLORIDE 50 MG/ML
50 INJECTION, SOLUTION INTRAMUSCULAR; INTRAVENOUS AS NEEDED
OUTPATIENT
Start: 2025-04-15

## 2025-04-04 RX ORDER — ONDANSETRON HYDROCHLORIDE 8 MG/1
8 TABLET, FILM COATED ORAL ONCE
Status: CANCELLED | OUTPATIENT
Start: 2025-04-13

## 2025-04-04 RX ORDER — FAMOTIDINE 10 MG/ML
20 INJECTION, SOLUTION INTRAVENOUS ONCE AS NEEDED
OUTPATIENT
Start: 2025-04-18

## 2025-04-04 RX ORDER — PROCHLORPERAZINE MALEATE 10 MG
10 TABLET ORAL EVERY 6 HOURS PRN
OUTPATIENT
Start: 2025-04-10

## 2025-04-04 RX ORDER — EPINEPHRINE 0.3 MG/.3ML
0.3 INJECTION SUBCUTANEOUS EVERY 5 MIN PRN
OUTPATIENT
Start: 2025-04-16

## 2025-04-04 RX ORDER — ALBUTEROL SULFATE 0.83 MG/ML
3 SOLUTION RESPIRATORY (INHALATION) AS NEEDED
OUTPATIENT
Start: 2025-04-11

## 2025-04-04 RX ORDER — ALBUTEROL SULFATE 0.83 MG/ML
3 SOLUTION RESPIRATORY (INHALATION) AS NEEDED
OUTPATIENT
Start: 2025-04-17

## 2025-04-04 RX ORDER — EPINEPHRINE 0.3 MG/.3ML
0.3 INJECTION SUBCUTANEOUS EVERY 5 MIN PRN
Status: CANCELLED | OUTPATIENT
Start: 2025-04-12

## 2025-04-04 RX ORDER — ONDANSETRON HYDROCHLORIDE 8 MG/1
8 TABLET, FILM COATED ORAL ONCE
OUTPATIENT
Start: 2025-04-11

## 2025-04-04 RX ORDER — PROCHLORPERAZINE EDISYLATE 5 MG/ML
10 INJECTION INTRAMUSCULAR; INTRAVENOUS EVERY 6 HOURS PRN
OUTPATIENT
Start: 2025-04-16

## 2025-04-04 RX ORDER — PROCHLORPERAZINE EDISYLATE 5 MG/ML
10 INJECTION INTRAMUSCULAR; INTRAVENOUS EVERY 6 HOURS PRN
OUTPATIENT
Start: 2025-04-10

## 2025-04-04 RX ORDER — ALBUTEROL SULFATE 0.83 MG/ML
3 SOLUTION RESPIRATORY (INHALATION) AS NEEDED
Status: CANCELLED | OUTPATIENT
Start: 2025-04-13

## 2025-04-04 RX ORDER — PROCHLORPERAZINE MALEATE 10 MG
10 TABLET ORAL EVERY 6 HOURS PRN
OUTPATIENT
Start: 2025-04-15

## 2025-04-04 RX ORDER — DIPHENHYDRAMINE HYDROCHLORIDE 50 MG/ML
50 INJECTION, SOLUTION INTRAMUSCULAR; INTRAVENOUS AS NEEDED
Status: CANCELLED | OUTPATIENT
Start: 2025-04-12

## 2025-04-04 RX ORDER — PROCHLORPERAZINE EDISYLATE 5 MG/ML
10 INJECTION INTRAMUSCULAR; INTRAVENOUS EVERY 6 HOURS PRN
Status: CANCELLED | OUTPATIENT
Start: 2025-04-13

## 2025-04-04 RX ORDER — ALBUTEROL SULFATE 0.83 MG/ML
3 SOLUTION RESPIRATORY (INHALATION) AS NEEDED
Status: DISCONTINUED | OUTPATIENT
Start: 2025-04-04 | End: 2025-04-04 | Stop reason: HOSPADM

## 2025-04-04 RX ORDER — PROCHLORPERAZINE MALEATE 10 MG
10 TABLET ORAL EVERY 6 HOURS PRN
Status: CANCELLED | OUTPATIENT
Start: 2025-04-12

## 2025-04-04 RX ORDER — FAMOTIDINE 10 MG/ML
20 INJECTION, SOLUTION INTRAVENOUS ONCE AS NEEDED
Status: CANCELLED | OUTPATIENT
Start: 2025-04-13

## 2025-04-04 RX ORDER — PROCHLORPERAZINE MALEATE 10 MG
10 TABLET ORAL EVERY 6 HOURS PRN
OUTPATIENT
Start: 2025-04-17

## 2025-04-04 RX ORDER — ALLOPURINOL 300 MG/1
300 TABLET ORAL DAILY
Qty: 14 TABLET | Refills: 0 | Status: SHIPPED | OUTPATIENT
Start: 2025-04-04 | End: 2025-04-18

## 2025-04-04 RX ORDER — DIPHENHYDRAMINE HYDROCHLORIDE 50 MG/ML
50 INJECTION, SOLUTION INTRAMUSCULAR; INTRAVENOUS AS NEEDED
OUTPATIENT
Start: 2025-04-16

## 2025-04-04 RX ORDER — ONDANSETRON HYDROCHLORIDE 8 MG/1
8 TABLET, FILM COATED ORAL EVERY 8 HOURS PRN
Qty: 30 TABLET | Refills: 5 | Status: SHIPPED | OUTPATIENT
Start: 2025-04-04

## 2025-04-04 RX ORDER — PROCHLORPERAZINE MALEATE 10 MG
10 TABLET ORAL EVERY 6 HOURS PRN
OUTPATIENT
Start: 2025-04-18

## 2025-04-04 RX ORDER — EPINEPHRINE 0.3 MG/.3ML
0.3 INJECTION SUBCUTANEOUS EVERY 5 MIN PRN
Status: DISCONTINUED | OUTPATIENT
Start: 2025-04-04 | End: 2025-04-04 | Stop reason: HOSPADM

## 2025-04-04 RX ORDER — ONDANSETRON HYDROCHLORIDE 8 MG/1
8 TABLET, FILM COATED ORAL ONCE
OUTPATIENT
Start: 2025-04-14

## 2025-04-04 RX ORDER — ONDANSETRON HYDROCHLORIDE 8 MG/1
8 TABLET, FILM COATED ORAL ONCE
OUTPATIENT
Start: 2025-04-10

## 2025-04-04 RX ORDER — ONDANSETRON HYDROCHLORIDE 8 MG/1
8 TABLET, FILM COATED ORAL ONCE
OUTPATIENT
Start: 2025-04-18

## 2025-04-04 RX ORDER — ALBUTEROL SULFATE 0.83 MG/ML
3 SOLUTION RESPIRATORY (INHALATION) AS NEEDED
OUTPATIENT
Start: 2025-04-18

## 2025-04-04 RX ORDER — EPINEPHRINE 0.3 MG/.3ML
0.3 INJECTION SUBCUTANEOUS EVERY 5 MIN PRN
Status: CANCELLED | OUTPATIENT
Start: 2025-04-13

## 2025-04-04 RX ORDER — FAMOTIDINE 10 MG/ML
20 INJECTION, SOLUTION INTRAVENOUS ONCE AS NEEDED
OUTPATIENT
Start: 2025-04-14

## 2025-04-04 RX ORDER — PROCHLORPERAZINE MALEATE 10 MG
10 TABLET ORAL EVERY 6 HOURS PRN
Qty: 30 TABLET | Refills: 5 | Status: SHIPPED | OUTPATIENT
Start: 2025-04-04

## 2025-04-04 RX ORDER — FAMOTIDINE 10 MG/ML
20 INJECTION, SOLUTION INTRAVENOUS ONCE AS NEEDED
OUTPATIENT
Start: 2025-04-15

## 2025-04-04 RX ORDER — CELECOXIB 100 MG/1
100 CAPSULE ORAL 2 TIMES DAILY
Qty: 60 CAPSULE | Refills: 0 | Status: SHIPPED | OUTPATIENT
Start: 2025-04-04 | End: 2025-05-04

## 2025-04-04 RX ORDER — EPINEPHRINE 0.3 MG/.3ML
0.3 INJECTION SUBCUTANEOUS EVERY 5 MIN PRN
OUTPATIENT
Start: 2025-04-18

## 2025-04-04 RX ORDER — EPINEPHRINE 0.3 MG/.3ML
0.3 INJECTION SUBCUTANEOUS EVERY 5 MIN PRN
OUTPATIENT
Start: 2025-04-17

## 2025-04-04 RX ORDER — ONDANSETRON HYDROCHLORIDE 8 MG/1
8 TABLET, FILM COATED ORAL ONCE
Status: CANCELLED | OUTPATIENT
Start: 2025-04-12

## 2025-04-04 RX ORDER — FAMOTIDINE 10 MG/ML
20 INJECTION, SOLUTION INTRAVENOUS ONCE AS NEEDED
OUTPATIENT
Start: 2025-04-10

## 2025-04-04 RX ORDER — PROCHLORPERAZINE EDISYLATE 5 MG/ML
10 INJECTION INTRAMUSCULAR; INTRAVENOUS EVERY 6 HOURS PRN
OUTPATIENT
Start: 2025-04-11

## 2025-04-04 RX ORDER — DIPHENHYDRAMINE HYDROCHLORIDE 50 MG/ML
50 INJECTION, SOLUTION INTRAMUSCULAR; INTRAVENOUS AS NEEDED
OUTPATIENT
Start: 2025-04-17

## 2025-04-04 RX ORDER — PROCHLORPERAZINE EDISYLATE 5 MG/ML
10 INJECTION INTRAMUSCULAR; INTRAVENOUS EVERY 6 HOURS PRN
OUTPATIENT
Start: 2025-04-15

## 2025-04-04 RX ORDER — EPINEPHRINE 0.3 MG/.3ML
0.3 INJECTION SUBCUTANEOUS EVERY 5 MIN PRN
OUTPATIENT
Start: 2025-04-14

## 2025-04-04 RX ORDER — ONDANSETRON HYDROCHLORIDE 8 MG/1
8 TABLET, FILM COATED ORAL ONCE
OUTPATIENT
Start: 2025-04-17

## 2025-04-04 RX ORDER — DIPHENHYDRAMINE HYDROCHLORIDE 50 MG/ML
50 INJECTION, SOLUTION INTRAMUSCULAR; INTRAVENOUS AS NEEDED
Status: CANCELLED | OUTPATIENT
Start: 2025-04-13

## 2025-04-04 RX ORDER — DIPHENHYDRAMINE HYDROCHLORIDE 50 MG/ML
50 INJECTION, SOLUTION INTRAMUSCULAR; INTRAVENOUS AS NEEDED
OUTPATIENT
Start: 2025-04-18

## 2025-04-04 RX ORDER — PROCHLORPERAZINE MALEATE 10 MG
10 TABLET ORAL EVERY 6 HOURS PRN
OUTPATIENT
Start: 2025-04-16

## 2025-04-04 RX ORDER — PROCHLORPERAZINE EDISYLATE 5 MG/ML
10 INJECTION INTRAMUSCULAR; INTRAVENOUS EVERY 6 HOURS PRN
Status: CANCELLED | OUTPATIENT
Start: 2025-04-12

## 2025-04-04 RX ORDER — ALBUTEROL SULFATE 0.83 MG/ML
3 SOLUTION RESPIRATORY (INHALATION) AS NEEDED
Status: CANCELLED | OUTPATIENT
Start: 2025-04-12

## 2025-04-04 RX ORDER — PROCHLORPERAZINE EDISYLATE 5 MG/ML
10 INJECTION INTRAMUSCULAR; INTRAVENOUS EVERY 6 HOURS PRN
OUTPATIENT
Start: 2025-04-17

## 2025-04-04 RX ORDER — ALBUTEROL SULFATE 0.83 MG/ML
3 SOLUTION RESPIRATORY (INHALATION) AS NEEDED
OUTPATIENT
Start: 2025-04-10

## 2025-04-04 RX ORDER — EPINEPHRINE 0.3 MG/.3ML
0.3 INJECTION SUBCUTANEOUS EVERY 5 MIN PRN
OUTPATIENT
Start: 2025-04-11

## 2025-04-04 RX ORDER — PROCHLORPERAZINE EDISYLATE 5 MG/ML
10 INJECTION INTRAMUSCULAR; INTRAVENOUS EVERY 6 HOURS PRN
OUTPATIENT
Start: 2025-04-14

## 2025-04-04 RX ORDER — PROCHLORPERAZINE MALEATE 10 MG
10 TABLET ORAL EVERY 6 HOURS PRN
Status: CANCELLED | OUTPATIENT
Start: 2025-04-13

## 2025-04-04 RX ORDER — DIPHENHYDRAMINE HYDROCHLORIDE 50 MG/ML
50 INJECTION, SOLUTION INTRAMUSCULAR; INTRAVENOUS AS NEEDED
OUTPATIENT
Start: 2025-04-14

## 2025-04-04 RX ORDER — EPINEPHRINE 0.3 MG/.3ML
0.3 INJECTION SUBCUTANEOUS EVERY 5 MIN PRN
OUTPATIENT
Start: 2025-04-15

## 2025-04-04 RX ORDER — ONDANSETRON HYDROCHLORIDE 8 MG/1
8 TABLET, FILM COATED ORAL ONCE
OUTPATIENT
Start: 2025-04-15

## 2025-04-04 RX ORDER — ALBUTEROL SULFATE 0.83 MG/ML
3 SOLUTION RESPIRATORY (INHALATION) AS NEEDED
OUTPATIENT
Start: 2025-04-15

## 2025-04-04 RX ORDER — ALBUTEROL SULFATE 0.83 MG/ML
3 SOLUTION RESPIRATORY (INHALATION) AS NEEDED
OUTPATIENT
Start: 2025-04-14

## 2025-04-04 RX ORDER — PROCHLORPERAZINE EDISYLATE 5 MG/ML
10 INJECTION INTRAMUSCULAR; INTRAVENOUS EVERY 6 HOURS PRN
OUTPATIENT
Start: 2025-04-18

## 2025-04-04 RX ORDER — FAMOTIDINE 10 MG/ML
20 INJECTION, SOLUTION INTRAVENOUS ONCE AS NEEDED
Status: CANCELLED | OUTPATIENT
Start: 2025-04-12

## 2025-04-04 RX ORDER — DIPHENHYDRAMINE HYDROCHLORIDE 50 MG/ML
50 INJECTION, SOLUTION INTRAMUSCULAR; INTRAVENOUS AS NEEDED
OUTPATIENT
Start: 2025-04-10

## 2025-04-04 RX ORDER — EPINEPHRINE 0.3 MG/.3ML
0.3 INJECTION SUBCUTANEOUS EVERY 5 MIN PRN
OUTPATIENT
Start: 2025-04-10

## 2025-04-04 RX ORDER — FAMOTIDINE 10 MG/ML
20 INJECTION, SOLUTION INTRAVENOUS ONCE AS NEEDED
OUTPATIENT
Start: 2025-04-11

## 2025-04-04 RX ORDER — FAMOTIDINE 10 MG/ML
20 INJECTION, SOLUTION INTRAVENOUS ONCE AS NEEDED
OUTPATIENT
Start: 2025-04-16

## 2025-04-04 RX ORDER — ONDANSETRON HYDROCHLORIDE 8 MG/1
8 TABLET, FILM COATED ORAL ONCE
OUTPATIENT
Start: 2025-04-16

## 2025-04-04 ASSESSMENT — ENCOUNTER SYMPTOMS
NAUSEA: 0
SORE THROAT: 0
FATIGUE: 0
DIARRHEA: 0
ADENOPATHY: 0
NUMBNESS: 0
LIGHT-HEADEDNESS: 0
ABDOMINAL PAIN: 0
VOMITING: 0
RHINORRHEA: 0
CHILLS: 0
HEADACHES: 0
NERVOUS/ANXIOUS: 0
SINUS PAIN: 0
DIFFICULTY URINATING: 0
BRUISES/BLEEDS EASILY: 0
UNEXPECTED WEIGHT CHANGE: 0
COUGH: 0
SHORTNESS OF BREATH: 0
DIAPHORESIS: 0
DYSPHORIC MOOD: 0
ACTIVITY CHANGE: 0
JOINT SWELLING: 0
SINUS PRESSURE: 0
CONFUSION: 0
APPETITE CHANGE: 0
CONSTIPATION: 0
BACK PAIN: 0
WEAKNESS: 0
FLANK PAIN: 0
FEVER: 0

## 2025-04-04 ASSESSMENT — PAIN SCALES - GENERAL: PAINLEVEL_OUTOF10: 9

## 2025-04-04 NOTE — ASSESSMENT & PLAN NOTE
We had a long discussion today about the diagnosis and natural history of his acute leukemia.  With his white count under 10, as long as there is an absence of disseminated intravascular coagulation and/or infection/fever we can try to keep him out of the hospital.  In terms of treatment options, I discussed that we are still missing critical genetic data from his bone marrow biopsy.  This includes the presence or absence of IDH 1 and NPM1 and FLT3 mutations which may all impact our initial treatment selections.    At the same time we discussed basic principles of acute leukemia management, in general or for step is to try to induce remission and restore normal hematopoiesis, hopefully improving anemia, leukopenia, thrombocytopenia.  I discussed principles of supportive care, which include prophylactic antimicrobials (patient already on acyclovir, fluconazole, levofloxacin), routine supportive transfusions.  During his initial treatment he will highly likely need minimum twice weekly visits to infusion center to observe for need for transfusions.  I also discussed that many patients will opt for a central venous access    We also discussed lifestyle modifications.  I recommended complete abstention from alcohol, avoiding crowds, caution with visits into the public and route rigorous handwashing at home.  People that are sick should probably not visit.  There is a paucity of strong data on exactly how isolated someone should be, but in general I do not recommend that patients completely isolate themselves and it is more important to take a cautious approach.  I also discussed that many causes of neutropenic fever are not things that are infectious from other people, but instead from inherent risks of becoming immunosuppressed.  (E.g. translocation of gut bacteria and into the bloodstream.)    He may also be potentially eligible for 1 clinical trial on the MYELOMATCH study, if his genetics suggest he may be eligible I  will discuss this with him.  His FISH testing demonstrates that he does not have a core binding factor AML, and therefore even if he is mutated NPM1, I would likely more strongly consider azacitidine and venetoclax based therapy over induction chemotherapy which would like ly be outpatient.  My goal will be to start as soon as possible next week.  We are anticipating his NGS results tomorrow, therefore I scheduled him for a follow-up virtual visit tomorrow afternoon to make final treatment plans and review his final genetics.  Diagnostics:  -Follow-up karyotype  -Follow-up next-generation sequencing  Treatment:  -Final treatment options to be determined,  Disease Monitoring:  -Minimum twice weekly CBCs while pending treatment start, low threshold to consider admission in the setting of complication  Supportive Care:  -Twice weekly infusion visits for supportive transfusions  Antimicrobial Prophylaxis:  -Acyclovir  -Fluconazole  -Levofloxacin  IV access:  -We discussed options for central venous access, no decisions were made today, could consider PICC versus tunneled catheter.

## 2025-04-04 NOTE — PROGRESS NOTES
Patient ID: Link Inman is a 64 y.o. male.  Referring Physician: No referring provider defined for this encounter.  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  On review with patient has increasing fatigue and reduced activity since November 2024.  Underwent bone marrow biopsy 3/27/2025 that demonstrated:   BONE MARROW, LEFT ILIAC CREST, ASPIRATE WITH CLOT AND BIOPSY WITH TOUCH PREPARATION:   -- ACUTE MYELOID LEUKEMIA (estimated at 80% of marrow cellularity).  -- MARKEDLY DECREASED MATURING TRILINEAGE HEMATOPOIESIS.  Karyotype: pending  FISH: FISH NEGATIVE for inverted 3/t(3;3), t(6;9), t(8;21), gain of RUNX1 or KEGJ1A6, MLL (KMT2A) rearrangement, t(15;17), inverted 16/t(16;16), TP53 deletion and monosomy 17   NGS: pending             History of Present Illness:  I had a follow-up virtual visit today with Mr. Inman who also had his spouse, daughter, and son-in-law in attendance.  His had actually returned from this [facility] [place] [place] earlier today where he received a blood transfusion.  He notes that he does feel better after receiving a blood transfusion.  Reporting he has more energy, was happy to have that done.  Otherwise no interval symptoms from yesterday that are markedly different.        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  I reviewed that critically he was found negative for NPM1, FLT3, and IDH 1/2 mutations.  This suggest there is not a clear-cut targeted therapy that could be added to additional therapy.    In this setting, although  his karyotype is pending, the negative FISH panel strongly suggests that he has an ELN 2022 intermediate risk acute myeloid leukemia.  I will confirm with trial nurses, he may have 1 potential clinical trial that he is eligible for, though I suspect he is in too high and age bracket for it.  In the absence of a clinical trial, I discussed that there are 2 approaches and standards of care.  1 is admission with intensive induction typically an anthracycline combined with continuous intravenous cytarabine for 7 straight days.  This has a long track record of history, and has a low but small chance of durable remission and/or cure with chemotherapy only.  This likelihood without a transplant is noted to be lower in the age over 65 population.  Treatment would involve admission for likely upwards of 28 to 42 days, and inpatient supportive care for frequent transfusion needs, expected fevers, potential need for mucositis other complications.  I also think in his age group is very appropriate to consider azacitidine venetoclax; indeed with his cardiac history and musculoskeletal issues this may need to be considered a preferred regimen.  We do not have as robust long-term data, and generally do not feel that this results of long-term permanent remissions (however for some subsets of leukemia such as NPM1 mutated, it does appear that a handful of patients do have chances at long-term remissions.)    Given his age, and risk disease both treatments would likely aim for consolidation with allogenic stem cell transplant, which I think would give him the highest chance of long-term disease-free control and/or cure although will have significant upfront risks and potential risks for complications and long-term quality of life.  He has been referred to the transplant program we are actively looking for donors and I noted that    In the absence of a clinical trial, we decided to pursue azacitidine of venetoclax.  I will aim to  start this as soon as feasible next week, my best guess is the earliest we next Thursday, and I will need to's discussed with him in person and/or virtually again in order to sign, though we otherwise completed the informed consent process today.    We discussed risks and benefits of azacitidine.  Overall, most patients tolerate this therapy well, but some patients will have more significant side effects.  Common side effects include low grade fever, fatigue, bruising, nausea, vomiting, diarrhea ro constipation, and does have some uncommon side effects as well such as rash muscle aches.  It can suppress the blood counts transiently and require transfusion support through therapy, and carries a risk of neutropenic fever which must be taken seriously.  It can be given intravenous or subcutaneously; when given subcutaneously, it can have injection site issues.  It is typically given 7-10 days in a row every 4 weeks, though there are alternative schedules, as well as combinations with other medications.    We discussed risks and benefits of venetoclax.  The major concerns with venetoclax with leukemias are significant worsening of cytopenias and risks of neutropenic fever in the setting of treatment start, though this is alleviated if response is obtained.  In combination with HMAs or cytarabine, up to two cycles can be needed before knowing the outcome of therapy.  Other concern is lymphoid diseases is tumor lysis, however, these seems to be rare event with myeloid diseases.  However, as a result, first several days with ramp up of dose hydration, close laboratory monitory (including 6-8 hours after initial doses) is required and sometimes first cycle is in the hospital.   Other side effects of venetoclax include peripheral edema or selling, fatigue, headache, diarrhea (usually manageable), nausea, vomiting, risk of infection, as well as neutropenia.  There are substantial interactions with antifungal therapy (CY  inhibitors) and dosing requires adjustments:   if taking fluconazole, 50%, if taking posaconazole - 10mg, 20mg, 50mg, 70mg with 70mg maximum dose, with voriconazole or other strong inhibitor, 10mg, 20mg, 50mg, 100mg.    Diagnostics:  -Follow-up karyotype  Treatment:  -Will review if she has a clinical trial available  -Otherwise plan to initiate azacitidine 75 mg/m² for 7 days on days 1-7 (or 1-9 with a weekend off) combined with venetoclax 400 mg or equivalent (200 mg with his concomitant fluconazole) days 1 through 28, with a low threshold to consider stopping venetoclax early.  Disease Monitoring:  -Minimum twice weekly CBCs while pending treatment start, low threshold to consider admission in the setting of complication  Supportive Care:  -Twice weekly infusion visits for supportive transfusions  Antimicrobial Prophylaxis:  -Acyclovir  -Fluconazole  -Levofloxacin  IV access:  -Peripheral IV for the moment; may consider PICC line  Stem cell transplant candidate  Referred to the allogenic stem cell transplant program.  Provided family typing information, have forwarded to coordinator today  -Initiated donor search  -alloviral transplant panel-set to be drawn at next visit  -HLA recipient typing  -HLA antibody profile  Chronic hip pain, bilateral  They report that they had reached out to his orthopedic surgeon who recommended discussing pain regimens with me.  I discussed that I do not consider myself a pain specialist and can primarily comment on the potential risks and safety of different medications with an active diagnosis of acute leukemia.  We noted that even the higher dose of acetaminophen which he tried overnight does not seem to help him, and is having severe impact on his sleep and overnight discomfort.  I discussed potential risks of opioids, but noted that one of the advantages of opioids as they do not interact significantly with a hematopoietic and/or renal systems and so often do not interact with  chemotherapy.  That said, the most common reasons are used in cancer care as they have the best evidence for malignancy associated pain; that said I do not think his pain is likely coming from his malignancy given its chronicity.  I noted some of the long-term challenges with the use of opioids including addiction potential, risks of sedation, risk of falls, risk of constipation, risk of physical dependence and withdrawal symptoms with long-term use.  I also noted that they are controlled substances and will be important of a single physician manage this should he go that route.  I also discussed alternative NSAIDs, namely celecoxib.  We do not have very good data that celecoxib is safe in the setting of thrombocytopenia or other hematologic malignancies, but studies suggest that it does not have the same interactions with platelet function but other NSAIDs and aspirin has, and is safer from a GI bleeding perspective.  That said, there are reports of patients having bleeding episodes on this medicine, and though I would prefer to avoid it if there are no other options it may be worth a limited trial, and I have prescribed this in patients with thrombocytopenia who lack other options and except and understand the uncertainty and risk.  He is interested in trialing it, I was willing to send him a prescription for this as we will be seeing him regularly to see if he has some benefit from this, and monitor him closely throughout this month.  -Trial of celecoxib 100 mg twice daily               Riky Gomez MD    Virtual or Telephone Consent  An interactive audio and video telecommunication system which permits real time communications between the patient (at the originating site) and provider (at the distant site) was utilized to provide this telehealth service.   Verbal consent was requested and obtained from Link Inman on this date, 04/04/25 for a telehealth visit and the patient's location was confirmed  at the time of the visit.        SUBJECTIVE:     Reviewed past medical, surgical and family history for updates  Reviewed Social history for updates.    Review of Systems   Constitutional:  Negative for activity change, appetite change, chills, diaphoresis, fatigue, fever and unexpected weight change.   HENT:  Negative for congestion, mouth sores, nosebleeds, rhinorrhea, sinus pressure, sinus pain and sore throat.    Eyes:  Negative for visual disturbance.   Respiratory:  Negative for cough and shortness of breath.    Cardiovascular:  Negative for chest pain and leg swelling.   Gastrointestinal:  Negative for abdominal pain, constipation, diarrhea, nausea and vomiting.   Genitourinary:  Negative for difficulty urinating and flank pain.   Musculoskeletal:  Negative for back pain and joint swelling.   Skin:  Negative for rash.   Neurological:  Negative for weakness, light-headedness, numbness and headaches.   Hematological:  Negative for adenopathy. Does not bruise/bleed easily.   Psychiatric/Behavioral:  Negative for confusion and dysphoric mood. The patient is not nervous/anxious.        Reviewed Home Medications & Adherence:     OBJECTIVE:    This was a virtual visit - no vital signs or physical exam could be performed.       Pathology:  The pertinent pathology results were reviewed and discussed with the patient.   Updated NGS reviewed - noted above .

## 2025-04-04 NOTE — ASSESSMENT & PLAN NOTE
I discussed that his next-generation sequencing results were newly resulted.  I reviewed that critically he was found negative for NPM1, FLT3, and IDH 1/2 mutations.  This suggest there is not a clear-cut targeted therapy that could be added to additional therapy.    In this setting, although his karyotype is pending, the negative FISH panel strongly suggests that he has an ELN 2022 intermediate risk acute myeloid leukemia.  I will confirm with trial nurses, he may have 1 potential clinical trial that he is eligible for, though I suspect he is in too high and age bracket for it.  In the absence of a clinical trial, I discussed that there are 2 approaches and standards of care.  1 is admission with intensive induction typically an anthracycline combined with continuous intravenous cytarabine for 7 straight days.  This has a long track record of history, and has a low but small chance of durable remission and/or cure with chemotherapy only.  This likelihood without a transplant is noted to be lower in the age over 65 population.  Treatment would involve admission for likely upwards of 28 to 42 days, and inpatient supportive care for frequent transfusion needs, expected fevers, potential need for mucositis other complications.  I also think in his age group is very appropriate to consider azacitidine venetoclax; indeed with his cardiac history and musculoskeletal issues this may need to be considered a preferred regimen.  We do not have as robust long-term data, and generally do not feel that this results of long-term permanent remissions (however for some subsets of leukemia such as NPM1 mutated, it does appear that a handful of patients do have chances at long-term remissions.)    Given his age, and risk disease both treatments would likely aim for consolidation with allogenic stem cell transplant, which I think would give him the highest chance of long-term disease-free control and/or cure although will have  significant upfront risks and potential risks for complications and long-term quality of life.  He has been referred to the transplant program we are actively looking for donors and I noted that    In the absence of a clinical trial, we decided to pursue azacitidine of venetoclax.  I will aim to start this as soon as feasible next week, my best guess is the earliest we next Thursday, and I will need to's discussed with him in person and/or virtually again in order to sign, though we otherwise completed the informed consent process today.    We discussed risks and benefits of azacitidine.  Overall, most patients tolerate this therapy well, but some patients will have more significant side effects.  Common side effects include low grade fever, fatigue, bruising, nausea, vomiting, diarrhea ro constipation, and does have some uncommon side effects as well such as rash muscle aches.  It can suppress the blood counts transiently and require transfusion support through therapy, and carries a risk of neutropenic fever which must be taken seriously.  It can be given intravenous or subcutaneously; when given subcutaneously, it can have injection site issues.  It is typically given 7-10 days in a row every 4 weeks, though there are alternative schedules, as well as combinations with other medications.    We discussed risks and benefits of venetoclax.  The major concerns with venetoclax with leukemias are significant worsening of cytopenias and risks of neutropenic fever in the setting of treatment start, though this is alleviated if response is obtained.  In combination with HMAs or cytarabine, up to two cycles can be needed before knowing the outcome of therapy.  Other concern is lymphoid diseases is tumor lysis, however, these seems to be rare event with myeloid diseases.  However, as a result, first several days with ramp up of dose hydration, close laboratory monitory (including 6-8 hours after initial doses) is required  and sometimes first cycle is in the hospital.   Other side effects of venetoclax include peripheral edema or selling, fatigue, headache, diarrhea (usually manageable), nausea, vomiting, risk of infection, as well as neutropenia.  There are substantial interactions with antifungal therapy (CY inhibitors) and dosing requires adjustments:   if taking fluconazole, 50%, if taking posaconazole - 10mg, 20mg, 50mg, 70mg with 70mg maximum dose, with voriconazole or other strong inhibitor, 10mg, 20mg, 50mg, 100mg.    Diagnostics:  -Follow-up karyotype  Treatment:  -Will review if she has a clinical trial available  -Otherwise plan to initiate azacitidine 75 mg/m² for 7 days on days 1-7 (or 1-9 with a weekend off) combined with venetoclax 400 mg or equivalent (200 mg with his concomitant fluconazole) days 1 through 28, with a low threshold to consider stopping venetoclax early.  Disease Monitoring:  -Minimum twice weekly CBCs while pending treatment start, low threshold to consider admission in the setting of complication  Supportive Care:  -Twice weekly infusion visits for supportive transfusions  Antimicrobial Prophylaxis:  -Acyclovir  -Fluconazole  -Levofloxacin  IV access:  -Peripheral IV for the moment; may consider PICC line

## 2025-04-04 NOTE — ASSESSMENT & PLAN NOTE
Unfortunately any surgery will need to be put off at this time.  Indeed timing of future surgery could be quite difficult.  Patient is reporting uncontrolled pain at home since stopping his naproxen.  I reviewed that we generally do not favor nonsteroidal anti-inflammatory medications due to the increased risk of GI bleeding, which can be much more dangerous with the coagulation and thrombocytopenia seen with active leukemia.  Moreover, we are often using medications that can be nephrotoxic which can also be a problem with nonsteroidals.    Acetaminophen is a reasonable option for him.  I did discuss the importance of having a working thermometer at home and monitoring closely for fever; we do have some concerns that acetaminophen can mask a fever but I think if he is checking routinely whenever he is feeling poorly which acetaminophen is still probably the safest choice of medication for him at this time.  He is only using about 1000 mg a day, and I discussed he could safely use up to 3 g/day chronically without too many issues.    If his pain control is still an adequate, I have occasionally considered celecoxib for patients like him as this is not felt to have the same risk of GI bleeding and platelet dysfunction, but this is still somewhat controversial.  We could also consider chronic opioids, but these are also high risk medications.  He reported he is going to have a follow-up with his primary care physician in the near future as well.

## 2025-04-04 NOTE — ASSESSMENT & PLAN NOTE
Referred to the allogenic stem cell transplant program.  Provided family typing information, have forwarded to coordinator today  -Initiated donor search  -alloviral transplant panel-set to be drawn at next visit  -HLA recipient typing  -HLA antibody profile

## 2025-04-04 NOTE — ASSESSMENT & PLAN NOTE
His platelet counts are now under 50, we could continue this until they are 30, but I anticipate he will likely have profoundly low platelets with the initiation of treatment.  I recommend we hold aspirin unless absolutely necessary until we can hopefully achieve a remission for his leukemia and see sustained platelet counts.

## 2025-04-04 NOTE — ASSESSMENT & PLAN NOTE
They report that they had reached out to his orthopedic surgeon who recommended discussing pain regimens with me.  I discussed that I do not consider myself a pain specialist and can primarily comment on the potential risks and safety of different medications with an active diagnosis of acute leukemia.  We noted that even the higher dose of acetaminophen which he tried overnight does not seem to help him, and is having severe impact on his sleep and overnight discomfort.  I discussed potential risks of opioids, but noted that one of the advantages of opioids as they do not interact significantly with a hematopoietic and/or renal systems and so often do not interact with chemotherapy.  That said, the most common reasons are used in cancer care as they have the best evidence for malignancy associated pain; that said I do not think his pain is likely coming from his malignancy given its chronicity.  I noted some of the long-term challenges with the use of opioids including addiction potential, risks of sedation, risk of falls, risk of constipation, risk of physical dependence and withdrawal symptoms with long-term use.  I also noted that they are controlled substances and will be important of a single physician manage this should he go that route.  I also discussed alternative NSAIDs, namely celecoxib.  We do not have very good data that celecoxib is safe in the setting of thrombocytopenia or other hematologic malignancies, but studies suggest that it does not have the same interactions with platelet function but other NSAIDs and aspirin has, and is safer from a GI bleeding perspective.  That said, there are reports of patients having bleeding episodes on this medicine, and though I would prefer to avoid it if there are no other options it may be worth a limited trial, and I have prescribed this in patients with thrombocytopenia who lack other options and except and understand the uncertainty and risk.  He is  interested in trialing it, I was willing to send him a prescription for this as we will be seeing him regularly to see if he has some benefit from this, and monitor him closely throughout this month.  -Trial of celecoxib 100 mg twice daily

## 2025-04-04 NOTE — ASSESSMENT & PLAN NOTE
He has some potential barriers to transplant, including a history of chronic systolic heart failure.  However his EF appears normal and his heart failure appears very well manage.  His chronic pain and overall performance status could be an issue, however he has multiple siblings as potential mesh related donors.  We do not yet know his final restratification, but a potential need for stem cell transplant is likely.    As noted below, in the last few months he has been worked up for concern for memory issue, I did recommend that we try to complete that workup to fully understand if he is at risk for developing Alzheimer's or has a true cognitive impairment.  This could wait on the decision of the long-term risks and benefits of considering a transplant.    At the same time I do not recommend we delay workup for donor search I will refer him to the transplant program for education.

## 2025-04-04 NOTE — ASSESSMENT & PLAN NOTE
With his platelet count under 50, and likely to fall much further with initiation of treatment, I strongly recommended holding/discontinuing his rivaroxaban for the time being.  Once he returns to remission, if we are able to demonstrate sustained platelet counts over 50, we can and should consider restarting this medication.

## 2025-04-04 NOTE — TELEPHONE ENCOUNTER
"I called and spoke with Link and his family (wife, daughter and son) on the phone to follow up after Dr. Gomez's virtual visit today. I provided education upcoming treatment plan: Venetoclax/Axacitidine. Handouts on both medications sent via [facility] for patient to review. Pt is aware planned start date is 4/10/25 and we reviewed schedule of treatment. Consent will be signed on 4/10. Provided education on count check days between cycles, pt is aware we will be doing 2x weekly count checks on Mon and Thur - reviewed upcoming dates of appointments. I provided education on what to expect during treatment/infusion visits, reviewed monitoring for fevers and discussed possible side effects and management of SE. We reviewed take-home medications and instructions for use during treatment. Patient appreciative of information and had no further questions at this time.     Patient did ask if he could take new medication Celecoxib with a sulfa allergy. I reached out to Dr. Gomez via secure chat with question. Our pharmacist and Dr. Gomez reviewed - advised \"well theoretically possible, it's unlikely to be an issue.\" - This was relayed to patient and he verbalized understanding.    "

## 2025-04-04 NOTE — LETTER
April 4, 2025     Sukhdeep Lopez MD  [address]  [address]  [place] [address]    Patient: Link Inman   YOB: 1960   Date of Visit: 4/4/2025       Dear Dr. Sukhdeep Lopez MD:    I saw our mutual patient Link Inman for a virtual visit in the malignant hematology clinic at [facility] as a follow up to our visit yesterday with his NGS results.   Please see below for my notes from this encounter. Please do not hesitate to call me if you have any questions. I look forward to continuing to follow your patient along with you.       Sincerely,     Riky Gomez MD      CC: No Recipients  ______________________________________________________________________________________    Patient ID: Link Inman is a 64 y.o. male.  Referring Physician: No referring provider defined for this encounter.  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  On review with patient has increasing fatigue and reduced activity since November 2024.  Underwent bone marrow biopsy 3/27/2025 that demonstrated:   BONE MARROW, LEFT ILIAC CREST, ASPIRATE WITH CLOT AND BIOPSY WITH TOUCH PREPARATION:   -- ACUTE MYELOID LEUKEMIA (estimated at 80% of marrow cellularity).  -- MARKEDLY DECREASED MATURING TRILINEAGE HEMATOPOIESIS.  Karyotype: pending  FISH: FISH NEGATIVE for inverted 3/t(3;3), t(6;9), t(8;21), gain of RUNX1 or CCNA8D2, MLL (KMT2A) rearrangement, t(15;17), inverted 16/t(16;16), TP53 deletion and monosomy 17   NGS: pending             History of Present Illness:  I had a follow-up virtual visit today with Mr. Inman who also had his spouse, daughter,  and son-in-law in attendance.  His had actually returned from this [facility] center earlier today where he received a blood transfusion.  He notes that he does feel better after receiving a blood transfusion.  Reporting he has more energy, was happy to have that done.  Otherwise no interval symptoms from yesterday that are markedly different.        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  I reviewed that critically he was found negative for NPM1, FLT3, and IDH 1/2 mutations.  This suggest there is not a clear-cut targeted therapy that could be added to additional therapy.    In this setting, although his karyotype is pending, the negative FISH panel strongly suggests that he has an ELN 2022 intermediate risk acute myeloid leukemia.  I will confirm with trial nurses, he may have 1 potential clinical trial that he is eligible for, though I suspect he is in too high and age bracket for it.  In the absence of a clinical trial, I discussed that there are 2 approaches and standards of care.  1 is admission with intensive induction typically an anthracycline combined with continuous intravenous cytarabine for 7 straight days.  This has a long track record of history, and has a low but small chance of durable remission and/or cure with chemotherapy only.  This likelihood without a transplant is noted to be lower in the age over 65 population.  Treatment would involve admission for likely upwards of 28 to 42 days, and inpatient supportive care for frequent transfusion needs, expected fevers, potential need for mucositis other complications.  I also think in his age group is very appropriate to consider azacitidine venetoclax; indeed with his cardiac history and musculoskeletal issues this may need to be considered a preferred regimen.  We do not have as robust long-term data, and generally do not  feel that this results of long-term permanent remissions (however for some subsets of leukemia such as NPM1 mutated, it does appear that a handful of patients do have chances at long-term remissions.)    Given his age, and risk disease both treatments would likely aim for consolidation with allogenic stem cell transplant, which I think would give him the highest chance of long-term disease-free control and/or cure although will have significant upfront risks and potential risks for complications and long-term quality of life.  He has been referred to the transplant program we are actively looking for donors and I noted that    In the absence of a clinical trial, we decided to pursue azacitidine of venetoclax.  I will aim to start this as soon as feasible next week, my best guess is the earliest we next Thursday, and I will need to's discussed with him in person and/or virtually again in order to sign, though we otherwise completed the informed consent process today.    We discussed risks and benefits of azacitidine.  Overall, most patients tolerate this therapy well, but some patients will have more significant side effects.  Common side effects include low grade fever, fatigue, bruising, nausea, vomiting, diarrhea ro constipation, and does have some uncommon side effects as well such as rash muscle aches.  It can suppress the blood counts transiently and require transfusion support through therapy, and carries a risk of neutropenic fever which must be taken seriously.  It can be given intravenous or subcutaneously; when given subcutaneously, it can have injection site issues.  It is typically given 7-10 days in a row every 4 weeks, though there are alternative schedules, as well as combinations with other medications.    We discussed risks and benefits of venetoclax.  The major concerns with venetoclax with leukemias are significant worsening of cytopenias and risks of neutropenic fever in the setting of treatment  start, though this is alleviated if response is obtained.  In combination with HMAs or cytarabine, up to two cycles can be needed before knowing the outcome of therapy.  Other concern is lymphoid diseases is tumor lysis, however, these seems to be rare event with myeloid diseases.  However, as a result, first several days with ramp up of dose hydration, close laboratory monitory (including 6-8 hours after initial doses) is required and sometimes first cycle is in the hospital.   Other side effects of venetoclax include peripheral edema or selling, fatigue, headache, diarrhea (usually manageable), nausea, vomiting, risk of infection, as well as neutropenia.  There are substantial interactions with antifungal therapy (CY inhibitors) and dosing requires adjustments:   if taking fluconazole, 50%, if taking posaconazole - 10mg, 20mg, 50mg, 70mg with 70mg maximum dose, with voriconazole or other strong inhibitor, 10mg, 20mg, 50mg, 100mg.    Diagnostics:  -Follow-up karyotype  Treatment:  -Will review if she has a clinical trial available  -Otherwise plan to initiate azacitidine 75 mg/m² for 7 days on days 1-7 (or 1-9 with a weekend off) combined with venetoclax 400 mg or equivalent (200 mg with his concomitant fluconazole) days 1 through 28, with a low threshold to consider stopping venetoclax early.  Disease Monitoring:  -Minimum twice weekly CBCs while pending treatment start, low threshold to consider admission in the setting of complication  Supportive Care:  -Twice weekly infusion visits for supportive transfusions  Antimicrobial Prophylaxis:  -Acyclovir  -Fluconazole  -Levofloxacin  IV access:  -Peripheral IV for the moment; may consider PICC line  Stem cell transplant candidate  Referred to the allogenic stem cell transplant program.  Provided family typing information, have forwarded to coordinator today  -Initiated donor search  -alloviral transplant panel-set to be drawn at next visit  -HLA recipient  typing  -HLA antibody profile  Chronic hip pain, bilateral  They report that they had reached out to his orthopedic surgeon who recommended discussing pain regimens with me.  I discussed that I do not consider myself a pain specialist and can primarily comment on the potential risks and safety of different medications with an active diagnosis of acute leukemia.  We noted that even the higher dose of acetaminophen which he tried overnight does not seem to help him, and is having severe impact on his sleep and overnight discomfort.  I discussed potential risks of opioids, but noted that one of the advantages of opioids as they do not interact significantly with a hematopoietic and/or renal systems and so often do not interact with chemotherapy.  That said, the most common reasons are used in cancer care as they have the best evidence for malignancy associated pain; that said I do not think his pain is likely coming from his malignancy given its chronicity.  I noted some of the long-term challenges with the use of opioids including addiction potential, risks of sedation, risk of falls, risk of constipation, risk of physical dependence and withdrawal symptoms with long-term use.  I also noted that they are controlled substances and will be important of a single physician manage this should he go that route.  I also discussed alternative NSAIDs, namely celecoxib.  We do not have very good data that celecoxib is safe in the setting of thrombocytopenia or other hematologic malignancies, but studies suggest that it does not have the same interactions with platelet function but other NSAIDs and aspirin has, and is safer from a GI bleeding perspective.  That said, there are reports of patients having bleeding episodes on this medicine, and though I would prefer to avoid it if there are no other options it may be worth a limited trial, and I have prescribed this in patients with thrombocytopenia who lack other options and  except and understand the uncertainty and risk.  He is interested in trialing it, I was willing to send him a prescription for this as we will be seeing him regularly to see if he has some benefit from this, and monitor him closely throughout this month.  -Trial of celecoxib 100 mg twice daily               Riky Gomez MD    Virtual or Telephone Consent  An interactive audio and video telecommunication system which permits real time communications between the patient (at the originating site) and provider (at the distant site) was utilized to provide this telehealth service.   Verbal consent was requested and obtained from Link Inman on this date, 04/04/25 for a telehealth visit and the patient's location was confirmed at the time of the visit.        SUBJECTIVE:     Reviewed past medical, surgical and family history for updates  Reviewed Social history for updates.    Review of Systems   Constitutional:  Negative for activity change, appetite change, chills, diaphoresis, fatigue, fever and unexpected weight change.   HENT:  Negative for congestion, mouth sores, nosebleeds, rhinorrhea, sinus pressure, sinus pain and sore throat.    Eyes:  Negative for visual disturbance.   Respiratory:  Negative for cough and shortness of breath.    Cardiovascular:  Negative for chest pain and leg swelling.   Gastrointestinal:  Negative for abdominal pain, constipation, diarrhea, nausea and vomiting.   Genitourinary:  Negative for difficulty urinating and flank pain.   Musculoskeletal:  Negative for back pain and joint swelling.   Skin:  Negative for rash.   Neurological:  Negative for weakness, light-headedness, numbness and headaches.   Hematological:  Negative for adenopathy. Does not bruise/bleed easily.   Psychiatric/Behavioral:  Negative for confusion and dysphoric mood. The patient is not nervous/anxious.        Reviewed Home Medications & Adherence:     OBJECTIVE:    This was a virtual visit - no vital signs or  physical exam could be performed.       Pathology:  The pertinent pathology results were reviewed and discussed with the patient.   Updated NGS reviewed - noted above .

## 2025-04-04 NOTE — ASSESSMENT & PLAN NOTE
Patient was evaluated by neurology with concerns for short-term memory issues.  He had a brain PET/CT that is potentially concerning for reduced activity, and was referred to psychiatry for evaluation for depression.  Was prescribed sertraline, but did not initiate it as he was subsequently in the ED emergency department quickly and was informed that it could potentially contribute to blood thinning.  Will importantly, was referred for neuropsychiatric testing.    My general impression today does not seem to suggest a significant cognitive impairment.  He seems to have excellent recollection of his symptoms and diagnosis, what he has been explained before, and this is affirmed by his spouse.  He does occasionally return to similar phrases.    I recommended to him that given the concern for issues, and that we are considering treatments that could exacerbate memory issues or cognitive impairment that we try to complete his neuropsychiatric testing.  (Stem cell transplant survivors often report some increased memory issues) I do not know if this can be expedited, but will reach out to his neurologist and psychiatrist to see if they could potentially ask about expediting this workup.

## 2025-04-07 ENCOUNTER — SOCIAL WORK (OUTPATIENT)
Dept: HEMATOLOGY/ONCOLOGY | Facility: CLINIC | Age: 65
End: 2025-04-07

## 2025-04-07 ENCOUNTER — INFUSION (OUTPATIENT)
Dept: HEMATOLOGY/ONCOLOGY | Facility: CLINIC | Age: 65
End: 2025-04-07
Payer: MEDICARE

## 2025-04-07 ENCOUNTER — DOCUMENTATION (OUTPATIENT)
Dept: HEMATOLOGY/ONCOLOGY | Facility: HOSPITAL | Age: 65
End: 2025-04-07
Payer: MEDICARE

## 2025-04-07 ENCOUNTER — TELEPHONE (OUTPATIENT)
Dept: ADMISSION | Facility: HOSPITAL | Age: 65
End: 2025-04-07
Payer: MEDICARE

## 2025-04-07 ENCOUNTER — TELEPHONE (OUTPATIENT)
Dept: HEMATOLOGY/ONCOLOGY | Facility: CLINIC | Age: 65
End: 2025-04-07

## 2025-04-07 VITALS
RESPIRATION RATE: 16 BRPM | TEMPERATURE: 96.8 F | SYSTOLIC BLOOD PRESSURE: 147 MMHG | DIASTOLIC BLOOD PRESSURE: 85 MMHG | WEIGHT: 213.63 LBS | OXYGEN SATURATION: 96 % | HEART RATE: 94 BPM | BODY MASS INDEX: 28.18 KG/M2

## 2025-04-07 DIAGNOSIS — C92.00 ACUTE MYELOID LEUKEMIA NOT HAVING ACHIEVED REMISSION (MULTI): ICD-10-CM

## 2025-04-07 LAB
ALBUMIN SERPL BCP-MCNC: 3.8 G/DL (ref 3.4–5)
ALP SERPL-CCNC: 94 U/L (ref 33–136)
ALT SERPL W P-5'-P-CCNC: 41 U/L (ref 10–52)
ANION GAP SERPL CALC-SCNC: 12 MMOL/L (ref 10–20)
AST SERPL W P-5'-P-CCNC: 33 U/L (ref 9–39)
BASOPHILS # BLD AUTO: 0.01 X10*3/UL (ref 0–0.1)
BASOPHILS NFR BLD AUTO: 0.5 %
BILIRUB SERPL-MCNC: 0.3 MG/DL (ref 0–1.2)
BUN SERPL-MCNC: 20 MG/DL (ref 6–23)
CALCIUM SERPL-MCNC: 8.8 MG/DL (ref 8.6–10.3)
CHLORIDE SERPL-SCNC: 107 MMOL/L (ref 98–107)
CO2 SERPL-SCNC: 25 MMOL/L (ref 21–32)
CREAT SERPL-MCNC: 0.86 MG/DL (ref 0.5–1.3)
DACRYOCYTES BLD QL SMEAR: NORMAL
EGFRCR SERPLBLD CKD-EPI 2021: >90 ML/MIN/1.73M*2
EOSINOPHIL # BLD AUTO: 0.01 X10*3/UL (ref 0–0.7)
EOSINOPHIL NFR BLD AUTO: 0.5 %
ERYTHROCYTE [DISTWIDTH] IN BLOOD BY AUTOMATED COUNT: 16.3 % (ref 11.5–14.5)
GLUCOSE SERPL-MCNC: 109 MG/DL (ref 74–99)
HCT VFR BLD AUTO: 20.2 % (ref 41–52)
HGB BLD-MCNC: 7 G/DL (ref 13.5–17.5)
IMM GRANULOCYTES # BLD AUTO: 0 X10*3/UL (ref 0–0.7)
IMM GRANULOCYTES NFR BLD AUTO: 0 % (ref 0–0.9)
LDH SERPL L TO P-CCNC: 203 U/L (ref 84–246)
LYMPHOCYTES # BLD AUTO: 1.09 X10*3/UL (ref 1.2–4.8)
LYMPHOCYTES NFR BLD AUTO: 58.3 %
MCH RBC QN AUTO: 38.7 PG (ref 26–34)
MCHC RBC AUTO-ENTMCNC: 34.7 G/DL (ref 32–36)
MCV RBC AUTO: 112 FL (ref 80–100)
MONOCYTES # BLD AUTO: 0.68 X10*3/UL (ref 0.1–1)
MONOCYTES NFR BLD AUTO: 36.4 %
NEUTROPHILS # BLD AUTO: 0.08 X10*3/UL (ref 1.2–7.7)
NEUTROPHILS NFR BLD AUTO: 4.3 %
NRBC BLD-RTO: ABNORMAL /100{WBCS}
OVALOCYTES BLD QL SMEAR: NORMAL
PHOSPHATE SERPL-MCNC: 3.7 MG/DL (ref 2.5–4.9)
PLATELET # BLD AUTO: 31 X10*3/UL (ref 150–450)
POTASSIUM SERPL-SCNC: 3.9 MMOL/L (ref 3.5–5.3)
PROT SERPL-MCNC: 6.5 G/DL (ref 6.4–8.2)
RBC # BLD AUTO: 1.81 X10*6/UL (ref 4.5–5.9)
RBC MORPH BLD: NORMAL
SODIUM SERPL-SCNC: 140 MMOL/L (ref 136–145)
URATE SERPL-MCNC: 4.2 MG/DL (ref 4–7.5)
WBC # BLD AUTO: 1.9 X10*3/UL (ref 4.4–11.3)

## 2025-04-07 PROCEDURE — 86665 EPSTEIN-BARR CAPSID VCA: CPT

## 2025-04-07 PROCEDURE — 87389 HIV-1 AG W/HIV-1&-2 AB AG IA: CPT

## 2025-04-07 PROCEDURE — 87340 HEPATITIS B SURFACE AG IA: CPT

## 2025-04-07 PROCEDURE — 84550 ASSAY OF BLOOD/URIC ACID: CPT

## 2025-04-07 PROCEDURE — 86780 TREPONEMA PALLIDUM: CPT

## 2025-04-07 PROCEDURE — 86696 HERPES SIMPLEX TYPE 2 TEST: CPT

## 2025-04-07 PROCEDURE — 86777 TOXOPLASMA ANTIBODY: CPT

## 2025-04-07 PROCEDURE — 85025 COMPLETE CBC W/AUTO DIFF WBC: CPT

## 2025-04-07 PROCEDURE — 86787 VARICELLA-ZOSTER ANTIBODY: CPT

## 2025-04-07 PROCEDURE — 86704 HEP B CORE ANTIBODY TOTAL: CPT

## 2025-04-07 PROCEDURE — 86644 CMV ANTIBODY: CPT

## 2025-04-07 PROCEDURE — 86923 COMPATIBILITY TEST ELECTRIC: CPT

## 2025-04-07 PROCEDURE — 86705 HEP B CORE ANTIBODY IGM: CPT

## 2025-04-07 PROCEDURE — 99001 SPECIMEN HANDLING PT-LAB: CPT | Mod: OUT | Performed by: INTERNAL MEDICINE

## 2025-04-07 PROCEDURE — 86803 HEPATITIS C AB TEST: CPT

## 2025-04-07 PROCEDURE — 86706 HEP B SURFACE ANTIBODY: CPT

## 2025-04-07 PROCEDURE — 84075 ASSAY ALKALINE PHOSPHATASE: CPT

## 2025-04-07 PROCEDURE — 86645 CMV ANTIBODY IGM: CPT

## 2025-04-07 PROCEDURE — 83615 LACTATE (LD) (LDH) ENZYME: CPT

## 2025-04-07 PROCEDURE — 86901 BLOOD TYPING SEROLOGIC RH(D): CPT

## 2025-04-07 PROCEDURE — 84100 ASSAY OF PHOSPHORUS: CPT

## 2025-04-07 PROCEDURE — 86790 VIRUS ANTIBODY NOS: CPT

## 2025-04-07 ASSESSMENT — PAIN SCALES - GENERAL: PAINLEVEL_OUTOF10: 5

## 2025-04-07 NOTE — TELEPHONE ENCOUNTER
Patients daughter, Brandee called with concerns of her [place] paperwork.  It was dropped off at office last week, questioning when she can pick It up and if there were any questions/concerns on paperwork.   Brandee requesting call back at [phone]    Next FUV 4/10/25

## 2025-04-07 NOTE — TELEPHONE ENCOUNTER
Patient had additional questions during infusion visit today per infusion nurse. I called patient back, spoke to him and his daughter Ketty. Patient had question regarding schedule of treatments, medication list and pre-medication for treatment - all questions answered. I encouraged patient to write down any additional questions for visit with Dr. Gomez on Thursday. Pt did report celecoxib that Dr. Gomez prescribed is giving him good relief of pain/muscle spasm.     Patient also asking when to expect arrival of Venetoclax. I explained medication will be coming from  specialty pharmacy via mail delivery and our pharmacist will reach out to give additional information/education soon.     Benoit was appreciative and had no further questions at this time.

## 2025-04-07 NOTE — PROGRESS NOTES
Notified by Teresa Perdomo in Lab Services that HGB 7.0 PLT 31. Secure Chat sent to Dr. Gomez. Pt has infusion appointment at [facility].

## 2025-04-07 NOTE — TELEPHONE ENCOUNTER
"I called and spoke with Brandee. We reviewed her [facility] paperwork details. Brandee is aware we will complete on Thursday in clinic when she is here as paperwork needs to be signed and instructions state to \"return to patient\". Brandee had no further questions at this time.   "

## 2025-04-08 ENCOUNTER — APPOINTMENT (OUTPATIENT)
Dept: DERMATOLOGY | Facility: CLINIC | Age: 65
End: 2025-04-08
Payer: MEDICARE

## 2025-04-08 ENCOUNTER — SPECIALTY PHARMACY (OUTPATIENT)
Dept: PHARMACY | Facility: CLINIC | Age: 65
End: 2025-04-08

## 2025-04-08 LAB
ABO GROUP (TYPE) IN BLOOD: NORMAL
ANTIBODY SCREEN: NORMAL
CEBPA RESULTS: NORMAL
CMV IGG AVIDITY SERPL IA-RTO: NONREACTIVE %
EBV EA IGG SER QL: ABNORMAL
EBV NA AB SER QL: POSITIVE
EBV VCA IGG SER IA-ACNC: POSITIVE
EBV VCA IGM SER IA-ACNC: NEGATIVE
ELECTRONICALLY SIGNED BY: NORMAL
HBV CORE AB SER QL: NONREACTIVE
HBV CORE IGM SER QL: NONREACTIVE
HBV SURFACE AB SER-ACNC: <3.1 MIU/ML
HBV SURFACE AG SERPL QL IA: NONREACTIVE
HCV AB SER QL: NONREACTIVE
HERPES SIMPLEX VIRUS 1 IGG: <0.2 INDEX
HERPES SIMPLEX VIRUS 2 IGG: <0.2 INDEX
HIV 1+2 AB+HIV1 P24 AG SERPL QL IA: NONREACTIVE
RH FACTOR (ANTIGEN D): NORMAL
T GONDII IGG SER-ACNC: NONREACTIVE
TREPONEMA PALLIDUM IGG+IGM AB [PRESENCE] IN SERUM OR PLASMA BY IMMUNOASSAY: NONREACTIVE
VARICELLA ZOSTER IGG INDEX: 6.5 IA
VZV IGG SER QL IA: POSITIVE

## 2025-04-08 PROCEDURE — RXMED WILLOW AMBULATORY MEDICATION CHARGE

## 2025-04-08 NOTE — PROGRESS NOTES
PSYCHOSOCIAL ASSESSMENT     Demographic Information  Link Inman  1960  [id number]  Preferred Name: Link  Assessment Type:  Initial Assessment  Date of assessment: 4/7/2025  Provider(s): Yudith Gomez  Diagnosis: AML  Person(s) present during assessment: Daughter, Daughter-in-law and wife  Primary language: English  Interpretive services used: None    Distress Thermometer  Distress Score: 1  Distress Concerns: Physical Concerns: Sleep, Fatigue, Changes in eating, and Loss or change of physical abilities, Emotional Concerns: Worry or anxiety, Social Concerns: Relationship with spouse or partner, Practical Concerns: Taking care of others, and Spiritual or Adventism Concerns: Sense of meaning or purpose                Living Environment/Support Systems  Partner Status:   Children: two daughters (living out of state) and one son (lives locally)  Support systems: family   Primary caregiver: Self, Spouse/Partner, and Child/Children  Current Living Situation: House Own  Resides with: wife  Concerns with Housing Environment: None  Comments:     Safety  Patient safe at home? Yes   History of Domestic Violence: No history of domestic violence disclosed      Functional Status  Functional status: Minimum Assistance  Patient currently ambulates: With Aides: Cane  Patient has following equipment: None  Other physical health issues that the patient is experiencing: concerns noted for memory loss, chronic hip pain,   What supports are in place to assist the patient: None  Transportation:  Family/Friends: wife and children - patient advised not to drive until reassessed after 1st cycle of treatment  Comments:         Finances/Insurance  Insurance: Medicare/AARP  Does the patient have any pending insurance applications: No   Hospital Financial Assistance: None  Patient's income source: long-term  Work History: Retired   History: No  Background  Food Insecurity: No   Does the patient have any financial  concerns: No   Any difficulties affording medications? No   Applicable [place]: No   Comments:      Advance Directives  Advance Directives were not discussed at this time  Health Care Agent Status:Not Activated  Health Care Agent, When applicable:   Comments:    Legal Involvement  Relevant current or previous legal concerns: No current legal concerns noted at this time     Worship or Spiritual Identity  Comments: Patient identifies as Worship    Mental Health  Active SI/HI: No  Mental Health Diagnosis, if applicable: None  Mood: Appropriate for the situation  Concerns relating to substance use (including alcohol/tobacco): none  Cognitive Comments: Concerns regarding patient's memory - currently being evaluated  Relevant Providers:   Comments:       Assessment  Potential Barriers to Care:  None Identified  Patient Strengths:  Healthy Coping Skills, Motivated for Treatment, Self-Advocate, and Strong Support System    Plan  Referrals: [facility]  Applications: N/A  Other: N/A    Narrative: LSW met with patient today during his count check appointment.  Patient is a 64 year old male, newly diagnosed with AML - he follows with Dr. Gomez.  Patient is open to meeting with  and was easily engaged in conversation.  Patient's daughter, daughter-in-law and wife were all present in the room and engaged in conversation.  Patient shared that it has been an overwhelming two weeks - he is still coming to terms with his diagnosis and processing what the future may hold.  He will be starting his first cycle of treatment on Thursday (AzaCitidine and Venetoclax) He is appropriately anxious about the start of treatment and had further education today by RN regarding chemotherapy and the plan going forward.      Patient appears to have a good support network.  Patient's wife and family appear loving and supportive of the patient. He has two daughters (who live out of state) and a son (lives locally) patient's  daughter in law was also present and acts as a good  supporter of the patient.  Family would like to have someone with the patient a majority of the day and are working to develop a schedule.  His one daughter is currently in town and when she returns to home his other daughter will come to [place].  Patient is retired but his wife does work full-time.  She states that she does not feel she is able to return to work at this time though due to caregiver needs.  We discussed option of completing FMLA paperwork and wife will plan to reach out to her HR department to obtain paperwork.  Explained that she would be entitled to take continuous or intermittent leave based on what the caregiving needs are for her .  Will plan to further discuss with wife once she receives paperwork    Upon reviewing chart, it was noted that there is some concern with patient's memory.  Currently his cognition is being worked up and he was evaluated by psychiatry back in March. Patient was started on an antidepressant at that time and was agreeable to a PET scan and further neuropsychological testing.  Some Mild forgetfullness was noted but  Patient was appropriate in his questions today and appeared to follow the plan of care that was discussed.     Family inquired about DME (hospital bed) and possibly a life reclining chair.  At present, patient does not want a hospital bed as he feels that he can get up and down stairs but recognizes that if that were to change LSW could work to order DME at that time.  Patient lives in a two story home - he states that all the bedrooms are on the second floor but he does have a bathroom on the first floor.  He utilizes a cane for walking and does have a walker at home.  He enjoys taking walks at the park by his home and is hopeful that he will be able to restart those again soon.     LSW provided information on the [facility], [employer] and [employer] Road to Recovery Program.   Explained that I could assist with insurance concerns/billing questions as well.  Patient is covered by Medicare/AARP so medical costs should be minimal.      No other issues or concerns noted at this time.  LSW provided my contact information and I shared that I would look further into the lift reclining chair for the patient.  Will plan to meet with patient again on Thursday to follow up.  Social work will continue to follow.

## 2025-04-09 ENCOUNTER — PHARMACY VISIT (OUTPATIENT)
Dept: PHARMACY | Facility: CLINIC | Age: 65
End: 2025-04-09
Payer: COMMERCIAL

## 2025-04-09 ENCOUNTER — SPECIALTY PHARMACY (OUTPATIENT)
Dept: HEMATOLOGY/ONCOLOGY | Facility: HOSPITAL | Age: 65
End: 2025-04-09
Payer: MEDICARE

## 2025-04-09 RX ORDER — ALBUTEROL SULFATE 0.83 MG/ML
3 SOLUTION RESPIRATORY (INHALATION) AS NEEDED
Status: CANCELLED | OUTPATIENT
Start: 2025-04-09

## 2025-04-09 RX ORDER — FAMOTIDINE 10 MG/ML
20 INJECTION, SOLUTION INTRAVENOUS ONCE AS NEEDED
Status: CANCELLED | OUTPATIENT
Start: 2025-04-09

## 2025-04-09 RX ORDER — DIPHENHYDRAMINE HYDROCHLORIDE 50 MG/ML
50 INJECTION, SOLUTION INTRAMUSCULAR; INTRAVENOUS AS NEEDED
Status: CANCELLED | OUTPATIENT
Start: 2025-04-09

## 2025-04-09 RX ORDER — EPINEPHRINE 0.3 MG/.3ML
0.3 INJECTION SUBCUTANEOUS EVERY 5 MIN PRN
Status: CANCELLED | OUTPATIENT
Start: 2025-04-09

## 2025-04-09 NOTE — PROGRESS NOTES
[facility] Clinical Note  Initial Patient Education     Introduction  Link Inman is a 64 y.o. male who is on the specialty pharmacy service for management of: Oncology Core.    Link Inman is initiating the following therapy: cat 200 daily with rmap up (name, dose, directions)    Medication receipt date: 4/10  Duration of therapy: Until drug toxicity or progression    The most recent encounter visit with the referring prescriber Patricia on 4/2 was reviewed.  Pharmacy will continue to collaborate in the care of this patient with the referring prescriber.    Clinical Background  An initial assessment was conducted prior to first fill of the medication to determine the appropriateness of therapy given the patient's diagnosis, medication list, comorbidities, allergies, medical history, patient's ability to self administer medication, and therapeutic goals based on possible outcomes of therapy. Refer to initial assessment task completed on 4/7.    Labs/Procedures for clinical appropriateness that were reviewed include:   Cbc cmp bmbx    Education/Discussion  Link was contacted on 4/9/2025 at 2:20 PM for a pharmacy visit with encounter number [id number] from:   [facility]  [address]  [address] [facility] [address]  Dept: [phone]  Loc: [phone]  Link consented to a/an Telephone visit, which was performed.    Medication Start Date (planned or actual): tomorrow  Education was conducted prior to start of therapy? Yes    Education discussed includes the following:  Patient Education  Counseled the Patient on the Following : Theraputic rationale and expected outcomes, Lab monitoring and follow-up, Doses and administration, Expected duration of therapy, Adherence and missed doses, Possible side effects and management, Possible drug interactions  Learner: Patient  Education Method: Demonstration  Education Response: Demonstrated  understanding  Additional details of the medication specific counseling are found within the linked patient education flowsheet.     The follow up timeline was discussed. Every person responds to and reacts to therapy differently. Patient should be assessed for efficacy and tolerability in approximately: 8-12 weeks    Provided education on goals and possible outcomes of therapy:  Adherence with therapy  Timely completion of appropriate labs  Timely and appropriate follow up with provider  Identify and address medication interactions with presciption medications, OTC medications and supplements  Optimize or maintain quality of life    The importance of adherence was discussed and they were advised to take the medication as prescribed by their provider.     Impression/Plan  Review and Assessment   Reviewed During This Encounter: Medications  Medications Assessed for Appropriate Use, Dose, Route, Frequency, and Duration: Yes  Medication Reconciliation Completed: Yes  Drug Interactions Evaluated: Yes  Clinically Relevant Drug Interactions Identified: Yes  List Interactions and Management Plan: cat/fluc    This patient has not been identified as high risk due to Lack of high risk qualifiers.  The following action was taken: N/A.         The  Specialty Pharmacy Welcome packet may be viewed here:   Specialty Pharmacy Welcome Packet     Or by scanning QR code:      Provided contact information ([phone]) for [facility] and reviewed dispensing process, refill timeline and patient management follow up. Advised to contact the pharmacy if there are any adverse effects and/or changes to medication list, including prescriptions, OTC medications, herbal products, or supplements. Confirmed understanding of education conducted during assessment. All questions and concerns were addressed and patient was encouraged to reach out for additional questions or concerns.      Richie Hoyt [facility]

## 2025-04-10 ENCOUNTER — INFUSION (OUTPATIENT)
Dept: HEMATOLOGY/ONCOLOGY | Facility: CLINIC | Age: 65
End: 2025-04-10
Payer: MEDICARE

## 2025-04-10 ENCOUNTER — OFFICE VISIT (OUTPATIENT)
Dept: HEMATOLOGY/ONCOLOGY | Facility: CLINIC | Age: 65
End: 2025-04-10
Payer: MEDICARE

## 2025-04-10 ENCOUNTER — APPOINTMENT (OUTPATIENT)
Dept: HEMATOLOGY/ONCOLOGY | Facility: CLINIC | Age: 65
End: 2025-04-10
Payer: MEDICARE

## 2025-04-10 ENCOUNTER — DOCUMENTATION (OUTPATIENT)
Dept: HEMATOLOGY/ONCOLOGY | Facility: CLINIC | Age: 65
End: 2025-04-10

## 2025-04-10 VITALS
DIASTOLIC BLOOD PRESSURE: 73 MMHG | TEMPERATURE: 97.7 F | WEIGHT: 210.98 LBS | SYSTOLIC BLOOD PRESSURE: 119 MMHG | BODY MASS INDEX: 27.84 KG/M2 | HEART RATE: 92 BPM | OXYGEN SATURATION: 95 % | RESPIRATION RATE: 16 BRPM

## 2025-04-10 VITALS — BODY MASS INDEX: 29.47 KG/M2 | HEIGHT: 71 IN

## 2025-04-10 DIAGNOSIS — Z71.1 CONCERN ABOUT MEMORY: ICD-10-CM

## 2025-04-10 DIAGNOSIS — I48.0 PAROXYSMAL ATRIAL FIBRILLATION (MULTI): ICD-10-CM

## 2025-04-10 DIAGNOSIS — C92.00 ACUTE MYELOID LEUKEMIA NOT HAVING ACHIEVED REMISSION (MULTI): Primary | ICD-10-CM

## 2025-04-10 DIAGNOSIS — Z76.82 STEM CELL TRANSPLANT CANDIDATE: ICD-10-CM

## 2025-04-10 DIAGNOSIS — M25.552 CHRONIC HIP PAIN, BILATERAL: ICD-10-CM

## 2025-04-10 DIAGNOSIS — G89.29 CHRONIC HIP PAIN, BILATERAL: ICD-10-CM

## 2025-04-10 DIAGNOSIS — C92.00 ACUTE MYELOID LEUKEMIA NOT HAVING ACHIEVED REMISSION (MULTI): ICD-10-CM

## 2025-04-10 DIAGNOSIS — M25.551 CHRONIC HIP PAIN, BILATERAL: ICD-10-CM

## 2025-04-10 LAB
ALBUMIN SERPL BCP-MCNC: 4.1 G/DL (ref 3.4–5)
ALP SERPL-CCNC: 110 U/L (ref 33–136)
ALT SERPL W P-5'-P-CCNC: 109 U/L (ref 10–52)
ANION GAP SERPL CALC-SCNC: 12 MMOL/L (ref 10–20)
AST SERPL W P-5'-P-CCNC: 70 U/L (ref 9–39)
BASOPHILS # BLD AUTO: 0 X10*3/UL (ref 0–0.1)
BASOPHILS NFR BLD AUTO: 0 %
BILIRUB SERPL-MCNC: 0.6 MG/DL (ref 0–1.2)
BLOOD EXPIRATION DATE: NORMAL
BUN SERPL-MCNC: 23 MG/DL (ref 6–23)
CALCIUM SERPL-MCNC: 8.9 MG/DL (ref 8.6–10.3)
CHLORIDE SERPL-SCNC: 105 MMOL/L (ref 98–107)
CMV IGM SERPL-ACNC: <8 AU/ML
CO2 SERPL-SCNC: 24 MMOL/L (ref 21–32)
CREAT SERPL-MCNC: 1.03 MG/DL (ref 0.5–1.3)
DISPENSE STATUS: NORMAL
EGFRCR SERPLBLD CKD-EPI 2021: 80 ML/MIN/1.73M*2
EOSINOPHIL # BLD AUTO: 0 X10*3/UL (ref 0–0.7)
EOSINOPHIL NFR BLD AUTO: 0 %
ERYTHROCYTE [DISTWIDTH] IN BLOOD BY AUTOMATED COUNT: 15.8 % (ref 11.5–14.5)
GLUCOSE SERPL-MCNC: 108 MG/DL (ref 74–99)
HCT VFR BLD AUTO: 20.7 % (ref 41–52)
HGB BLD-MCNC: 7 G/DL (ref 13.5–17.5)
HTLV I+II AB SER QL IA: NEGATIVE
IMM GRANULOCYTES # BLD AUTO: 0.01 X10*3/UL (ref 0–0.7)
IMM GRANULOCYTES NFR BLD AUTO: 0.5 % (ref 0–0.9)
LDH SERPL L TO P-CCNC: 238 U/L (ref 84–246)
LYMPHOCYTES # BLD AUTO: 1.39 X10*3/UL (ref 1.2–4.8)
LYMPHOCYTES NFR BLD AUTO: 75.1 %
MCH RBC QN AUTO: 37.8 PG (ref 26–34)
MCHC RBC AUTO-ENTMCNC: 33.8 G/DL (ref 32–36)
MCV RBC AUTO: 112 FL (ref 80–100)
MONOCYTES # BLD AUTO: 0.41 X10*3/UL (ref 0.1–1)
MONOCYTES NFR BLD AUTO: 22.2 %
NEUTROPHILS # BLD AUTO: 0.04 X10*3/UL (ref 1.2–7.7)
NEUTROPHILS NFR BLD AUTO: 2.2 %
PLATELET # BLD AUTO: 31 X10*3/UL (ref 150–450)
POTASSIUM SERPL-SCNC: 4.3 MMOL/L (ref 3.5–5.3)
PRODUCT BLOOD TYPE: 5100
PRODUCT CODE: NORMAL
PROT SERPL-MCNC: 7 G/DL (ref 6.4–8.2)
RBC # BLD AUTO: 1.85 X10*6/UL (ref 4.5–5.9)
SODIUM SERPL-SCNC: 137 MMOL/L (ref 136–145)
UNIT ABO: NORMAL
UNIT NUMBER: NORMAL
UNIT RH: NORMAL
UNIT VOLUME: 275
WBC # BLD AUTO: 1.9 X10*3/UL (ref 4.4–11.3)
XM INTEP: NORMAL

## 2025-04-10 PROCEDURE — 84075 ASSAY ALKALINE PHOSPHATASE: CPT

## 2025-04-10 PROCEDURE — 1125F AMNT PAIN NOTED PAIN PRSNT: CPT | Performed by: INTERNAL MEDICINE

## 2025-04-10 PROCEDURE — 2500000004 HC RX 250 GENERAL PHARMACY W/ HCPCS (ALT 636 FOR OP/ED): Performed by: INTERNAL MEDICINE

## 2025-04-10 PROCEDURE — 85025 COMPLETE CBC W/AUTO DIFF WBC: CPT

## 2025-04-10 PROCEDURE — 99214 OFFICE O/P EST MOD 30 MIN: CPT | Performed by: INTERNAL MEDICINE

## 2025-04-10 PROCEDURE — G2211 COMPLEX E/M VISIT ADD ON: HCPCS | Performed by: INTERNAL MEDICINE

## 2025-04-10 PROCEDURE — 96401 CHEMO ANTI-NEOPL SQ/IM: CPT

## 2025-04-10 PROCEDURE — 1159F MED LIST DOCD IN RCRD: CPT | Performed by: INTERNAL MEDICINE

## 2025-04-10 PROCEDURE — 1160F RVW MEDS BY RX/DR IN RCRD: CPT | Performed by: INTERNAL MEDICINE

## 2025-04-10 PROCEDURE — 83615 LACTATE (LD) (LDH) ENZYME: CPT

## 2025-04-10 RX ORDER — EPINEPHRINE 0.3 MG/.3ML
0.3 INJECTION SUBCUTANEOUS EVERY 5 MIN PRN
Status: CANCELLED | OUTPATIENT
Start: 2025-04-10

## 2025-04-10 RX ORDER — DIPHENHYDRAMINE HYDROCHLORIDE 50 MG/ML
50 INJECTION, SOLUTION INTRAMUSCULAR; INTRAVENOUS AS NEEDED
Status: CANCELLED | OUTPATIENT
Start: 2025-04-10

## 2025-04-10 RX ORDER — FAMOTIDINE 10 MG/ML
20 INJECTION, SOLUTION INTRAVENOUS ONCE AS NEEDED
Status: DISCONTINUED | OUTPATIENT
Start: 2025-04-10 | End: 2025-04-10 | Stop reason: HOSPADM

## 2025-04-10 RX ORDER — ONDANSETRON HYDROCHLORIDE 8 MG/1
8 TABLET, FILM COATED ORAL ONCE
Status: COMPLETED | OUTPATIENT
Start: 2025-04-10 | End: 2025-04-10

## 2025-04-10 RX ORDER — DIPHENHYDRAMINE HYDROCHLORIDE 50 MG/ML
50 INJECTION, SOLUTION INTRAMUSCULAR; INTRAVENOUS AS NEEDED
Status: DISCONTINUED | OUTPATIENT
Start: 2025-04-10 | End: 2025-04-10 | Stop reason: HOSPADM

## 2025-04-10 RX ORDER — HEPARIN 100 UNIT/ML
500 SYRINGE INTRAVENOUS AS NEEDED
Status: CANCELLED | OUTPATIENT
Start: 2025-04-10

## 2025-04-10 RX ORDER — HEPARIN 100 UNIT/ML
500 SYRINGE INTRAVENOUS AS NEEDED
OUTPATIENT
Start: 2025-04-10

## 2025-04-10 RX ORDER — ALBUTEROL SULFATE 0.83 MG/ML
3 SOLUTION RESPIRATORY (INHALATION) AS NEEDED
Status: CANCELLED | OUTPATIENT
Start: 2025-04-10

## 2025-04-10 RX ORDER — HEPARIN SODIUM,PORCINE/PF 10 UNIT/ML
50 SYRINGE (ML) INTRAVENOUS AS NEEDED
OUTPATIENT
Start: 2025-04-10

## 2025-04-10 RX ORDER — EPINEPHRINE 0.3 MG/.3ML
0.3 INJECTION SUBCUTANEOUS EVERY 5 MIN PRN
Status: DISCONTINUED | OUTPATIENT
Start: 2025-04-10 | End: 2025-04-10 | Stop reason: HOSPADM

## 2025-04-10 RX ORDER — HEPARIN SODIUM,PORCINE/PF 10 UNIT/ML
50 SYRINGE (ML) INTRAVENOUS AS NEEDED
Status: CANCELLED | OUTPATIENT
Start: 2025-04-10

## 2025-04-10 RX ORDER — FAMOTIDINE 10 MG/ML
20 INJECTION, SOLUTION INTRAVENOUS ONCE AS NEEDED
Status: CANCELLED | OUTPATIENT
Start: 2025-04-10

## 2025-04-10 RX ORDER — ALBUTEROL SULFATE 0.83 MG/ML
3 SOLUTION RESPIRATORY (INHALATION) AS NEEDED
Status: DISCONTINUED | OUTPATIENT
Start: 2025-04-10 | End: 2025-04-10 | Stop reason: HOSPADM

## 2025-04-10 RX ADMIN — ONDANSETRON HYDROCHLORIDE 8 MG: 8 TABLET, FILM COATED ORAL at 10:51

## 2025-04-10 RX ADMIN — AZACITIDINE 170 MG: 100 INJECTION, POWDER, LYOPHILIZED, FOR SOLUTION INTRAVENOUS; SUBCUTANEOUS at 11:24

## 2025-04-10 ASSESSMENT — ENCOUNTER SYMPTOMS
RHINORRHEA: 0
WEAKNESS: 0
SINUS PRESSURE: 0
ACTIVITY CHANGE: 0
APPETITE CHANGE: 0
FEVER: 0
CONSTIPATION: 0
UNEXPECTED WEIGHT CHANGE: 0
NUMBNESS: 0
BACK PAIN: 0
CHILLS: 0
BRUISES/BLEEDS EASILY: 0
COUGH: 0
DIARRHEA: 0
NAUSEA: 0
NERVOUS/ANXIOUS: 0
ADENOPATHY: 0
SHORTNESS OF BREATH: 0
SORE THROAT: 0
FLANK PAIN: 0
DIAPHORESIS: 0
HEADACHES: 0
CONFUSION: 0
DYSPHORIC MOOD: 0
ABDOMINAL PAIN: 0
FATIGUE: 0
VOMITING: 0
LIGHT-HEADEDNESS: 0
JOINT SWELLING: 0
DIFFICULTY URINATING: 0
SINUS PAIN: 0

## 2025-04-10 ASSESSMENT — PAIN SCALES - GENERAL: PAINLEVEL_OUTOF10: 4

## 2025-04-10 NOTE — ASSESSMENT & PLAN NOTE
Family noted they had not gotten kicked yet, and did not you had to have an appointment with a stem cell transplant physician.  Expectation is that he should hear likely by the middle of next week.  I noted the kits could be done locally or at their local residence, overall should not be too difficult to coordinate.

## 2025-04-10 NOTE — ASSESSMENT & PLAN NOTE
Appreciate indication with his psychiatrist and neurologist, currently they do not have a strong suspicion of significant cognitive defects, indeed screening test such as a Mini-Mental Status exam were not suggestive, unclear if we can expedite neuropsychiatric testing.

## 2025-04-10 NOTE — ASSESSMENT & PLAN NOTE
4/10/2025: Patient is here to start his azacitidine venetoclax therapy.  We have previously discussed the treatment in detail, but needed to meet in person to complete formal informed consent which we completed today.  He has not had major medical events.    Diagnostics:  -Follow-up karyotype  Treatment:  -Initiate azacitidine 75 mg/m² for 7 days on days 1-7 (or 1-9 with a weekend off) combined with venetoclax 400 mg or equivalent (200 mg with his concomitant fluconazole) days 1 through 28, with a low threshold to consider stopping venetoclax early.  Disease Monitoring:  -Minimum twice weekly CBCs while pending treatment start, low threshold to consider admission in the setting of complication  Supportive Care:  -Twice weekly infusion visits for supportive transfusions  Antimicrobial Prophylaxis:  -Acyclovir  -Fluconazole  -Levofloxacin  IV access:  -Peripheral IV for the moment; may consider PICC line

## 2025-04-10 NOTE — PROGRESS NOTES
Patient ID: Link Inman is a 64 y.o. male.  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  On review with patient has increasing fatigue and reduced activity since November 2024.  Underwent bone marrow biopsy 3/27/2025 that demonstrated:   BONE MARROW, LEFT ILIAC CREST, ASPIRATE WITH CLOT AND BIOPSY WITH TOUCH PREPARATION:   -- ACUTE MYELOID LEUKEMIA (estimated at 80% of marrow cellularity).  -- MARKEDLY DECREASED MATURING TRILINEAGE HEMATOPOIESIS.  Karyotype: 46 XY  FISH: FISH NEGATIVE for inverted 3/t(3;3), t(6;9), t(8;21), gain of RUNX1 or ALQD2V9, MLL (KMT2A) rearrangement, t(15;17), inverted 16/t(16;16), TP53 deletion and monosomy 17   NGS: TET2 p.* VAF: 32% TET2 p.* VAF: 32%        4/10/2025 -  Chemotherapy    Venetoclax / AzaCITIDine, 28 Day Cycles - Induction / Consolidation          Noted the celecoxib was highly effective, was sleeping much, much better overall with better pain control.  In that setting, he has better energy with the better sleep.  He is also not had ill effects including abdominal pain.    Has not noticed any major changes from how he felt last week.  Feels informed about his treatment getting started.        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  We have previously discussed the treatment in detail, but needed to meet in person to complete formal informed consent which we completed today.  He has not had major medical  events.    Diagnostics:  -Follow-up karyotype  Treatment:  -Initiate azacitidine 75 mg/m² for 7 days on days 1-7 (or 1-9 with a weekend off) combined with venetoclax 400 mg or equivalent (200 mg with his concomitant fluconazole) days 1 through 28, with a low threshold to consider stopping venetoclax early.  Disease Monitoring:  -Minimum twice weekly CBCs while pending treatment start, low threshold to consider admission in the setting of complication  Supportive Care:  -Twice weekly infusion visits for supportive transfusions  Antimicrobial Prophylaxis:  -Acyclovir  -Fluconazole  -Levofloxacin  IV access:  -Peripheral IV for the moment; may consider PICC line  Stem cell transplant candidate  Family noted they had not gotten kicked yet, and did not you had to have an appointment with a stem cell transplant physician.  Expectation is that he should hear likely by the middle of next week.  I noted the kits could be done locally or at their local residence, overall should not be too difficult to coordinate.  Paroxysmal atrial fibrillation (Multi)  Appreciate excellent communication with Dr. Enamorado -will currently hold his antiplatelet therapy.  Concern about memory  Appreciate indication with his psychiatrist and neurologist, currently they do not have a strong suspicion of significant cognitive defects, indeed screening test such as a Mini-Mental Status exam were not suggestive, unclear if we can expedite neuropsychiatric testing.  Chronic hip pain, bilateral  Does report that celecoxib is helpful, I remain concerned about potential interactions with other meds, and noted that this probably would not be a reasonable strategy during transplant.  I also encouraged him to consider making this med and as needed medication, and hopefully limiting the number of times he is taking it.  I encouraged him to meet with his primary care physician to discuss other alternatives; his primary care physician reached out and was  potentially considering a trial of tramadol, I think this is quite reasonable  -May continue celecoxib 100 mg twice daily  -With follow-up with primary care doctor, consider transition to alternative pain management              Subjective     Reviewed and Past Medical History, Past Surgical History, Family History, and Social History:    Review of Systems   Constitutional:  Negative for activity change, appetite change, chills, diaphoresis, fatigue, fever and unexpected weight change.   HENT:  Negative for congestion, mouth sores, nosebleeds, rhinorrhea, sinus pressure, sinus pain and sore throat.    Eyes:  Negative for visual disturbance.   Respiratory:  Negative for cough and shortness of breath.    Cardiovascular:  Negative for chest pain and leg swelling.   Gastrointestinal:  Negative for abdominal pain, constipation, diarrhea, nausea and vomiting.   Genitourinary:  Negative for difficulty urinating and flank pain.   Musculoskeletal:  Negative for back pain and joint swelling.   Skin:  Negative for rash.   Neurological:  Negative for weakness, light-headedness, numbness and headaches.   Hematological:  Negative for adenopathy. Does not bruise/bleed easily.   Psychiatric/Behavioral:  Negative for confusion and dysphoric mood. The patient is not nervous/anxious.        Home Medications and Adherence Reviewed with Patient.       Objective      VS:  /73   Pulse 92   Temp 36.5 °C (97.7 °F) (Temporal)   Resp 16   Wt 95.7 kg (210 lb 15.7 oz)   SpO2 95%   BMI 27.84 kg/m²   BSA: 2.22 meters squared  KPS: 70    Physical Exam  Constitutional:       Appearance: Normal appearance.   HENT:      Mouth/Throat:      Mouth: Mucous membranes are moist.   Eyes:      Conjunctiva/sclera: Conjunctivae normal.      Pupils: Pupils are equal, round, and reactive to light.   Cardiovascular:      Rate and Rhythm: Normal rate.   Pulmonary:      Effort: Pulmonary effort is normal.   Abdominal:      General: Abdomen is flat.       Palpations: Abdomen is soft. There is no splenomegaly.   Musculoskeletal:         General: Normal range of motion.   Skin:     General: Skin is warm and dry.   Neurological:      General: No focal deficit present.      Mental Status: He is oriented to person, place, and time.   Psychiatric:         Mood and Affect: Mood normal.         Behavior: Behavior normal.         Laboratory:  Pertinent laboratory results were reviewed and discussed with patient, notably:   White blood cells from earlier this week were 1.9, ANC of 80, hemoglobin 7.0, platelets of 31  Chemistry from 4/7 similarly within normal limits  Labs from today are pending    Pathology:  The pertinent pathology results were reviewed and discussed with the patient.  Notably, no interval updates.        Riky Gomez MD

## 2025-04-10 NOTE — ASSESSMENT & PLAN NOTE
Appreciate excellent communication with Dr. Enamorado -will currently hold his antiplatelet therapy.

## 2025-04-10 NOTE — PROGRESS NOTES
Met with patient, his wife and daughter Brandee today during infusion visit. I provided hand out (calendar and list view) of updated schedule of infusion appointments and we reviewed dates/times. I also provided AVS from clinic appointment which included updated medication list. We reviewed his medication list in detail and used check boxes to display when to take each medication. Patient is using a medication organizer box at home. Patient's wife confirmed they did  allopurinol, zofran, compazine from pharmacy. Venetoclax arrived to their home today. Patient read-back instructions for taking Venetoclax.   Brandee provided FMLA paperwork to be completed so she can be here to assist with care for her father. Form completed, signed by Dr. Gomez and e-mailed back to Brandee at email provided ([email or web address]). Form states to return to employee.     Link, his wife and daughter had no further questions. We reviewed how to contact office if questions arise.

## 2025-04-10 NOTE — LETTER
April 10, 2025      TO: MD Sukhdeep Pinto MD  [address]  [address]  [place] [address]       Regarding:   Patient:  :  Visit Date: Link Inman  1960  4/10/2025       Dear Dr. Sukhdeep Lopez MD     I saw our mutual patient Link Inman for an interval visit in the malignant hematology clinic at [facility].  Please see below for my notes from this encounter. Please do not hesitate to call me if you have any questions. I look forward to continuing to follow your patient along with you.      Sincerely,    Riky Gomez MD         CC: Sukhdeep Lopez MD  [address]  [address]  [place] [address]  Via In Basket    Sukhdeep Lopez MD  [address]  [address]  [place] [address]       Please see my documentation below:   Patient ID: Link Inman is a 64 y.o. male.  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  On review with patient has increasing fatigue and reduced activity since 2024.  Underwent bone marrow biopsy 3/27/2025 that demonstrated:   BONE MARROW, LEFT ILIAC CREST, ASPIRATE WITH CLOT AND BIOPSY WITH TOUCH PREPARATION:   -- ACUTE MYELOID LEUKEMIA (estimated at 80% of marrow cellularity).  -- MARKEDLY DECREASED MATURING TRILINEAGE HEMATOPOIESIS.  Karyotype: 46 XY  FISH: FISH NEGATIVE for inverted 3/t(3;3), t(6;9), t(8;21), gain of RUNX1 or ZUSO5X3, MLL (KMT2A) rearrangement, t(15;17), inverted 16/t(16;16), TP53 deletion and monosomy 17   NGS: TET2 p.* VAF: 32% TET2 p.* VAF:  32%        4/10/2025 -  Chemotherapy    Venetoclax / AzaCITIDine, 28 Day Cycles - Induction / Consolidation          Noted the celecoxib was highly effective, was sleeping much, much better overall with better pain control.  In that setting, he has better energy with the better sleep.  He is also not had ill effects including abdominal pain.    Has not noticed any major changes from how he felt last week.  Feels informed about his treatment getting started.        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  We have previously discussed the treatment in detail, but needed to meet in person to complete formal informed consent which we completed today.  He has not had major medical events.    Diagnostics:  -Follow-up karyotype  Treatment:  -Initiate azacitidine 75 mg/m² for 7 days on days 1-7 (or 1-9 with a weekend off) combined with venetoclax 400 mg or equivalent (200 mg with his concomitant fluconazole) days 1 through 28, with a low threshold to consider stopping venetoclax early.  Disease Monitoring:  -Minimum twice weekly CBCs while pending treatment start, low threshold to consider admission in the setting of complication  Supportive Care:  -Twice weekly infusion visits for supportive transfusions  Antimicrobial Prophylaxis:  -Acyclovir  -Fluconazole  -Levofloxacin  IV access:  -Peripheral IV for the moment; may consider PICC line  Stem cell transplant candidate  Family noted they had not gotten kicked yet, and did not you had to have an appointment with a stem cell transplant physician.  Expectation is that he should hear likely by the middle of next week.  I noted the kits could be done locally or at their local residence, overall should not be too difficult to coordinate.  Paroxysmal atrial fibrillation (Multi)  Appreciate excellent communication with Dr. Enamorado -will currently hold his antiplatelet  therapy.  Concern about memory  Appreciate indication with his psychiatrist and neurologist, currently they do not have a strong suspicion of significant cognitive defects, indeed screening test such as a Mini-Mental Status exam were not suggestive, unclear if we can expedite neuropsychiatric testing.  Chronic hip pain, bilateral  Does report that celecoxib is helpful, I remain concerned about potential interactions with other meds, and noted that this probably would not be a reasonable strategy during transplant.  I also encouraged him to consider making this med and as needed medication, and hopefully limiting the number of times he is taking it.  I encouraged him to meet with his primary care physician to discuss other alternatives; his primary care physician reached out and was potentially considering a trial of tramadol, I think this is quite reasonable  -May continue celecoxib 100 mg twice daily  -With follow-up with primary care doctor, consider transition to alternative pain management              Subjective    Reviewed and Past Medical History, Past Surgical History, Family History, and Social History:    Review of Systems   Constitutional:  Negative for activity change, appetite change, chills, diaphoresis, fatigue, fever and unexpected weight change.   HENT:  Negative for congestion, mouth sores, nosebleeds, rhinorrhea, sinus pressure, sinus pain and sore throat.    Eyes:  Negative for visual disturbance.   Respiratory:  Negative for cough and shortness of breath.    Cardiovascular:  Negative for chest pain and leg swelling.   Gastrointestinal:  Negative for abdominal pain, constipation, diarrhea, nausea and vomiting.   Genitourinary:  Negative for difficulty urinating and flank pain.   Musculoskeletal:  Negative for back pain and joint swelling.   Skin:  Negative for rash.   Neurological:  Negative for weakness, light-headedness, numbness and headaches.   Hematological:  Negative for adenopathy. Does  not bruise/bleed easily.   Psychiatric/Behavioral:  Negative for confusion and dysphoric mood. The patient is not nervous/anxious.        Home Medications and Adherence Reviewed with Patient.       Objective     VS:  /73   Pulse 92   Temp 36.5 °C (97.7 °F) (Temporal)   Resp 16   Wt 95.7 kg (210 lb 15.7 oz)   SpO2 95%   BMI 27.84 kg/m²   BSA: 2.22 meters squared  KPS: 70    Physical Exam  Constitutional:       Appearance: Normal appearance.   HENT:      Mouth/Throat:      Mouth: Mucous membranes are moist.   Eyes:      Conjunctiva/sclera: Conjunctivae normal.      Pupils: Pupils are equal, round, and reactive to light.   Cardiovascular:      Rate and Rhythm: Normal rate.   Pulmonary:      Effort: Pulmonary effort is normal.   Abdominal:      General: Abdomen is flat.      Palpations: Abdomen is soft. There is no splenomegaly.   Musculoskeletal:         General: Normal range of motion.   Skin:     General: Skin is warm and dry.   Neurological:      General: No focal deficit present.      Mental Status: He is oriented to person, place, and time.   Psychiatric:         Mood and Affect: Mood normal.         Behavior: Behavior normal.         Laboratory:  Pertinent laboratory results were reviewed and discussed with patient, notably:   White blood cells from earlier this week were 1.9, ANC of 80, hemoglobin 7.0, platelets of 31  Chemistry from 4/7 similarly within normal limits  Labs from today are pending    Pathology:  The pertinent pathology results were reviewed and discussed with the patient.  Notably, no interval updates.        Riky Gomez MD

## 2025-04-10 NOTE — ASSESSMENT & PLAN NOTE
Does report that celecoxib is helpful, I remain concerned about potential interactions with other meds, and noted that this probably would not be a reasonable strategy during transplant.  I also encouraged him to consider making this med and as needed medication, and hopefully limiting the number of times he is taking it.  I encouraged him to meet with his primary care physician to discuss other alternatives; his primary care physician reached out and was potentially considering a trial of tramadol, I think this is quite reasonable  -May continue celecoxib 100 mg twice daily  -With follow-up with primary care doctor, consider transition to alternative pain management

## 2025-04-10 NOTE — PATIENT INSTRUCTIONS
We would like to welcome you to our clinic and introduce you to our core team members.  We are here to assist you through your care at  [facility].      Dr. Riky Gomez is a hematologist/oncologist who is an expert with hematologic malignancies such as myeloid neoplasms and acute leukemias. He will be your primary provider for hematologic and oncologic care.  Some of our patients will proceed to a Hematopoietic Stem Cell Transplant, and in those cases, Dr. Gomez will hand off your care to a transplant specific provider at the time of transplant.       Ms. Nikki Kuhlenschmidt, CNP is a Nurse Practitioner who is a part of Dr. Gomez's team. She will work collaboratively with Dr. Gomez to meet your goals. This often may include seeing you for more urgent appointments or follow-up visits. Frequently, Ms. Patel will meet with you regularly once we have implement treatment or follow up plans. They follow the same protocols and guidelines for your condition and communicate closely with Dr. Gomez.       Ms. Yarelis Rios RN is the primary Nurse Coordinator at the [facility] who works directly with Dr. Gomez and Ms. Patel. She can be reached by calling the office as well. Yarelis is in clinic Monday through Friday, off on Wednesdays.       Ms. Sandhya Cao RN is the primary Nurse Coordinator at the [facility] working with Dr. Gomez and Ms. Patel on Thursdays. She is in clinic Monday through Friday, off on Tuesdays.    The [facility] phone number is [phone]. Please use this number to contact his care team regardless of which office you use to access care. This number is the most direct way to communicate with all the members of the care team.  We are part of a larger malignant hematology team that includes several physician specialists, and other nurses and team members will also  be available to help you with your care.     For urgent clinical issues, we ask that you reach out to our team at [phone] (option 5, then option 2) to reach our dedicated triage phone nurse line. They are available to assist with urgent clinical issues even on weekends and after clinic hours.     For non-urgent clinical questions and refill requests please consider reaching out to our team via [employer]. For your awareness, [employer] messages are routed to a nursing pool of hematology nurses to ensure they are screened in a timely fashion and then routed to primary team members.  We do our best to answer [employer] queries within 72 hours, and If you do not hear back from us, please feel empowered to call or reach out again or call. Please do NOT use [employer] messages if you experiencing symptoms (how you feel) even if you feel it is minor.  Symptoms often warrant follow up questions from an expert nurse to fully triage the issue.     For your convenience, Dr. Gomez sees patients at different [place] locations including both [facility] and [facility]. While we try to make your appointments as convenient as possible, occasionally a visit to another location may be necessary to provide the best care for you.    We look forward to working with you to meet your healthcare goals.

## 2025-04-11 ENCOUNTER — INFUSION (OUTPATIENT)
Dept: HEMATOLOGY/ONCOLOGY | Facility: CLINIC | Age: 65
End: 2025-04-11
Payer: MEDICARE

## 2025-04-11 ENCOUNTER — SOCIAL WORK (OUTPATIENT)
Dept: HEMATOLOGY/ONCOLOGY | Facility: CLINIC | Age: 65
End: 2025-04-11
Payer: MEDICARE

## 2025-04-11 ENCOUNTER — DOCUMENTATION (OUTPATIENT)
Dept: HEMATOLOGY/ONCOLOGY | Facility: HOSPITAL | Age: 65
End: 2025-04-11
Payer: MEDICARE

## 2025-04-11 VITALS
RESPIRATION RATE: 16 BRPM | HEART RATE: 85 BPM | DIASTOLIC BLOOD PRESSURE: 66 MMHG | OXYGEN SATURATION: 95 % | WEIGHT: 211.42 LBS | SYSTOLIC BLOOD PRESSURE: 111 MMHG | TEMPERATURE: 97.7 F | BODY MASS INDEX: 29.53 KG/M2

## 2025-04-11 DIAGNOSIS — C92.00 ACUTE MYELOID LEUKEMIA NOT HAVING ACHIEVED REMISSION (MULTI): ICD-10-CM

## 2025-04-11 DIAGNOSIS — C92.00 ACUTE MYELOID LEUKEMIA NOT HAVING ACHIEVED REMISSION (MULTI): Primary | ICD-10-CM

## 2025-04-11 LAB
ANION GAP SERPL CALC-SCNC: 11 MMOL/L (ref 10–20)
BASOPHILS # BLD AUTO: 0.01 X10*3/UL (ref 0–0.1)
BASOPHILS NFR BLD AUTO: 1.8 %
BUN SERPL-MCNC: 31 MG/DL (ref 6–23)
CALCIUM SERPL-MCNC: 8.8 MG/DL (ref 8.6–10.3)
CHLORIDE SERPL-SCNC: 103 MMOL/L (ref 98–107)
CO2 SERPL-SCNC: 24 MMOL/L (ref 21–32)
CREAT SERPL-MCNC: 1.22 MG/DL (ref 0.5–1.3)
DACRYOCYTES BLD QL SMEAR: NORMAL
EGFRCR SERPLBLD CKD-EPI 2021: 65 ML/MIN/1.73M*2
EOSINOPHIL # BLD AUTO: 0 X10*3/UL (ref 0–0.7)
EOSINOPHIL NFR BLD AUTO: 0 %
ERYTHROCYTE [DISTWIDTH] IN BLOOD BY AUTOMATED COUNT: 15.9 % (ref 11.5–14.5)
GLUCOSE SERPL-MCNC: 101 MG/DL (ref 74–99)
HCT VFR BLD AUTO: 20.1 % (ref 41–52)
HGB BLD-MCNC: 6.8 G/DL (ref 13.5–17.5)
IMM GRANULOCYTES # BLD AUTO: 0 X10*3/UL (ref 0–0.7)
IMM GRANULOCYTES NFR BLD AUTO: 0 % (ref 0–0.9)
LDH SERPL L TO P-CCNC: 1093 U/L (ref 84–246)
LYMPHOCYTES # BLD AUTO: 0.38 X10*3/UL (ref 1.2–4.8)
LYMPHOCYTES NFR BLD AUTO: 69.1 %
MCH RBC QN AUTO: 37.8 PG (ref 26–34)
MCHC RBC AUTO-ENTMCNC: 33.8 G/DL (ref 32–36)
MCV RBC AUTO: 112 FL (ref 80–100)
MONOCYTES # BLD AUTO: 0.1 X10*3/UL (ref 0.1–1)
MONOCYTES NFR BLD AUTO: 18.2 %
NEUTROPHILS # BLD AUTO: 0.06 X10*3/UL (ref 1.2–7.7)
NEUTROPHILS NFR BLD AUTO: 10.9 %
NRBC BLD-RTO: ABNORMAL /100{WBCS}
PHOSPHATE SERPL-MCNC: 5.5 MG/DL (ref 2.5–4.9)
PLATELET # BLD AUTO: 22 X10*3/UL (ref 150–450)
POLYCHROMASIA BLD QL SMEAR: NORMAL
POTASSIUM SERPL-SCNC: 4.2 MMOL/L (ref 3.5–5.3)
RBC # BLD AUTO: 1.8 X10*6/UL (ref 4.5–5.9)
RBC MORPH BLD: NORMAL
SODIUM SERPL-SCNC: 134 MMOL/L (ref 136–145)
URATE SERPL-MCNC: 7.6 MG/DL (ref 4–7.5)
WBC # BLD AUTO: 0.6 X10*3/UL (ref 4.4–11.3)

## 2025-04-11 PROCEDURE — 84100 ASSAY OF PHOSPHORUS: CPT

## 2025-04-11 PROCEDURE — 83615 LACTATE (LD) (LDH) ENZYME: CPT

## 2025-04-11 PROCEDURE — 2500000004 HC RX 250 GENERAL PHARMACY W/ HCPCS (ALT 636 FOR OP/ED): Performed by: NURSE PRACTITIONER

## 2025-04-11 PROCEDURE — 2500000004 HC RX 250 GENERAL PHARMACY W/ HCPCS (ALT 636 FOR OP/ED): Performed by: INTERNAL MEDICINE

## 2025-04-11 PROCEDURE — 80048 BASIC METABOLIC PNL TOTAL CA: CPT

## 2025-04-11 PROCEDURE — 85025 COMPLETE CBC W/AUTO DIFF WBC: CPT

## 2025-04-11 PROCEDURE — 84550 ASSAY OF BLOOD/URIC ACID: CPT

## 2025-04-11 PROCEDURE — 86900 BLOOD TYPING SEROLOGIC ABO: CPT

## 2025-04-11 RX ORDER — ONDANSETRON HYDROCHLORIDE 8 MG/1
8 TABLET, FILM COATED ORAL ONCE
Status: COMPLETED | OUTPATIENT
Start: 2025-04-11 | End: 2025-04-11

## 2025-04-11 RX ORDER — ALBUTEROL SULFATE 0.83 MG/ML
3 SOLUTION RESPIRATORY (INHALATION) AS NEEDED
Status: CANCELLED | OUTPATIENT
Start: 2025-04-11

## 2025-04-11 RX ORDER — PROCHLORPERAZINE EDISYLATE 5 MG/ML
10 INJECTION INTRAMUSCULAR; INTRAVENOUS EVERY 6 HOURS PRN
Status: DISCONTINUED | OUTPATIENT
Start: 2025-04-11 | End: 2025-04-11 | Stop reason: HOSPADM

## 2025-04-11 RX ORDER — FAMOTIDINE 10 MG/ML
20 INJECTION, SOLUTION INTRAVENOUS ONCE AS NEEDED
Status: CANCELLED | OUTPATIENT
Start: 2025-04-11

## 2025-04-11 RX ORDER — PROCHLORPERAZINE MALEATE 10 MG
10 TABLET ORAL EVERY 6 HOURS PRN
Status: DISCONTINUED | OUTPATIENT
Start: 2025-04-11 | End: 2025-04-11 | Stop reason: HOSPADM

## 2025-04-11 RX ORDER — ALBUTEROL SULFATE 0.83 MG/ML
3 SOLUTION RESPIRATORY (INHALATION) AS NEEDED
Status: DISCONTINUED | OUTPATIENT
Start: 2025-04-11 | End: 2025-04-11 | Stop reason: HOSPADM

## 2025-04-11 RX ORDER — DIPHENHYDRAMINE HYDROCHLORIDE 50 MG/ML
50 INJECTION, SOLUTION INTRAMUSCULAR; INTRAVENOUS AS NEEDED
Status: DISCONTINUED | OUTPATIENT
Start: 2025-04-11 | End: 2025-04-11 | Stop reason: HOSPADM

## 2025-04-11 RX ORDER — EPINEPHRINE 0.3 MG/.3ML
0.3 INJECTION SUBCUTANEOUS EVERY 5 MIN PRN
Status: CANCELLED | OUTPATIENT
Start: 2025-04-11

## 2025-04-11 RX ORDER — DIPHENHYDRAMINE HYDROCHLORIDE 50 MG/ML
50 INJECTION, SOLUTION INTRAMUSCULAR; INTRAVENOUS AS NEEDED
Status: CANCELLED | OUTPATIENT
Start: 2025-04-11

## 2025-04-11 RX ORDER — EPINEPHRINE 0.3 MG/.3ML
0.3 INJECTION SUBCUTANEOUS EVERY 5 MIN PRN
Status: DISCONTINUED | OUTPATIENT
Start: 2025-04-11 | End: 2025-04-11 | Stop reason: HOSPADM

## 2025-04-11 RX ORDER — FAMOTIDINE 10 MG/ML
20 INJECTION, SOLUTION INTRAVENOUS ONCE AS NEEDED
Status: DISCONTINUED | OUTPATIENT
Start: 2025-04-11 | End: 2025-04-11 | Stop reason: HOSPADM

## 2025-04-11 RX ADMIN — AZACITIDINE 170 MG: 100 INJECTION, POWDER, LYOPHILIZED, FOR SOLUTION INTRAVENOUS; SUBCUTANEOUS at 15:46

## 2025-04-11 RX ADMIN — ONDANSETRON HYDROCHLORIDE 8 MG: 8 TABLET, FILM COATED ORAL at 15:32

## 2025-04-11 RX ADMIN — SODIUM CHLORIDE 500 ML: 9 INJECTION, SOLUTION INTRAVENOUS at 14:36

## 2025-04-11 ASSESSMENT — PAIN SCALES - GENERAL: PAINLEVEL_OUTOF10: 0-NO PAIN

## 2025-04-11 NOTE — PROGRESS NOTES
SANDRAW followed up with patient and family today regarding information on a lift reclining chair.  Per patient, he was scheduled for bilateral hip replacements prior to his diagnosis.  Patient has since delayed his surgery and continues to have pain in the hip areas making it difficult for him to stand up from a chair.  He inquired if he would be able to order a lift reclining chair through his insurance.     LSW reached out to [facility] regarding the process to order a lift chair.  Provided patient with information about how to obtain the DME.  Patient would need to pay up front for the chair and then [facility] would submit the prescription and certificate of medical necessity to the patients' insurance requesting reimbursement.  Per [facility], insurance typically will only pay about $200-$300 and they are only reimbursing for the lift mechanism not the chair itself.      It was also recommended to patient that the script and paperwork needed should be completed by either his PCP or his Orthopedic surgeon as they will need to provide specifics about the patient's hip pain/concerns.  Patient stated his understanding and will plan to follow up with his PCP.  Step By Step information about ordering the chair was provided to the patient for review.  LSW also provided them with the website for [facility] so that they could look into options for a chair.  No other questions or concerns noted at this time.  Social work will continue to follow.

## 2025-04-12 ENCOUNTER — INFUSION (OUTPATIENT)
Dept: HEMATOLOGY/ONCOLOGY | Facility: HOSPITAL | Age: 65
End: 2025-04-12
Payer: MEDICARE

## 2025-04-12 VITALS
DIASTOLIC BLOOD PRESSURE: 73 MMHG | WEIGHT: 213 LBS | SYSTOLIC BLOOD PRESSURE: 135 MMHG | OXYGEN SATURATION: 100 % | TEMPERATURE: 97.3 F | RESPIRATION RATE: 16 BRPM | HEART RATE: 73 BPM | BODY MASS INDEX: 29.75 KG/M2

## 2025-04-12 DIAGNOSIS — C92.00 ACUTE MYELOID LEUKEMIA NOT HAVING ACHIEVED REMISSION (MULTI): ICD-10-CM

## 2025-04-12 LAB
ABO GROUP (TYPE) IN BLOOD: NORMAL
ALBUMIN SERPL BCP-MCNC: 3.9 G/DL (ref 3.4–5)
ALP SERPL-CCNC: 103 U/L (ref 33–136)
ALT SERPL W P-5'-P-CCNC: 112 U/L (ref 10–52)
ANION GAP SERPL CALC-SCNC: 11 MMOL/L (ref 10–20)
ANTIBODY SCREEN: NORMAL
AST SERPL W P-5'-P-CCNC: 58 U/L (ref 9–39)
BASOPHILS # BLD AUTO: 0 X10*3/UL (ref 0–0.1)
BASOPHILS NFR BLD AUTO: 0 %
BILIRUB SERPL-MCNC: 0.6 MG/DL (ref 0–1.2)
BLOOD EXPIRATION DATE: NORMAL
BLOOD EXPIRATION DATE: NORMAL
BUN SERPL-MCNC: 29 MG/DL (ref 6–23)
CALCIUM SERPL-MCNC: 9.1 MG/DL (ref 8.6–10.6)
CHLORIDE SERPL-SCNC: 105 MMOL/L (ref 98–107)
CO2 SERPL-SCNC: 24 MMOL/L (ref 21–32)
CREAT SERPL-MCNC: 1.19 MG/DL (ref 0.5–1.3)
DACRYOCYTES BLD QL SMEAR: NORMAL
DISPENSE STATUS: NORMAL
DISPENSE STATUS: NORMAL
EGFRCR SERPLBLD CKD-EPI 2021: 67 ML/MIN/1.73M*2
EOSINOPHIL # BLD AUTO: 0 X10*3/UL (ref 0–0.7)
EOSINOPHIL NFR BLD AUTO: 0 %
ERYTHROCYTE [DISTWIDTH] IN BLOOD BY AUTOMATED COUNT: 15.9 % (ref 11.5–14.5)
GLUCOSE SERPL-MCNC: 103 MG/DL (ref 74–99)
HCT VFR BLD AUTO: 16.5 % (ref 41–52)
HGB BLD-MCNC: 5.8 G/DL (ref 13.5–17.5)
IMM GRANULOCYTES # BLD AUTO: 0 X10*3/UL (ref 0–0.7)
IMM GRANULOCYTES NFR BLD AUTO: 0 % (ref 0–0.9)
LYMPHOCYTES # BLD AUTO: 0.89 X10*3/UL (ref 1.2–4.8)
LYMPHOCYTES NFR BLD AUTO: 84 %
MCH RBC QN AUTO: 37.2 PG (ref 26–34)
MCHC RBC AUTO-ENTMCNC: 35.2 G/DL (ref 32–36)
MCV RBC AUTO: 106 FL (ref 80–100)
MONOCYTES # BLD AUTO: 0.11 X10*3/UL (ref 0.1–1)
MONOCYTES NFR BLD AUTO: 10.4 %
NEUTROPHILS # BLD AUTO: 0.06 X10*3/UL (ref 1.2–7.7)
NEUTROPHILS NFR BLD AUTO: 5.6 %
NRBC BLD-RTO: 0 /100 WBCS (ref 0–0)
OVALOCYTES BLD QL SMEAR: NORMAL
PHOSPHATE SERPL-MCNC: 4.6 MG/DL (ref 2.5–4.9)
PLATELET # BLD AUTO: 18 X10*3/UL (ref 150–450)
POTASSIUM SERPL-SCNC: 4.4 MMOL/L (ref 3.5–5.3)
PRODUCT BLOOD TYPE: 5100
PRODUCT BLOOD TYPE: 5100
PRODUCT CODE: NORMAL
PRODUCT CODE: NORMAL
PROT SERPL-MCNC: 6.9 G/DL (ref 6.4–8.2)
RBC # BLD AUTO: 1.56 X10*6/UL (ref 4.5–5.9)
RBC MORPH BLD: NORMAL
RH FACTOR (ANTIGEN D): NORMAL
SODIUM SERPL-SCNC: 136 MMOL/L (ref 136–145)
UNIT ABO: NORMAL
UNIT ABO: NORMAL
UNIT NUMBER: NORMAL
UNIT NUMBER: NORMAL
UNIT RH: NORMAL
UNIT RH: NORMAL
UNIT VOLUME: 322
UNIT VOLUME: 350
URATE SERPL-MCNC: 6.8 MG/DL (ref 4–7.5)
WBC # BLD AUTO: 1.1 X10*3/UL (ref 4.4–11.3)
XM INTEP: NORMAL
XM INTEP: NORMAL

## 2025-04-12 PROCEDURE — 80053 COMPREHEN METABOLIC PANEL: CPT

## 2025-04-12 PROCEDURE — 2500000004 HC RX 250 GENERAL PHARMACY W/ HCPCS (ALT 636 FOR OP/ED): Performed by: NURSE PRACTITIONER

## 2025-04-12 PROCEDURE — 85025 COMPLETE CBC W/AUTO DIFF WBC: CPT

## 2025-04-12 PROCEDURE — 36430 TRANSFUSION BLD/BLD COMPNT: CPT

## 2025-04-12 PROCEDURE — 96360 HYDRATION IV INFUSION INIT: CPT | Mod: INF

## 2025-04-12 PROCEDURE — 84100 ASSAY OF PHOSPHORUS: CPT

## 2025-04-12 PROCEDURE — 84550 ASSAY OF BLOOD/URIC ACID: CPT

## 2025-04-12 RX ORDER — FAMOTIDINE 10 MG/ML
20 INJECTION, SOLUTION INTRAVENOUS ONCE AS NEEDED
OUTPATIENT
Start: 2025-04-12

## 2025-04-12 RX ORDER — FAMOTIDINE 10 MG/ML
20 INJECTION, SOLUTION INTRAVENOUS ONCE AS NEEDED
Status: DISCONTINUED | OUTPATIENT
Start: 2025-04-12 | End: 2025-04-12 | Stop reason: HOSPADM

## 2025-04-12 RX ORDER — EPINEPHRINE 0.3 MG/.3ML
0.3 INJECTION SUBCUTANEOUS EVERY 5 MIN PRN
Status: DISCONTINUED | OUTPATIENT
Start: 2025-04-12 | End: 2025-04-12 | Stop reason: HOSPADM

## 2025-04-12 RX ORDER — DIPHENHYDRAMINE HYDROCHLORIDE 50 MG/ML
50 INJECTION, SOLUTION INTRAMUSCULAR; INTRAVENOUS AS NEEDED
OUTPATIENT
Start: 2025-04-12

## 2025-04-12 RX ORDER — DIPHENHYDRAMINE HYDROCHLORIDE 50 MG/ML
50 INJECTION, SOLUTION INTRAMUSCULAR; INTRAVENOUS AS NEEDED
Status: DISCONTINUED | OUTPATIENT
Start: 2025-04-12 | End: 2025-04-12 | Stop reason: HOSPADM

## 2025-04-12 RX ORDER — ALBUTEROL SULFATE 0.83 MG/ML
3 SOLUTION RESPIRATORY (INHALATION) AS NEEDED
OUTPATIENT
Start: 2025-04-12

## 2025-04-12 RX ORDER — EPINEPHRINE 0.3 MG/.3ML
0.3 INJECTION SUBCUTANEOUS EVERY 5 MIN PRN
OUTPATIENT
Start: 2025-04-12

## 2025-04-12 RX ORDER — ALBUTEROL SULFATE 0.83 MG/ML
3 SOLUTION RESPIRATORY (INHALATION) AS NEEDED
Status: DISCONTINUED | OUTPATIENT
Start: 2025-04-12 | End: 2025-04-12 | Stop reason: HOSPADM

## 2025-04-12 RX ADMIN — SODIUM CHLORIDE 500 ML: 9 INJECTION, SOLUTION INTRAVENOUS at 11:16

## 2025-04-12 ASSESSMENT — PAIN SCALES - GENERAL: PAINLEVEL_OUTOF10: 0-NO PAIN

## 2025-04-12 NOTE — PROGRESS NOTES
Pt here for PRBC transfusion for Hgb 6.8 yesterday.  Today Hgb dropped to 5.8.  Marilyn Atwood CNP notified and ordered additional unit of blood.  Also received IVF bolus per order.  CMP resulted and not indicating tumor lysis syndrome.  Reviewed with Robert and per her no need for hydration appointment tomorrow.  Patient and family notified and verbalized understanding.  Discharged in stable condition.

## 2025-04-13 ENCOUNTER — APPOINTMENT (OUTPATIENT)
Dept: HEMATOLOGY/ONCOLOGY | Facility: HOSPITAL | Age: 65
End: 2025-04-13
Payer: MEDICARE

## 2025-04-14 ENCOUNTER — INFUSION (OUTPATIENT)
Dept: HEMATOLOGY/ONCOLOGY | Facility: CLINIC | Age: 65
End: 2025-04-14
Payer: MEDICARE

## 2025-04-14 ENCOUNTER — DOCUMENTATION (OUTPATIENT)
Dept: HEMATOLOGY/ONCOLOGY | Facility: HOSPITAL | Age: 65
End: 2025-04-14
Payer: MEDICARE

## 2025-04-14 ENCOUNTER — LAB (OUTPATIENT)
Dept: LAB | Facility: CLINIC | Age: 65
End: 2025-04-14
Payer: MEDICARE

## 2025-04-14 VITALS
WEIGHT: 214.07 LBS | DIASTOLIC BLOOD PRESSURE: 85 MMHG | TEMPERATURE: 97.7 F | RESPIRATION RATE: 16 BRPM | BODY MASS INDEX: 29.9 KG/M2 | OXYGEN SATURATION: 96 % | HEART RATE: 90 BPM | SYSTOLIC BLOOD PRESSURE: 139 MMHG

## 2025-04-14 DIAGNOSIS — C92.00 ACUTE MYELOID LEUKEMIA NOT HAVING ACHIEVED REMISSION (MULTI): Primary | ICD-10-CM

## 2025-04-14 DIAGNOSIS — C92.00 ACUTE MYELOID LEUKEMIA NOT HAVING ACHIEVED REMISSION (MULTI): ICD-10-CM

## 2025-04-14 LAB
ALBUMIN SERPL BCP-MCNC: 4 G/DL (ref 3.4–5)
ALP SERPL-CCNC: 88 U/L (ref 33–136)
ALT SERPL W P-5'-P-CCNC: 81 U/L (ref 10–52)
ANION GAP SERPL CALC-SCNC: 12 MMOL/L (ref 10–20)
AST SERPL W P-5'-P-CCNC: 37 U/L (ref 9–39)
BASOPHILS # BLD AUTO: 0.01 X10*3/UL (ref 0–0.1)
BASOPHILS NFR BLD AUTO: 0.8 %
BILIRUB SERPL-MCNC: 0.9 MG/DL (ref 0–1.2)
BUN SERPL-MCNC: 19 MG/DL (ref 6–23)
CALCIUM SERPL-MCNC: 8.8 MG/DL (ref 8.6–10.3)
CHLORIDE SERPL-SCNC: 106 MMOL/L (ref 98–107)
CO2 SERPL-SCNC: 24 MMOL/L (ref 21–32)
CREAT SERPL-MCNC: 0.92 MG/DL (ref 0.5–1.3)
DACRYOCYTES BLD QL SMEAR: NORMAL
EGFRCR SERPLBLD CKD-EPI 2021: >90 ML/MIN/1.73M*2
EOSINOPHIL # BLD AUTO: 0 X10*3/UL (ref 0–0.7)
EOSINOPHIL NFR BLD AUTO: 0 %
ERYTHROCYTE [DISTWIDTH] IN BLOOD BY AUTOMATED COUNT: 20.7 % (ref 11.5–14.5)
GLUCOSE SERPL-MCNC: 102 MG/DL (ref 74–99)
HCT VFR BLD AUTO: 23.6 % (ref 41–52)
HGB BLD-MCNC: 8.1 G/DL (ref 13.5–17.5)
IMM GRANULOCYTES # BLD AUTO: 0.02 X10*3/UL (ref 0–0.7)
IMM GRANULOCYTES NFR BLD AUTO: 1.6 % (ref 0–0.9)
LDH SERPL L TO P-CCNC: 421 U/L (ref 84–246)
LYMPHOCYTES # BLD AUTO: 1.11 X10*3/UL (ref 1.2–4.8)
LYMPHOCYTES NFR BLD AUTO: 86 %
MCH RBC QN AUTO: 35.5 PG (ref 26–34)
MCHC RBC AUTO-ENTMCNC: 34.3 G/DL (ref 32–36)
MCV RBC AUTO: 104 FL (ref 80–100)
MONOCYTES # BLD AUTO: 0.1 X10*3/UL (ref 0.1–1)
MONOCYTES NFR BLD AUTO: 7.8 %
NEUTROPHILS # BLD AUTO: 0.05 X10*3/UL (ref 1.2–7.7)
NEUTROPHILS NFR BLD AUTO: 3.8 %
NRBC BLD-RTO: ABNORMAL /100{WBCS}
OVALOCYTES BLD QL SMEAR: NORMAL
PLATELET # BLD AUTO: 15 X10*3/UL (ref 150–450)
POTASSIUM SERPL-SCNC: 4.2 MMOL/L (ref 3.5–5.3)
PROT SERPL-MCNC: 6.8 G/DL (ref 6.4–8.2)
RBC # BLD AUTO: 2.28 X10*6/UL (ref 4.5–5.9)
RBC MORPH BLD: NORMAL
SODIUM SERPL-SCNC: 138 MMOL/L (ref 136–145)
URATE SERPL-MCNC: 4.4 MG/DL (ref 4–7.5)
WBC # BLD AUTO: 1.3 X10*3/UL (ref 4.4–11.3)

## 2025-04-14 PROCEDURE — 96401 CHEMO ANTI-NEOPL SQ/IM: CPT

## 2025-04-14 PROCEDURE — 2500000004 HC RX 250 GENERAL PHARMACY W/ HCPCS (ALT 636 FOR OP/ED): Mod: JW | Performed by: INTERNAL MEDICINE

## 2025-04-14 PROCEDURE — 36415 COLL VENOUS BLD VENIPUNCTURE: CPT

## 2025-04-14 PROCEDURE — 85025 COMPLETE CBC W/AUTO DIFF WBC: CPT

## 2025-04-14 PROCEDURE — 86901 BLOOD TYPING SEROLOGIC RH(D): CPT

## 2025-04-14 PROCEDURE — 83615 LACTATE (LD) (LDH) ENZYME: CPT

## 2025-04-14 PROCEDURE — 80053 COMPREHEN METABOLIC PANEL: CPT

## 2025-04-14 PROCEDURE — 2500000004 HC RX 250 GENERAL PHARMACY W/ HCPCS (ALT 636 FOR OP/ED): Performed by: INTERNAL MEDICINE

## 2025-04-14 PROCEDURE — 84550 ASSAY OF BLOOD/URIC ACID: CPT

## 2025-04-14 RX ORDER — DIPHENHYDRAMINE HYDROCHLORIDE 50 MG/ML
50 INJECTION, SOLUTION INTRAMUSCULAR; INTRAVENOUS AS NEEDED
Status: DISCONTINUED | OUTPATIENT
Start: 2025-04-14 | End: 2025-04-14 | Stop reason: HOSPADM

## 2025-04-14 RX ORDER — PROCHLORPERAZINE MALEATE 10 MG
10 TABLET ORAL EVERY 6 HOURS PRN
Status: DISCONTINUED | OUTPATIENT
Start: 2025-04-14 | End: 2025-04-14 | Stop reason: HOSPADM

## 2025-04-14 RX ORDER — FAMOTIDINE 10 MG/ML
20 INJECTION, SOLUTION INTRAVENOUS ONCE AS NEEDED
Status: DISCONTINUED | OUTPATIENT
Start: 2025-04-14 | End: 2025-04-14 | Stop reason: HOSPADM

## 2025-04-14 RX ORDER — ONDANSETRON HYDROCHLORIDE 8 MG/1
8 TABLET, FILM COATED ORAL ONCE
Status: COMPLETED | OUTPATIENT
Start: 2025-04-14 | End: 2025-04-14

## 2025-04-14 RX ORDER — EPINEPHRINE 0.3 MG/.3ML
0.3 INJECTION SUBCUTANEOUS EVERY 5 MIN PRN
Status: DISCONTINUED | OUTPATIENT
Start: 2025-04-14 | End: 2025-04-14 | Stop reason: HOSPADM

## 2025-04-14 RX ORDER — ALBUTEROL SULFATE 0.83 MG/ML
3 SOLUTION RESPIRATORY (INHALATION) AS NEEDED
Status: DISCONTINUED | OUTPATIENT
Start: 2025-04-14 | End: 2025-04-14 | Stop reason: HOSPADM

## 2025-04-14 RX ORDER — PROCHLORPERAZINE EDISYLATE 5 MG/ML
10 INJECTION INTRAMUSCULAR; INTRAVENOUS EVERY 6 HOURS PRN
Status: DISCONTINUED | OUTPATIENT
Start: 2025-04-14 | End: 2025-04-14 | Stop reason: HOSPADM

## 2025-04-14 RX ADMIN — ONDANSETRON HYDROCHLORIDE 8 MG: 8 TABLET, FILM COATED ORAL at 09:09

## 2025-04-14 RX ADMIN — AZACITIDINE 170 MG: 100 INJECTION, POWDER, LYOPHILIZED, FOR SOLUTION INTRAVENOUS; SUBCUTANEOUS at 09:45

## 2025-04-14 ASSESSMENT — PAIN SCALES - GENERAL: PAINLEVEL_OUTOF10: 5

## 2025-04-14 NOTE — PROGRESS NOTES
Verified Mr. Inman HELD venetoclex over the weekend.   Hgb 8.1, Plts 15 will monitor.   - no need for blood/plts today per parameters. Labs scheduled to be drawn again Wednesday.      He can restart Venetoclex per Nikki LAND NP.  Clarified dosage.     Pt will return tomorrow for next Aza injections.

## 2025-04-14 NOTE — PROGRESS NOTES
Notified by Bennett Arguelles in Lab Services that PLT 15. Secure Chat sent to Nikki Patel NP. Pt has appointment at [facility].

## 2025-04-15 ENCOUNTER — INFUSION (OUTPATIENT)
Dept: HEMATOLOGY/ONCOLOGY | Facility: CLINIC | Age: 65
End: 2025-04-15
Payer: MEDICARE

## 2025-04-15 VITALS
TEMPERATURE: 96.6 F | WEIGHT: 212.41 LBS | OXYGEN SATURATION: 95 % | DIASTOLIC BLOOD PRESSURE: 74 MMHG | RESPIRATION RATE: 16 BRPM | HEART RATE: 90 BPM | SYSTOLIC BLOOD PRESSURE: 114 MMHG | BODY MASS INDEX: 29.74 KG/M2 | HEIGHT: 71 IN

## 2025-04-15 DIAGNOSIS — C92.00 ACUTE MYELOID LEUKEMIA NOT HAVING ACHIEVED REMISSION (MULTI): ICD-10-CM

## 2025-04-15 DIAGNOSIS — C92.00 ACUTE MYELOID LEUKEMIA NOT HAVING ACHIEVED REMISSION (MULTI): Primary | ICD-10-CM

## 2025-04-15 LAB
ABO GROUP (TYPE) IN BLOOD: NORMAL
ANION GAP SERPL CALC-SCNC: 11 MMOL/L (ref 10–20)
ANTIBODY SCREEN: NORMAL
BUN SERPL-MCNC: 26 MG/DL (ref 6–23)
CALCIUM SERPL-MCNC: 9.5 MG/DL (ref 8.6–10.3)
CHLORIDE SERPL-SCNC: 104 MMOL/L (ref 98–107)
CO2 SERPL-SCNC: 27 MMOL/L (ref 21–32)
CREAT SERPL-MCNC: 1.05 MG/DL (ref 0.5–1.3)
EGFRCR SERPLBLD CKD-EPI 2021: 78 ML/MIN/1.73M*2
GLUCOSE SERPL-MCNC: 139 MG/DL (ref 74–99)
LDH SERPL L TO P-CCNC: 331 U/L (ref 84–246)
PHOSPHATE SERPL-MCNC: 5 MG/DL (ref 2.5–4.9)
POTASSIUM SERPL-SCNC: 4.2 MMOL/L (ref 3.5–5.3)
RH FACTOR (ANTIGEN D): NORMAL
SODIUM SERPL-SCNC: 138 MMOL/L (ref 136–145)
URATE SERPL-MCNC: 4.5 MG/DL (ref 4–7.5)

## 2025-04-15 PROCEDURE — 84100 ASSAY OF PHOSPHORUS: CPT

## 2025-04-15 PROCEDURE — 2500000004 HC RX 250 GENERAL PHARMACY W/ HCPCS (ALT 636 FOR OP/ED): Performed by: INTERNAL MEDICINE

## 2025-04-15 PROCEDURE — 84550 ASSAY OF BLOOD/URIC ACID: CPT

## 2025-04-15 PROCEDURE — 82374 ASSAY BLOOD CARBON DIOXIDE: CPT

## 2025-04-15 PROCEDURE — 96401 CHEMO ANTI-NEOPL SQ/IM: CPT

## 2025-04-15 PROCEDURE — 83615 LACTATE (LD) (LDH) ENZYME: CPT

## 2025-04-15 RX ORDER — PROCHLORPERAZINE EDISYLATE 5 MG/ML
10 INJECTION INTRAMUSCULAR; INTRAVENOUS EVERY 6 HOURS PRN
Status: DISCONTINUED | OUTPATIENT
Start: 2025-04-15 | End: 2025-04-15 | Stop reason: HOSPADM

## 2025-04-15 RX ORDER — PROCHLORPERAZINE MALEATE 10 MG
10 TABLET ORAL EVERY 6 HOURS PRN
Status: DISCONTINUED | OUTPATIENT
Start: 2025-04-15 | End: 2025-04-15 | Stop reason: HOSPADM

## 2025-04-15 RX ORDER — HEPARIN 100 UNIT/ML
500 SYRINGE INTRAVENOUS AS NEEDED
Status: CANCELLED | OUTPATIENT
Start: 2025-04-15

## 2025-04-15 RX ORDER — ALBUTEROL SULFATE 0.83 MG/ML
3 SOLUTION RESPIRATORY (INHALATION) AS NEEDED
Status: DISCONTINUED | OUTPATIENT
Start: 2025-04-15 | End: 2025-04-15 | Stop reason: HOSPADM

## 2025-04-15 RX ORDER — HEPARIN SODIUM,PORCINE/PF 10 UNIT/ML
50 SYRINGE (ML) INTRAVENOUS AS NEEDED
Status: CANCELLED | OUTPATIENT
Start: 2025-04-15

## 2025-04-15 RX ORDER — DIPHENHYDRAMINE HYDROCHLORIDE 50 MG/ML
50 INJECTION, SOLUTION INTRAMUSCULAR; INTRAVENOUS AS NEEDED
Status: DISCONTINUED | OUTPATIENT
Start: 2025-04-15 | End: 2025-04-15 | Stop reason: HOSPADM

## 2025-04-15 RX ORDER — ONDANSETRON HYDROCHLORIDE 8 MG/1
8 TABLET, FILM COATED ORAL ONCE
Status: COMPLETED | OUTPATIENT
Start: 2025-04-15 | End: 2025-04-15

## 2025-04-15 RX ORDER — EPINEPHRINE 0.3 MG/.3ML
0.3 INJECTION SUBCUTANEOUS EVERY 5 MIN PRN
Status: DISCONTINUED | OUTPATIENT
Start: 2025-04-15 | End: 2025-04-15 | Stop reason: HOSPADM

## 2025-04-15 RX ORDER — FAMOTIDINE 10 MG/ML
20 INJECTION, SOLUTION INTRAVENOUS ONCE AS NEEDED
Status: DISCONTINUED | OUTPATIENT
Start: 2025-04-15 | End: 2025-04-15 | Stop reason: HOSPADM

## 2025-04-15 RX ADMIN — ONDANSETRON HYDROCHLORIDE 8 MG: 8 TABLET, FILM COATED ORAL at 11:41

## 2025-04-15 RX ADMIN — AZACITIDINE 170 MG: 100 INJECTION, POWDER, LYOPHILIZED, FOR SOLUTION INTRAVENOUS; SUBCUTANEOUS at 12:03

## 2025-04-15 ASSESSMENT — PAIN SCALES - GENERAL: PAINLEVEL_OUTOF10: 5

## 2025-04-16 ENCOUNTER — DOCUMENTATION (OUTPATIENT)
Dept: HEMATOLOGY/ONCOLOGY | Facility: HOSPITAL | Age: 65
End: 2025-04-16

## 2025-04-16 ENCOUNTER — INFUSION (OUTPATIENT)
Dept: HEMATOLOGY/ONCOLOGY | Facility: CLINIC | Age: 65
End: 2025-04-16
Payer: MEDICARE

## 2025-04-16 ENCOUNTER — APPOINTMENT (OUTPATIENT)
Dept: PRIMARY CARE | Facility: CLINIC | Age: 65
End: 2025-04-16
Payer: MEDICARE

## 2025-04-16 VITALS
DIASTOLIC BLOOD PRESSURE: 78 MMHG | HEART RATE: 86 BPM | OXYGEN SATURATION: 98 % | WEIGHT: 213 LBS | BODY MASS INDEX: 29.82 KG/M2 | RESPIRATION RATE: 14 BRPM | HEIGHT: 71 IN | SYSTOLIC BLOOD PRESSURE: 128 MMHG

## 2025-04-16 VITALS
RESPIRATION RATE: 17 BRPM | SYSTOLIC BLOOD PRESSURE: 138 MMHG | BODY MASS INDEX: 29.42 KG/M2 | OXYGEN SATURATION: 98 % | HEART RATE: 75 BPM | TEMPERATURE: 97.5 F | WEIGHT: 210.65 LBS | DIASTOLIC BLOOD PRESSURE: 83 MMHG

## 2025-04-16 DIAGNOSIS — M54.50 CHRONIC LOW BACK PAIN, UNSPECIFIED BACK PAIN LATERALITY, UNSPECIFIED WHETHER SCIATICA PRESENT: ICD-10-CM

## 2025-04-16 DIAGNOSIS — M54.16 LUMBAR RADICULOPATHY, CHRONIC: ICD-10-CM

## 2025-04-16 DIAGNOSIS — Z00.00 MEDICARE ANNUAL WELLNESS VISIT, SUBSEQUENT: Primary | ICD-10-CM

## 2025-04-16 DIAGNOSIS — C92.00 ACUTE MYELOID LEUKEMIA NOT HAVING ACHIEVED REMISSION (MULTI): ICD-10-CM

## 2025-04-16 DIAGNOSIS — M25.552 CHRONIC HIP PAIN, BILATERAL: ICD-10-CM

## 2025-04-16 DIAGNOSIS — G89.29 CHRONIC LOW BACK PAIN, UNSPECIFIED BACK PAIN LATERALITY, UNSPECIFIED WHETHER SCIATICA PRESENT: ICD-10-CM

## 2025-04-16 DIAGNOSIS — G89.29 CHRONIC HIP PAIN, BILATERAL: ICD-10-CM

## 2025-04-16 DIAGNOSIS — M25.551 CHRONIC HIP PAIN, BILATERAL: ICD-10-CM

## 2025-04-16 LAB
ALBUMIN SERPL BCP-MCNC: 4.1 G/DL (ref 3.4–5)
ALP SERPL-CCNC: 90 U/L (ref 33–136)
ALT SERPL W P-5'-P-CCNC: 51 U/L (ref 10–52)
ANION GAP SERPL CALC-SCNC: 12 MMOL/L (ref 10–20)
AST SERPL W P-5'-P-CCNC: 18 U/L (ref 9–39)
BASOPHILS # BLD MANUAL: 0 X10*3/UL (ref 0–0.1)
BASOPHILS NFR BLD MANUAL: 0 %
BILIRUB SERPL-MCNC: 1.1 MG/DL (ref 0–1.2)
BUN SERPL-MCNC: 27 MG/DL (ref 6–23)
CALCIUM SERPL-MCNC: 9.4 MG/DL (ref 8.6–10.3)
CHLORIDE SERPL-SCNC: 103 MMOL/L (ref 98–107)
CO2 SERPL-SCNC: 25 MMOL/L (ref 21–32)
CREAT SERPL-MCNC: 1.21 MG/DL (ref 0.5–1.3)
EGFRCR SERPLBLD CKD-EPI 2021: 66 ML/MIN/1.73M*2
EOSINOPHIL # BLD MANUAL: 0 X10*3/UL (ref 0–0.7)
EOSINOPHIL NFR BLD MANUAL: 0 %
ERYTHROCYTE [DISTWIDTH] IN BLOOD BY AUTOMATED COUNT: 19.3 % (ref 11.5–14.5)
GLUCOSE SERPL-MCNC: 108 MG/DL (ref 74–99)
HCT VFR BLD AUTO: 22.9 % (ref 41–52)
HGB BLD-MCNC: 7.7 G/DL (ref 13.5–17.5)
HYPOCHROMIA BLD QL SMEAR: ABNORMAL
IMM GRANULOCYTES # BLD AUTO: 0.08 X10*3/UL (ref 0–0.7)
IMM GRANULOCYTES NFR BLD AUTO: 5.3 % (ref 0–0.9)
LDH SERPL L TO P-CCNC: 313 U/L (ref 84–246)
LYMPHOCYTES # BLD MANUAL: 1.1 X10*3/UL (ref 1.2–4.8)
LYMPHOCYTES NFR BLD MANUAL: 73 %
MCH RBC QN AUTO: 34.8 PG (ref 26–34)
MCHC RBC AUTO-ENTMCNC: 33.6 G/DL (ref 32–36)
MCV RBC AUTO: 104 FL (ref 80–100)
METAMYELOCYTES # BLD MANUAL: 0.02 X10*3/UL
METAMYELOCYTES NFR BLD MANUAL: 1 %
MONOCYTES # BLD MANUAL: 0.08 X10*3/UL (ref 0.1–1)
MONOCYTES NFR BLD MANUAL: 5 %
NEUTROPHILS # BLD MANUAL: 0.32 X10*3/UL (ref 1.2–7.7)
NEUTS BAND # BLD MANUAL: 0.09 X10*3/UL (ref 0–0.7)
NEUTS BAND NFR BLD MANUAL: 6 %
NEUTS SEG # BLD MANUAL: 0.23 X10*3/UL (ref 1.2–7)
NEUTS SEG NFR BLD MANUAL: 15 %
NRBC BLD-RTO: ABNORMAL /100{WBCS}
PHOSPHATE SERPL-MCNC: 4.8 MG/DL (ref 2.5–4.9)
PLATELET # BLD AUTO: 12 X10*3/UL (ref 150–450)
POTASSIUM SERPL-SCNC: 4.3 MMOL/L (ref 3.5–5.3)
PROT SERPL-MCNC: 7 G/DL (ref 6.4–8.2)
RBC # BLD AUTO: 2.21 X10*6/UL (ref 4.5–5.9)
RBC MORPH BLD: ABNORMAL
SODIUM SERPL-SCNC: 136 MMOL/L (ref 136–145)
TOTAL CELLS COUNTED BLD: 100
URATE SERPL-MCNC: 5.1 MG/DL (ref 4–7.5)
WBC # BLD AUTO: 1.5 X10*3/UL (ref 4.4–11.3)

## 2025-04-16 PROCEDURE — 96401 CHEMO ANTI-NEOPL SQ/IM: CPT

## 2025-04-16 PROCEDURE — 86901 BLOOD TYPING SEROLOGIC RH(D): CPT

## 2025-04-16 PROCEDURE — 83615 LACTATE (LD) (LDH) ENZYME: CPT

## 2025-04-16 PROCEDURE — 84100 ASSAY OF PHOSPHORUS: CPT

## 2025-04-16 PROCEDURE — 99213 OFFICE O/P EST LOW 20 MIN: CPT | Performed by: INTERNAL MEDICINE

## 2025-04-16 PROCEDURE — 2500000004 HC RX 250 GENERAL PHARMACY W/ HCPCS (ALT 636 FOR OP/ED): Performed by: INTERNAL MEDICINE

## 2025-04-16 PROCEDURE — 1170F FXNL STATUS ASSESSED: CPT | Performed by: INTERNAL MEDICINE

## 2025-04-16 PROCEDURE — G0439 PPPS, SUBSEQ VISIT: HCPCS | Performed by: INTERNAL MEDICINE

## 2025-04-16 PROCEDURE — 1124F ACP DISCUSS-NO DSCNMKR DOCD: CPT | Performed by: INTERNAL MEDICINE

## 2025-04-16 PROCEDURE — 85027 COMPLETE CBC AUTOMATED: CPT

## 2025-04-16 PROCEDURE — 84550 ASSAY OF BLOOD/URIC ACID: CPT

## 2025-04-16 PROCEDURE — 85007 BL SMEAR W/DIFF WBC COUNT: CPT

## 2025-04-16 PROCEDURE — 80053 COMPREHEN METABOLIC PANEL: CPT

## 2025-04-16 PROCEDURE — 3008F BODY MASS INDEX DOCD: CPT | Performed by: INTERNAL MEDICINE

## 2025-04-16 RX ORDER — HEPARIN SODIUM,PORCINE/PF 10 UNIT/ML
50 SYRINGE (ML) INTRAVENOUS AS NEEDED
Status: CANCELLED | OUTPATIENT
Start: 2025-04-16

## 2025-04-16 RX ORDER — TRAMADOL HYDROCHLORIDE 50 MG/1
50 TABLET ORAL EVERY 8 HOURS PRN
Qty: 90 TABLET | Refills: 5 | Status: SHIPPED | OUTPATIENT
Start: 2025-04-16

## 2025-04-16 RX ORDER — FAMOTIDINE 10 MG/ML
20 INJECTION, SOLUTION INTRAVENOUS ONCE AS NEEDED
Status: DISCONTINUED | OUTPATIENT
Start: 2025-04-16 | End: 2025-04-16 | Stop reason: HOSPADM

## 2025-04-16 RX ORDER — ONDANSETRON HYDROCHLORIDE 8 MG/1
8 TABLET, FILM COATED ORAL ONCE
Status: COMPLETED | OUTPATIENT
Start: 2025-04-16 | End: 2025-04-16

## 2025-04-16 RX ORDER — HEPARIN 100 UNIT/ML
500 SYRINGE INTRAVENOUS AS NEEDED
Status: CANCELLED | OUTPATIENT
Start: 2025-04-16

## 2025-04-16 RX ORDER — EPINEPHRINE 0.3 MG/.3ML
0.3 INJECTION SUBCUTANEOUS EVERY 5 MIN PRN
Status: DISCONTINUED | OUTPATIENT
Start: 2025-04-16 | End: 2025-04-16 | Stop reason: HOSPADM

## 2025-04-16 RX ORDER — ALBUTEROL SULFATE 0.83 MG/ML
3 SOLUTION RESPIRATORY (INHALATION) AS NEEDED
Status: DISCONTINUED | OUTPATIENT
Start: 2025-04-16 | End: 2025-04-16 | Stop reason: HOSPADM

## 2025-04-16 RX ORDER — DIPHENHYDRAMINE HYDROCHLORIDE 50 MG/ML
50 INJECTION, SOLUTION INTRAMUSCULAR; INTRAVENOUS AS NEEDED
Status: DISCONTINUED | OUTPATIENT
Start: 2025-04-16 | End: 2025-04-16 | Stop reason: HOSPADM

## 2025-04-16 RX ADMIN — ONDANSETRON HYDROCHLORIDE 8 MG: 8 TABLET, FILM COATED ORAL at 12:34

## 2025-04-16 RX ADMIN — AZACITIDINE 170 MG: 100 INJECTION, POWDER, LYOPHILIZED, FOR SOLUTION INTRAVENOUS; SUBCUTANEOUS at 13:09

## 2025-04-16 ASSESSMENT — PATIENT HEALTH QUESTIONNAIRE - PHQ9
2. FEELING DOWN, DEPRESSED OR HOPELESS: NOT AT ALL
1. LITTLE INTEREST OR PLEASURE IN DOING THINGS: NOT AT ALL
SUM OF ALL RESPONSES TO PHQ9 QUESTIONS 1 AND 2: 0

## 2025-04-16 ASSESSMENT — ACTIVITIES OF DAILY LIVING (ADL)
GROCERY_SHOPPING: INDEPENDENT
MANAGING_FINANCES: INDEPENDENT
TAKING_MEDICATION: INDEPENDENT
DRESSING: INDEPENDENT
BATHING: INDEPENDENT
DOING_HOUSEWORK: INDEPENDENT

## 2025-04-16 ASSESSMENT — PAIN SCALES - GENERAL: PAINLEVEL_OUTOF10: 5

## 2025-04-16 NOTE — PATIENT INSTRUCTIONS
" ASK DR. WALDRON REGARDING UTILITY OF ANTIDEPRESSANT THERAPY TO CONTROL WHAT MAY BE A PARANEOPLASTIC BEHAVIORAL SITUATION    2.  IF DR. WALDRON OK'S, THEN CALL AND I CAN START LOW DOSE OF LEXAPRO/ESCITALOPRAM MEDICATION WHICH IN MY EXPERIENCE HELPS WITH MALE \"SHORT FUSE\" SYNDROME    3.  EVENTUALLY DR. WALDRON CAN HELP TIME PREVNAR-20 PNEUMONIA IMMUNIZATION AND SHINGRIX IMMUNIZATION SERIES    4.  COLONOSCOPY IS GOING TO BE POSTPONED UNTIL AFTER LEUKEMIA TREATMENT IS STABILIZED    5.  TRAMADOL 50 MG THREE TIMES DAILY AS NEEDED SCRIPT SENT IN FOR YOU TO TAKE IN LIEU OF CELEBREX.  MAY SUPPLEMENT WITH TYLENOL AS WELL.  IF TRAMADOL IS INADEQUATE, PLEASE LET ME KNOW.    6.  I AM HERE TO HELP YOU AS MUCH AS I CAN AS YOU ARE MOVING THROUGH THIS HEALTH CHALLENGE, AND WILL WORK WITH DR. WALDRON AS BEST I CAN    7.  LIFT CHAIR SCRIPT IS PRINTED FOR YOU    8.  FOLLOW UP AS NEEDED.  WILL NEED TO TOUCH BASE WITH DR. STONE PRE-TRANSPLANT  "

## 2025-04-16 NOTE — PROGRESS NOTES
Notified by Bennett Arguelles in Lab Services that PLT 12. Secure Chat sent to Dr. Gomez. Pt has appointment at [facility]

## 2025-04-16 NOTE — PROGRESS NOTES
"Subjective   Reason for Visit: Link Inman is an 64 y.o. male here for a Medicare Wellness visit.   WILL NEED ORDER FOR LIFT CHAIR   NEW DX ACUTE MYELOID LEUKEMIA    DISCUSS STOPPING CELEBREX AND STARTING TRAMADOL             HPI  HAS SEEN DR. HAYS, AND AFTER UNREMARKABLE MRI, REFERRED TO DR. NI.    HAS APPT WITH DIFFERENT PSYCHOLOGICAL DOCTOR.    HAD TRIED ZOLOFT BUT FOR ONLY A FEW DAYS.      PLAN FOR INDUCTION/CONSOLIDATION AND IF REMISSION CAN BE ACHIEVED, THEN CONSIDERATION FOR TRANSPLANT.    WILL  NOT BE ABLE TO GET SURGERIES FOR MULTIPLE ORTHOPEDIC ISSUES UNTIL IN REMISSION, IS HAVING SOME DIFFICULTY GETTING UP FROM CHAIR    Patient Care Team:  Sukhdeep Lopez MD as PCP - General (Internal Medicine)  Sukhdeep Lopez MD as PCP - [facility] Attributed Provider  April Eckert MD as Consulting Physician (Hematology and Oncology)  Riky Gomez MD as Consulting Physician (Hematology and Oncology)  Nahum Amin MD PhD as Consulting Physician (Hematology and Oncology)     Review of Systems   Constitutional:  Negative for chills, diaphoresis and fever.   Respiratory:  Negative for shortness of breath.    Cardiovascular:  Negative for chest pain and leg swelling.   Gastrointestinal:  Negative for constipation, diarrhea, nausea and vomiting.   Musculoskeletal:  Positive for arthralgias. Negative for joint swelling and myalgias.   Neurological:  Negative for dizziness, tremors, speech difficulty and weakness.   Psychiatric/Behavioral:  Positive for agitation (MILD, SHORT FUSE).        Objective   Vitals:  /78   Pulse 86   Resp 14   Ht 1.802 m (5' 10.95\")   Wt 96.6 kg (213 lb)   SpO2 98%   BMI 29.75 kg/m²       Physical Exam  Vitals reviewed.   Constitutional:       General: He is not in acute distress.     Appearance: Normal appearance. He is not ill-appearing.   Cardiovascular:      Rate and Rhythm: Normal rate and regular rhythm.      Pulses: Normal pulses.      Heart sounds: " "     No gallop.   Pulmonary:      Breath sounds: Normal breath sounds. No wheezing, rhonchi or rales.   Abdominal:      General: Abdomen is flat. Bowel sounds are normal.      Palpations: Abdomen is soft.      Tenderness: There is no guarding or rebound.   Musculoskeletal:      Right lower leg: No edema.      Left lower leg: No edema.   Skin:     General: Skin is warm and dry.   Neurological:      General: No focal deficit present.      Mental Status: He is oriented to person, place, and time.      Coordination: Coordination normal.      Gait: Gait normal.   Psychiatric:         Mood and Affect: Mood normal.         Behavior: Behavior normal.         Thought Content: Thought content normal.         Judgment: Judgment normal.         Assessment & Plan  Chronic low back pain, unspecified back pain laterality, unspecified whether sciatica present    Orders:    traMADol (Ultram) 50 mg tablet; Take 1 tablet (50 mg) by mouth every 8 hours if needed for severe pain (7 - 10).    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 WALDRON REGARDING UTILITY OF ANTIDEPRESSANT THERAPY TO CONTROL WHAT MAY BE A PARANEOPLASTIC BEHAVIORAL SITUATION    2.  IF DR. WALDRON OK'S, THEN CALL AND I CAN START LOW DOSE OF LEXAPRO/ESCITALOPRAM MEDICATION WHICH IN MY EXPERIENCE HELPS WITH MALE \"SHORT FUSE\" SYNDROME    3.  EVENTUALLY DR. WALDRON CAN HELP TIME PREVNAR-20 PNEUMONIA IMMUNIZATION AND SHINGRIX IMMUNIZATION SERIES    4.  COLONOSCOPY IS GOING TO BE POSTPONED UNTIL AFTER LEUKEMIA TREATMENT IS STABILIZED    5.  " TRAMADOL 50 MG THREE TIMES DAILY AS NEEDED SCRIPT SENT IN FOR YOU TO TAKE IN LIEU OF CELEBREX.  MAY SUPPLEMENT WITH TYLENOL AS WELL.  IF TRAMADOL IS INADEQUATE, PLEASE LET ME KNOW.    6.  I AM HERE TO HELP YOU AS MUCH AS I CAN AS YOU ARE MOVING THROUGH THIS HEALTH CHALLENGE, AND WILL WORK WITH DR. WALDRON AS BEST I CAN    7.  LIFT CHAIR SCRIPT IS PRINTED FOR YOU    8.  FOLLOW UP AS NEEDED.  WILL NEED TO TOUCH BASE WITH DR. STONE PRE-TRANSPLANT

## 2025-04-17 ENCOUNTER — INFUSION (OUTPATIENT)
Dept: HEMATOLOGY/ONCOLOGY | Facility: CLINIC | Age: 65
End: 2025-04-17
Payer: MEDICARE

## 2025-04-17 ENCOUNTER — DOCUMENTATION (OUTPATIENT)
Dept: OTHER | Facility: HOSPITAL | Age: 65
End: 2025-04-17
Payer: MEDICARE

## 2025-04-17 ENCOUNTER — TELEPHONE (OUTPATIENT)
Dept: PRIMARY CARE | Facility: CLINIC | Age: 65
End: 2025-04-17
Payer: MEDICARE

## 2025-04-17 VITALS
BODY MASS INDEX: 29.42 KG/M2 | OXYGEN SATURATION: 96 % | TEMPERATURE: 97.9 F | RESPIRATION RATE: 18 BRPM | WEIGHT: 210.65 LBS | DIASTOLIC BLOOD PRESSURE: 72 MMHG | HEART RATE: 83 BPM | SYSTOLIC BLOOD PRESSURE: 113 MMHG

## 2025-04-17 DIAGNOSIS — C92.00 ACUTE MYELOID LEUKEMIA NOT HAVING ACHIEVED REMISSION (MULTI): Primary | ICD-10-CM

## 2025-04-17 DIAGNOSIS — C92.00 ACUTE MYELOID LEUKEMIA NOT HAVING ACHIEVED REMISSION (MULTI): ICD-10-CM

## 2025-04-17 DIAGNOSIS — F34.1 DYSTHYMIA: Primary | ICD-10-CM

## 2025-04-17 LAB
ABO GROUP (TYPE) IN BLOOD: NORMAL
ANION GAP SERPL CALC-SCNC: 11 MMOL/L (ref 10–20)
ANTIBODY SCREEN: NORMAL
BUN SERPL-MCNC: 26 MG/DL (ref 6–23)
CALCIUM SERPL-MCNC: 9.1 MG/DL (ref 8.6–10.3)
CHLORIDE SERPL-SCNC: 103 MMOL/L (ref 98–107)
CO2 SERPL-SCNC: 25 MMOL/L (ref 21–32)
CREAT SERPL-MCNC: 1.19 MG/DL (ref 0.5–1.3)
EGFRCR SERPLBLD CKD-EPI 2021: 67 ML/MIN/1.73M*2
GLUCOSE SERPL-MCNC: 115 MG/DL (ref 74–99)
HLA CLS I TYP PNL BLD/T DONR HIGH RES: NORMAL
HLA RESULTS: NORMAL
HLA RESULTS: NORMAL
HLA-A+B+C AB NFR SER: NORMAL %
HLA-DP+DQ+DR AB NFR SER: NORMAL %
HLA-DP2 QL: NORMAL
HLA-DQB1 HIGH RES: NORMAL
HLA-DRB1 HIGH RES: NORMAL
LDH SERPL L TO P-CCNC: 264 U/L (ref 84–246)
PHOSPHATE SERPL-MCNC: 3.9 MG/DL (ref 2.5–4.9)
POTASSIUM SERPL-SCNC: 4.4 MMOL/L (ref 3.5–5.3)
RH FACTOR (ANTIGEN D): NORMAL
SODIUM SERPL-SCNC: 135 MMOL/L (ref 136–145)
URATE SERPL-MCNC: 4.8 MG/DL (ref 4–7.5)

## 2025-04-17 PROCEDURE — 2500000004 HC RX 250 GENERAL PHARMACY W/ HCPCS (ALT 636 FOR OP/ED): Performed by: INTERNAL MEDICINE

## 2025-04-17 PROCEDURE — 84550 ASSAY OF BLOOD/URIC ACID: CPT

## 2025-04-17 PROCEDURE — 83615 LACTATE (LD) (LDH) ENZYME: CPT

## 2025-04-17 PROCEDURE — 96401 CHEMO ANTI-NEOPL SQ/IM: CPT

## 2025-04-17 PROCEDURE — 80048 BASIC METABOLIC PNL TOTAL CA: CPT

## 2025-04-17 PROCEDURE — 84100 ASSAY OF PHOSPHORUS: CPT

## 2025-04-17 RX ORDER — ALBUTEROL SULFATE 0.83 MG/ML
3 SOLUTION RESPIRATORY (INHALATION) AS NEEDED
Status: DISCONTINUED | OUTPATIENT
Start: 2025-04-17 | End: 2025-04-17 | Stop reason: HOSPADM

## 2025-04-17 RX ORDER — HEPARIN SODIUM,PORCINE/PF 10 UNIT/ML
50 SYRINGE (ML) INTRAVENOUS AS NEEDED
Status: CANCELLED | OUTPATIENT
Start: 2025-04-17

## 2025-04-17 RX ORDER — DIPHENHYDRAMINE HYDROCHLORIDE 50 MG/ML
50 INJECTION, SOLUTION INTRAMUSCULAR; INTRAVENOUS AS NEEDED
Status: DISCONTINUED | OUTPATIENT
Start: 2025-04-17 | End: 2025-04-17 | Stop reason: HOSPADM

## 2025-04-17 RX ORDER — EPINEPHRINE 0.3 MG/.3ML
0.3 INJECTION SUBCUTANEOUS EVERY 5 MIN PRN
Status: DISCONTINUED | OUTPATIENT
Start: 2025-04-17 | End: 2025-04-17 | Stop reason: HOSPADM

## 2025-04-17 RX ORDER — ESCITALOPRAM OXALATE 5 MG/1
5 TABLET ORAL DAILY
Qty: 30 TABLET | Refills: 5 | Status: SHIPPED | OUTPATIENT
Start: 2025-04-17

## 2025-04-17 RX ORDER — HEPARIN 100 UNIT/ML
500 SYRINGE INTRAVENOUS AS NEEDED
Status: CANCELLED | OUTPATIENT
Start: 2025-04-17

## 2025-04-17 RX ORDER — FAMOTIDINE 10 MG/ML
20 INJECTION, SOLUTION INTRAVENOUS ONCE AS NEEDED
Status: DISCONTINUED | OUTPATIENT
Start: 2025-04-17 | End: 2025-04-17 | Stop reason: HOSPADM

## 2025-04-17 RX ORDER — ONDANSETRON HYDROCHLORIDE 8 MG/1
8 TABLET, FILM COATED ORAL ONCE
Status: COMPLETED | OUTPATIENT
Start: 2025-04-17 | End: 2025-04-17

## 2025-04-17 RX ADMIN — AZACITIDINE 170 MG: 100 INJECTION, POWDER, LYOPHILIZED, FOR SOLUTION INTRAVENOUS; SUBCUTANEOUS at 10:40

## 2025-04-17 RX ADMIN — ONDANSETRON HYDROCHLORIDE 8 MG: 8 TABLET, FILM COATED ORAL at 09:53

## 2025-04-17 ASSESSMENT — PAIN SCALES - GENERAL: PAINLEVEL_OUTOF10: 7

## 2025-04-17 NOTE — TELEPHONE ENCOUNTER
Family spoke with Oncologist, Dr. Gomez, and doctor said it was fine for Patient to start Lexapro.     Asking fro you to send the medication to [facility]

## 2025-04-17 NOTE — PROGRESS NOTES
Link and his wife Emi are aware that I will be their nurse coordinator for the allo transplant process. Allo transplant education is set up for 4/24 at 1PM with Link, his wife, and children-who will be involved. They are also aware that an appointment has been made to establish with a transplant physician (Dr. Villatoro) on 5/2. My contact information was provided.

## 2025-04-18 ENCOUNTER — DOCUMENTATION (OUTPATIENT)
Dept: HEMATOLOGY/ONCOLOGY | Facility: HOSPITAL | Age: 65
End: 2025-04-18
Payer: MEDICARE

## 2025-04-18 ENCOUNTER — INFUSION (OUTPATIENT)
Dept: HEMATOLOGY/ONCOLOGY | Facility: CLINIC | Age: 65
End: 2025-04-18
Payer: MEDICARE

## 2025-04-18 VITALS
DIASTOLIC BLOOD PRESSURE: 73 MMHG | WEIGHT: 209.88 LBS | RESPIRATION RATE: 16 BRPM | SYSTOLIC BLOOD PRESSURE: 117 MMHG | TEMPERATURE: 97.3 F | OXYGEN SATURATION: 97 % | HEART RATE: 68 BPM | BODY MASS INDEX: 29.31 KG/M2

## 2025-04-18 DIAGNOSIS — D61.818 PANCYTOPENIA: ICD-10-CM

## 2025-04-18 DIAGNOSIS — C92.00 ACUTE MYELOID LEUKEMIA NOT HAVING ACHIEVED REMISSION (MULTI): ICD-10-CM

## 2025-04-18 LAB
ABO GROUP (TYPE) IN BLOOD: NORMAL
ALBUMIN SERPL BCP-MCNC: 4 G/DL (ref 3.4–5)
ALP SERPL-CCNC: 96 U/L (ref 33–136)
ALT SERPL W P-5'-P-CCNC: 36 U/L (ref 10–52)
ANION GAP SERPL CALC-SCNC: 11 MMOL/L (ref 10–20)
ANTIBODY SCREEN: NORMAL
AST SERPL W P-5'-P-CCNC: 14 U/L (ref 9–39)
BASOPHILS # BLD AUTO: 0.04 X10*3/UL (ref 0–0.1)
BASOPHILS NFR BLD AUTO: 3.8 %
BILIRUB SERPL-MCNC: 1 MG/DL (ref 0–1.2)
BLOOD EXPIRATION DATE: NORMAL
BUN SERPL-MCNC: 26 MG/DL (ref 6–23)
CALCIUM SERPL-MCNC: 9.2 MG/DL (ref 8.6–10.3)
CHLORIDE SERPL-SCNC: 103 MMOL/L (ref 98–107)
CO2 SERPL-SCNC: 26 MMOL/L (ref 21–32)
CREAT SERPL-MCNC: 1.31 MG/DL (ref 0.5–1.3)
DACRYOCYTES BLD QL SMEAR: NORMAL
DISPENSE STATUS: NORMAL
EGFRCR SERPLBLD CKD-EPI 2021: 60 ML/MIN/1.73M*2
EOSINOPHIL # BLD AUTO: 0.01 X10*3/UL (ref 0–0.7)
EOSINOPHIL NFR BLD AUTO: 1 %
ERYTHROCYTE [DISTWIDTH] IN BLOOD BY AUTOMATED COUNT: 18.9 % (ref 11.5–14.5)
GLUCOSE SERPL-MCNC: 157 MG/DL (ref 74–99)
HCT VFR BLD AUTO: 20.8 % (ref 41–52)
HGB BLD-MCNC: 6.9 G/DL (ref 13.5–17.5)
IMM GRANULOCYTES # BLD AUTO: 0.03 X10*3/UL (ref 0–0.7)
IMM GRANULOCYTES NFR BLD AUTO: 2.9 % (ref 0–0.9)
LDH SERPL L TO P-CCNC: 249 U/L (ref 84–246)
LYMPHOCYTES # BLD AUTO: 0.71 X10*3/UL (ref 1.2–4.8)
LYMPHOCYTES NFR BLD AUTO: 68.3 %
MCH RBC QN AUTO: 34.7 PG (ref 26–34)
MCHC RBC AUTO-ENTMCNC: 33.2 G/DL (ref 32–36)
MCV RBC AUTO: 105 FL (ref 80–100)
MONOCYTES # BLD AUTO: 0.05 X10*3/UL (ref 0.1–1)
MONOCYTES NFR BLD AUTO: 4.8 %
NEUTROPHILS # BLD AUTO: 0.2 X10*3/UL (ref 1.2–7.7)
NEUTROPHILS NFR BLD AUTO: 19.2 %
NRBC BLD-RTO: ABNORMAL /100{WBCS}
OVALOCYTES BLD QL SMEAR: NORMAL
PHOSPHATE SERPL-MCNC: 3.3 MG/DL (ref 2.5–4.9)
PLATELET # BLD AUTO: 12 X10*3/UL (ref 150–450)
POTASSIUM SERPL-SCNC: 4.1 MMOL/L (ref 3.5–5.3)
PRODUCT BLOOD TYPE: 5100
PRODUCT CODE: NORMAL
PROT SERPL-MCNC: 6.7 G/DL (ref 6.4–8.2)
RBC # BLD AUTO: 1.99 X10*6/UL (ref 4.5–5.9)
RBC MORPH BLD: NORMAL
RH FACTOR (ANTIGEN D): NORMAL
SODIUM SERPL-SCNC: 136 MMOL/L (ref 136–145)
UNIT ABO: NORMAL
UNIT NUMBER: NORMAL
UNIT RH: NORMAL
UNIT VOLUME: 270
UNIT VOLUME: 287
UNIT VOLUME: 287
URATE SERPL-MCNC: 5 MG/DL (ref 4–7.5)
WBC # BLD AUTO: 1 X10*3/UL (ref 4.4–11.3)
XM INTEP: NORMAL
XM INTEP: NORMAL

## 2025-04-18 PROCEDURE — 83615 LACTATE (LD) (LDH) ENZYME: CPT

## 2025-04-18 PROCEDURE — 84100 ASSAY OF PHOSPHORUS: CPT

## 2025-04-18 PROCEDURE — 84550 ASSAY OF BLOOD/URIC ACID: CPT

## 2025-04-18 PROCEDURE — 2500000004 HC RX 250 GENERAL PHARMACY W/ HCPCS (ALT 636 FOR OP/ED): Performed by: INTERNAL MEDICINE

## 2025-04-18 PROCEDURE — 36430 TRANSFUSION BLD/BLD COMPNT: CPT

## 2025-04-18 PROCEDURE — 2500000004 HC RX 250 GENERAL PHARMACY W/ HCPCS (ALT 636 FOR OP/ED): Performed by: NURSE PRACTITIONER

## 2025-04-18 PROCEDURE — 85025 COMPLETE CBC W/AUTO DIFF WBC: CPT

## 2025-04-18 PROCEDURE — 36415 COLL VENOUS BLD VENIPUNCTURE: CPT

## 2025-04-18 PROCEDURE — 80053 COMPREHEN METABOLIC PANEL: CPT

## 2025-04-18 PROCEDURE — 86923 COMPATIBILITY TEST ELECTRIC: CPT

## 2025-04-18 PROCEDURE — 96401 CHEMO ANTI-NEOPL SQ/IM: CPT

## 2025-04-18 RX ORDER — HEPARIN SODIUM,PORCINE/PF 10 UNIT/ML
50 SYRINGE (ML) INTRAVENOUS AS NEEDED
OUTPATIENT
Start: 2025-04-18

## 2025-04-18 RX ORDER — PROCHLORPERAZINE EDISYLATE 5 MG/ML
10 INJECTION INTRAMUSCULAR; INTRAVENOUS EVERY 6 HOURS PRN
Status: DISCONTINUED | OUTPATIENT
Start: 2025-04-18 | End: 2025-04-18 | Stop reason: HOSPADM

## 2025-04-18 RX ORDER — ALBUTEROL SULFATE 0.83 MG/ML
3 SOLUTION RESPIRATORY (INHALATION) AS NEEDED
OUTPATIENT
Start: 2025-04-18

## 2025-04-18 RX ORDER — DIPHENHYDRAMINE HYDROCHLORIDE 50 MG/ML
50 INJECTION, SOLUTION INTRAMUSCULAR; INTRAVENOUS AS NEEDED
OUTPATIENT
Start: 2025-04-18

## 2025-04-18 RX ORDER — ONDANSETRON HYDROCHLORIDE 8 MG/1
8 TABLET, FILM COATED ORAL ONCE
Status: COMPLETED | OUTPATIENT
Start: 2025-04-18 | End: 2025-04-18

## 2025-04-18 RX ORDER — EPINEPHRINE 0.3 MG/.3ML
0.3 INJECTION SUBCUTANEOUS EVERY 5 MIN PRN
Status: DISCONTINUED | OUTPATIENT
Start: 2025-04-18 | End: 2025-04-18 | Stop reason: HOSPADM

## 2025-04-18 RX ORDER — PROCHLORPERAZINE MALEATE 10 MG
10 TABLET ORAL EVERY 6 HOURS PRN
Status: DISCONTINUED | OUTPATIENT
Start: 2025-04-18 | End: 2025-04-18 | Stop reason: HOSPADM

## 2025-04-18 RX ORDER — ALBUTEROL SULFATE 0.83 MG/ML
3 SOLUTION RESPIRATORY (INHALATION) AS NEEDED
Status: DISCONTINUED | OUTPATIENT
Start: 2025-04-18 | End: 2025-04-18 | Stop reason: HOSPADM

## 2025-04-18 RX ORDER — HEPARIN 100 UNIT/ML
500 SYRINGE INTRAVENOUS AS NEEDED
OUTPATIENT
Start: 2025-04-18

## 2025-04-18 RX ORDER — EPINEPHRINE 0.3 MG/.3ML
0.3 INJECTION SUBCUTANEOUS EVERY 5 MIN PRN
OUTPATIENT
Start: 2025-04-18

## 2025-04-18 RX ORDER — FAMOTIDINE 10 MG/ML
20 INJECTION, SOLUTION INTRAVENOUS ONCE AS NEEDED
Status: DISCONTINUED | OUTPATIENT
Start: 2025-04-18 | End: 2025-04-18 | Stop reason: HOSPADM

## 2025-04-18 RX ORDER — FAMOTIDINE 10 MG/ML
20 INJECTION, SOLUTION INTRAVENOUS ONCE AS NEEDED
OUTPATIENT
Start: 2025-04-18

## 2025-04-18 RX ORDER — DIPHENHYDRAMINE HYDROCHLORIDE 50 MG/ML
50 INJECTION, SOLUTION INTRAMUSCULAR; INTRAVENOUS AS NEEDED
Status: DISCONTINUED | OUTPATIENT
Start: 2025-04-18 | End: 2025-04-18 | Stop reason: HOSPADM

## 2025-04-18 RX ADMIN — ONDANSETRON HYDROCHLORIDE 8 MG: 8 TABLET, FILM COATED ORAL at 12:10

## 2025-04-18 RX ADMIN — SODIUM CHLORIDE 500 ML: 9 INJECTION, SOLUTION INTRAVENOUS at 12:20

## 2025-04-18 RX ADMIN — AZACITIDINE 170 MG: 100 INJECTION, POWDER, LYOPHILIZED, FOR SOLUTION INTRAVENOUS; SUBCUTANEOUS at 12:22

## 2025-04-18 ASSESSMENT — PAIN SCALES - GENERAL: PAINLEVEL_OUTOF10: 6

## 2025-04-21 ENCOUNTER — DOCUMENTATION (OUTPATIENT)
Dept: HEMATOLOGY/ONCOLOGY | Facility: HOSPITAL | Age: 65
End: 2025-04-21

## 2025-04-21 ENCOUNTER — APPOINTMENT (OUTPATIENT)
Dept: BEHAVIORAL HEALTH | Facility: CLINIC | Age: 65
End: 2025-04-21
Payer: MEDICARE

## 2025-04-21 ENCOUNTER — INFUSION (OUTPATIENT)
Dept: HEMATOLOGY/ONCOLOGY | Facility: CLINIC | Age: 65
End: 2025-04-21
Payer: MEDICARE

## 2025-04-21 VITALS
BODY MASS INDEX: 29.31 KG/M2 | SYSTOLIC BLOOD PRESSURE: 123 MMHG | DIASTOLIC BLOOD PRESSURE: 73 MMHG | HEART RATE: 91 BPM | TEMPERATURE: 97.3 F | WEIGHT: 209.88 LBS | OXYGEN SATURATION: 94 % | RESPIRATION RATE: 16 BRPM

## 2025-04-21 DIAGNOSIS — C92.00 ACUTE MYELOID LEUKEMIA NOT HAVING ACHIEVED REMISSION (MULTI): ICD-10-CM

## 2025-04-21 LAB
ALBUMIN SERPL BCP-MCNC: 4.3 G/DL (ref 3.4–5)
ALP SERPL-CCNC: 100 U/L (ref 33–136)
ALT SERPL W P-5'-P-CCNC: 24 U/L (ref 10–52)
ANION GAP SERPL CALC-SCNC: 14 MMOL/L (ref 10–20)
AST SERPL W P-5'-P-CCNC: 12 U/L (ref 9–39)
BASOPHILS # BLD AUTO: 0.08 X10*3/UL (ref 0–0.1)
BASOPHILS NFR BLD AUTO: 6 %
BILIRUB SERPL-MCNC: 0.7 MG/DL (ref 0–1.2)
BUN SERPL-MCNC: 20 MG/DL (ref 6–23)
CALCIUM SERPL-MCNC: 9.3 MG/DL (ref 8.6–10.3)
CHLORIDE SERPL-SCNC: 104 MMOL/L (ref 98–107)
CO2 SERPL-SCNC: 23 MMOL/L (ref 21–32)
CREAT SERPL-MCNC: 0.98 MG/DL (ref 0.5–1.3)
EGFRCR SERPLBLD CKD-EPI 2021: 85 ML/MIN/1.73M*2
EOSINOPHIL # BLD AUTO: 0 X10*3/UL (ref 0–0.7)
EOSINOPHIL NFR BLD AUTO: 0 %
ERYTHROCYTE [DISTWIDTH] IN BLOOD BY AUTOMATED COUNT: 21.2 % (ref 11.5–14.5)
GLUCOSE SERPL-MCNC: 114 MG/DL (ref 74–99)
HCT VFR BLD AUTO: 21.5 % (ref 41–52)
HGB BLD-MCNC: 7.4 G/DL (ref 13.5–17.5)
IMM GRANULOCYTES # BLD AUTO: 0 X10*3/UL (ref 0–0.7)
IMM GRANULOCYTES NFR BLD AUTO: 0 % (ref 0–0.9)
LDH SERPL L TO P-CCNC: 200 U/L (ref 84–246)
LYMPHOCYTES # BLD AUTO: 0.85 X10*3/UL (ref 1.2–4.8)
LYMPHOCYTES NFR BLD AUTO: 63.4 %
MCH RBC QN AUTO: 33.6 PG (ref 26–34)
MCHC RBC AUTO-ENTMCNC: 34.4 G/DL (ref 32–36)
MCV RBC AUTO: 98 FL (ref 80–100)
MONOCYTES # BLD AUTO: 0.05 X10*3/UL (ref 0.1–1)
MONOCYTES NFR BLD AUTO: 3.7 %
NEUTROPHILS # BLD AUTO: 0.36 X10*3/UL (ref 1.2–7.7)
NEUTROPHILS NFR BLD AUTO: 26.9 %
NRBC BLD-RTO: ABNORMAL /100{WBCS}
OVALOCYTES BLD QL SMEAR: NORMAL
PLATELET # BLD AUTO: 29 X10*3/UL (ref 150–450)
POTASSIUM SERPL-SCNC: 3.8 MMOL/L (ref 3.5–5.3)
PROT SERPL-MCNC: 7.3 G/DL (ref 6.4–8.2)
RBC # BLD AUTO: 2.2 X10*6/UL (ref 4.5–5.9)
RBC MORPH BLD: NORMAL
SODIUM SERPL-SCNC: 137 MMOL/L (ref 136–145)
WBC # BLD AUTO: 1.3 X10*3/UL (ref 4.4–11.3)

## 2025-04-21 PROCEDURE — 80053 COMPREHEN METABOLIC PANEL: CPT

## 2025-04-21 PROCEDURE — 86901 BLOOD TYPING SEROLOGIC RH(D): CPT

## 2025-04-21 PROCEDURE — 85025 COMPLETE CBC W/AUTO DIFF WBC: CPT

## 2025-04-21 PROCEDURE — 36415 COLL VENOUS BLD VENIPUNCTURE: CPT

## 2025-04-21 PROCEDURE — 83615 LACTATE (LD) (LDH) ENZYME: CPT

## 2025-04-21 PROCEDURE — 86923 COMPATIBILITY TEST ELECTRIC: CPT

## 2025-04-21 RX ORDER — HEPARIN 100 UNIT/ML
500 SYRINGE INTRAVENOUS AS NEEDED
Status: CANCELLED | OUTPATIENT
Start: 2025-04-21

## 2025-04-21 RX ORDER — HEPARIN SODIUM,PORCINE/PF 10 UNIT/ML
50 SYRINGE (ML) INTRAVENOUS AS NEEDED
Status: CANCELLED | OUTPATIENT
Start: 2025-04-21

## 2025-04-21 ASSESSMENT — PAIN SCALES - GENERAL: PAINLEVEL_OUTOF10: 8

## 2025-04-21 NOTE — PROGRESS NOTES
Upon review of pt's med list, I noted lexapro and zoloft were listed as pt taking. I called pt and he went to get his medication list. He states he is not taking zoloft at this time. I removed this from his med list.

## 2025-04-21 NOTE — PROGRESS NOTES
Notified by Guillermina Amin in Lab Services that PLT 29. Secure Chat sent to Dr. Gomez. Pt has appointment at [facility].

## 2025-04-22 PROBLEM — Z00.00 MEDICARE ANNUAL WELLNESS VISIT, SUBSEQUENT: Status: ACTIVE | Noted: 2025-04-22

## 2025-04-22 LAB
ABO GROUP (TYPE) IN BLOOD: NORMAL
ANTIBODY SCREEN: NORMAL
RH FACTOR (ANTIGEN D): NORMAL

## 2025-04-22 ASSESSMENT — ENCOUNTER SYMPTOMS
SPEECH DIFFICULTY: 0
DIZZINESS: 0
AGITATION: 1
MYALGIAS: 0
DIAPHORESIS: 0
FEVER: 0
VOMITING: 0
NAUSEA: 0
TREMORS: 0
DIARRHEA: 0
CHILLS: 0
JOINT SWELLING: 0
SHORTNESS OF BREATH: 0
CONSTIPATION: 0
ARTHRALGIAS: 1
WEAKNESS: 0

## 2025-04-24 ENCOUNTER — LAB REQUISITION (OUTPATIENT)
Dept: LAB | Facility: CLINIC | Age: 65
End: 2025-04-24
Payer: MEDICARE

## 2025-04-24 ENCOUNTER — INFUSION (OUTPATIENT)
Dept: HEMATOLOGY/ONCOLOGY | Facility: CLINIC | Age: 65
End: 2025-04-24
Payer: MEDICARE

## 2025-04-24 VITALS
RESPIRATION RATE: 16 BRPM | DIASTOLIC BLOOD PRESSURE: 75 MMHG | WEIGHT: 213.19 LBS | SYSTOLIC BLOOD PRESSURE: 126 MMHG | BODY MASS INDEX: 29.78 KG/M2 | OXYGEN SATURATION: 97 % | HEART RATE: 82 BPM | TEMPERATURE: 97 F

## 2025-04-24 DIAGNOSIS — C92.00 ACUTE MYELOID LEUKEMIA NOT HAVING ACHIEVED REMISSION (MULTI): ICD-10-CM

## 2025-04-24 DIAGNOSIS — C92.00 ACUTE MYELOBLASTIC LEUKEMIA, NOT HAVING ACHIEVED REMISSION (MULTI): ICD-10-CM

## 2025-04-24 LAB
ABO GROUP (TYPE) IN BLOOD: NORMAL
ALBUMIN SERPL BCP-MCNC: 4.1 G/DL (ref 3.4–5)
ALP SERPL-CCNC: 88 U/L (ref 33–136)
ALT SERPL W P-5'-P-CCNC: 17 U/L (ref 10–52)
ANION GAP SERPL CALC-SCNC: 13 MMOL/L (ref 10–20)
ANTIBODY SCREEN: NORMAL
AST SERPL W P-5'-P-CCNC: 12 U/L (ref 9–39)
BASOPHILS # BLD AUTO: 0.02 X10*3/UL (ref 0–0.1)
BASOPHILS NFR BLD AUTO: 2.4 %
BILIRUB SERPL-MCNC: 0.6 MG/DL (ref 0–1.2)
BLOOD EXPIRATION DATE: NORMAL
BUN SERPL-MCNC: 13 MG/DL (ref 6–23)
CALCIUM SERPL-MCNC: 9.1 MG/DL (ref 8.6–10.3)
CHLORIDE SERPL-SCNC: 104 MMOL/L (ref 98–107)
CO2 SERPL-SCNC: 27 MMOL/L (ref 21–32)
CREAT SERPL-MCNC: 0.97 MG/DL (ref 0.5–1.3)
DACRYOCYTES BLD QL SMEAR: NORMAL
DISPENSE STATUS: NORMAL
EGFRCR SERPLBLD CKD-EPI 2021: 86 ML/MIN/1.73M*2
EOSINOPHIL # BLD AUTO: 0 X10*3/UL (ref 0–0.7)
EOSINOPHIL NFR BLD AUTO: 0 %
ERYTHROCYTE [DISTWIDTH] IN BLOOD BY AUTOMATED COUNT: 20.9 % (ref 11.5–14.5)
GLUCOSE SERPL-MCNC: 112 MG/DL (ref 74–99)
HCT VFR BLD AUTO: 20.6 % (ref 41–52)
HGB BLD-MCNC: 7 G/DL (ref 13.5–17.5)
IMM GRANULOCYTES # BLD AUTO: 0 X10*3/UL (ref 0–0.7)
IMM GRANULOCYTES NFR BLD AUTO: 0 % (ref 0–0.9)
LDH SERPL L TO P-CCNC: 172 U/L (ref 84–246)
LYMPHOCYTES # BLD AUTO: 0.59 X10*3/UL (ref 1.2–4.8)
LYMPHOCYTES NFR BLD AUTO: 71.1 %
MCH RBC QN AUTO: 33.3 PG (ref 26–34)
MCHC RBC AUTO-ENTMCNC: 34 G/DL (ref 32–36)
MCV RBC AUTO: 98 FL (ref 80–100)
MONOCYTES # BLD AUTO: 0.02 X10*3/UL (ref 0.1–1)
MONOCYTES NFR BLD AUTO: 2.4 %
NEUTROPHILS # BLD AUTO: 0.2 X10*3/UL (ref 1.2–7.7)
NEUTROPHILS NFR BLD AUTO: 24.1 %
NRBC BLD-RTO: ABNORMAL /100{WBCS}
OVALOCYTES BLD QL SMEAR: NORMAL
PLATELET # BLD AUTO: 31 X10*3/UL (ref 150–450)
POLYCHROMASIA BLD QL SMEAR: NORMAL
POTASSIUM SERPL-SCNC: 3.5 MMOL/L (ref 3.5–5.3)
PRODUCT BLOOD TYPE: 5100
PRODUCT CODE: NORMAL
PROT SERPL-MCNC: 6.8 G/DL (ref 6.4–8.2)
RBC # BLD AUTO: 2.1 X10*6/UL (ref 4.5–5.9)
RBC MORPH BLD: NORMAL
RH FACTOR (ANTIGEN D): NORMAL
SODIUM SERPL-SCNC: 140 MMOL/L (ref 136–145)
SPECIMEN HANDLING: NORMAL
UNIT ABO: NORMAL
UNIT NUMBER: NORMAL
UNIT RH: NORMAL
UNIT VOLUME: 350
WBC # BLD AUTO: 0.8 X10*3/UL (ref 4.4–11.3)
XM INTEP: NORMAL

## 2025-04-24 PROCEDURE — 86850 RBC ANTIBODY SCREEN: CPT

## 2025-04-24 PROCEDURE — 80053 COMPREHEN METABOLIC PANEL: CPT

## 2025-04-24 PROCEDURE — 36430 TRANSFUSION BLD/BLD COMPNT: CPT

## 2025-04-24 PROCEDURE — 85025 COMPLETE CBC W/AUTO DIFF WBC: CPT

## 2025-04-24 PROCEDURE — 83615 LACTATE (LD) (LDH) ENZYME: CPT

## 2025-04-24 PROCEDURE — P9040 RBC LEUKOREDUCED IRRADIATED: HCPCS

## 2025-04-24 RX ORDER — ACYCLOVIR 400 MG/1
400 TABLET ORAL 2 TIMES DAILY
Qty: 180 TABLET | Refills: 3 | Status: SHIPPED | OUTPATIENT
Start: 2025-04-24

## 2025-04-24 RX ORDER — HEPARIN 100 UNIT/ML
500 SYRINGE INTRAVENOUS AS NEEDED
OUTPATIENT
Start: 2025-04-24

## 2025-04-24 RX ORDER — ALBUTEROL SULFATE 0.83 MG/ML
3 SOLUTION RESPIRATORY (INHALATION) AS NEEDED
OUTPATIENT
Start: 2025-04-24

## 2025-04-24 RX ORDER — HEPARIN SODIUM,PORCINE/PF 10 UNIT/ML
50 SYRINGE (ML) INTRAVENOUS AS NEEDED
OUTPATIENT
Start: 2025-04-24

## 2025-04-24 RX ORDER — FAMOTIDINE 10 MG/ML
20 INJECTION, SOLUTION INTRAVENOUS ONCE AS NEEDED
OUTPATIENT
Start: 2025-04-24

## 2025-04-24 RX ORDER — DIPHENHYDRAMINE HYDROCHLORIDE 50 MG/ML
50 INJECTION, SOLUTION INTRAMUSCULAR; INTRAVENOUS AS NEEDED
OUTPATIENT
Start: 2025-04-24

## 2025-04-24 RX ORDER — EPINEPHRINE 0.3 MG/.3ML
0.3 INJECTION SUBCUTANEOUS EVERY 5 MIN PRN
OUTPATIENT
Start: 2025-04-24

## 2025-04-24 ASSESSMENT — PAIN SCALES - GENERAL: PAINLEVEL_OUTOF10: 9

## 2025-04-25 ENCOUNTER — DOCUMENTATION (OUTPATIENT)
Dept: OTHER | Facility: HOSPITAL | Age: 65
End: 2025-04-25
Payer: MEDICARE

## 2025-04-25 NOTE — PROGRESS NOTES
"Patient Education  Learner: family, patient, and significant other  Educated on: 4/24/25, Allogeneic transplant  Readiness: acceptance  Preferred learning method: explanation, handout, video  Method used: explanation, handout, and video  Response: verbalizes understanding  Barriers: None  Preferred language: English    I spoke with Link \"Benoit\", his wife Emi, his 2 daughters, and 2 sisters yesterday for allogeneic stem cell transplant education. We discussed the general concept of transplant including chemotherapy administration and cell infusion. We reviewed the workup process including organ function testing and laboratory testing, required for MD and financial clearance to proceed with stem cell collection and transplant. We discussed hospital admission for transplant and that Benoit will remain in the hospital for 4-6 weeks. Benoit's preparative regimen prior to transplant is TBD but will most likely include Melphalan, Fludarabine, rATG, and TBI. Administration and potential side effects of this treatment were reviewed and patient will be provided Lexicomp material specific to this medication at his appointment with Dr. Villatoro on 5/2. We discussed neutropenia, anemia, thrombocytopenia and the potential complications associated with these events. Infection prevention measures such as strict hand washing, low pathogen diet, daily showering/CHG bathing and frequent walking were reviewed.  Patient will be provided with patient information sheets on transplant related topics and an allo transplant binder will be provided. 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 We discussed the goals of discharge and the need for a caregiver and someone to accompany him to post transplant visits in the [facility] Infusion Therapy Suite or on [facility], which will occur 2-3 times per week, at minimum for  days post transplant.  We discussed the importance of having a reliable caregiver post-transplant to drive patient to " appointments, help with medication adherence, and assist with ADLS such as cooking and cleaning. The patient verbalized understanding of these measures and his wife states that she will be the primary caregiver, but that they have several family and friends that can help assist. Patient will be given copies of all applicable consents related to upcoming therapy and transplant so they can review prior to signing with Dr. Villatoro. Trace live in [place] so local lodging will not be needed. They are aware that social work will be reaching out. Questions/concerns were addressed. My contact information was provided.

## 2025-04-26 DIAGNOSIS — I50.22 CHRONIC SYSTOLIC HEART FAILURE: ICD-10-CM

## 2025-04-26 DIAGNOSIS — I48.91 ATRIAL FIBRILLATION, UNSPECIFIED TYPE (MULTI): ICD-10-CM

## 2025-04-28 ENCOUNTER — DOCUMENTATION (OUTPATIENT)
Dept: HEMATOLOGY/ONCOLOGY | Facility: HOSPITAL | Age: 65
End: 2025-04-28
Payer: MEDICARE

## 2025-04-28 ENCOUNTER — INFUSION (OUTPATIENT)
Dept: HEMATOLOGY/ONCOLOGY | Facility: CLINIC | Age: 65
End: 2025-04-28
Payer: MEDICARE

## 2025-04-28 VITALS
DIASTOLIC BLOOD PRESSURE: 76 MMHG | WEIGHT: 210.21 LBS | SYSTOLIC BLOOD PRESSURE: 138 MMHG | BODY MASS INDEX: 29.36 KG/M2 | TEMPERATURE: 97.2 F | OXYGEN SATURATION: 96 % | HEART RATE: 78 BPM | RESPIRATION RATE: 17 BRPM

## 2025-04-28 DIAGNOSIS — C92.00 ACUTE MYELOID LEUKEMIA NOT HAVING ACHIEVED REMISSION (MULTI): ICD-10-CM

## 2025-04-28 LAB
ALBUMIN SERPL BCP-MCNC: 4 G/DL (ref 3.4–5)
ALP SERPL-CCNC: 86 U/L (ref 33–136)
ALT SERPL W P-5'-P-CCNC: 22 U/L (ref 10–52)
ANION GAP SERPL CALC-SCNC: 14 MMOL/L (ref 10–20)
AST SERPL W P-5'-P-CCNC: 21 U/L (ref 9–39)
BASOPHILS # BLD MANUAL: 0 X10*3/UL (ref 0–0.1)
BASOPHILS NFR BLD MANUAL: 0 %
BILIRUB SERPL-MCNC: 0.6 MG/DL (ref 0–1.2)
BUN SERPL-MCNC: 13 MG/DL (ref 6–23)
CALCIUM SERPL-MCNC: 8.9 MG/DL (ref 8.6–10.3)
CHLORIDE SERPL-SCNC: 104 MMOL/L (ref 98–107)
CO2 SERPL-SCNC: 27 MMOL/L (ref 21–32)
CREAT SERPL-MCNC: 1.09 MG/DL (ref 0.5–1.3)
DACRYOCYTES BLD QL SMEAR: ABNORMAL
EGFRCR SERPLBLD CKD-EPI 2021: 75 ML/MIN/1.73M*2
EOSINOPHIL # BLD MANUAL: 0.01 X10*3/UL (ref 0–0.7)
EOSINOPHIL NFR BLD MANUAL: 2 %
ERYTHROCYTE [DISTWIDTH] IN BLOOD BY AUTOMATED COUNT: 22.9 % (ref 11.5–14.5)
GLUCOSE SERPL-MCNC: 114 MG/DL (ref 74–99)
HCT VFR BLD AUTO: 26.5 % (ref 41–52)
HGB BLD-MCNC: 8.7 G/DL (ref 13.5–17.5)
IMM GRANULOCYTES # BLD AUTO: 0.01 X10*3/UL (ref 0–0.7)
IMM GRANULOCYTES NFR BLD AUTO: 1.4 % (ref 0–0.9)
LDH SERPL L TO P-CCNC: 217 U/L (ref 84–246)
LYMPHOCYTES # BLD MANUAL: 0.49 X10*3/UL (ref 1.2–4.8)
LYMPHOCYTES NFR BLD MANUAL: 82 %
MCH RBC QN AUTO: 33.6 PG (ref 26–34)
MCHC RBC AUTO-ENTMCNC: 32.8 G/DL (ref 32–36)
MCV RBC AUTO: 102 FL (ref 80–100)
MONOCYTES # BLD MANUAL: 0 X10*3/UL (ref 0.1–1)
MONOCYTES NFR BLD MANUAL: 0 %
NEUTS SEG # BLD MANUAL: 0.1 X10*3/UL (ref 1.2–7)
NEUTS SEG NFR BLD MANUAL: 16 %
NRBC BLD MANUAL-RTO: 14 % (ref 0–0)
NRBC BLD-RTO: ABNORMAL /100{WBCS}
OVALOCYTES BLD QL SMEAR: ABNORMAL
PLATELET # BLD AUTO: 260 X10*3/UL (ref 150–450)
POLYCHROMASIA BLD QL SMEAR: ABNORMAL
POTASSIUM SERPL-SCNC: 3.8 MMOL/L (ref 3.5–5.3)
PROT SERPL-MCNC: 6.7 G/DL (ref 6.4–8.2)
RBC # BLD AUTO: 2.59 X10*6/UL (ref 4.5–5.9)
RBC MORPH BLD: ABNORMAL
SODIUM SERPL-SCNC: 141 MMOL/L (ref 136–145)
TOTAL CELLS COUNTED BLD: 50
WBC # BLD AUTO: 0.6 X10*3/UL (ref 4.4–11.3)

## 2025-04-28 PROCEDURE — 83615 LACTATE (LD) (LDH) ENZYME: CPT

## 2025-04-28 PROCEDURE — 85027 COMPLETE CBC AUTOMATED: CPT

## 2025-04-28 PROCEDURE — 36415 COLL VENOUS BLD VENIPUNCTURE: CPT

## 2025-04-28 PROCEDURE — 86901 BLOOD TYPING SEROLOGIC RH(D): CPT

## 2025-04-28 PROCEDURE — 85007 BL SMEAR W/DIFF WBC COUNT: CPT

## 2025-04-28 PROCEDURE — 84075 ASSAY ALKALINE PHOSPHATASE: CPT

## 2025-04-28 RX ORDER — SACUBITRIL AND VALSARTAN 24; 26 MG/1; MG/1
1 TABLET, FILM COATED ORAL 2 TIMES DAILY
Qty: 180 TABLET | Refills: 3 | Status: SHIPPED | OUTPATIENT
Start: 2025-04-28

## 2025-04-28 RX ORDER — HEPARIN SODIUM,PORCINE/PF 10 UNIT/ML
50 SYRINGE (ML) INTRAVENOUS AS NEEDED
OUTPATIENT
Start: 2025-04-28

## 2025-04-28 RX ORDER — RIVAROXABAN 20 MG/1
20 TABLET, FILM COATED ORAL DAILY
Qty: 90 TABLET | Refills: 3 | Status: SHIPPED | OUTPATIENT
Start: 2025-04-28

## 2025-04-28 RX ORDER — HEPARIN 100 UNIT/ML
500 SYRINGE INTRAVENOUS AS NEEDED
OUTPATIENT
Start: 2025-04-28

## 2025-04-28 ASSESSMENT — PAIN SCALES - GENERAL: PAINLEVEL_OUTOF10: 9

## 2025-04-29 LAB
ABO GROUP (TYPE) IN BLOOD: NORMAL
ANTIBODY SCREEN: NORMAL
RH FACTOR (ANTIGEN D): NORMAL

## 2025-04-30 ENCOUNTER — TELEPHONE (OUTPATIENT)
Dept: HEMATOLOGY/ONCOLOGY | Facility: CLINIC | Age: 65
End: 2025-04-30
Payer: MEDICARE

## 2025-04-30 DIAGNOSIS — Z76.82 STEM CELL TRANSPLANT CANDIDATE: ICD-10-CM

## 2025-04-30 DIAGNOSIS — D61.818 PANCYTOPENIA: ICD-10-CM

## 2025-04-30 DIAGNOSIS — C92.00 ACUTE MYELOID LEUKEMIA NOT HAVING ACHIEVED REMISSION (MULTI): Primary | ICD-10-CM

## 2025-04-30 DIAGNOSIS — C92.00 ACUTE MYELOID LEUKEMIA NOT HAVING ACHIEVED REMISSION (MULTI): ICD-10-CM

## 2025-04-30 LAB
PATH REPORT.ADDENDUM SPEC: NORMAL
PATH REPORT.COMMENTS IMP SPEC: NORMAL
PATH REPORT.FINAL DX SPEC: NORMAL
PATH REPORT.GROSS SPEC: NORMAL
PATH REPORT.MICROSCOPIC SPEC OTHER STN: NORMAL
PATH REPORT.RELEVANT HX SPEC: NORMAL
PATH REPORT.TOTAL CANCER: NORMAL

## 2025-04-30 RX ORDER — FLUCONAZOLE 200 MG/1
400 TABLET ORAL DAILY
Qty: 60 TABLET | Refills: 5 | Status: SHIPPED | OUTPATIENT
Start: 2025-04-30 | End: 2025-10-27

## 2025-04-30 RX ORDER — VENETOCLAX 100 MG/1
TABLET, FILM COATED ORAL
Qty: 55 TABLET | Refills: 0 | Status: CANCELLED | OUTPATIENT
Start: 2025-05-07 | End: 2025-06-04

## 2025-04-30 RX ORDER — FLUCONAZOLE 200 MG/1
400 TABLET ORAL DAILY
Qty: 60 TABLET | Refills: 5 | Status: SHIPPED | OUTPATIENT
Start: 2025-04-30 | End: 2025-04-30 | Stop reason: SDUPTHER

## 2025-04-30 RX ORDER — LEVOFLOXACIN 500 MG/1
500 TABLET, FILM COATED ORAL DAILY
Qty: 30 TABLET | Refills: 2 | Status: SHIPPED | OUTPATIENT
Start: 2025-04-30 | End: 2025-04-30 | Stop reason: SDUPTHER

## 2025-04-30 RX ORDER — LEVOFLOXACIN 500 MG/1
500 TABLET, FILM COATED ORAL DAILY
Qty: 30 TABLET | Refills: 2 | Status: SHIPPED | OUTPATIENT
Start: 2025-04-30

## 2025-04-30 NOTE — TELEPHONE ENCOUNTER
Reason for Conversation  Appointment 5/1     Background   I called and spoke with Link, I updated him that Dr. Gomez is recommending a bone marrow biopsy be completed tomorrow after completing cycle 1. I asked if he could come in at 0800 AM instead of scheduled 0830AM apt time - pt agreeable and verbalized understanding. Patient had no other questions at this time.     Disposition   No disposition on file.

## 2025-05-01 ENCOUNTER — PROCEDURE VISIT (OUTPATIENT)
Dept: HEMATOLOGY/ONCOLOGY | Facility: CLINIC | Age: 65
End: 2025-05-01
Payer: MEDICARE

## 2025-05-01 ENCOUNTER — LAB (OUTPATIENT)
Dept: LAB | Facility: CLINIC | Age: 65
End: 2025-05-01
Payer: MEDICARE

## 2025-05-01 ENCOUNTER — DOCUMENTATION (OUTPATIENT)
Dept: HEMATOLOGY/ONCOLOGY | Facility: HOSPITAL | Age: 65
End: 2025-05-01

## 2025-05-01 ENCOUNTER — INFUSION (OUTPATIENT)
Dept: HEMATOLOGY/ONCOLOGY | Facility: CLINIC | Age: 65
End: 2025-05-01
Payer: MEDICARE

## 2025-05-01 ENCOUNTER — LAB REQUISITION (OUTPATIENT)
Dept: LAB | Facility: CLINIC | Age: 65
End: 2025-05-01
Payer: MEDICARE

## 2025-05-01 VITALS
OXYGEN SATURATION: 95 % | SYSTOLIC BLOOD PRESSURE: 127 MMHG | WEIGHT: 208.78 LBS | HEART RATE: 90 BPM | BODY MASS INDEX: 29.16 KG/M2 | TEMPERATURE: 97 F | RESPIRATION RATE: 18 BRPM | DIASTOLIC BLOOD PRESSURE: 72 MMHG

## 2025-05-01 DIAGNOSIS — C92.00 ACUTE MYELOID LEUKEMIA NOT HAVING ACHIEVED REMISSION (MULTI): Primary | ICD-10-CM

## 2025-05-01 DIAGNOSIS — C92.00 ACUTE MYELOID LEUKEMIA NOT HAVING ACHIEVED REMISSION (MULTI): ICD-10-CM

## 2025-05-01 DIAGNOSIS — C92.00 ACUTE MYELOBLASTIC LEUKEMIA, NOT HAVING ACHIEVED REMISSION (MULTI): ICD-10-CM

## 2025-05-01 LAB
BASOPHILS # BLD AUTO: 0.01 X10*3/UL (ref 0–0.1)
BASOPHILS NFR BLD AUTO: 1.8 %
EOSINOPHIL # BLD AUTO: 0 X10*3/UL (ref 0–0.7)
EOSINOPHIL NFR BLD AUTO: 0 %
ERYTHROCYTE [DISTWIDTH] IN BLOOD BY AUTOMATED COUNT: 25.1 % (ref 11.5–14.5)
GIANT PLATELETS BLD QL SMEAR: NORMAL
HCT VFR BLD AUTO: 31.2 % (ref 41–52)
HGB BLD-MCNC: 10.2 G/DL (ref 13.5–17.5)
HLA CLS I TYP PNL BLD/T DONR HIGH RES: NORMAL
HLA CLS I TYP PNL BLD/T DONR HIGH RES: NORMAL
HLA RESULTS: NORMAL
HLA RESULTS: NORMAL
HLA-DP2 QL: NORMAL
HLA-DP2 QL: NORMAL
HLA-DQB1 HIGH RES: NORMAL
HLA-DQB1 HIGH RES: NORMAL
HLA-DRB1 HIGH RES: NORMAL
HLA-DRB1 HIGH RES: NORMAL
IMM GRANULOCYTES # BLD AUTO: 0 X10*3/UL (ref 0–0.7)
IMM GRANULOCYTES NFR BLD AUTO: 0 % (ref 0–0.9)
LYMPHOCYTES # BLD AUTO: 0.43 X10*3/UL (ref 1.2–4.8)
LYMPHOCYTES NFR BLD AUTO: 78.2 %
MCH RBC QN AUTO: 34 PG (ref 26–34)
MCHC RBC AUTO-ENTMCNC: 32.7 G/DL (ref 32–36)
MCV RBC AUTO: 104 FL (ref 80–100)
MONOCYTES # BLD AUTO: 0.02 X10*3/UL (ref 0.1–1)
MONOCYTES NFR BLD AUTO: 3.6 %
NEUTROPHILS # BLD AUTO: 0.09 X10*3/UL (ref 1.2–7.7)
NEUTROPHILS NFR BLD AUTO: 16.4 %
NRBC BLD-RTO: ABNORMAL /100{WBCS}
OVALOCYTES BLD QL SMEAR: NORMAL
PLATELET # BLD AUTO: 572 X10*3/UL (ref 150–450)
POLYCHROMASIA BLD QL SMEAR: NORMAL
RBC # BLD AUTO: 3 X10*6/UL (ref 4.5–5.9)
RBC MORPH BLD: NORMAL
WBC # BLD AUTO: 0.6 X10*3/UL (ref 4.4–11.3)

## 2025-05-01 PROCEDURE — 38222 DX BONE MARROW BX & ASPIR: CPT | Mod: LT | Performed by: NURSE PRACTITIONER

## 2025-05-01 PROCEDURE — 85025 COMPLETE CBC W/AUTO DIFF WBC: CPT

## 2025-05-01 PROCEDURE — 88185 FLOWCYTOMETRY/TC ADD-ON: CPT | Mod: TC

## 2025-05-01 PROCEDURE — 36415 COLL VENOUS BLD VENIPUNCTURE: CPT

## 2025-05-01 PROCEDURE — 85097 BONE MARROW INTERPRETATION: CPT | Mod: TC,SUR

## 2025-05-01 PROCEDURE — 85097 BONE MARROW INTERPRETATION: CPT

## 2025-05-01 PROCEDURE — 38222 DX BONE MARROW BX & ASPIR: CPT | Performed by: NURSE PRACTITIONER

## 2025-05-01 PROCEDURE — 88237 TISSUE CULTURE BONE MARROW: CPT

## 2025-05-01 ASSESSMENT — PAIN SCALES - GENERAL: PAINLEVEL_OUTOF10: 9

## 2025-05-01 NOTE — PROGRESS NOTES
Patient ID: Link Inman is a 64 y.o. male.    Biopsy bone marrow    Date/Time: 5/1/2025 9:08 AM    Performed by: SAULO Espinal  Authorized by: SAULO Espinal    Consent:     Consent obtained:  Verbal    Consent given by:  Patient    Risks discussed:  Bleeding, infection and pain    Alternatives discussed:  No treatment  Universal protocol:     Procedure explained and questions answered to patient or proxy's satisfaction: yes      Test results available: yes      Site/side marked: yes      Immediately prior to procedure, a time out was called: yes      Patient identity confirmed:  Verbally with patient and provided demographic data  Indications:     Indications:  AML  Pre-procedure details:     Skin preparation:  Povidone-iodine  Sedation:     Sedation type:  None  Anesthesia:     Anesthesia method:  Local infiltration    Local anesthetic:  Lidocaine 1% w/o epi  Procedure specific details:      The left posterior iliac crest was prepped with povidone-iodine.     10 ml of lidocaine 1% local anesthesia infiltrated into the subcutaneous tissue.    Left bone marrow biopsy and left bone marrow aspirate was obtained.     The procedure was tolerated well and there were no complications.    Specimens sent for: Heme path protocol    Post-procedure details:     Procedure completion:  Tolerated well, no immediate complications

## 2025-05-02 ENCOUNTER — SPECIALTY PHARMACY (OUTPATIENT)
Dept: HEMATOLOGY/ONCOLOGY | Facility: HOSPITAL | Age: 65
End: 2025-05-02
Payer: MEDICARE

## 2025-05-02 ENCOUNTER — OFFICE VISIT (OUTPATIENT)
Dept: HEMATOLOGY/ONCOLOGY | Facility: HOSPITAL | Age: 65
End: 2025-05-02
Payer: MEDICARE

## 2025-05-02 ENCOUNTER — SPECIALTY PHARMACY (OUTPATIENT)
Dept: PHARMACY | Facility: CLINIC | Age: 65
End: 2025-05-02

## 2025-05-02 VITALS
SYSTOLIC BLOOD PRESSURE: 130 MMHG | TEMPERATURE: 96.6 F | OXYGEN SATURATION: 97 % | BODY MASS INDEX: 28.9 KG/M2 | HEART RATE: 79 BPM | DIASTOLIC BLOOD PRESSURE: 76 MMHG | RESPIRATION RATE: 18 BRPM | WEIGHT: 206.9 LBS

## 2025-05-02 DIAGNOSIS — C92.00 ACUTE MYELOID LEUKEMIA NOT HAVING ACHIEVED REMISSION (MULTI): ICD-10-CM

## 2025-05-02 DIAGNOSIS — Z76.82 STEM CELL TRANSPLANT CANDIDATE: ICD-10-CM

## 2025-05-02 PROCEDURE — 99214 OFFICE O/P EST MOD 30 MIN: CPT | Performed by: STUDENT IN AN ORGANIZED HEALTH CARE EDUCATION/TRAINING PROGRAM

## 2025-05-02 PROCEDURE — 1125F AMNT PAIN NOTED PAIN PRSNT: CPT | Performed by: STUDENT IN AN ORGANIZED HEALTH CARE EDUCATION/TRAINING PROGRAM

## 2025-05-02 ASSESSMENT — PAIN SCALES - GENERAL: PAINLEVEL_OUTOF10: 8

## 2025-05-02 NOTE — PROGRESS NOTES
Has started c1 aza/cat/ denies GI disturbances/n/v/fevers/chills. Had some constipation during aza injection but now resolved. Inj sites now healed. Confirmed taking cat with food.

## 2025-05-05 ENCOUNTER — SPECIALTY PHARMACY (OUTPATIENT)
Dept: PHARMACY | Facility: CLINIC | Age: 65
End: 2025-05-05

## 2025-05-05 ENCOUNTER — DOCUMENTATION (OUTPATIENT)
Dept: HEMATOLOGY/ONCOLOGY | Facility: HOSPITAL | Age: 65
End: 2025-05-05
Payer: MEDICARE

## 2025-05-05 ENCOUNTER — INFUSION (OUTPATIENT)
Dept: HEMATOLOGY/ONCOLOGY | Facility: CLINIC | Age: 65
End: 2025-05-05
Payer: MEDICARE

## 2025-05-05 VITALS
TEMPERATURE: 96.4 F | RESPIRATION RATE: 16 BRPM | SYSTOLIC BLOOD PRESSURE: 108 MMHG | HEART RATE: 88 BPM | DIASTOLIC BLOOD PRESSURE: 65 MMHG | BODY MASS INDEX: 28.82 KG/M2 | OXYGEN SATURATION: 93 % | WEIGHT: 206.35 LBS

## 2025-05-05 DIAGNOSIS — C92.00 ACUTE MYELOID LEUKEMIA NOT HAVING ACHIEVED REMISSION (MULTI): ICD-10-CM

## 2025-05-05 DIAGNOSIS — R33.9 URINARY RETENTION: ICD-10-CM

## 2025-05-05 DIAGNOSIS — R33.9 URINARY RETENTION: Primary | ICD-10-CM

## 2025-05-05 LAB
ALBUMIN SERPL BCP-MCNC: 4 G/DL (ref 3.4–5)
ALP SERPL-CCNC: 81 U/L (ref 33–136)
ALT SERPL W P-5'-P-CCNC: 13 U/L (ref 10–52)
ANION GAP SERPL CALC-SCNC: 14 MMOL/L (ref 10–20)
APPEARANCE UR: CLEAR
AST SERPL W P-5'-P-CCNC: 10 U/L (ref 9–39)
BASOPHILS # BLD AUTO: 0.03 X10*3/UL (ref 0–0.1)
BASOPHILS NFR BLD AUTO: 3.1 %
BILIRUB SERPL-MCNC: 0.8 MG/DL (ref 0–1.2)
BILIRUB UR STRIP.AUTO-MCNC: NEGATIVE MG/DL
BUN SERPL-MCNC: 13 MG/DL (ref 6–23)
CALCIUM SERPL-MCNC: 9.4 MG/DL (ref 8.6–10.3)
CHLORIDE SERPL-SCNC: 104 MMOL/L (ref 98–107)
CO2 SERPL-SCNC: 26 MMOL/L (ref 21–32)
COLOR UR: YELLOW
CREAT SERPL-MCNC: 1.04 MG/DL (ref 0.5–1.3)
EGFRCR SERPLBLD CKD-EPI 2021: 79 ML/MIN/1.73M*2
EOSINOPHIL # BLD AUTO: 0 X10*3/UL (ref 0–0.7)
EOSINOPHIL NFR BLD AUTO: 0 %
ERYTHROCYTE [DISTWIDTH] IN BLOOD BY AUTOMATED COUNT: ABNORMAL %
GIANT PLATELETS BLD QL SMEAR: NORMAL
GLUCOSE SERPL-MCNC: 103 MG/DL (ref 74–99)
GLUCOSE UR STRIP.AUTO-MCNC: NORMAL MG/DL
HCT VFR BLD AUTO: 33.8 % (ref 41–52)
HGB BLD-MCNC: 11 G/DL (ref 13.5–17.5)
IMM GRANULOCYTES # BLD AUTO: 0 X10*3/UL (ref 0–0.7)
IMM GRANULOCYTES NFR BLD AUTO: 0 % (ref 0–0.9)
KETONES UR STRIP.AUTO-MCNC: NEGATIVE MG/DL
LEUKOCYTE ESTERASE UR QL STRIP.AUTO: NEGATIVE
LYMPHOCYTES # BLD AUTO: 0.53 X10*3/UL (ref 1.2–4.8)
LYMPHOCYTES NFR BLD AUTO: 54.6 %
MCH RBC QN AUTO: 34.7 PG (ref 26–34)
MCHC RBC AUTO-ENTMCNC: 32.5 G/DL (ref 32–36)
MCV RBC AUTO: 107 FL (ref 80–100)
MONOCYTES # BLD AUTO: 0.27 X10*3/UL (ref 0.1–1)
MONOCYTES NFR BLD AUTO: 27.8 %
NEUTROPHILS # BLD AUTO: 0.14 X10*3/UL (ref 1.2–7.7)
NEUTROPHILS NFR BLD AUTO: 14.5 %
NITRITE UR QL STRIP.AUTO: NEGATIVE
NRBC BLD-RTO: ABNORMAL /100{WBCS}
OVALOCYTES BLD QL SMEAR: NORMAL
PH UR STRIP.AUTO: 6 [PH]
PLATELET # BLD AUTO: 735 X10*3/UL (ref 150–450)
POLYCHROMASIA BLD QL SMEAR: NORMAL
POTASSIUM SERPL-SCNC: 4.2 MMOL/L (ref 3.5–5.3)
PROT SERPL-MCNC: 7.2 G/DL (ref 6.4–8.2)
PROT UR STRIP.AUTO-MCNC: NEGATIVE MG/DL
RBC # BLD AUTO: 3.17 X10*6/UL (ref 4.5–5.9)
RBC # UR STRIP.AUTO: NEGATIVE MG/DL
RBC MORPH BLD: NORMAL
SODIUM SERPL-SCNC: 140 MMOL/L (ref 136–145)
SP GR UR STRIP.AUTO: 1.01
UROBILINOGEN UR STRIP.AUTO-MCNC: NORMAL MG/DL
WBC # BLD AUTO: 1 X10*3/UL (ref 4.4–11.3)

## 2025-05-05 PROCEDURE — 85025 COMPLETE CBC W/AUTO DIFF WBC: CPT

## 2025-05-05 PROCEDURE — 86850 RBC ANTIBODY SCREEN: CPT

## 2025-05-05 PROCEDURE — 81003 URINALYSIS AUTO W/O SCOPE: CPT

## 2025-05-05 PROCEDURE — 80053 COMPREHEN METABOLIC PANEL: CPT

## 2025-05-05 PROCEDURE — 36415 COLL VENOUS BLD VENIPUNCTURE: CPT

## 2025-05-05 PROCEDURE — RXMED WILLOW AMBULATORY MEDICATION CHARGE

## 2025-05-05 RX ORDER — HEPARIN 100 UNIT/ML
500 SYRINGE INTRAVENOUS AS NEEDED
Status: CANCELLED | OUTPATIENT
Start: 2025-05-05

## 2025-05-05 RX ORDER — EPINEPHRINE 0.3 MG/.3ML
0.3 INJECTION SUBCUTANEOUS EVERY 5 MIN PRN
OUTPATIENT
Start: 2025-05-05

## 2025-05-05 RX ORDER — FAMOTIDINE 10 MG/ML
20 INJECTION, SOLUTION INTRAVENOUS ONCE AS NEEDED
OUTPATIENT
Start: 2025-05-05

## 2025-05-05 RX ORDER — ALBUTEROL SULFATE 0.83 MG/ML
3 SOLUTION RESPIRATORY (INHALATION) AS NEEDED
OUTPATIENT
Start: 2025-05-05

## 2025-05-05 RX ORDER — DIPHENHYDRAMINE HYDROCHLORIDE 50 MG/ML
50 INJECTION, SOLUTION INTRAMUSCULAR; INTRAVENOUS AS NEEDED
OUTPATIENT
Start: 2025-05-05

## 2025-05-05 RX ORDER — HEPARIN SODIUM,PORCINE/PF 10 UNIT/ML
50 SYRINGE (ML) INTRAVENOUS AS NEEDED
Status: CANCELLED | OUTPATIENT
Start: 2025-05-05

## 2025-05-05 ASSESSMENT — ENCOUNTER SYMPTOMS
BRUISES/BLEEDS EASILY: 0
JOINT SWELLING: 0
CONSTIPATION: 0
FATIGUE: 0
UNEXPECTED WEIGHT CHANGE: 0
VOMITING: 0
FLANK PAIN: 0
DIFFICULTY URINATING: 0
SINUS PAIN: 0
COUGH: 0
NAUSEA: 0
BACK PAIN: 0
WEAKNESS: 0
CHILLS: 0
RHINORRHEA: 0
LIGHT-HEADEDNESS: 0
ACTIVITY CHANGE: 0
DIAPHORESIS: 0
FEVER: 0
SHORTNESS OF BREATH: 0
DYSPHORIC MOOD: 0
APPETITE CHANGE: 0
NERVOUS/ANXIOUS: 0
NUMBNESS: 0
SORE THROAT: 0
ADENOPATHY: 0
HEADACHES: 0
SINUS PRESSURE: 0
ABDOMINAL PAIN: 0
DIARRHEA: 0
CONFUSION: 0

## 2025-05-05 ASSESSMENT — PAIN SCALES - GENERAL: PAINLEVEL_OUTOF10: 7

## 2025-05-05 NOTE — PROGRESS NOTES
Patient ID: Link Inman is a 64 y.o. male.  Referring Physician: No referring provider defined for this encounter.  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  On review with patient has increasing fatigue and reduced activity since November 2024.  Underwent bone marrow biopsy 3/27/2025 that demonstrated:   BONE MARROW, LEFT ILIAC CREST, ASPIRATE WITH CLOT AND BIOPSY WITH TOUCH PREPARATION:   -- ACUTE MYELOID LEUKEMIA (estimated at 80% of marrow cellularity).  -- MARKEDLY DECREASED MATURING TRILINEAGE HEMATOPOIESIS.  Karyotype: 46 XY  FISH: FISH NEGATIVE for inverted 3/t(3;3), t(6;9), t(8;21), gain of RUNX1 or LKEO4N8, MLL (KMT2A) rearrangement, t(15;17), inverted 16/t(16;16), TP53 deletion and monosomy 17   NGS: TET2 p.* VAF: 32% TET2 p.* VAF: 32%        4/10/2025 -  Chemotherapy    Venetoclax / AzaCITIDine, 28 Day Cycles - Induction / Consolidation          Patient presents with family members to discuss potential allogeneic transplant for AML. Feeling ok and has started therapy.        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 Gomez, GINETTE/LENORE      Subjective     Reviewed and Past Medical History, Past Surgical History, Family History, and Social History:    Review of Systems   Constitutional:  Negative for activity change, appetite change, chills, diaphoresis, fatigue, fever and unexpected weight  change.   HENT:  Negative for congestion, mouth sores, nosebleeds, rhinorrhea, sinus pressure, sinus pain and sore throat.    Eyes:  Negative for visual disturbance.   Respiratory:  Negative for cough and shortness of breath.    Cardiovascular:  Negative for chest pain and leg swelling.   Gastrointestinal:  Negative for abdominal pain, constipation, diarrhea, nausea and vomiting.   Genitourinary:  Negative for difficulty urinating and flank pain.   Musculoskeletal:  Negative for back pain and joint swelling.   Skin:  Negative for rash.   Neurological:  Negative for weakness, light-headedness, numbness and headaches.   Hematological:  Negative for adenopathy. Does not bruise/bleed easily.   Psychiatric/Behavioral:  Negative for confusion and dysphoric mood. The patient is not nervous/anxious.      Home Medications and Adherence Reviewed with Patient.     Objective      VS:  /76 (BP Location: Right arm, Patient Position: Sitting, BP Cuff Size: Large adult)   Pulse 79   Temp 35.9 °C (96.6 °F) (Skin)   Resp 18   Wt 93.8 kg (206 lb 14.4 oz)   SpO2 97%   BMI 28.90 kg/m²   BSA: 2.17 meters squared  KPS: 70    Physical Exam  Constitutional:       Appearance: Normal appearance.   HENT:      Mouth/Throat:      Mouth: Mucous membranes are moist.   Eyes:      Conjunctiva/sclera: Conjunctivae normal.      Pupils: Pupils are equal, round, and reactive to light.   Cardiovascular:      Rate and Rhythm: Normal rate.   Pulmonary:      Effort: Pulmonary effort is normal.   Abdominal:      General: Abdomen is flat.      Palpations: Abdomen is soft. There is no splenomegaly.   Musculoskeletal:         General: Normal range of motion.   Skin:     General: Skin is warm and dry.   Neurological:      General: No focal deficit present.      Mental Status: He is oriented to person, place, and time.   Psychiatric:         Mood and Affect: Mood normal.         Behavior: Behavior normal.           30 minutes were spent reviewing the  patients chart, discussing symptoms, performing physical exam, reviewing lab results with patient and going over the plan of care    Elpidio Villatoro MD

## 2025-05-05 NOTE — ASSESSMENT & PLAN NOTE
- appears appropriate candidate  - will start organ based assessment  - HLA typing and place for discussion at BMT meeting  - answered all questions regarding transplant

## 2025-05-06 ENCOUNTER — PHARMACY VISIT (OUTPATIENT)
Dept: PHARMACY | Facility: CLINIC | Age: 65
End: 2025-05-06
Payer: COMMERCIAL

## 2025-05-06 LAB
ABO GROUP (TYPE) IN BLOOD: NORMAL
ANTIBODY SCREEN: NORMAL
CELL COUNT (BLOOD): 0.64 X10*3/UL
CELL POPULATIONS: NORMAL
DIAGNOSIS: NORMAL
FLOW DIFFERENTIAL: NORMAL
FLOW TEST ORDERED: NORMAL
LAB TEST METHOD: NORMAL
NUMBER OF CELLS COLLECTED: NORMAL
PATH REPORT.COMMENTS IMP SPEC: NORMAL
PATH REPORT.FINAL DX SPEC: NORMAL
PATH REPORT.GROSS SPEC: NORMAL
PATH REPORT.MICROSCOPIC SPEC OTHER STN: NORMAL
PATH REPORT.RELEVANT HX SPEC: NORMAL
PATH REPORT.TOTAL CANCER: NORMAL
PATH REPORT.TOTAL CANCER: NORMAL
RH FACTOR (ANTIGEN D): NORMAL
SIGNATURE COMMENT: NORMAL
SPECIMEN VIABILITY: NORMAL

## 2025-05-07 ENCOUNTER — LAB REQUISITION (OUTPATIENT)
Dept: LAB | Facility: CLINIC | Age: 65
End: 2025-05-07
Payer: MEDICARE

## 2025-05-07 DIAGNOSIS — C95.00 ACUTE LEUKEMIA OF UNSPECIFIED CELL TYPE NOT HAVING ACHIEVED REMISSION (MULTI): ICD-10-CM

## 2025-05-07 LAB
HLA CLS I TYP PNL BLD/T DONR HIGH RES: NORMAL
HLA RESULTS: NORMAL
HLA-DP2 QL: NORMAL
HLA-DQB1 HIGH RES: NORMAL
HLA-DRB1 HIGH RES: NORMAL

## 2025-05-08 ENCOUNTER — OFFICE VISIT (OUTPATIENT)
Dept: HEMATOLOGY/ONCOLOGY | Facility: CLINIC | Age: 65
End: 2025-05-08
Payer: MEDICARE

## 2025-05-08 ENCOUNTER — APPOINTMENT (OUTPATIENT)
Dept: HEMATOLOGY/ONCOLOGY | Facility: CLINIC | Age: 65
End: 2025-05-08
Payer: MEDICARE

## 2025-05-08 ENCOUNTER — SOCIAL WORK (OUTPATIENT)
Dept: HEMATOLOGY/ONCOLOGY | Facility: CLINIC | Age: 65
End: 2025-05-08

## 2025-05-08 ENCOUNTER — INFUSION (OUTPATIENT)
Dept: HEMATOLOGY/ONCOLOGY | Facility: CLINIC | Age: 65
End: 2025-05-08
Payer: MEDICARE

## 2025-05-08 VITALS
SYSTOLIC BLOOD PRESSURE: 117 MMHG | DIASTOLIC BLOOD PRESSURE: 78 MMHG | WEIGHT: 204.15 LBS | RESPIRATION RATE: 16 BRPM | TEMPERATURE: 97.7 F | BODY MASS INDEX: 28.51 KG/M2 | OXYGEN SATURATION: 94 % | HEART RATE: 95 BPM

## 2025-05-08 DIAGNOSIS — Z76.82 STEM CELL TRANSPLANT CANDIDATE: ICD-10-CM

## 2025-05-08 DIAGNOSIS — C92.00 ACUTE MYELOID LEUKEMIA NOT HAVING ACHIEVED REMISSION (MULTI): ICD-10-CM

## 2025-05-08 DIAGNOSIS — I48.91 ATRIAL FIBRILLATION, UNSPECIFIED TYPE (MULTI): ICD-10-CM

## 2025-05-08 DIAGNOSIS — C92.00 ACUTE MYELOID LEUKEMIA NOT HAVING ACHIEVED REMISSION (MULTI): Primary | ICD-10-CM

## 2025-05-08 DIAGNOSIS — I48.0 PAROXYSMAL ATRIAL FIBRILLATION (MULTI): ICD-10-CM

## 2025-05-08 LAB
ALBUMIN SERPL BCP-MCNC: 4 G/DL (ref 3.4–5)
ALP SERPL-CCNC: 90 U/L (ref 33–136)
ALT SERPL W P-5'-P-CCNC: 13 U/L (ref 10–52)
ANION GAP SERPL CALC-SCNC: 14 MMOL/L (ref 10–20)
AST SERPL W P-5'-P-CCNC: 12 U/L (ref 9–39)
BASOPHILS # BLD AUTO: 0.02 X10*3/UL (ref 0–0.1)
BASOPHILS NFR BLD AUTO: 1.2 %
BILIRUB SERPL-MCNC: 0.4 MG/DL (ref 0–1.2)
BUN SERPL-MCNC: 13 MG/DL (ref 6–23)
CALCIUM SERPL-MCNC: 9.3 MG/DL (ref 8.6–10.3)
CHLORIDE SERPL-SCNC: 106 MMOL/L (ref 98–107)
CO2 SERPL-SCNC: 24 MMOL/L (ref 21–32)
CREAT SERPL-MCNC: 0.93 MG/DL (ref 0.5–1.3)
EGFRCR SERPLBLD CKD-EPI 2021: >90 ML/MIN/1.73M*2
EOSINOPHIL # BLD AUTO: 0 X10*3/UL (ref 0–0.7)
EOSINOPHIL NFR BLD AUTO: 0 %
ERYTHROCYTE [DISTWIDTH] IN BLOOD BY AUTOMATED COUNT: 22.1 % (ref 11.5–14.5)
GIANT PLATELETS BLD QL SMEAR: NORMAL
GLUCOSE SERPL-MCNC: 125 MG/DL (ref 74–99)
HCT VFR BLD AUTO: 36.2 % (ref 41–52)
HGB BLD-MCNC: 11.7 G/DL (ref 13.5–17.5)
IMM GRANULOCYTES # BLD AUTO: 0 X10*3/UL (ref 0–0.7)
IMM GRANULOCYTES NFR BLD AUTO: 0 % (ref 0–0.9)
LYMPHOCYTES # BLD AUTO: 0.65 X10*3/UL (ref 1.2–4.8)
LYMPHOCYTES NFR BLD AUTO: 40.1 %
MCH RBC QN AUTO: 34.1 PG (ref 26–34)
MCHC RBC AUTO-ENTMCNC: 32.3 G/DL (ref 32–36)
MCV RBC AUTO: 106 FL (ref 80–100)
MONOCYTES # BLD AUTO: 0.63 X10*3/UL (ref 0.1–1)
MONOCYTES NFR BLD AUTO: 38.9 %
NEUTROPHILS # BLD AUTO: 0.32 X10*3/UL (ref 1.2–7.7)
NEUTROPHILS NFR BLD AUTO: 19.8 %
NRBC BLD-RTO: ABNORMAL /100{WBCS}
OVALOCYTES BLD QL SMEAR: NORMAL
PLATELET # BLD AUTO: 985 X10*3/UL (ref 150–450)
POLYCHROMASIA BLD QL SMEAR: NORMAL
POTASSIUM SERPL-SCNC: 4 MMOL/L (ref 3.5–5.3)
PROT SERPL-MCNC: 7.2 G/DL (ref 6.4–8.2)
RBC # BLD AUTO: 3.43 X10*6/UL (ref 4.5–5.9)
RBC MORPH BLD: NORMAL
SODIUM SERPL-SCNC: 140 MMOL/L (ref 136–145)
WBC # BLD AUTO: 1.6 X10*3/UL (ref 4.4–11.3)

## 2025-05-08 PROCEDURE — 80053 COMPREHEN METABOLIC PANEL: CPT

## 2025-05-08 PROCEDURE — 1159F MED LIST DOCD IN RCRD: CPT | Performed by: INTERNAL MEDICINE

## 2025-05-08 PROCEDURE — 1125F AMNT PAIN NOTED PAIN PRSNT: CPT | Performed by: INTERNAL MEDICINE

## 2025-05-08 PROCEDURE — G2211 COMPLEX E/M VISIT ADD ON: HCPCS | Performed by: INTERNAL MEDICINE

## 2025-05-08 PROCEDURE — 99215 OFFICE O/P EST HI 40 MIN: CPT | Performed by: INTERNAL MEDICINE

## 2025-05-08 PROCEDURE — 36415 COLL VENOUS BLD VENIPUNCTURE: CPT

## 2025-05-08 PROCEDURE — 85025 COMPLETE CBC W/AUTO DIFF WBC: CPT

## 2025-05-08 PROCEDURE — 1160F RVW MEDS BY RX/DR IN RCRD: CPT | Performed by: INTERNAL MEDICINE

## 2025-05-08 RX ORDER — HEPARIN SODIUM,PORCINE/PF 10 UNIT/ML
50 SYRINGE (ML) INTRAVENOUS AS NEEDED
OUTPATIENT
Start: 2025-05-08

## 2025-05-08 RX ORDER — HEPARIN 100 UNIT/ML
500 SYRINGE INTRAVENOUS AS NEEDED
OUTPATIENT
Start: 2025-05-08

## 2025-05-08 ASSESSMENT — PAIN SCALES - GENERAL: PAINLEVEL_OUTOF10: 9

## 2025-05-08 NOTE — ASSESSMENT & PLAN NOTE
5/8/2025:  early assessment marrow looked to be free of any evidence of leukemia, consistent with response to intiial cycle of treatment.  Reviewed need for ANC recovery prior to start of 2nd cycle.     Constipation was OK - had to experiment with some different laxatives.  Senakot has helped a lot - using daily in the morning, and docusate seems to work.    Diagnostics:  - none pending  Treatment:  -Initiate azacitidine 75 mg/m² for 7 days on days 1-7 (or 1-9 with a weekend off) combined with venetoclax 400 mg or equivalent (200 mg with his concomitant fluconazole) - reduce to days 1-21 given delay in cycle 2 for ANC recover.  If delay > 3 weeks, consider reduction to days 1-14  Disease Monitoring:  -Minimum twice weekly CBCs while pending treatment start, low threshold to consider admission in the setting of complication  Supportive Care:  -Twice weekly infusion visits for supportive transfusions - use plts > 50 when taking rivaroxaban, but patient should HOLD when plts decrease.   Antimicrobial Prophylaxis:  -Acyclovir  -Fluconazole  -Levofloxacin  IV access:  -Peripheral IV for the moment; may consider PICC line

## 2025-05-08 NOTE — ASSESSMENT & PLAN NOTE
5/8/2025: has met with Dr. Villatoro, advised that with his marrow response, he may proceed to transplant when feasible.

## 2025-05-08 NOTE — PROGRESS NOTES
Patient ID: Link Inman is a 64 y.o. male.  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  On review with patient has increasing fatigue and reduced activity since November 2024.  Underwent bone marrow biopsy 3/27/2025 that demonstrated:   BONE MARROW, LEFT ILIAC CREST, ASPIRATE WITH CLOT AND BIOPSY WITH TOUCH PREPARATION:   -- ACUTE MYELOID LEUKEMIA (estimated at 80% of marrow cellularity).  -- MARKEDLY DECREASED MATURING TRILINEAGE HEMATOPOIESIS.  Karyotype: 46 XY  FISH: FISH NEGATIVE for inverted 3/t(3;3), t(6;9), t(8;21), gain of RUNX1 or GPXF4I7, MLL (KMT2A) rearrangement, t(15;17), inverted 16/t(16;16), TP53 deletion and monosomy 17   NGS: TET2 p.* VAF: 32% TET2 p.* VAF: 32%        4/10/2025 -  Chemotherapy    Treatment:   I. Azacitidine 75m/m2 days 1-7, with venetoclax 400mg days 1-28  Cycle 1 - initiated 4/10/2025.  Overall well tolerated with expected complications  Response: marrow completed 5/1/2025 with no evidence of leukemia  Cycle 2: plan to reduce cat to days 1-21, plan start when ANC > 1000  Venetoclax / AzaCITIDine, 28 Day Cycles - Induction / Consolidation          Overall, on a scale of 1-10, did a 9.  Felt like the beginning of the 2nd week - the middle, and then fatigue in the 3rd week was tough - but it got much better. Did have to use medicine to manage constipation, but found that he did OK.  His primary issues continued to be his hips - his PCP started tramadol, and he has stopped the celecoxib.            Assessment & Plan  Acute myeloid leukemia not  having achieved remission (Multi)  5/8/2025:  early assessment marrow looked to be free of any evidence of leukemia, consistent with response to intiial cycle of treatment.  Reviewed need for ANC recovery prior to start of 2nd cycle.     Constipation was OK - had to experiment with some different laxatives.  Senakot has helped a lot - using daily in the morning, and docusate seems to work.    Diagnostics:  - none pending  Treatment:  -Initiate azacitidine 75 mg/m² for 7 days on days 1-7 (or 1-9 with a weekend off) combined with venetoclax 400 mg or equivalent (200 mg with his concomitant fluconazole) - reduce to days 1-21 given delay in cycle 2 for ANC recover.  If delay > 3 weeks, consider reduction to days 1-14  Disease Monitoring:  -Minimum twice weekly CBCs while pending treatment start, low threshold to consider admission in the setting of complication  Supportive Care:  -Twice weekly infusion visits for supportive transfusions - use plts > 50 when taking rivaroxaban, but patient should HOLD when plts decrease.   Antimicrobial Prophylaxis:  -Acyclovir  -Fluconazole  -Levofloxacin  IV access:  -Peripheral IV for the moment; may consider PICC line  Stem cell transplant candidate  5/8/2025: has met with Dr. Villatoro, advised that with his marrow response, he may proceed to transplant when feasible.   Paroxysmal atrial fibrillation (Multi)  I believe with his plts recovered, he may restart his rivaroxaban.  However, I counseled that when plts are below   - restart rivar             Subjective     Reviewed and Past Medical History, Past Surgical History, Family History, and Social History:    Review of Systems   Constitutional:  Positive for fatigue. Negative for activity change, appetite change, chills, diaphoresis, fever and unexpected weight change.   HENT:  Negative for congestion, mouth sores, nosebleeds, rhinorrhea, sinus pressure, sinus pain and sore throat.    Eyes:  Negative for visual disturbance.    Respiratory:  Negative for cough and shortness of breath.    Cardiovascular:  Negative for chest pain and leg swelling.   Gastrointestinal:  Positive for constipation. Negative for abdominal pain, diarrhea, nausea and vomiting.   Genitourinary:  Negative for difficulty urinating and flank pain.   Musculoskeletal:  Negative for back pain and joint swelling.   Skin:  Negative for rash.   Neurological:  Negative for weakness, light-headedness, numbness and headaches.   Hematological:  Negative for adenopathy. Does not bruise/bleed easily.   Psychiatric/Behavioral:  Negative for confusion and dysphoric mood. The patient is not nervous/anxious.        Home Medications and Adherence Reviewed with Patient.       Objective      VS:  /78   Pulse 95   Temp 36.5 °C (97.7 °F) (Temporal)   Resp 16   Wt 92.6 kg (204 lb 2.3 oz)   SpO2 94%   BMI 28.51 kg/m²   BSA: 2.15 meters squared      Physical Exam  Constitutional:       Appearance: Normal appearance.   HENT:      Mouth/Throat:      Mouth: Mucous membranes are moist.   Eyes:      Conjunctiva/sclera: Conjunctivae normal.      Pupils: Pupils are equal, round, and reactive to light.   Cardiovascular:      Rate and Rhythm: Normal rate.   Pulmonary:      Effort: Pulmonary effort is normal.   Abdominal:      General: Abdomen is flat.      Palpations: Abdomen is soft. There is no splenomegaly.   Musculoskeletal:         General: Normal range of motion.   Skin:     General: Skin is warm and dry.   Neurological:      General: No focal deficit present.      Mental Status: He is oriented to person, place, and time.   Psychiatric:         Mood and Affect: Mood normal.         Behavior: Behavior normal.         Laboratory:  Pertinent laboratory results were reviewed and discussed with patient, notably:   ANC remains too low to start next cycle  Plts high,       Pathology:  The pertinent pathology results were reviewed and discussed with the patient.  Notably,   Bone Marrow  Biopsy:   FINAL DIAGNOSIS   Date Value Ref Range Status   05/01/2025   Final    A, B & C. BONE MARROW CLOT WITH ASPIRATE AND CORE WITH TOUCH PREP, LEFT ILIAC CREST:      --NORMOCELLULAR BONE MARROW (30-40% CELLULAR) WITH MATURING ERYTHROID PREDOMINANT HEMATOPOIESIS, SEE NOTE  --NO MORPHOLOGIC OR IMMUNOPHENOTYPIC EVIDENCE OF ACUTE LEUKEMIA  --DECREASED TO ABSENT STORAGE IRON     Note: The findings are compatible with a post-therapy regenerative marrow. No evidence of leukemic blasts detected by morphology, immunohistochemistry, or flow cytometry. Correlation with clinical findings is suggested.  A small lymphoid aggregate composed of small lymphocytes is noted in the core biopsy, additional stains are pending and will be reported in an addendum.        Comment   Date Value Ref Range Status   03/27/2025   Final    Dr. Maya provided an update on preliminary findings to Dr. Eckert via Epic text message at about 1250 on 31 March 2025, immediate acknowledgement received.       Bone Marrow Differential   Date Value Ref Range Status   05/01/2025   Final    Cell Type Value Reference Range   Promyelocytes 0.0 1-5 %   Myelocytes 2.0 5-10 %   Metamyelocytes 0.0 10-25 %   Bands 0.5 10-20 %   Segmented Neutrophils 6.0 5-30 %   Eosinophils 2.0 2-4 %   Basophils 0.0 0-1 %        Lymphocytes 13.5 5-25 %   Monocytes 1.0 0-2 %   Plasma Cells 0.5 0-2 %        Blasts 0.5 0-1 %        Total Erythroid 74.0 17-35 %        Total Cells Counted 200    Myeloid/Erythroid Ratio 0.1 1.5-4.1          Microscopic Description   Date Value Ref Range Status   05/01/2025   Final    PERIPHERAL SMEAR: Submitted              Red cells: Macrocytic anemia with polychromasia.        White cells: Leukopenia.        Platelets: Thrombocytosis       Comments: No circulating blasts seen.    ASPIRATE SMEAR: Submitted                      Specimen: Spicular.       Erythropoiesis: Increased with normal morphology.       Granulopoiesis: Predominantly mature  neutrophils.       Megakaryocytes: Rare. Morphology: Normal.    TOUCH PREP: Submitted       Specimen: Paucicellular.        Comments: Similar to aspirate smear.    ASPIRATE CLOT: Submitted                           Specimen: Spicular.       Cellularity: 30%         Estimated M:E ratio: Consistent with aspirate smear.       Megakaryocytes: Adequate. Morphology: Normal.       Granulomas: Absent.       Lymphoid aggregates: Absent.        Comments: Similar to core biopsy.    CORE BIOPSY: Submitted                            Specimen: Adequate.       Cellularity: 30-40%       Estimated M:E ratio: Consistent with aspirate smear.       Bony trabeculae: Normal.       Megakaryocytes: Adequate. Morphology: Normal.         Granulomas: Focal lipogranuloma.       Lymphoid aggregates: Focal collection of small lymphocytes adjacent to lipogranuloma.    SPECIAL STAINS:         Iron (clot section, part A): Storage iron decreased to absent; no ring sideroblasts identified.    IMMUNOHISTOCHEMISTRY: Performed on block B1.       CD34 / : highlights blasts at 1%       E-cadherin: highlights many erythroid precursors    FLOW CYTOMETRY: Performed, see separate report.    Immunostains were performed in addition to flow cytometry to fully characterize the phenotype, architecture, and extent of the marrow population(s).  This was medically necessary for the best possible diagnosis.     Pathologic evaluation of this specimen was performed in conjunction with the hematopathology fellow, DO Soni.          Gross Description   Date Value Ref Range Status   05/01/2025   Final    A:  Received in formalin, labeled with the patient's name and hospital number, is an irregular fragment of blood clot measuring 1.1 x 0.3 x 0.2 cm. The specimen is submitted in toto in one cassette.   JEK  B:  Received in B-plus fixative, labeled with the patient's name and hospital number, is a cylindrical segment of bone measuring 1.4 x 0.2 x 0.2 cm. The specimen  is submitted in toto in one cassette following decalcification in [facility].   JODI Gomez MD

## 2025-05-08 NOTE — LETTER
May 10, 2025      TO: MD Sukhdeep Pinto MD  [address]  [address]  [place] [address]       Regarding:   Patient:  :  Visit Date: Link Inman  1960  2025       Dear Dr. Sukhdeep Lopez MD     I saw our mutual patient Link Inman for an interval visit in the malignant hematology clinic at [facility].  Please see below for my notes from this encounter. Please do not hesitate to call me if you have any questions. I look forward to continuing to follow your patient along with you.      Sincerely,    Riky Gomez MD         CC: Sukhdeep Lopez MD  [address]  [address]  [place] [address]  Via In Basket    Sukhdeep Lopez MD  [address]  [address]  [place] [address]       Please see my documentation below:   Patient ID: Link Inman is a 64 y.o. male.  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  On review with patient has increasing fatigue and reduced activity since 2024.  Underwent bone marrow biopsy 3/27/2025 that demonstrated:   BONE MARROW, LEFT ILIAC CREST, ASPIRATE WITH CLOT AND BIOPSY WITH TOUCH PREPARATION:   -- ACUTE MYELOID LEUKEMIA (estimated at 80% of marrow cellularity).  -- MARKEDLY DECREASED MATURING TRILINEAGE HEMATOPOIESIS.  Karyotype: 46 XY  FISH: FISH NEGATIVE for inverted 3/t(3;3), t(6;9), t(8;21), gain of RUNX1 or NKXQ5T1, MLL (KMT2A) rearrangement, t(15;17), inverted 16/t(16;16), TP53 deletion and monosomy 17   NGS: TET2 p.* VAF: 32% TET2 p.* VAF: 32%         4/10/2025 -  Chemotherapy    Treatment:   I. Azacitidine 75m/m2 days 1-7, with venetoclax 400mg days 1-28  Cycle 1 - initiated 4/10/2025.  Overall well tolerated with expected complications  Response: marrow completed 5/1/2025 with no evidence of leukemia  Cycle 2: plan to reduce cat to days 1-21, plan start when ANC > 1000  Venetoclax / AzaCITIDine, 28 Day Cycles - Induction / Consolidation          Overall, on a scale of 1-10, did a 9.  Felt like the beginning of the 2nd week - the middle, and then fatigue in the 3rd week was tough - but it got much better. Did have to use medicine to manage constipation, but found that he did OK.  His primary issues continued to be his hips - his PCP started tramadol, and he has stopped the celecoxib.            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  Reviewed need for ANC recovery prior to start of 2nd cycle.     Constipation was OK - had to experiment with some different laxatives.  Senakot has helped a lot - using daily in the morning, and docusate seems to work.    Diagnostics:  - none pending  Treatment:  -Initiate azacitidine 75 mg/m² for 7 days on days 1-7 (or 1-9 with a weekend off) combined with venetoclax 400 mg or equivalent (200 mg with his concomitant fluconazole) - reduce to days 1-21 given delay in cycle 2 for ANC recover.  If delay > 3 weeks, consider reduction to days 1-14  Disease Monitoring:  -Minimum twice weekly CBCs while pending treatment start, low threshold to consider admission in the setting of complication  Supportive Care:  -Twice weekly infusion visits for supportive transfusions - use plts > 50 when taking rivaroxaban, but patient should HOLD when plts decrease.   Antimicrobial Prophylaxis:  -Acyclovir  -Fluconazole  -Levofloxacin  IV access:  -Peripheral IV for the  moment; may consider PICC line  Stem cell transplant candidate  5/8/2025: has met with Dr. Villatoro, advised that with his marrow response, he may proceed to transplant when feasible.   Paroxysmal atrial fibrillation (Multi)  I believe with his plts recovered, he may restart his rivaroxaban.  However, I counseled that when plts are below   - restart rivar             Subjective     Reviewed and Past Medical History, Past Surgical History, Family History, and Social History:    Review of Systems   Constitutional:  Positive for fatigue. Negative for activity change, appetite change, chills, diaphoresis, fever and unexpected weight change.   HENT:  Negative for congestion, mouth sores, nosebleeds, rhinorrhea, sinus pressure, sinus pain and sore throat.    Eyes:  Negative for visual disturbance.   Respiratory:  Negative for cough and shortness of breath.    Cardiovascular:  Negative for chest pain and leg swelling.   Gastrointestinal:  Positive for constipation. Negative for abdominal pain, diarrhea, nausea and vomiting.   Genitourinary:  Negative for difficulty urinating and flank pain.   Musculoskeletal:  Negative for back pain and joint swelling.   Skin:  Negative for rash.   Neurological:  Negative for weakness, light-headedness, numbness and headaches.   Hematological:  Negative for adenopathy. Does not bruise/bleed easily.   Psychiatric/Behavioral:  Negative for confusion and dysphoric mood. The patient is not nervous/anxious.        Home Medications and Adherence Reviewed with Patient.       Objective      VS:  /78   Pulse 95   Temp 36.5 °C (97.7 °F) (Temporal)   Resp 16   Wt 92.6 kg (204 lb 2.3 oz)   SpO2 94%   BMI 28.51 kg/m²   BSA: 2.15 meters squared      Physical Exam  Constitutional:       Appearance: Normal appearance.   HENT:      Mouth/Throat:      Mouth: Mucous membranes are moist.   Eyes:      Conjunctiva/sclera: Conjunctivae normal.      Pupils: Pupils are equal, round, and reactive to  light.   Cardiovascular:      Rate and Rhythm: Normal rate.   Pulmonary:      Effort: Pulmonary effort is normal.   Abdominal:      General: Abdomen is flat.      Palpations: Abdomen is soft. There is no splenomegaly.   Musculoskeletal:         General: Normal range of motion.   Skin:     General: Skin is warm and dry.   Neurological:      General: No focal deficit present.      Mental Status: He is oriented to person, place, and time.   Psychiatric:         Mood and Affect: Mood normal.         Behavior: Behavior normal.         Laboratory:  Pertinent laboratory results were reviewed and discussed with patient, notably:   ANC remains too low to start next cycle  Plts high,       Pathology:  The pertinent pathology results were reviewed and discussed with the patient.  Notably,   Bone Marrow Biopsy:   FINAL DIAGNOSIS   Date Value Ref Range Status   05/01/2025   Final    A, B & C. BONE MARROW CLOT WITH ASPIRATE AND CORE WITH TOUCH PREP, LEFT ILIAC CREST:      --NORMOCELLULAR BONE MARROW (30-40% CELLULAR) WITH MATURING ERYTHROID PREDOMINANT HEMATOPOIESIS, SEE NOTE  --NO MORPHOLOGIC OR IMMUNOPHENOTYPIC EVIDENCE OF ACUTE LEUKEMIA  --DECREASED TO ABSENT STORAGE IRON     Note: The findings are compatible with a post-therapy regenerative marrow. No evidence of leukemic blasts detected by morphology, immunohistochemistry, or flow cytometry. Correlation with clinical findings is suggested.  A small lymphoid aggregate composed of small lymphocytes is noted in the core biopsy, additional stains are pending and will be reported in an addendum.        Comment   Date Value Ref Range Status   03/27/2025   Final    Dr. Maya provided an update on preliminary findings to Dr. Eckert via Epic text message at about 1250 on 31 March 2025, immediate acknowledgement received.       Bone Marrow Differential   Date Value Ref Range Status   05/01/2025   Final    Cell Type Value Reference Range   Promyelocytes 0.0 1-5 %   Myelocytes 2.0 5-10  %   Metamyelocytes 0.0 10-25 %   Bands 0.5 10-20 %   Segmented Neutrophils 6.0 5-30 %   Eosinophils 2.0 2-4 %   Basophils 0.0 0-1 %        Lymphocytes 13.5 5-25 %   Monocytes 1.0 0-2 %   Plasma Cells 0.5 0-2 %        Blasts 0.5 0-1 %        Total Erythroid 74.0 17-35 %        Total Cells Counted 200    Myeloid/Erythroid Ratio 0.1 1.5-4.1          Microscopic Description   Date Value Ref Range Status   05/01/2025   Final    PERIPHERAL SMEAR: Submitted              Red cells: Macrocytic anemia with polychromasia.        White cells: Leukopenia.        Platelets: Thrombocytosis       Comments: No circulating blasts seen.    ASPIRATE SMEAR: Submitted                      Specimen: Spicular.       Erythropoiesis: Increased with normal morphology.       Granulopoiesis: Predominantly mature neutrophils.       Megakaryocytes: Rare. Morphology: Normal.    TOUCH PREP: Submitted       Specimen: Paucicellular.        Comments: Similar to aspirate smear.    ASPIRATE CLOT: Submitted                           Specimen: Spicular.       Cellularity: 30%         Estimated M:E ratio: Consistent with aspirate smear.       Megakaryocytes: Adequate. Morphology: Normal.       Granulomas: Absent.       Lymphoid aggregates: Absent.        Comments: Similar to core biopsy.    CORE BIOPSY: Submitted                            Specimen: Adequate.       Cellularity: 30-40%       Estimated M:E ratio: Consistent with aspirate smear.       Bony trabeculae: Normal.       Megakaryocytes: Adequate. Morphology: Normal.         Granulomas: Focal lipogranuloma.       Lymphoid aggregates: Focal collection of small lymphocytes adjacent to lipogranuloma.    SPECIAL STAINS:         Iron (clot section, part A): Storage iron decreased to absent; no ring sideroblasts identified.    IMMUNOHISTOCHEMISTRY: Performed on block B1.       CD34 / : highlights blasts at 1%       E-cadherin: highlights many erythroid precursors    FLOW CYTOMETRY: Performed, see  separate report.    Immunostains were performed in addition to flow cytometry to fully characterize the phenotype, architecture, and extent of the marrow population(s).  This was medically necessary for the best possible diagnosis.     Pathologic evaluation of this specimen was performed in conjunction with the hematopathology fellow, DO Soni.          Gross Description   Date Value Ref Range Status   05/01/2025   Final    A:  Received in formalin, labeled with the patient's name and hospital number, is an irregular fragment of blood clot measuring 1.1 x 0.3 x 0.2 cm. The specimen is submitted in toto in one cassette.   JEK  B:  Received in B-plus fixative, labeled with the patient's name and hospital number, is a cylindrical segment of bone measuring 1.4 x 0.2 x 0.2 cm. The specimen is submitted in toto in one cassette following decalcification in [facility].   JODI Gomez MD

## 2025-05-09 ENCOUNTER — APPOINTMENT (OUTPATIENT)
Dept: HEMATOLOGY/ONCOLOGY | Facility: HOSPITAL | Age: 65
End: 2025-05-09
Payer: MEDICARE

## 2025-05-09 ENCOUNTER — APPOINTMENT (OUTPATIENT)
Dept: HEMATOLOGY/ONCOLOGY | Facility: CLINIC | Age: 65
End: 2025-05-09
Payer: MEDICARE

## 2025-05-09 LAB
CHROM ANALY OVERALL INTERP-IMP: NORMAL
ELECTRONICALLY SIGNED BY CYTOGENETICS: NORMAL

## 2025-05-09 PROCEDURE — 81382 HLA II TYPING 1 LOC HR: CPT | Mod: OUT,59 | Performed by: INTERNAL MEDICINE

## 2025-05-10 RX ORDER — RIVAROXABAN 20 MG/1
20 TABLET, FILM COATED ORAL DAILY
Qty: 90 TABLET | Refills: 3 | Status: SHIPPED | OUTPATIENT
Start: 2025-05-10

## 2025-05-10 ASSESSMENT — ENCOUNTER SYMPTOMS
LIGHT-HEADEDNESS: 0
BACK PAIN: 0
FATIGUE: 1
ABDOMINAL PAIN: 0
HEADACHES: 0
WEAKNESS: 0
ACTIVITY CHANGE: 0
SORE THROAT: 0
DYSPHORIC MOOD: 0
DIFFICULTY URINATING: 0
CONSTIPATION: 1
SINUS PAIN: 0
CONFUSION: 0
RHINORRHEA: 0
FLANK PAIN: 0
ADENOPATHY: 0
COUGH: 0
FEVER: 0
SHORTNESS OF BREATH: 0
APPETITE CHANGE: 0
CHILLS: 0
BRUISES/BLEEDS EASILY: 0
NAUSEA: 0
SINUS PRESSURE: 0
JOINT SWELLING: 0
DIAPHORESIS: 0
NUMBNESS: 0
DIARRHEA: 0
UNEXPECTED WEIGHT CHANGE: 0
NERVOUS/ANXIOUS: 0
VOMITING: 0

## 2025-05-11 NOTE — ASSESSMENT & PLAN NOTE
I believe with his plts recovered, he may restart his rivaroxaban.  However, I counseled that when plts are below   - restart rivar

## 2025-05-12 ENCOUNTER — INFUSION (OUTPATIENT)
Dept: HEMATOLOGY/ONCOLOGY | Facility: CLINIC | Age: 65
End: 2025-05-12
Payer: MEDICARE

## 2025-05-12 VITALS
HEART RATE: 92 BPM | OXYGEN SATURATION: 96 % | DIASTOLIC BLOOD PRESSURE: 84 MMHG | RESPIRATION RATE: 16 BRPM | WEIGHT: 205.25 LBS | SYSTOLIC BLOOD PRESSURE: 154 MMHG | TEMPERATURE: 96.6 F | BODY MASS INDEX: 28.67 KG/M2

## 2025-05-12 DIAGNOSIS — C92.00 ACUTE MYELOID LEUKEMIA NOT HAVING ACHIEVED REMISSION (MULTI): ICD-10-CM

## 2025-05-12 LAB
ALBUMIN SERPL BCP-MCNC: 4 G/DL (ref 3.4–5)
ALP SERPL-CCNC: 91 U/L (ref 33–136)
ALT SERPL W P-5'-P-CCNC: 16 U/L (ref 10–52)
ANION GAP SERPL CALC-SCNC: 13 MMOL/L (ref 10–20)
AST SERPL W P-5'-P-CCNC: 15 U/L (ref 9–39)
BASOPHILS # BLD AUTO: 0.04 X10*3/UL (ref 0–0.1)
BASOPHILS NFR BLD AUTO: 1.2 %
BILIRUB SERPL-MCNC: 0.4 MG/DL (ref 0–1.2)
BUN SERPL-MCNC: 17 MG/DL (ref 6–23)
CALCIUM SERPL-MCNC: 9.3 MG/DL (ref 8.6–10.3)
CHLORIDE SERPL-SCNC: 104 MMOL/L (ref 98–107)
CO2 SERPL-SCNC: 24 MMOL/L (ref 21–32)
CREAT SERPL-MCNC: 0.89 MG/DL (ref 0.5–1.3)
DACRYOCYTES BLD QL SMEAR: NORMAL
EGFRCR SERPLBLD CKD-EPI 2021: >90 ML/MIN/1.73M*2
EOSINOPHIL # BLD AUTO: 0 X10*3/UL (ref 0–0.7)
EOSINOPHIL NFR BLD AUTO: 0 %
ERYTHROCYTE [DISTWIDTH] IN BLOOD BY AUTOMATED COUNT: 21.3 % (ref 11.5–14.5)
GLUCOSE SERPL-MCNC: 112 MG/DL (ref 74–99)
HCT VFR BLD AUTO: 37.9 % (ref 41–52)
HGB BLD-MCNC: 12.4 G/DL (ref 13.5–17.5)
IMM GRANULOCYTES # BLD AUTO: 0.03 X10*3/UL (ref 0–0.7)
IMM GRANULOCYTES NFR BLD AUTO: 0.9 % (ref 0–0.9)
LYMPHOCYTES # BLD AUTO: 1.15 X10*3/UL (ref 1.2–4.8)
LYMPHOCYTES NFR BLD AUTO: 35.6 %
MCH RBC QN AUTO: 34.1 PG (ref 26–34)
MCHC RBC AUTO-ENTMCNC: 32.7 G/DL (ref 32–36)
MCV RBC AUTO: 104 FL (ref 80–100)
MONOCYTES # BLD AUTO: 0.87 X10*3/UL (ref 0.1–1)
MONOCYTES NFR BLD AUTO: 26.9 %
NEUTROPHILS # BLD AUTO: 1.14 X10*3/UL (ref 1.2–7.7)
NEUTROPHILS NFR BLD AUTO: 35.4 %
NRBC BLD-RTO: ABNORMAL /100{WBCS}
PLATELET # BLD AUTO: 886 X10*3/UL (ref 150–450)
POLYCHROMASIA BLD QL SMEAR: NORMAL
POTASSIUM SERPL-SCNC: 4.1 MMOL/L (ref 3.5–5.3)
PROT SERPL-MCNC: 7 G/DL (ref 6.4–8.2)
RBC # BLD AUTO: 3.64 X10*6/UL (ref 4.5–5.9)
RBC MORPH BLD: NORMAL
SCHISTOCYTES BLD QL SMEAR: NORMAL
SODIUM SERPL-SCNC: 137 MMOL/L (ref 136–145)
STOMATOCYTES BLD QL SMEAR: NORMAL
TARGETS BLD QL SMEAR: NORMAL
WBC # BLD AUTO: 3.2 X10*3/UL (ref 4.4–11.3)

## 2025-05-12 PROCEDURE — 86901 BLOOD TYPING SEROLOGIC RH(D): CPT

## 2025-05-12 PROCEDURE — 80053 COMPREHEN METABOLIC PANEL: CPT

## 2025-05-12 PROCEDURE — 36415 COLL VENOUS BLD VENIPUNCTURE: CPT

## 2025-05-12 PROCEDURE — 81379 HLA I TYPING COMPLETE HR: CPT | Mod: OUT | Performed by: INTERNAL MEDICINE

## 2025-05-12 PROCEDURE — 85025 COMPLETE CBC W/AUTO DIFF WBC: CPT

## 2025-05-12 RX ORDER — FAMOTIDINE 10 MG/ML
20 INJECTION, SOLUTION INTRAVENOUS ONCE AS NEEDED
OUTPATIENT
Start: 2025-05-12

## 2025-05-12 RX ORDER — EPINEPHRINE 0.3 MG/.3ML
0.3 INJECTION SUBCUTANEOUS EVERY 5 MIN PRN
OUTPATIENT
Start: 2025-05-12

## 2025-05-12 RX ORDER — HEPARIN SODIUM,PORCINE/PF 10 UNIT/ML
50 SYRINGE (ML) INTRAVENOUS AS NEEDED
Status: CANCELLED | OUTPATIENT
Start: 2025-05-12

## 2025-05-12 RX ORDER — HEPARIN 100 UNIT/ML
500 SYRINGE INTRAVENOUS AS NEEDED
Status: CANCELLED | OUTPATIENT
Start: 2025-05-12

## 2025-05-12 RX ORDER — DIPHENHYDRAMINE HYDROCHLORIDE 50 MG/ML
50 INJECTION, SOLUTION INTRAMUSCULAR; INTRAVENOUS AS NEEDED
OUTPATIENT
Start: 2025-05-12

## 2025-05-12 RX ORDER — ALBUTEROL SULFATE 0.83 MG/ML
3 SOLUTION RESPIRATORY (INHALATION) AS NEEDED
OUTPATIENT
Start: 2025-05-12

## 2025-05-12 ASSESSMENT — PAIN SCALES - GENERAL: PAINLEVEL_OUTOF10: 9

## 2025-05-13 ENCOUNTER — SOCIAL WORK (OUTPATIENT)
Dept: HEMATOLOGY/ONCOLOGY | Facility: CLINIC | Age: 65
End: 2025-05-13
Payer: MEDICARE

## 2025-05-13 ENCOUNTER — APPOINTMENT (OUTPATIENT)
Dept: HEMATOLOGY/ONCOLOGY | Facility: CLINIC | Age: 65
End: 2025-05-13
Payer: MEDICARE

## 2025-05-13 LAB
ABO GROUP (TYPE) IN BLOOD: NORMAL
ANTIBODY SCREEN: NORMAL
RH FACTOR (ANTIGEN D): NORMAL

## 2025-05-14 ENCOUNTER — APPOINTMENT (OUTPATIENT)
Dept: CARDIOLOGY | Facility: CLINIC | Age: 65
End: 2025-05-14
Payer: MEDICARE

## 2025-05-14 ENCOUNTER — APPOINTMENT (OUTPATIENT)
Dept: HEMATOLOGY/ONCOLOGY | Facility: CLINIC | Age: 65
End: 2025-05-14
Payer: MEDICARE

## 2025-05-15 ENCOUNTER — SOCIAL WORK (OUTPATIENT)
Dept: HEMATOLOGY/ONCOLOGY | Facility: CLINIC | Age: 65
End: 2025-05-15

## 2025-05-15 ENCOUNTER — INFUSION (OUTPATIENT)
Dept: HEMATOLOGY/ONCOLOGY | Facility: CLINIC | Age: 65
End: 2025-05-15
Payer: MEDICARE

## 2025-05-15 VITALS
BODY MASS INDEX: 27.99 KG/M2 | HEART RATE: 98 BPM | OXYGEN SATURATION: 95 % | TEMPERATURE: 96.6 F | RESPIRATION RATE: 16 BRPM | WEIGHT: 200.4 LBS | SYSTOLIC BLOOD PRESSURE: 124 MMHG | DIASTOLIC BLOOD PRESSURE: 87 MMHG

## 2025-05-15 DIAGNOSIS — C92.00 ACUTE MYELOID LEUKEMIA NOT HAVING ACHIEVED REMISSION (MULTI): ICD-10-CM

## 2025-05-15 LAB
ALBUMIN SERPL BCP-MCNC: 4.3 G/DL (ref 3.4–5)
ALP SERPL-CCNC: 96 U/L (ref 33–136)
ALT SERPL W P-5'-P-CCNC: 16 U/L (ref 10–52)
ANION GAP SERPL CALC-SCNC: 13 MMOL/L (ref 10–20)
AST SERPL W P-5'-P-CCNC: 16 U/L (ref 9–39)
BASOPHILS # BLD AUTO: 0.06 X10*3/UL (ref 0–0.1)
BASOPHILS NFR BLD AUTO: 1.2 %
BILIRUB SERPL-MCNC: 0.4 MG/DL (ref 0–1.2)
BUN SERPL-MCNC: 17 MG/DL (ref 6–23)
CALCIUM SERPL-MCNC: 9.7 MG/DL (ref 8.6–10.3)
CHLORIDE SERPL-SCNC: 103 MMOL/L (ref 98–107)
CO2 SERPL-SCNC: 27 MMOL/L (ref 21–32)
CREAT SERPL-MCNC: 1.02 MG/DL (ref 0.5–1.3)
EGFRCR SERPLBLD CKD-EPI 2021: 81 ML/MIN/1.73M*2
EOSINOPHIL # BLD AUTO: 0 X10*3/UL (ref 0–0.7)
EOSINOPHIL NFR BLD AUTO: 0 %
ERYTHROCYTE [DISTWIDTH] IN BLOOD BY AUTOMATED COUNT: 21.3 % (ref 11.5–14.5)
GLUCOSE SERPL-MCNC: 92 MG/DL (ref 74–99)
HCT VFR BLD AUTO: 40.3 % (ref 41–52)
HGB BLD-MCNC: 13 G/DL (ref 13.5–17.5)
IMM GRANULOCYTES # BLD AUTO: 0.03 X10*3/UL (ref 0–0.7)
IMM GRANULOCYTES NFR BLD AUTO: 0.6 % (ref 0–0.9)
LDH SERPL L TO P-CCNC: 184 U/L (ref 84–246)
LYMPHOCYTES # BLD AUTO: 1.34 X10*3/UL (ref 1.2–4.8)
LYMPHOCYTES NFR BLD AUTO: 26.5 %
MCH RBC QN AUTO: 33.9 PG (ref 26–34)
MCHC RBC AUTO-ENTMCNC: 32.3 G/DL (ref 32–36)
MCV RBC AUTO: 105 FL (ref 80–100)
MONOCYTES # BLD AUTO: 0.91 X10*3/UL (ref 0.1–1)
MONOCYTES NFR BLD AUTO: 18 %
NEUTROPHILS # BLD AUTO: 2.71 X10*3/UL (ref 1.2–7.7)
NEUTROPHILS NFR BLD AUTO: 53.7 %
PATH REPORT.ADDENDUM SPEC: NORMAL
PATH REPORT.COMMENTS IMP SPEC: NORMAL
PATH REPORT.FINAL DX SPEC: NORMAL
PATH REPORT.GROSS SPEC: NORMAL
PATH REPORT.MICROSCOPIC SPEC OTHER STN: NORMAL
PATH REPORT.RELEVANT HX SPEC: NORMAL
PATH REPORT.TOTAL CANCER: NORMAL
PLATELET # BLD AUTO: 640 X10*3/UL (ref 150–450)
POTASSIUM SERPL-SCNC: 4.2 MMOL/L (ref 3.5–5.3)
PROT SERPL-MCNC: 7.4 G/DL (ref 6.4–8.2)
RBC # BLD AUTO: 3.83 X10*6/UL (ref 4.5–5.9)
SODIUM SERPL-SCNC: 139 MMOL/L (ref 136–145)
WBC # BLD AUTO: 5.1 X10*3/UL (ref 4.4–11.3)

## 2025-05-15 PROCEDURE — 86901 BLOOD TYPING SEROLOGIC RH(D): CPT

## 2025-05-15 PROCEDURE — 83615 LACTATE (LD) (LDH) ENZYME: CPT

## 2025-05-15 PROCEDURE — 2500000004 HC RX 250 GENERAL PHARMACY W/ HCPCS (ALT 636 FOR OP/ED): Performed by: INTERNAL MEDICINE

## 2025-05-15 PROCEDURE — 85025 COMPLETE CBC W/AUTO DIFF WBC: CPT

## 2025-05-15 PROCEDURE — 80053 COMPREHEN METABOLIC PANEL: CPT

## 2025-05-15 PROCEDURE — 96401 CHEMO ANTI-NEOPL SQ/IM: CPT

## 2025-05-15 RX ORDER — CHOLECALCIFEROL (VITAMIN D3) 25 MCG
50 TABLET ORAL DAILY
COMMUNITY

## 2025-05-15 RX ORDER — ONDANSETRON HYDROCHLORIDE 8 MG/1
8 TABLET, FILM COATED ORAL ONCE
Status: COMPLETED | OUTPATIENT
Start: 2025-05-15 | End: 2025-05-15

## 2025-05-15 RX ORDER — POLYETHYLENE GLYCOL 3350 17 G/17G
17 POWDER, FOR SOLUTION ORAL AS NEEDED
COMMUNITY

## 2025-05-15 RX ORDER — PROCHLORPERAZINE EDISYLATE 5 MG/ML
10 INJECTION INTRAMUSCULAR; INTRAVENOUS EVERY 6 HOURS PRN
Status: DISCONTINUED | OUTPATIENT
Start: 2025-05-15 | End: 2025-05-15 | Stop reason: HOSPADM

## 2025-05-15 RX ORDER — ACETAMINOPHEN 500 MG
1000 TABLET ORAL EVERY 8 HOURS PRN
COMMUNITY

## 2025-05-15 RX ORDER — FAMOTIDINE 10 MG/ML
20 INJECTION, SOLUTION INTRAVENOUS ONCE AS NEEDED
Status: DISCONTINUED | OUTPATIENT
Start: 2025-05-15 | End: 2025-05-15 | Stop reason: HOSPADM

## 2025-05-15 RX ORDER — HEPARIN 100 UNIT/ML
500 SYRINGE INTRAVENOUS AS NEEDED
Status: CANCELLED | OUTPATIENT
Start: 2025-05-15

## 2025-05-15 RX ORDER — HEPARIN SODIUM,PORCINE/PF 10 UNIT/ML
50 SYRINGE (ML) INTRAVENOUS AS NEEDED
Status: CANCELLED | OUTPATIENT
Start: 2025-05-15

## 2025-05-15 RX ORDER — PROCHLORPERAZINE MALEATE 10 MG
10 TABLET ORAL EVERY 6 HOURS PRN
Status: DISCONTINUED | OUTPATIENT
Start: 2025-05-15 | End: 2025-05-15 | Stop reason: HOSPADM

## 2025-05-15 RX ORDER — AMOXICILLIN 250 MG
2 CAPSULE ORAL DAILY
COMMUNITY

## 2025-05-15 RX ORDER — MULTIVIT-MIN/IRON FUM/FOLIC AC 7.5 MG-4
1 TABLET ORAL DAILY
COMMUNITY

## 2025-05-15 RX ORDER — DIPHENHYDRAMINE HYDROCHLORIDE 50 MG/ML
50 INJECTION, SOLUTION INTRAMUSCULAR; INTRAVENOUS AS NEEDED
Status: DISCONTINUED | OUTPATIENT
Start: 2025-05-15 | End: 2025-05-15 | Stop reason: HOSPADM

## 2025-05-15 RX ORDER — ALBUTEROL SULFATE 0.83 MG/ML
3 SOLUTION RESPIRATORY (INHALATION) AS NEEDED
Status: DISCONTINUED | OUTPATIENT
Start: 2025-05-15 | End: 2025-05-15 | Stop reason: HOSPADM

## 2025-05-15 RX ORDER — EPINEPHRINE 0.3 MG/.3ML
0.3 INJECTION SUBCUTANEOUS EVERY 5 MIN PRN
Status: DISCONTINUED | OUTPATIENT
Start: 2025-05-15 | End: 2025-05-15 | Stop reason: HOSPADM

## 2025-05-15 RX ADMIN — ONDANSETRON HYDROCHLORIDE 8 MG: 8 TABLET, FILM COATED ORAL at 10:43

## 2025-05-15 RX ADMIN — AZACITIDINE 170 MG: 100 INJECTION, POWDER, LYOPHILIZED, FOR SOLUTION INTRAVENOUS; SUBCUTANEOUS at 11:00

## 2025-05-15 ASSESSMENT — PAIN SCALES - GENERAL: PAINLEVEL_OUTOF10: 9

## 2025-05-16 ENCOUNTER — INFUSION (OUTPATIENT)
Dept: HEMATOLOGY/ONCOLOGY | Facility: CLINIC | Age: 65
End: 2025-05-16
Payer: MEDICARE

## 2025-05-16 ENCOUNTER — SOCIAL WORK (OUTPATIENT)
Dept: HEMATOLOGY/ONCOLOGY | Facility: HOSPITAL | Age: 65
End: 2025-05-16

## 2025-05-16 VITALS
RESPIRATION RATE: 16 BRPM | DIASTOLIC BLOOD PRESSURE: 76 MMHG | SYSTOLIC BLOOD PRESSURE: 121 MMHG | BODY MASS INDEX: 27.93 KG/M2 | OXYGEN SATURATION: 95 % | TEMPERATURE: 96.6 F | WEIGHT: 199.96 LBS | HEART RATE: 100 BPM

## 2025-05-16 DIAGNOSIS — C92.00 ACUTE MYELOID LEUKEMIA NOT HAVING ACHIEVED REMISSION (MULTI): ICD-10-CM

## 2025-05-16 LAB
ABO GROUP (TYPE) IN BLOOD: NORMAL
ANTIBODY SCREEN: NORMAL
RH FACTOR (ANTIGEN D): NORMAL

## 2025-05-16 PROCEDURE — 2500000004 HC RX 250 GENERAL PHARMACY W/ HCPCS (ALT 636 FOR OP/ED): Mod: JW | Performed by: INTERNAL MEDICINE

## 2025-05-16 PROCEDURE — 2500000004 HC RX 250 GENERAL PHARMACY W/ HCPCS (ALT 636 FOR OP/ED): Performed by: INTERNAL MEDICINE

## 2025-05-16 PROCEDURE — 96401 CHEMO ANTI-NEOPL SQ/IM: CPT

## 2025-05-16 RX ORDER — HEPARIN 100 UNIT/ML
500 SYRINGE INTRAVENOUS AS NEEDED
OUTPATIENT
Start: 2025-05-16

## 2025-05-16 RX ORDER — DIPHENHYDRAMINE HYDROCHLORIDE 50 MG/ML
50 INJECTION, SOLUTION INTRAMUSCULAR; INTRAVENOUS AS NEEDED
Status: DISCONTINUED | OUTPATIENT
Start: 2025-05-16 | End: 2025-05-16 | Stop reason: HOSPADM

## 2025-05-16 RX ORDER — ALBUTEROL SULFATE 0.83 MG/ML
3 SOLUTION RESPIRATORY (INHALATION) AS NEEDED
Status: DISCONTINUED | OUTPATIENT
Start: 2025-05-16 | End: 2025-05-16 | Stop reason: HOSPADM

## 2025-05-16 RX ORDER — EPINEPHRINE 0.3 MG/.3ML
0.3 INJECTION SUBCUTANEOUS EVERY 5 MIN PRN
Status: DISCONTINUED | OUTPATIENT
Start: 2025-05-16 | End: 2025-05-16 | Stop reason: HOSPADM

## 2025-05-16 RX ORDER — HEPARIN SODIUM,PORCINE/PF 10 UNIT/ML
50 SYRINGE (ML) INTRAVENOUS AS NEEDED
OUTPATIENT
Start: 2025-05-16

## 2025-05-16 RX ORDER — PROCHLORPERAZINE EDISYLATE 5 MG/ML
10 INJECTION INTRAMUSCULAR; INTRAVENOUS EVERY 6 HOURS PRN
Status: DISCONTINUED | OUTPATIENT
Start: 2025-05-16 | End: 2025-05-16 | Stop reason: HOSPADM

## 2025-05-16 RX ORDER — FAMOTIDINE 10 MG/ML
20 INJECTION, SOLUTION INTRAVENOUS ONCE AS NEEDED
Status: DISCONTINUED | OUTPATIENT
Start: 2025-05-16 | End: 2025-05-16 | Stop reason: HOSPADM

## 2025-05-16 RX ORDER — ONDANSETRON HYDROCHLORIDE 8 MG/1
8 TABLET, FILM COATED ORAL ONCE
Status: COMPLETED | OUTPATIENT
Start: 2025-05-16 | End: 2025-05-16

## 2025-05-16 RX ORDER — PROCHLORPERAZINE MALEATE 10 MG
10 TABLET ORAL EVERY 6 HOURS PRN
Status: DISCONTINUED | OUTPATIENT
Start: 2025-05-16 | End: 2025-05-16 | Stop reason: HOSPADM

## 2025-05-16 RX ADMIN — AZACITIDINE 170 MG: 100 INJECTION, POWDER, LYOPHILIZED, FOR SOLUTION INTRAVENOUS; SUBCUTANEOUS at 09:49

## 2025-05-16 RX ADMIN — ONDANSETRON HYDROCHLORIDE 8 MG: 8 TABLET, FILM COATED ORAL at 09:17

## 2025-05-16 ASSESSMENT — PAIN SCALES - GENERAL: PAINLEVEL_OUTOF10: 8

## 2025-05-16 NOTE — PROGRESS NOTES
SW spoke with patient on this day to introduce self/services. Provided him with information re: nickolas resources and also informed him that SW will reach out in the future when transplant timeline is established to complete SW assessment. Patient was provided with this SW's direct contact information.    Nickolas Application    Identified Need: Non-Medical Expenses  Nickolas Name: [facility] Patient AidKRISTEL Urgent Need  Application Submitted via: online portal on 05/16/25  Anticipated Nickolas Award Amount: $100, $500, respectively  Both applications are pending as of this day. Patient needs to submit income/identification verifications to gain approval.    Pt agreeable to plan. SW will continue to follow.

## 2025-05-16 NOTE — PROGRESS NOTES
SANDRAW continues to follow patient for support and ongoing assessment.  Met with patient and wife today during his infusion visit.  Patient shared that he has been feeling well and that he had a follow up with Dr. Gomez today which provided that he is doing well.  Patient shared that his treatment is going to be delayed a few days as they are waiting for his counts to recovery but overall everything seems to be moving along in a positive way.  Patient shared what a relief this has been and appears to be coping appropriately.  Patient shared that he is thankful for his family and how wonderful it has been to have everyone supporting him and his wife through everything.  No other issues or concerns noted at this time.  Social work will continue to follow.

## 2025-05-16 NOTE — PROGRESS NOTES
LSW faxed [place] paperwork to patient's wife's employer ([employer]) wife is requesting intermittent DEEPIKA.  LSW will send a copy of the documents to the wife's email address.  No other issues or concerns at this time. Social work will continue to follow.

## 2025-05-16 NOTE — PROGRESS NOTES
LSW received email from patient's wife requesting assistance with completing FMLA paperwork.  Wife would like to take intermittent leave from work to assist with emotional support, transportation and care needs for patient's appointments.  Will plan to complete documents and fax them to her employer after Dr. Gomez signs the forms on 5/15/25.  Will reach out to patient's wife once completed.  Social work will continue to follow.

## 2025-05-17 DIAGNOSIS — F34.1 DYSTHYMIA: ICD-10-CM

## 2025-05-19 ENCOUNTER — INFUSION (OUTPATIENT)
Dept: HEMATOLOGY/ONCOLOGY | Facility: CLINIC | Age: 65
End: 2025-05-19
Payer: MEDICARE

## 2025-05-19 VITALS
OXYGEN SATURATION: 93 % | RESPIRATION RATE: 17 BRPM | SYSTOLIC BLOOD PRESSURE: 121 MMHG | WEIGHT: 200.84 LBS | HEART RATE: 72 BPM | DIASTOLIC BLOOD PRESSURE: 78 MMHG | BODY MASS INDEX: 28.05 KG/M2 | TEMPERATURE: 98.4 F

## 2025-05-19 DIAGNOSIS — C92.00 ACUTE MYELOID LEUKEMIA NOT HAVING ACHIEVED REMISSION (MULTI): ICD-10-CM

## 2025-05-19 LAB
ALBUMIN SERPL BCP-MCNC: 4.2 G/DL (ref 3.4–5)
ALP SERPL-CCNC: 87 U/L (ref 33–136)
ALT SERPL W P-5'-P-CCNC: 14 U/L (ref 10–52)
ANION GAP SERPL CALC-SCNC: 13 MMOL/L (ref 10–20)
AST SERPL W P-5'-P-CCNC: 15 U/L (ref 9–39)
BASOPHILS # BLD AUTO: 0.05 X10*3/UL (ref 0–0.1)
BASOPHILS NFR BLD AUTO: 0.9 %
BILIRUB SERPL-MCNC: 0.4 MG/DL (ref 0–1.2)
BUN SERPL-MCNC: 16 MG/DL (ref 6–23)
CALCIUM SERPL-MCNC: 9.5 MG/DL (ref 8.6–10.3)
CHLORIDE SERPL-SCNC: 103 MMOL/L (ref 98–107)
CO2 SERPL-SCNC: 27 MMOL/L (ref 21–32)
CREAT SERPL-MCNC: 1.01 MG/DL (ref 0.5–1.3)
EGFRCR SERPLBLD CKD-EPI 2021: 82 ML/MIN/1.73M*2
EOSINOPHIL # BLD AUTO: 0.01 X10*3/UL (ref 0–0.7)
EOSINOPHIL NFR BLD AUTO: 0.2 %
ERYTHROCYTE [DISTWIDTH] IN BLOOD BY AUTOMATED COUNT: 20.5 % (ref 11.5–14.5)
GLUCOSE SERPL-MCNC: 99 MG/DL (ref 74–99)
HCT VFR BLD AUTO: 39.2 % (ref 41–52)
HGB BLD-MCNC: 12.8 G/DL (ref 13.5–17.5)
IMM GRANULOCYTES # BLD AUTO: 0.02 X10*3/UL (ref 0–0.7)
IMM GRANULOCYTES NFR BLD AUTO: 0.4 % (ref 0–0.9)
LDH SERPL L TO P-CCNC: 174 U/L (ref 84–246)
LYMPHOCYTES # BLD AUTO: 1.58 X10*3/UL (ref 1.2–4.8)
LYMPHOCYTES NFR BLD AUTO: 28.2 %
MCH RBC QN AUTO: 34.5 PG (ref 26–34)
MCHC RBC AUTO-ENTMCNC: 32.7 G/DL (ref 32–36)
MCV RBC AUTO: 106 FL (ref 80–100)
MONOCYTES # BLD AUTO: 0.68 X10*3/UL (ref 0.1–1)
MONOCYTES NFR BLD AUTO: 12.1 %
NEUTROPHILS # BLD AUTO: 3.26 X10*3/UL (ref 1.2–7.7)
NEUTROPHILS NFR BLD AUTO: 58.2 %
PLATELET # BLD AUTO: 347 X10*3/UL (ref 150–450)
POTASSIUM SERPL-SCNC: 4.6 MMOL/L (ref 3.5–5.3)
PROT SERPL-MCNC: 7.2 G/DL (ref 6.4–8.2)
RBC # BLD AUTO: 3.71 X10*6/UL (ref 4.5–5.9)
SODIUM SERPL-SCNC: 138 MMOL/L (ref 136–145)
WBC # BLD AUTO: 5.6 X10*3/UL (ref 4.4–11.3)

## 2025-05-19 PROCEDURE — 96401 CHEMO ANTI-NEOPL SQ/IM: CPT

## 2025-05-19 PROCEDURE — 81379 HLA I TYPING COMPLETE HR: CPT | Mod: OUT | Performed by: INTERNAL MEDICINE

## 2025-05-19 PROCEDURE — 83615 LACTATE (LD) (LDH) ENZYME: CPT

## 2025-05-19 PROCEDURE — 80053 COMPREHEN METABOLIC PANEL: CPT

## 2025-05-19 PROCEDURE — 2500000004 HC RX 250 GENERAL PHARMACY W/ HCPCS (ALT 636 FOR OP/ED): Performed by: INTERNAL MEDICINE

## 2025-05-19 PROCEDURE — 86901 BLOOD TYPING SEROLOGIC RH(D): CPT

## 2025-05-19 PROCEDURE — 85025 COMPLETE CBC W/AUTO DIFF WBC: CPT

## 2025-05-19 RX ORDER — FAMOTIDINE 10 MG/ML
20 INJECTION, SOLUTION INTRAVENOUS ONCE AS NEEDED
Status: DISCONTINUED | OUTPATIENT
Start: 2025-05-19 | End: 2025-05-19 | Stop reason: HOSPADM

## 2025-05-19 RX ORDER — DIPHENHYDRAMINE HYDROCHLORIDE 50 MG/ML
50 INJECTION, SOLUTION INTRAMUSCULAR; INTRAVENOUS AS NEEDED
Status: DISCONTINUED | OUTPATIENT
Start: 2025-05-19 | End: 2025-05-19 | Stop reason: HOSPADM

## 2025-05-19 RX ORDER — PROCHLORPERAZINE MALEATE 10 MG
10 TABLET ORAL EVERY 6 HOURS PRN
Status: DISCONTINUED | OUTPATIENT
Start: 2025-05-19 | End: 2025-05-19 | Stop reason: HOSPADM

## 2025-05-19 RX ORDER — HEPARIN SODIUM,PORCINE/PF 10 UNIT/ML
50 SYRINGE (ML) INTRAVENOUS AS NEEDED
Status: CANCELLED | OUTPATIENT
Start: 2025-05-19

## 2025-05-19 RX ORDER — EPINEPHRINE 0.3 MG/.3ML
0.3 INJECTION SUBCUTANEOUS EVERY 5 MIN PRN
Status: DISCONTINUED | OUTPATIENT
Start: 2025-05-19 | End: 2025-05-19 | Stop reason: HOSPADM

## 2025-05-19 RX ORDER — ONDANSETRON 8 MG/1
8 TABLET, FILM COATED ORAL ONCE
Status: COMPLETED | OUTPATIENT
Start: 2025-05-19 | End: 2025-05-19

## 2025-05-19 RX ORDER — PROCHLORPERAZINE EDISYLATE 5 MG/ML
10 INJECTION INTRAMUSCULAR; INTRAVENOUS EVERY 6 HOURS PRN
Status: DISCONTINUED | OUTPATIENT
Start: 2025-05-19 | End: 2025-05-19 | Stop reason: HOSPADM

## 2025-05-19 RX ORDER — ALBUTEROL SULFATE 0.83 MG/ML
3 SOLUTION RESPIRATORY (INHALATION) AS NEEDED
Status: DISCONTINUED | OUTPATIENT
Start: 2025-05-19 | End: 2025-05-19 | Stop reason: HOSPADM

## 2025-05-19 RX ORDER — HEPARIN 100 UNIT/ML
500 SYRINGE INTRAVENOUS AS NEEDED
Status: CANCELLED | OUTPATIENT
Start: 2025-05-19

## 2025-05-19 RX ORDER — ESCITALOPRAM OXALATE 5 MG/1
5 TABLET ORAL DAILY
Qty: 90 TABLET | Refills: 0 | Status: SHIPPED | OUTPATIENT
Start: 2025-05-19

## 2025-05-19 RX ADMIN — AZACITIDINE 170 MG: 100 INJECTION, POWDER, LYOPHILIZED, FOR SOLUTION INTRAVENOUS; SUBCUTANEOUS at 12:49

## 2025-05-19 RX ADMIN — ONDANSETRON HYDROCHLORIDE 8 MG: 8 TABLET, FILM COATED ORAL at 12:15

## 2025-05-19 ASSESSMENT — PAIN SCALES - GENERAL: PAINLEVEL_OUTOF10: 8

## 2025-05-20 ENCOUNTER — LAB REQUISITION (OUTPATIENT)
Dept: LAB | Facility: CLINIC | Age: 65
End: 2025-05-20
Payer: MEDICARE

## 2025-05-20 ENCOUNTER — INFUSION (OUTPATIENT)
Dept: HEMATOLOGY/ONCOLOGY | Facility: CLINIC | Age: 65
End: 2025-05-20
Payer: MEDICARE

## 2025-05-20 VITALS
DIASTOLIC BLOOD PRESSURE: 81 MMHG | HEART RATE: 78 BPM | WEIGHT: 201.06 LBS | OXYGEN SATURATION: 95 % | BODY MASS INDEX: 28.08 KG/M2 | RESPIRATION RATE: 16 BRPM | SYSTOLIC BLOOD PRESSURE: 121 MMHG | TEMPERATURE: 97.3 F

## 2025-05-20 DIAGNOSIS — C92.00 ACUTE MYELOID LEUKEMIA NOT HAVING ACHIEVED REMISSION (MULTI): ICD-10-CM

## 2025-05-20 DIAGNOSIS — Z76.82 STEM CELL TRANSPLANT CANDIDATE: ICD-10-CM

## 2025-05-20 DIAGNOSIS — R06.9 UNSPECIFIED ABNORMALITIES OF BREATHING: ICD-10-CM

## 2025-05-20 DIAGNOSIS — R79.1 ABNORMAL COAGULATION PROFILE: ICD-10-CM

## 2025-05-20 DIAGNOSIS — Z51.81 ENCOUNTER FOR MONITORING CARDIOTOXIC DRUG THERAPY: ICD-10-CM

## 2025-05-20 DIAGNOSIS — C95.00 ACUTE LEUKEMIA OF UNSPECIFIED CELL TYPE NOT HAVING ACHIEVED REMISSION (MULTI): ICD-10-CM

## 2025-05-20 DIAGNOSIS — Z79.899 HIGH RISK MEDICATION USE: ICD-10-CM

## 2025-05-20 DIAGNOSIS — Z79.899 ENCOUNTER FOR MONITORING CARDIOTOXIC DRUG THERAPY: ICD-10-CM

## 2025-05-20 LAB
ABO GROUP (TYPE) IN BLOOD: NORMAL
ANTIBODY SCREEN: NORMAL
RH FACTOR (ANTIGEN D): NORMAL

## 2025-05-20 PROCEDURE — 2500000004 HC RX 250 GENERAL PHARMACY W/ HCPCS (ALT 636 FOR OP/ED): Performed by: INTERNAL MEDICINE

## 2025-05-20 PROCEDURE — 96401 CHEMO ANTI-NEOPL SQ/IM: CPT

## 2025-05-20 RX ORDER — FAMOTIDINE 10 MG/ML
20 INJECTION, SOLUTION INTRAVENOUS ONCE AS NEEDED
Status: DISCONTINUED | OUTPATIENT
Start: 2025-05-20 | End: 2025-05-20 | Stop reason: HOSPADM

## 2025-05-20 RX ORDER — ALBUTEROL SULFATE 0.83 MG/ML
3 SOLUTION RESPIRATORY (INHALATION) AS NEEDED
Status: DISCONTINUED | OUTPATIENT
Start: 2025-05-20 | End: 2025-05-20 | Stop reason: HOSPADM

## 2025-05-20 RX ORDER — ONDANSETRON 8 MG/1
8 TABLET, FILM COATED ORAL ONCE
Status: COMPLETED | OUTPATIENT
Start: 2025-05-20 | End: 2025-05-20

## 2025-05-20 RX ORDER — PROCHLORPERAZINE MALEATE 10 MG
10 TABLET ORAL EVERY 6 HOURS PRN
Status: DISCONTINUED | OUTPATIENT
Start: 2025-05-20 | End: 2025-05-20 | Stop reason: HOSPADM

## 2025-05-20 RX ORDER — PROCHLORPERAZINE EDISYLATE 5 MG/ML
10 INJECTION INTRAMUSCULAR; INTRAVENOUS EVERY 6 HOURS PRN
Status: DISCONTINUED | OUTPATIENT
Start: 2025-05-20 | End: 2025-05-20 | Stop reason: HOSPADM

## 2025-05-20 RX ORDER — EPINEPHRINE 0.3 MG/.3ML
0.3 INJECTION SUBCUTANEOUS EVERY 5 MIN PRN
Status: DISCONTINUED | OUTPATIENT
Start: 2025-05-20 | End: 2025-05-20 | Stop reason: HOSPADM

## 2025-05-20 RX ORDER — DIPHENHYDRAMINE HYDROCHLORIDE 50 MG/ML
50 INJECTION, SOLUTION INTRAMUSCULAR; INTRAVENOUS AS NEEDED
Status: DISCONTINUED | OUTPATIENT
Start: 2025-05-20 | End: 2025-05-20 | Stop reason: HOSPADM

## 2025-05-20 RX ADMIN — ONDANSETRON HYDROCHLORIDE 8 MG: 8 TABLET, FILM COATED ORAL at 13:33

## 2025-05-20 RX ADMIN — AZACITIDINE 170 MG: 100 INJECTION, POWDER, LYOPHILIZED, FOR SOLUTION INTRAVENOUS; SUBCUTANEOUS at 13:58

## 2025-05-20 ASSESSMENT — PAIN SCALES - GENERAL: PAINLEVEL_OUTOF10: 8

## 2025-05-21 ENCOUNTER — DOCUMENTATION (OUTPATIENT)
Dept: OTHER | Facility: HOSPITAL | Age: 65
End: 2025-05-21

## 2025-05-21 ENCOUNTER — INFUSION (OUTPATIENT)
Dept: HEMATOLOGY/ONCOLOGY | Facility: CLINIC | Age: 65
End: 2025-05-21
Payer: MEDICARE

## 2025-05-21 VITALS
HEART RATE: 81 BPM | RESPIRATION RATE: 18 BRPM | DIASTOLIC BLOOD PRESSURE: 79 MMHG | OXYGEN SATURATION: 97 % | TEMPERATURE: 96.4 F | WEIGHT: 201.28 LBS | BODY MASS INDEX: 28.11 KG/M2 | SYSTOLIC BLOOD PRESSURE: 116 MMHG

## 2025-05-21 DIAGNOSIS — C92.00 ACUTE MYELOID LEUKEMIA NOT HAVING ACHIEVED REMISSION (MULTI): ICD-10-CM

## 2025-05-21 PROCEDURE — 2500000004 HC RX 250 GENERAL PHARMACY W/ HCPCS (ALT 636 FOR OP/ED): Performed by: INTERNAL MEDICINE

## 2025-05-21 PROCEDURE — 96401 CHEMO ANTI-NEOPL SQ/IM: CPT

## 2025-05-21 RX ORDER — FAMOTIDINE 10 MG/ML
20 INJECTION, SOLUTION INTRAVENOUS ONCE AS NEEDED
Status: DISCONTINUED | OUTPATIENT
Start: 2025-05-21 | End: 2025-05-21 | Stop reason: HOSPADM

## 2025-05-21 RX ORDER — ALBUTEROL SULFATE 0.83 MG/ML
3 SOLUTION RESPIRATORY (INHALATION) AS NEEDED
Status: DISCONTINUED | OUTPATIENT
Start: 2025-05-21 | End: 2025-05-21 | Stop reason: HOSPADM

## 2025-05-21 RX ORDER — HEPARIN SODIUM,PORCINE/PF 10 UNIT/ML
50 SYRINGE (ML) INTRAVENOUS AS NEEDED
Status: CANCELLED | OUTPATIENT
Start: 2025-05-21

## 2025-05-21 RX ORDER — HEPARIN 100 UNIT/ML
500 SYRINGE INTRAVENOUS AS NEEDED
Status: CANCELLED | OUTPATIENT
Start: 2025-05-21

## 2025-05-21 RX ORDER — ONDANSETRON 8 MG/1
8 TABLET, FILM COATED ORAL ONCE
Status: COMPLETED | OUTPATIENT
Start: 2025-05-21 | End: 2025-05-21

## 2025-05-21 RX ORDER — EPINEPHRINE 0.3 MG/.3ML
0.3 INJECTION SUBCUTANEOUS EVERY 5 MIN PRN
Status: DISCONTINUED | OUTPATIENT
Start: 2025-05-21 | End: 2025-05-21 | Stop reason: HOSPADM

## 2025-05-21 RX ORDER — DIPHENHYDRAMINE HYDROCHLORIDE 50 MG/ML
50 INJECTION, SOLUTION INTRAMUSCULAR; INTRAVENOUS AS NEEDED
Status: DISCONTINUED | OUTPATIENT
Start: 2025-05-21 | End: 2025-05-21 | Stop reason: HOSPADM

## 2025-05-21 RX ADMIN — AZACITIDINE 170 MG: 100 INJECTION, POWDER, LYOPHILIZED, FOR SOLUTION INTRAVENOUS; SUBCUTANEOUS at 13:56

## 2025-05-21 RX ADMIN — ONDANSETRON HYDROCHLORIDE 8 MG: 8 TABLET, FILM COATED ORAL at 13:40

## 2025-05-21 ASSESSMENT — PAIN SCALES - GENERAL: PAINLEVEL_OUTOF10: 8

## 2025-05-21 NOTE — PROGRESS NOTES
Today I spoke with Benoit and his wife Emi over the phone and reviewed his upcoming testing in preparation for his allo SCT. A calendar was emailed to them with this information as well. I also emailed and reviewed a calendar with the projected dates for his transplant. Questions/concerns were addressed. They have my contact information and know they can reach out if needed.

## 2025-05-22 ENCOUNTER — SOCIAL WORK (OUTPATIENT)
Dept: HEMATOLOGY/ONCOLOGY | Facility: HOSPITAL | Age: 65
End: 2025-05-22

## 2025-05-22 ENCOUNTER — INFUSION (OUTPATIENT)
Dept: HEMATOLOGY/ONCOLOGY | Facility: CLINIC | Age: 65
End: 2025-05-22
Payer: MEDICARE

## 2025-05-22 VITALS
DIASTOLIC BLOOD PRESSURE: 75 MMHG | WEIGHT: 197.64 LBS | OXYGEN SATURATION: 98 % | BODY MASS INDEX: 27.6 KG/M2 | RESPIRATION RATE: 17 BRPM | HEART RATE: 81 BPM | SYSTOLIC BLOOD PRESSURE: 109 MMHG | TEMPERATURE: 97.3 F

## 2025-05-22 DIAGNOSIS — C92.00 ACUTE MYELOID LEUKEMIA NOT HAVING ACHIEVED REMISSION (MULTI): ICD-10-CM

## 2025-05-22 LAB
ALBUMIN SERPL BCP-MCNC: 4.3 G/DL (ref 3.4–5)
ALP SERPL-CCNC: 105 U/L (ref 33–136)
ALT SERPL W P-5'-P-CCNC: 14 U/L (ref 10–52)
ANION GAP SERPL CALC-SCNC: 11 MMOL/L (ref 10–20)
AST SERPL W P-5'-P-CCNC: 14 U/L (ref 9–39)
BASOPHILS # BLD AUTO: 0.07 X10*3/UL (ref 0–0.1)
BASOPHILS NFR BLD AUTO: 0.9 %
BILIRUB SERPL-MCNC: 0.5 MG/DL (ref 0–1.2)
BUN SERPL-MCNC: 17 MG/DL (ref 6–23)
CALCIUM SERPL-MCNC: 9.6 MG/DL (ref 8.6–10.3)
CHLORIDE SERPL-SCNC: 102 MMOL/L (ref 98–107)
CO2 SERPL-SCNC: 28 MMOL/L (ref 21–32)
CREAT SERPL-MCNC: 1.17 MG/DL (ref 0.5–1.3)
EGFRCR SERPLBLD CKD-EPI 2021: 69 ML/MIN/1.73M*2
EOSINOPHIL # BLD AUTO: 0.01 X10*3/UL (ref 0–0.7)
EOSINOPHIL NFR BLD AUTO: 0.1 %
ERYTHROCYTE [DISTWIDTH] IN BLOOD BY AUTOMATED COUNT: 20.6 % (ref 11.5–14.5)
GLUCOSE SERPL-MCNC: 93 MG/DL (ref 74–99)
HCT VFR BLD AUTO: 41.6 % (ref 41–52)
HGB BLD-MCNC: 13.4 G/DL (ref 13.5–17.5)
IMM GRANULOCYTES # BLD AUTO: 0.01 X10*3/UL (ref 0–0.7)
IMM GRANULOCYTES NFR BLD AUTO: 0.1 % (ref 0–0.9)
LDH SERPL L TO P-CCNC: 185 U/L (ref 84–246)
LYMPHOCYTES # BLD AUTO: 1.49 X10*3/UL (ref 1.2–4.8)
LYMPHOCYTES NFR BLD AUTO: 19.9 %
MCH RBC QN AUTO: 34 PG (ref 26–34)
MCHC RBC AUTO-ENTMCNC: 32.2 G/DL (ref 32–36)
MCV RBC AUTO: 106 FL (ref 80–100)
MONOCYTES # BLD AUTO: 0.96 X10*3/UL (ref 0.1–1)
MONOCYTES NFR BLD AUTO: 12.9 %
NEUTROPHILS # BLD AUTO: 4.93 X10*3/UL (ref 1.2–7.7)
NEUTROPHILS NFR BLD AUTO: 66.1 %
PLATELET # BLD AUTO: 320 X10*3/UL (ref 150–450)
POTASSIUM SERPL-SCNC: 4.4 MMOL/L (ref 3.5–5.3)
PROT SERPL-MCNC: 7.4 G/DL (ref 6.4–8.2)
RBC # BLD AUTO: 3.94 X10*6/UL (ref 4.5–5.9)
SODIUM SERPL-SCNC: 137 MMOL/L (ref 136–145)
WBC # BLD AUTO: 7.5 X10*3/UL (ref 4.4–11.3)

## 2025-05-22 PROCEDURE — 2500000004 HC RX 250 GENERAL PHARMACY W/ HCPCS (ALT 636 FOR OP/ED): Performed by: INTERNAL MEDICINE

## 2025-05-22 PROCEDURE — 80053 COMPREHEN METABOLIC PANEL: CPT

## 2025-05-22 PROCEDURE — 83615 LACTATE (LD) (LDH) ENZYME: CPT

## 2025-05-22 PROCEDURE — 96401 CHEMO ANTI-NEOPL SQ/IM: CPT

## 2025-05-22 PROCEDURE — 86901 BLOOD TYPING SEROLOGIC RH(D): CPT

## 2025-05-22 PROCEDURE — 85025 COMPLETE CBC W/AUTO DIFF WBC: CPT

## 2025-05-22 RX ORDER — ALBUTEROL SULFATE 0.83 MG/ML
3 SOLUTION RESPIRATORY (INHALATION) AS NEEDED
Status: DISCONTINUED | OUTPATIENT
Start: 2025-05-22 | End: 2025-05-22 | Stop reason: HOSPADM

## 2025-05-22 RX ORDER — DIPHENHYDRAMINE HYDROCHLORIDE 50 MG/ML
50 INJECTION, SOLUTION INTRAMUSCULAR; INTRAVENOUS AS NEEDED
Status: DISCONTINUED | OUTPATIENT
Start: 2025-05-22 | End: 2025-05-22 | Stop reason: HOSPADM

## 2025-05-22 RX ORDER — HEPARIN 100 UNIT/ML
500 SYRINGE INTRAVENOUS AS NEEDED
OUTPATIENT
Start: 2025-05-22

## 2025-05-22 RX ORDER — PROCHLORPERAZINE MALEATE 10 MG
10 TABLET ORAL EVERY 6 HOURS PRN
Status: DISCONTINUED | OUTPATIENT
Start: 2025-05-22 | End: 2025-05-22 | Stop reason: HOSPADM

## 2025-05-22 RX ORDER — FAMOTIDINE 10 MG/ML
20 INJECTION, SOLUTION INTRAVENOUS ONCE AS NEEDED
Status: DISCONTINUED | OUTPATIENT
Start: 2025-05-22 | End: 2025-05-22 | Stop reason: HOSPADM

## 2025-05-22 RX ORDER — PROCHLORPERAZINE EDISYLATE 5 MG/ML
10 INJECTION INTRAMUSCULAR; INTRAVENOUS EVERY 6 HOURS PRN
Status: DISCONTINUED | OUTPATIENT
Start: 2025-05-22 | End: 2025-05-22 | Stop reason: HOSPADM

## 2025-05-22 RX ORDER — ONDANSETRON 8 MG/1
8 TABLET, FILM COATED ORAL ONCE
Status: COMPLETED | OUTPATIENT
Start: 2025-05-22 | End: 2025-05-22

## 2025-05-22 RX ORDER — EPINEPHRINE 0.3 MG/.3ML
0.3 INJECTION SUBCUTANEOUS EVERY 5 MIN PRN
Status: DISCONTINUED | OUTPATIENT
Start: 2025-05-22 | End: 2025-05-22 | Stop reason: HOSPADM

## 2025-05-22 RX ORDER — HEPARIN SODIUM,PORCINE/PF 10 UNIT/ML
50 SYRINGE (ML) INTRAVENOUS AS NEEDED
OUTPATIENT
Start: 2025-05-22

## 2025-05-22 RX ADMIN — ONDANSETRON HYDROCHLORIDE 8 MG: 8 TABLET, FILM COATED ORAL at 12:01

## 2025-05-22 RX ADMIN — AZACITIDINE 170 MG: 100 INJECTION, POWDER, LYOPHILIZED, FOR SOLUTION INTRAVENOUS; SUBCUTANEOUS at 12:44

## 2025-05-22 ASSESSMENT — PAIN SCALES - GENERAL: PAINLEVEL_OUTOF10: 10-WORST PAIN EVER

## 2025-05-22 NOTE — PROGRESS NOTES
SW received call from patient's daughter Prachi who lives in [place]. She offers collateral for patient's psychosocial overview.  She states she will be a part of the care team and offers to schedule time to complete psychosocial assessment to be a part of the conversation along with patient/spouse, and other siblings.   Time was arranged for 5/27/25 @ 10:00 via phone.  Pre-Transplant documents were sent to [email or web address]  Patient gives consent for ALDEN to communicate and receive/provide information re: patient's treatment plan.

## 2025-05-23 ENCOUNTER — INFUSION (OUTPATIENT)
Dept: HEMATOLOGY/ONCOLOGY | Facility: CLINIC | Age: 65
End: 2025-05-23
Payer: MEDICARE

## 2025-05-23 VITALS
RESPIRATION RATE: 17 BRPM | OXYGEN SATURATION: 95 % | HEART RATE: 89 BPM | SYSTOLIC BLOOD PRESSURE: 116 MMHG | TEMPERATURE: 97 F | DIASTOLIC BLOOD PRESSURE: 77 MMHG | BODY MASS INDEX: 27.74 KG/M2 | WEIGHT: 198.63 LBS

## 2025-05-23 DIAGNOSIS — C92.00 ACUTE MYELOID LEUKEMIA NOT HAVING ACHIEVED REMISSION (MULTI): ICD-10-CM

## 2025-05-23 LAB
ABO GROUP (TYPE) IN BLOOD: NORMAL
ANTIBODY SCREEN: NORMAL
RH FACTOR (ANTIGEN D): NORMAL

## 2025-05-23 PROCEDURE — 2500000004 HC RX 250 GENERAL PHARMACY W/ HCPCS (ALT 636 FOR OP/ED): Performed by: INTERNAL MEDICINE

## 2025-05-23 PROCEDURE — 96401 CHEMO ANTI-NEOPL SQ/IM: CPT

## 2025-05-23 RX ORDER — ALBUTEROL SULFATE 0.83 MG/ML
3 SOLUTION RESPIRATORY (INHALATION) AS NEEDED
Status: DISCONTINUED | OUTPATIENT
Start: 2025-05-23 | End: 2025-05-23 | Stop reason: HOSPADM

## 2025-05-23 RX ORDER — PROCHLORPERAZINE EDISYLATE 5 MG/ML
10 INJECTION INTRAMUSCULAR; INTRAVENOUS EVERY 6 HOURS PRN
Status: DISCONTINUED | OUTPATIENT
Start: 2025-05-23 | End: 2025-05-23 | Stop reason: HOSPADM

## 2025-05-23 RX ORDER — DIPHENHYDRAMINE HYDROCHLORIDE 50 MG/ML
50 INJECTION, SOLUTION INTRAMUSCULAR; INTRAVENOUS AS NEEDED
Status: DISCONTINUED | OUTPATIENT
Start: 2025-05-23 | End: 2025-05-23 | Stop reason: HOSPADM

## 2025-05-23 RX ORDER — PROCHLORPERAZINE MALEATE 10 MG
10 TABLET ORAL EVERY 6 HOURS PRN
Status: DISCONTINUED | OUTPATIENT
Start: 2025-05-23 | End: 2025-05-23 | Stop reason: HOSPADM

## 2025-05-23 RX ORDER — FAMOTIDINE 10 MG/ML
20 INJECTION, SOLUTION INTRAVENOUS ONCE AS NEEDED
Status: DISCONTINUED | OUTPATIENT
Start: 2025-05-23 | End: 2025-05-23 | Stop reason: HOSPADM

## 2025-05-23 RX ORDER — EPINEPHRINE 0.3 MG/.3ML
0.3 INJECTION SUBCUTANEOUS EVERY 5 MIN PRN
Status: DISCONTINUED | OUTPATIENT
Start: 2025-05-23 | End: 2025-05-23 | Stop reason: HOSPADM

## 2025-05-23 RX ORDER — ONDANSETRON 8 MG/1
8 TABLET, FILM COATED ORAL ONCE
Status: COMPLETED | OUTPATIENT
Start: 2025-05-23 | End: 2025-05-23

## 2025-05-23 RX ADMIN — AZACITIDINE 170 MG: 100 INJECTION, POWDER, LYOPHILIZED, FOR SOLUTION INTRAVENOUS; SUBCUTANEOUS at 13:17

## 2025-05-23 RX ADMIN — ONDANSETRON HYDROCHLORIDE 8 MG: 8 TABLET, FILM COATED ORAL at 12:54

## 2025-05-23 ASSESSMENT — PAIN SCALES - GENERAL: PAINLEVEL_OUTOF10: 10-WORST PAIN EVER

## 2025-05-27 ENCOUNTER — LAB REQUISITION (OUTPATIENT)
Dept: LAB | Facility: CLINIC | Age: 65
End: 2025-05-27
Payer: MEDICARE

## 2025-05-27 ENCOUNTER — SOCIAL WORK (OUTPATIENT)
Dept: HEMATOLOGY/ONCOLOGY | Facility: HOSPITAL | Age: 65
End: 2025-05-27
Payer: MEDICARE

## 2025-05-27 DIAGNOSIS — C92.00 ACUTE MYELOBLASTIC LEUKEMIA, NOT HAVING ACHIEVED REMISSION (MULTI): ICD-10-CM

## 2025-05-27 DIAGNOSIS — C95.00 ACUTE LEUKEMIA OF UNSPECIFIED CELL TYPE NOT HAVING ACHIEVED REMISSION (MULTI): ICD-10-CM

## 2025-05-27 NOTE — PROGRESS NOTES
"PSYCHOSOCIAL ASSESSMENT     Demographic Information  Link Inman  1960  [id number]  Transplant Type: Allogeneic  Assessment Type:  BMT Pre-Transplant Psychosocial Assessment-Initial  Date of assessment: 05/27/25  Provider(s): EDENILSON Villatoro  Diagnosis: AML  Person(s) present during assessment: Patient, \"Benoit\"; daughter, Ketty; daughter, Brandee; son, Rigo; spouse, Emi; SW via phone  Primary language: English  Interpretive services used: NA  County: [facility]                Living Environment/Support Systems  Partner Status:   Children: 3 adult children  Support systems: strong support system: spouse, adult children, 2 younger sisters (one lives in [place], and one in [place], [place])  24/7 Primary caregiver: spouse, who will be taking 2-3 weeks off in August to be available as 24/7 support. Will utilize FMLA/PTO/Sick Time. This will be supplemented by adult children on rotating basis.  Encouraged patient/support system to create schedule that covers 24/7 availability for first 100 days after T=0.  Secondary caregiver: children, rotating  Employment Status Caregiver: retired early due to diagnosis  Caregiver concerns: NA  Current Living Situation: House Own  Resides with: spouse, dog  Concerns with Housing Environment: None  Comments: See above    Lodging  Distance from transplant center: under 1 hour  Lodging Needed? : No  Comments: Will return home     Safety  Safety at Home: Feels safe  History of Domestic Violence: None reported      Functional Status   Functional status: Minimum Assistance  Patient currently ambulates: With Aides: Walker and Cane  Patient has following equipment: None  Other physical health issues that the patient is experiencing: needing hip surgery in the future following SCT. Decreased mobility because of hip limitations.  What supports are in place to assist the patient: Food Services, [facility] [place].  Transportation:  Friend/Family, spouse  Comments: Also " has a wheelchair at home, not billed to insurance that patient could use if needed.     Finances/Insurance  Insurance: Medicare/AARP  Does the patient have any pending insurance applications: No   Hospital Financial Assistance: None  Patient's income source:  skilled nursing and Family Support  Work History: retired at time of diagnosis. Took early long-term, and has since reports having a significant shift in income. Spouse is still working.   History: No  Background  Food Insecurity: No   Patient financial plans during transplant: will remain unchanged, as spouse will take planned PTO during caregiver time.  Does the patient have any financial concerns: Yes, is interested in any available grants  Any difficulties affording medications? No   Applicable [place]: Yes, Thus far, have applied for LLS Patient Aid ($100 non-renewable), Urgent Need ($500 renewable annually), LLS Copay Assistance ($4000/year), [employer] V[employer], and will apply for BMT InfoNet and LifeLine when available/eligible.  Comments: See above     Advance Directives  Has Advance Directives-No copy provided  Health Care Agent Status:Not Activated  Health Care Agent, When applicable: completed documents 20 years ago, will clarify when patient provides copies  Comments: He states he will bring copies to Elizabeth [place]'s appointment on 5/28/25 to scan into chart. Will review at that time to ensure that they reflect patient's current wishes.    Legal Involvement  Relevant current or previous legal concerns: None reported     Christian or Spiritual Identity  Comments: Did not discuss    Mental Health  Active SI/HI: No  Mental Health Diagnosis, if applicable: has seen [facility] providers who prescribed SSRI (Zoloft), but patient did feel positive effect. Stopped taking. Still taking Lexapro, prescribed by his PCP which he feels has positive effect.  Made patient aware of accessibility of [facility] providers, and confirms that he is scheduled to see provider later  "this month.  Family History of Mental Health? No   Mood: per patient, \"good\". He states per spouse, \"feels he's been osborne for the past 6 months\". Patient is open to engaging with psychiatry, and has appointment scheduled.  Patient identified coping skills related to admission: Identifies other life circumstances that are stressful during his transplant preparation period. Currently has water/flooding issues with plumbing.  Motivation for treatment? Good  Learning Preferences: Benefits greatly from family recording information provided by care team. SW has been providing information to daughter Ketty (with patient's permission), who then disseminates to other family members and patient.  Understanding of Diagnosis and Transplant? States he feels he has an adequate understanding  Cognitive Comments: Was seen by  psychiatry on 4/25/25, where he scored a 22/30 on the MOCA, indicating borderline cognitive functioning.  Relevant Providers: BH/PCP  Comments: See above    Substance Use  Use of Tobacco Products? Yes, Product Type: cigars Last Use: 18 years ago. Denies any return to use or cravings/urges.  Alcohol Use? No, stopped drinking (socially) at time of diagnosis. States last drink was March 21, 2025 when he received AML diagnosis  Other Substance Use? No  Concerns being able to stop any substance use for treatment? NA  Family History of Substance Use? No   Comments: See above    Assessment  SIPAT Total Score: 11    Patient is a Good Candidate for transplant from a psychosocial perspective.    Potential Barriers to Care: Cognition and Mental Health  Patient Strengths: Healthy Coping Skills, Motivated for Treatment, Self-Advocate, and Strong Support System    Plan   Referrals:[facility]  Applications:[facility]  Other: N/A    Narrative: See above    "

## 2025-05-28 ENCOUNTER — INFUSION (OUTPATIENT)
Dept: HEMATOLOGY/ONCOLOGY | Facility: CLINIC | Age: 65
End: 2025-05-28
Payer: MEDICARE

## 2025-05-28 ENCOUNTER — SOCIAL WORK (OUTPATIENT)
Dept: HEMATOLOGY/ONCOLOGY | Facility: CLINIC | Age: 65
End: 2025-05-28

## 2025-05-28 ENCOUNTER — LAB (OUTPATIENT)
Dept: LAB | Facility: CLINIC | Age: 65
End: 2025-05-28
Payer: MEDICARE

## 2025-05-28 VITALS
OXYGEN SATURATION: 97 % | SYSTOLIC BLOOD PRESSURE: 120 MMHG | DIASTOLIC BLOOD PRESSURE: 83 MMHG | BODY MASS INDEX: 27.82 KG/M2 | WEIGHT: 199.19 LBS | TEMPERATURE: 97.7 F | RESPIRATION RATE: 17 BRPM | HEART RATE: 85 BPM

## 2025-05-28 DIAGNOSIS — Z76.82 STEM CELL TRANSPLANT CANDIDATE: ICD-10-CM

## 2025-05-28 DIAGNOSIS — R79.1 ABNORMAL COAGULATION PROFILE: ICD-10-CM

## 2025-05-28 DIAGNOSIS — R06.9 UNSPECIFIED ABNORMALITIES OF BREATHING: ICD-10-CM

## 2025-05-28 DIAGNOSIS — Z79.899 HIGH RISK MEDICATION USE: ICD-10-CM

## 2025-05-28 DIAGNOSIS — C92.00 ACUTE MYELOID LEUKEMIA NOT HAVING ACHIEVED REMISSION (MULTI): ICD-10-CM

## 2025-05-28 LAB
ALBUMIN SERPL BCP-MCNC: 4.2 G/DL (ref 3.4–5)
ALP SERPL-CCNC: 95 U/L (ref 33–136)
ALT SERPL W P-5'-P-CCNC: 15 U/L (ref 10–52)
AMPHETAMINES UR QL SCN: NORMAL
ANION GAP SERPL CALC-SCNC: 12 MMOL/L (ref 10–20)
APTT PPP: 44 SECONDS (ref 26–36)
AST SERPL W P-5'-P-CCNC: 16 U/L (ref 9–39)
BARBITURATES UR QL SCN: NORMAL
BASOPHILS # BLD AUTO: 0.01 X10*3/UL (ref 0–0.1)
BASOPHILS NFR BLD AUTO: 0.2 %
BENZODIAZ UR QL SCN: NORMAL
BILIRUB SERPL-MCNC: 0.7 MG/DL (ref 0–1.2)
BNP SERPL-MCNC: 78 PG/ML (ref 0–99)
BUN SERPL-MCNC: 13 MG/DL (ref 6–23)
BZE UR QL SCN: NORMAL
CALCIUM SERPL-MCNC: 9.5 MG/DL (ref 8.6–10.3)
CANNABINOIDS UR QL SCN: NORMAL
CARDIAC TROPONIN I PNL SERPL HS: 6 NG/L (ref 0–20)
CHLORIDE SERPL-SCNC: 103 MMOL/L (ref 98–107)
CMV IGG AVIDITY SERPL IA-RTO: NONREACTIVE %
CO2 SERPL-SCNC: 28 MMOL/L (ref 21–32)
CREAT SERPL-MCNC: 0.92 MG/DL (ref 0.5–1.3)
EBV EA IGG SER QL: ABNORMAL
EBV NA AB SER QL: POSITIVE
EBV VCA IGG SER IA-ACNC: POSITIVE
EBV VCA IGM SER IA-ACNC: NEGATIVE
EGFRCR SERPLBLD CKD-EPI 2021: >90 ML/MIN/1.73M*2
EOSINOPHIL # BLD AUTO: 0.02 X10*3/UL (ref 0–0.7)
EOSINOPHIL NFR BLD AUTO: 0.4 %
ERYTHROCYTE [DISTWIDTH] IN BLOOD BY AUTOMATED COUNT: 19.7 % (ref 11.5–14.5)
FENTANYL+NORFENTANYL UR QL SCN: NORMAL
GLUCOSE SERPL-MCNC: 98 MG/DL (ref 74–99)
HBV CORE AB SER QL: NONREACTIVE
HBV CORE IGM SER QL: NONREACTIVE
HBV SURFACE AB SER-ACNC: <3.1 MIU/ML
HBV SURFACE AG SERPL QL IA: NONREACTIVE
HCT VFR BLD AUTO: 40.3 % (ref 41–52)
HCV AB SER QL: NONREACTIVE
HERPES SIMPLEX VIRUS 1 IGG: <0.2 INDEX
HERPES SIMPLEX VIRUS 2 IGG: <0.2 INDEX
HGB BLD-MCNC: 13.4 G/DL (ref 13.5–17.5)
HIV 1+2 AB+HIV1 P24 AG SERPL QL IA: NONREACTIVE
IMM GRANULOCYTES # BLD AUTO: 0.01 X10*3/UL (ref 0–0.7)
IMM GRANULOCYTES NFR BLD AUTO: 0.2 % (ref 0–0.9)
INR PPP: 1.9 (ref 0.9–1.1)
LDH SERPL L TO P-CCNC: 167 U/L (ref 84–246)
LYMPHOCYTES # BLD AUTO: 1.2 X10*3/UL (ref 1.2–4.8)
LYMPHOCYTES NFR BLD AUTO: 22.5 %
MCH RBC QN AUTO: 34.5 PG (ref 26–34)
MCHC RBC AUTO-ENTMCNC: 33.3 G/DL (ref 32–36)
MCV RBC AUTO: 104 FL (ref 80–100)
METHADONE UR QL SCN: NORMAL
MONOCYTES # BLD AUTO: 0.53 X10*3/UL (ref 0.1–1)
MONOCYTES NFR BLD AUTO: 9.9 %
NEUTROPHILS # BLD AUTO: 3.56 X10*3/UL (ref 1.2–7.7)
NEUTROPHILS NFR BLD AUTO: 66.8 %
OPIATES UR QL SCN: NORMAL
OXYCODONE+OXYMORPHONE UR QL SCN: NORMAL
PCP UR QL SCN: NORMAL
PLATELET # BLD AUTO: 296 X10*3/UL (ref 150–450)
POTASSIUM SERPL-SCNC: 4 MMOL/L (ref 3.5–5.3)
PROT SERPL-MCNC: 7 G/DL (ref 6.4–8.2)
PROTHROMBIN TIME: 21.4 SECONDS (ref 9.8–12.4)
RBC # BLD AUTO: 3.88 X10*6/UL (ref 4.5–5.9)
SODIUM SERPL-SCNC: 139 MMOL/L (ref 136–145)
T GONDII IGG SER-ACNC: NONREACTIVE
TREPONEMA PALLIDUM IGG+IGM AB [PRESENCE] IN SERUM OR PLASMA BY IMMUNOASSAY: NONREACTIVE
URATE SERPL-MCNC: 6.7 MG/DL (ref 4–7.5)
VARICELLA ZOSTER IGG INDEX: >8 IA
VZV IGG SER QL IA: POSITIVE
WBC # BLD AUTO: 5.3 X10*3/UL (ref 4.4–11.3)

## 2025-05-28 PROCEDURE — 86790 VIRUS ANTIBODY NOS: CPT

## 2025-05-28 PROCEDURE — 86704 HEP B CORE ANTIBODY TOTAL: CPT

## 2025-05-28 PROCEDURE — 87340 HEPATITIS B SURFACE AG IA: CPT

## 2025-05-28 PROCEDURE — 86695 HERPES SIMPLEX TYPE 1 TEST: CPT

## 2025-05-28 PROCEDURE — 86787 VARICELLA-ZOSTER ANTIBODY: CPT

## 2025-05-28 PROCEDURE — 84075 ASSAY ALKALINE PHOSPHATASE: CPT

## 2025-05-28 PROCEDURE — 86644 CMV ANTIBODY: CPT

## 2025-05-28 PROCEDURE — 36415 COLL VENOUS BLD VENIPUNCTURE: CPT

## 2025-05-28 PROCEDURE — 84550 ASSAY OF BLOOD/URIC ACID: CPT

## 2025-05-28 PROCEDURE — 84484 ASSAY OF TROPONIN QUANT: CPT

## 2025-05-28 PROCEDURE — 86777 TOXOPLASMA ANTIBODY: CPT

## 2025-05-28 PROCEDURE — 86665 EPSTEIN-BARR CAPSID VCA: CPT

## 2025-05-28 PROCEDURE — 80307 DRUG TEST PRSMV CHEM ANLYZR: CPT

## 2025-05-28 PROCEDURE — 83615 LACTATE (LD) (LDH) ENZYME: CPT

## 2025-05-28 PROCEDURE — 85610 PROTHROMBIN TIME: CPT

## 2025-05-28 PROCEDURE — 85025 COMPLETE CBC W/AUTO DIFF WBC: CPT

## 2025-05-28 PROCEDURE — 83880 ASSAY OF NATRIURETIC PEPTIDE: CPT

## 2025-05-28 PROCEDURE — 86705 HEP B CORE ANTIBODY IGM: CPT

## 2025-05-28 PROCEDURE — 86706 HEP B SURFACE ANTIBODY: CPT

## 2025-05-28 PROCEDURE — 86780 TREPONEMA PALLIDUM: CPT

## 2025-05-28 PROCEDURE — 86803 HEPATITIS C AB TEST: CPT

## 2025-05-28 PROCEDURE — 87799 DETECT AGENT NOS DNA QUANT: CPT

## 2025-05-28 PROCEDURE — 86645 CMV ANTIBODY IGM: CPT

## 2025-05-28 PROCEDURE — 87389 HIV-1 AG W/HIV-1&-2 AB AG IA: CPT

## 2025-05-28 PROCEDURE — 86900 BLOOD TYPING SEROLOGIC ABO: CPT

## 2025-05-28 ASSESSMENT — PAIN SCALES - GENERAL: PAINLEVEL_OUTOF10: 9

## 2025-05-29 ENCOUNTER — APPOINTMENT (OUTPATIENT)
Dept: BEHAVIORAL HEALTH | Facility: CLINIC | Age: 65
End: 2025-05-29
Payer: MEDICARE

## 2025-05-29 DIAGNOSIS — M25.552 BILATERAL HIP PAIN: Primary | ICD-10-CM

## 2025-05-29 DIAGNOSIS — F41.8 MIXED ANXIETY AND DEPRESSIVE DISORDER: ICD-10-CM

## 2025-05-29 DIAGNOSIS — M25.551 BILATERAL HIP PAIN: Primary | ICD-10-CM

## 2025-05-29 LAB
ABO GROUP (TYPE) IN BLOOD: NORMAL
ANTIBODY SCREEN: NORMAL
CMV DNA SERPL NAA+PROBE-LOG IU: NORMAL {LOG_IU}/ML
EBV DNA SPEC NAA+PROBE-LOG#: NORMAL {LOG_COPIES}/ML
HOLD SPECIMEN: 293
LABORATORY COMMENT REPORT: NOT DETECTED
LABORATORY COMMENT REPORT: NOT DETECTED
RH FACTOR (ANTIGEN D): NORMAL

## 2025-05-29 PROCEDURE — 1160F RVW MEDS BY RX/DR IN RCRD: CPT | Performed by: PSYCHIATRY & NEUROLOGY

## 2025-05-29 PROCEDURE — 1157F ADVNC CARE PLAN IN RCRD: CPT | Performed by: PSYCHIATRY & NEUROLOGY

## 2025-05-29 PROCEDURE — 1159F MED LIST DOCD IN RCRD: CPT | Performed by: PSYCHIATRY & NEUROLOGY

## 2025-05-29 PROCEDURE — 99205 OFFICE O/P NEW HI 60 MIN: CPT | Performed by: PSYCHIATRY & NEUROLOGY

## 2025-05-29 ASSESSMENT — ENCOUNTER SYMPTOMS
NERVOUS/ANXIOUS: 1
DYSPHORIC MOOD: 1

## 2025-05-29 NOTE — PROGRESS NOTES
LSW continues to follow patient for support and ongoing assessment.  Met with patient and spouse today during his infusion visit.  Patient shared that he is scheduled for his transplant admission on 6/27/25 he is anxious for this date to arrive and is hopefully that the transplant will go smoothly.  Wife shared that they are working through a caregiver schedule with family members and states that their daughter is planning a visit in the upcoming week, which they are very much looking forward to.      Patient inquired about updating advance directives for health care power of  - LSW reviewed documents with patient and completed both a POA and a Living Will.  A copy was scanned into EPIC and the original and additional copies will be provided to patient at his next appointment.  No other issues or concerns noted at this time.  Social work will continue to follow.

## 2025-05-29 NOTE — PROGRESS NOTES
Subjective   Patient ID: Link Inman is a 64 y.o. male who presents for No chief complaint on file. I met a doctor and I wanted to reschedule with another doctor, you today.     HPI: The patient is unhappy with a previous appointment and is now wanting to see another physician. The patient is being seen with his wife, Emi,  throughout this appointment. The patient was diagnosed with leukemia in March of 2025. The patient was very focused on the previous provider and his interaction with him including suggesting that his relationship with his wife has suffered from that interaction to which the patient's wife disagreed and stated that issues between them predated the interaction with the previous provider.   The patient also stated he did not like the read from my face but when asked if he wanted to follow with care with me at this time he said that he would proceed with follow up with me.   We did message Dr. Elpidio Villatoro and Dr. Riky Gifford about stopping escitalopram and treating with duloxetine and Dr. Villatoro thought that this was a reasonable choice. The patient and his wife had asked that I contact these providers before starting duloxetine treatment.  We also explained that emergencies are handled through 911 or the emergency department and otherwise to call our practice and we would work to be responsive and provide good care.    PPH; The patient reports that the only psychiatric treatment was with marriage counseling about two years ago as the only mental health care. The patient denies any psychiatric hospitalizations or suicide attempts.     Past Medical History:  No date: Arrhythmia      Comment:  atrial fibrillation  03/28/2021: Personal history of (healed) traumatic fracture      Comment:  History of compression fracture of spine  05/11/2019: Personal history of other diseases of the circulatory   system      Comment:  History of pericarditis  The patient has been diagnoses with leukemia  and is in the process of being treated.     FH: The patient reports that there is depression with her mother's side. The patient's wife stated that his mother and her mother's two sisters may have had bipolar disorder. The maternal mother may have had dementia.     SH: The patient was born in [place], [place] and the patient denies any abuse as a child. He feels he had a good family life. The patient is  once and he has one son 36 years old and a daughter who has a civil engineering and environmental engineering and also a younger daughter. He is on good terms with his offspring. The patient has difference of opinion on political matters with is son. The patient has been working from 1982 through 2024 when he retired. 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 He was never in the .       Mental Status Exam     General: In no acute distress.   Appearance: Calm  Attitude: Cooperative.   Behavior: Anxious and labile features.   Motor Activity: No agitation or retardation. No EPS/TD. Normal gait and station.   Speech: Regular rate, rhythm, volume and tone, spontaneous, fluent.   Mood: Anxious, irritable and labile features.   Affect: Anxious, irritable and labile features.    Thought Process: Rational.  Thought Content: Appropriate  Thought Perception: Does not endorse auditory or visual hallucinations, does not appear to be responding to hallucinatory stimuli.   Cognition: Alert, oriented x3. No deficits noted. Adequate fund of knowledge. No deficit in recent and remote memory. No deficits in attention, concentration or language.    Insight: Insight is limited.   Judgment: Judgment is limited.       Appearance: Well-groomed.   Build: Average.   Demeanor: Average.  Eye Contact: Average..   Motor Activity: Average.   Speech: Clear.   Language: Neurologic language is intact.   Fund of Knowledge: Aware of current events,~fair fund of  knowledge.   Delusions: None reported or evidenced.   Self Harm: None reported or evidenced.   Aggressive: None reported or evidenced.   Mood: Anxious, irritable and labile features.   Affect: Anxious, irritable and labile features.    Orientation: Alert.   Manner: Cooperative.   Thought process: Goal-directed.   Thought association: Displays rational thought process.   Content of thought: No suicidal or homicidal ideation or plans are evidenced.   Abstract/ Rational Thought: Relatively intact   Memory: Grossly intact.   Behavior: Calm.   Attention/Concentration: Normal.   Cognition: Intact.   Intelligence Estimate: Average.   Executive Function: Intact.   Insight: Limited.  Judgement: Limited.        Review of Systems   Neurological:         Mental Status Exam     General: In no acute distress.   Appearance: Calm  Attitude: Cooperative.   Behavior: Anxious and labile features.   Motor Activity: No agitation or retardation. No EPS/TD. Normal gait and station.   Speech: Regular rate, rhythm, volume and tone, spontaneous, fluent.   Mood: Anxious, irritable and labile features.   Affect: Anxious, irritable and labile features.    Thought Process: Rational.  Thought Content: Appropriate  Thought Perception: Does not endorse auditory or visual hallucinations, does not appear to be responding to hallucinatory stimuli.   Cognition: Alert, oriented x3. No deficits noted. Adequate fund of knowledge. No deficit in recent and remote memory. No deficits in attention, concentration or language.    Insight: Insight is limited.   Judgment: Judgment is limited.       Appearance: Well-groomed.   Build: Average.   Demeanor: Average.  Eye Contact: Average..   Motor Activity: Average.   Speech: Clear.   Language: Neurologic language is intact.   Fund of Knowledge: Aware of current events,~fair fund of knowledge.   Delusions: None reported or evidenced.   Self Harm: None reported or evidenced.   Aggressive: None reported or evidenced.    Mood: Anxious, irritable and labile features.   Affect: Anxious, irritable and labile features.    Orientation: Alert.   Manner: Cooperative.   Thought process: Goal-directed.   Thought association: Displays rational thought process.   Content of thought: No suicidal or homicidal ideation or plans are evidenced.   Abstract/ Rational Thought: Relatively intact   Memory: Grossly intact.   Behavior: Calm.   Attention/Concentration: Normal.   Cognition: Intact.   Intelligence Estimate: Average.   Executive Function: Intact.   Insight: Limited.  Judgement: Limited.     Psychiatric/Behavioral:  Positive for dysphoric mood. The patient is nervous/anxious.         Mental Status Exam     General: In no acute distress.   Appearance: Calm  Attitude: Cooperative.   Behavior: Anxious and labile features.   Motor Activity: No agitation or retardation. No EPS/TD. Normal gait and station.   Speech: Regular rate, rhythm, volume and tone, spontaneous, fluent.   Mood: Anxious, irritable and labile features.   Affect: Anxious, irritable and labile features.    Thought Process: Rational.  Thought Content: Appropriate  Thought Perception: Does not endorse auditory or visual hallucinations, does not appear to be responding to hallucinatory stimuli.   Cognition: Alert, oriented x3. No deficits noted. Adequate fund of knowledge. No deficit in recent and remote memory. No deficits in attention, concentration or language.    Insight: Insight is limited.   Judgment: Judgment is limited.       Appearance: Well-groomed.   Build: Average.   Demeanor: Average.  Eye Contact: Average..   Motor Activity: Average.   Speech: Clear.   Language: Neurologic language is intact.   Fund of Knowledge: Aware of current events,~fair fund of knowledge.   Delusions: None reported or evidenced.   Self Harm: None reported or evidenced.   Aggressive: None reported or evidenced.   Mood: Anxious, irritable and labile features.   Affect: Anxious, irritable and labile  features.    Orientation: Alert.   Manner: Cooperative.   Thought process: Goal-directed.   Thought association: Displays rational thought process.   Content of thought: No suicidal or homicidal ideation or plans are evidenced.   Abstract/ Rational Thought: Relatively intact   Memory: Grossly intact.   Behavior: Calm.   Attention/Concentration: Normal.   Cognition: Intact.   Intelligence Estimate: Average.   Executive Function: Intact.   Insight: Limited.  Judgement: Limited.       Psych Review of Symptoms:    ADHD: Patient denied any symptoms.         Anxiety:   Generalized Anxiety Symptoms: Difficulty controlling worry.       Developmental and Sensory Concerns: Patient denied any symptoms.         Depressive Symptoms:   Irritable.       Disruptive and Conduct Symptoms: Patient denied any symptoms.         Eating / Feeding Concerns: Patient denied any symptoms.         Elimination Symptoms: Patient denied any symptoms.         Manic Symptoms: Patient denied any symptoms.         Obsessive-Compulsive Symptoms: Patient denied any symptoms.         Psychotic Symptoms: Patient denied any symptoms.           Trauma Related Symptoms: Patient denied any symptoms.           Sleep Concerns: Patient denied any symptoms.             Objective   Physical Exam  Neurological:      Mental Status: He is alert and oriented to person, place, and time.      Comments: Mental Status Exam     General: In no acute distress.   Appearance: Calm  Attitude: Cooperative.   Behavior: Anxious and labile features.   Motor Activity: No agitation or retardation. No EPS/TD. Normal gait and station.   Speech: Regular rate, rhythm, volume and tone, spontaneous, fluent.   Mood: Anxious, irritable and labile features.   Affect: Anxious, irritable and labile features.    Thought Process: Rational.  Thought Content: Appropriate  Thought Perception: Does not endorse auditory or visual hallucinations, does not appear to be responding to hallucinatory  stimuli.   Cognition: Alert, oriented x3. No deficits noted. Adequate fund of knowledge. No deficit in recent and remote memory. No deficits in attention, concentration or language.    Insight: Insight is limited.   Judgment: Judgment is limited.       Appearance: Well-groomed.   Build: Average.   Demeanor: Average.  Eye Contact: Average..   Motor Activity: Average.   Speech: Clear.   Language: Neurologic language is intact.   Fund of Knowledge: Aware of current events,~fair fund of knowledge.   Delusions: None reported or evidenced.   Self Harm: None reported or evidenced.   Aggressive: None reported or evidenced.   Mood: Anxious, irritable and labile features.   Affect: Anxious, irritable and labile features.    Orientation: Alert.   Manner: Cooperative.   Thought process: Goal-directed.   Thought association: Displays rational thought process.   Content of thought: No suicidal or homicidal ideation or plans are evidenced.   Abstract/ Rational Thought: Relatively intact   Memory: Grossly intact.   Behavior: Calm.   Attention/Concentration: Normal.   Cognition: Intact.   Intelligence Estimate: Average.   Executive Function: Intact.   Insight: Limited.  Judgement: Limited.     Psychiatric:      Comments: Mental Status Exam     General: In no acute distress.   Appearance: Calm  Attitude: Cooperative.   Behavior: Anxious and labile features.   Motor Activity: No agitation or retardation. No EPS/TD. Normal gait and station.   Speech: Regular rate, rhythm, volume and tone, spontaneous, fluent.   Mood: Anxious, irritable and labile features.   Affect: Anxious, irritable and labile features.    Thought Process: Rational.  Thought Content: Appropriate  Thought Perception: Does not endorse auditory or visual hallucinations, does not appear to be responding to hallucinatory stimuli.   Cognition: Alert, oriented x3. No deficits noted. Adequate fund of knowledge. No deficit in recent and remote memory. No deficits in  attention, concentration or language.    Insight: Insight is limited.   Judgment: Judgment is limited.       Appearance: Well-groomed.   Build: Average.   Demeanor: Average.  Eye Contact: Average..   Motor Activity: Average.   Speech: Clear.   Language: Neurologic language is intact.   Fund of Knowledge: Aware of current events,~fair fund of knowledge.   Delusions: None reported or evidenced.   Self Harm: None reported or evidenced.   Aggressive: None reported or evidenced.   Mood: Anxious, irritable and labile features.   Affect: Anxious, irritable and labile features.    Orientation: Alert.   Manner: Cooperative.   Thought process: Goal-directed.   Thought association: Displays rational thought process.   Content of thought: No suicidal or homicidal ideation or plans are evidenced.   Abstract/ Rational Thought: Relatively intact   Memory: Grossly intact.   Behavior: Calm.   Attention/Concentration: Normal.   Cognition: Intact.   Intelligence Estimate: Average.   Executive Function: Intact.   Insight: Limited.  Judgement: Limited.           Lab Review:   Lab on 05/28/2025   Component Date Value    Troponin I, High Sensiti* 05/28/2025 6     BNP 05/28/2025 78     Uric Acid 05/28/2025 6.7     Protime 05/28/2025 21.4 (H)     INR 05/28/2025 1.9 (H)     aPTT 05/28/2025 44 (H)     Cytomegalovirus DNA, PCR* 05/28/2025      CMV DNA Result 05/28/2025 Not Detected     EBV DNA Result 05/28/2025 Not Detected     EBV PCR Plasma Log 05/28/2025      Cytomegalovirus IgG 05/28/2025 Nonreactive     Hepatitis C AB 05/28/2025 Nonreactive     Hepatitis B Core AB; IgM 05/28/2025 Nonreactive     Hepatitis B Core AB- Tot* 05/28/2025 Nonreactive     Syphilis Total Ab 05/28/2025 Nonreactive     Hepatitis B Surface AB 05/28/2025 <3.1     HSV 1, IgG 05/28/2025 <0.2     HSV 2, IgG 05/28/2025 <0.2     Varicella Zoster, IgG 05/28/2025 Positive (A)     Varicella Zoster, IgG In* 05/28/2025 >8.0 (H)     EBV VCA, IgG  05/28/2025 Positive (A)     EBV  VCA, IgM  05/28/2025 Negative     EBV Early Antigen Antibo* 05/28/2025 Equivocal (A)     EBV Nuclear Antigen Anti* 05/28/2025 Positive (A)     Toxoplasma IgG 05/28/2025 Nonreactive     HIV 1/2 Antigen/Antibody* 05/28/2025 Nonreactive     Hepatitis B Surface AG 05/28/2025 Nonreactive     WBC 05/28/2025 5.3     RBC 05/28/2025 3.88 (L)     Hemoglobin 05/28/2025 13.4 (L)     Hematocrit 05/28/2025 40.3 (L)     MCV 05/28/2025 104 (H)     MCH 05/28/2025 34.5 (H)     MCHC 05/28/2025 33.3     RDW 05/28/2025 19.7 (H)     Platelets 05/28/2025 296     Neutrophils % 05/28/2025 66.8     Immature Granulocytes %,* 05/28/2025 0.2     Lymphocytes % 05/28/2025 22.5     Monocytes % 05/28/2025 9.9     Eosinophils % 05/28/2025 0.4     Basophils % 05/28/2025 0.2     Neutrophils Absolute 05/28/2025 3.56     Immature Granulocytes Ab* 05/28/2025 0.01     Lymphocytes Absolute 05/28/2025 1.20     Monocytes Absolute 05/28/2025 0.53     Eosinophils Absolute 05/28/2025 0.02     Basophils Absolute 05/28/2025 0.01     Glucose 05/28/2025 98     Sodium 05/28/2025 139     Potassium 05/28/2025 4.0     Chloride 05/28/2025 103     Bicarbonate 05/28/2025 28     Anion Gap 05/28/2025 12     Urea Nitrogen 05/28/2025 13     Creatinine 05/28/2025 0.92     eGFR 05/28/2025 >90     Calcium 05/28/2025 9.5     Albumin 05/28/2025 4.2     Alkaline Phosphatase 05/28/2025 95     Total Protein 05/28/2025 7.0     AST 05/28/2025 16     Bilirubin, Total 05/28/2025 0.7     ALT 05/28/2025 15     ABO TYPE 05/28/2025 O     Rh TYPE 05/28/2025 POS     ANTIBODY SCREEN 05/28/2025 NEG     LDH 05/28/2025 167     Extra Tube 05/28/2025 293    Infusion on 05/28/2025   Component Date Value    Amphetamine Screen, Urine 05/28/2025 Presumptive Negative     Barbiturate Screen, Urine 05/28/2025 Presumptive Negative     Benzodiazepines Screen, * 05/28/2025 Presumptive Negative     Cannabinoid Screen, Urine 05/28/2025 Presumptive Negative     Cocaine Metabolite Scree* 05/28/2025 Presumptive  Negative     Fentanyl Screen, Urine 05/28/2025 Presumptive Negative     Opiate Screen, Urine 05/28/2025 Presumptive Negative     Oxycodone Screen, Urine 05/28/2025 Presumptive Negative     PCP Screen, Urine 05/28/2025 Presumptive Negative     Methadone Screen, Urine 05/28/2025 Presumptive Negative    Lab Requisition on 05/19/2025   Component Date Value    HLA Results 05/19/2025 Charges for [facility]MD[facility] Donor HLA Typing 3553 0000 [id number]    Lab Requisition on 05/12/2025   Component Date Value    HLA Results 05/12/2025 Charges for NMDP Donor HLA Typing 3553 0000 [id number]    Lab Requisition on 05/09/2025   Component Date Value    HLA Results 05/09/2025 Charges for NMDP Donor HLA Typing 3553 0000 [id number]    Infusion on 05/22/2025   Component Date Value    WBC 05/22/2025 7.5     RBC 05/22/2025 3.94 (L)     Hemoglobin 05/22/2025 13.4 (L)     Hematocrit 05/22/2025 41.6     MCV 05/22/2025 106 (H)     MCH 05/22/2025 34.0     MCHC 05/22/2025 32.2     RDW 05/22/2025 20.6 (H)     Platelets 05/22/2025 320     Neutrophils % 05/22/2025 66.1     Immature Granulocytes %,* 05/22/2025 0.1     Lymphocytes % 05/22/2025 19.9     Monocytes % 05/22/2025 12.9     Eosinophils % 05/22/2025 0.1     Basophils % 05/22/2025 0.9     Neutrophils Absolute 05/22/2025 4.93     Immature Granulocytes Ab* 05/22/2025 0.01     Lymphocytes Absolute 05/22/2025 1.49     Monocytes Absolute 05/22/2025 0.96     Eosinophils Absolute 05/22/2025 0.01     Basophils Absolute 05/22/2025 0.07     Glucose 05/22/2025 93     Sodium 05/22/2025 137     Potassium 05/22/2025 4.4     Chloride 05/22/2025 102     Bicarbonate 05/22/2025 28     Anion Gap 05/22/2025 11     Urea Nitrogen 05/22/2025 17     Creatinine 05/22/2025 1.17     eGFR 05/22/2025 69     Calcium 05/22/2025 9.6     Albumin 05/22/2025 4.3     Alkaline Phosphatase 05/22/2025 105     Total Protein 05/22/2025 7.4     AST 05/22/2025 14     Bilirubin, Total 05/22/2025 0.5     ALT 05/22/2025 14     ABO TYPE  05/22/2025 O     Rh TYPE 05/22/2025 POS     ANTIBODY SCREEN 05/22/2025 NEG     LDH 05/22/2025 185    Infusion on 05/19/2025   Component Date Value    WBC 05/19/2025 5.6     RBC 05/19/2025 3.71 (L)     Hemoglobin 05/19/2025 12.8 (L)     Hematocrit 05/19/2025 39.2 (L)     MCV 05/19/2025 106 (H)     MCH 05/19/2025 34.5 (H)     MCHC 05/19/2025 32.7     RDW 05/19/2025 20.5 (H)     Platelets 05/19/2025 347     Neutrophils % 05/19/2025 58.2     Immature Granulocytes %,* 05/19/2025 0.4     Lymphocytes % 05/19/2025 28.2     Monocytes % 05/19/2025 12.1     Eosinophils % 05/19/2025 0.2     Basophils % 05/19/2025 0.9     Neutrophils Absolute 05/19/2025 3.26     Immature Granulocytes Ab* 05/19/2025 0.02     Lymphocytes Absolute 05/19/2025 1.58     Monocytes Absolute 05/19/2025 0.68     Eosinophils Absolute 05/19/2025 0.01     Basophils Absolute 05/19/2025 0.05     Glucose 05/19/2025 99     Sodium 05/19/2025 138     Potassium 05/19/2025 4.6     Chloride 05/19/2025 103     Bicarbonate 05/19/2025 27     Anion Gap 05/19/2025 13     Urea Nitrogen 05/19/2025 16     Creatinine 05/19/2025 1.01     eGFR 05/19/2025 82     Calcium 05/19/2025 9.5     Albumin 05/19/2025 4.2     Alkaline Phosphatase 05/19/2025 87     Total Protein 05/19/2025 7.2     AST 05/19/2025 15     Bilirubin, Total 05/19/2025 0.4     ALT 05/19/2025 14     ABO TYPE 05/19/2025 O     Rh TYPE 05/19/2025 POS     ANTIBODY SCREEN 05/19/2025 NEG     LDH 05/19/2025 174    Infusion on 05/15/2025   Component Date Value    WBC 05/15/2025 5.1     RBC 05/15/2025 3.83 (L)     Hemoglobin 05/15/2025 13.0 (L)     Hematocrit 05/15/2025 40.3 (L)     MCV 05/15/2025 105 (H)     MCH 05/15/2025 33.9     MCHC 05/15/2025 32.3     RDW 05/15/2025 21.3 (H)     Platelets 05/15/2025 640 (H)     Neutrophils % 05/15/2025 53.7     Immature Granulocytes %,* 05/15/2025 0.6     Lymphocytes % 05/15/2025 26.5     Monocytes % 05/15/2025 18.0     Eosinophils % 05/15/2025 0.0     Basophils % 05/15/2025 1.2      Neutrophils Absolute 05/15/2025 2.71     Immature Granulocytes Ab* 05/15/2025 0.03     Lymphocytes Absolute 05/15/2025 1.34     Monocytes Absolute 05/15/2025 0.91     Eosinophils Absolute 05/15/2025 0.00     Basophils Absolute 05/15/2025 0.06     Glucose 05/15/2025 92     Sodium 05/15/2025 139     Potassium 05/15/2025 4.2     Chloride 05/15/2025 103     Bicarbonate 05/15/2025 27     Anion Gap 05/15/2025 13     Urea Nitrogen 05/15/2025 17     Creatinine 05/15/2025 1.02     eGFR 05/15/2025 81     Calcium 05/15/2025 9.7     Albumin 05/15/2025 4.3     Alkaline Phosphatase 05/15/2025 96     Total Protein 05/15/2025 7.4     AST 05/15/2025 16     Bilirubin, Total 05/15/2025 0.4     ALT 05/15/2025 16     ABO TYPE 05/15/2025 O     Rh TYPE 05/15/2025 POS     ANTIBODY SCREEN 05/15/2025 NEG     LDH 05/15/2025 184    Infusion on 05/12/2025   Component Date Value    WBC 05/12/2025 3.2 (L)     nRBC 05/12/2025      RBC 05/12/2025 3.64 (L)     Hemoglobin 05/12/2025 12.4 (L)     Hematocrit 05/12/2025 37.9 (L)     MCV 05/12/2025 104 (H)     MCH 05/12/2025 34.1 (H)     MCHC 05/12/2025 32.7     RDW 05/12/2025 21.3 (H)     Platelets 05/12/2025 886 (H)     Neutrophils % 05/12/2025 35.4     Immature Granulocytes %,* 05/12/2025 0.9     Lymphocytes % 05/12/2025 35.6     Monocytes % 05/12/2025 26.9     Eosinophils % 05/12/2025 0.0     Basophils % 05/12/2025 1.2     Neutrophils Absolute 05/12/2025 1.14 (L)     Immature Granulocytes Ab* 05/12/2025 0.03     Lymphocytes Absolute 05/12/2025 1.15 (L)     Monocytes Absolute 05/12/2025 0.87     Eosinophils Absolute 05/12/2025 0.00     Basophils Absolute 05/12/2025 0.04     Glucose 05/12/2025 112 (H)     Sodium 05/12/2025 137     Potassium 05/12/2025 4.1     Chloride 05/12/2025 104     Bicarbonate 05/12/2025 24     Anion Gap 05/12/2025 13     Urea Nitrogen 05/12/2025 17     Creatinine 05/12/2025 0.89     eGFR 05/12/2025 >90     Calcium 05/12/2025 9.3     Albumin 05/12/2025 4.0     Alkaline Phosphatase  05/12/2025 91     Total Protein 05/12/2025 7.0     AST 05/12/2025 15     Bilirubin, Total 05/12/2025 0.4     ALT 05/12/2025 16     ABO TYPE 05/12/2025 O     Rh TYPE 05/12/2025 POS     ANTIBODY SCREEN 05/12/2025 NEG     RBC Morphology 05/12/2025 See Below     Polychromasia 05/12/2025 Mild     RBC Fragments 05/12/2025 Few     Target Cells 05/12/2025 Few     Teardrop Cells 05/12/2025 Few     Stomatocytes 05/12/2025 Few    Infusion on 05/08/2025   Component Date Value    WBC 05/08/2025 1.6 (L)     nRBC 05/08/2025      RBC 05/08/2025 3.43 (L)     Hemoglobin 05/08/2025 11.7 (L)     Hematocrit 05/08/2025 36.2 (L)     MCV 05/08/2025 106 (H)     MCH 05/08/2025 34.1 (H)     MCHC 05/08/2025 32.3     RDW 05/08/2025 22.1 (H)     Platelets 05/08/2025 985 (H)     Neutrophils % 05/08/2025 19.8     Immature Granulocytes %,* 05/08/2025 0.0     Lymphocytes % 05/08/2025 40.1     Monocytes % 05/08/2025 38.9     Eosinophils % 05/08/2025 0.0     Basophils % 05/08/2025 1.2     Neutrophils Absolute 05/08/2025 0.32 (L)     Immature Granulocytes Ab* 05/08/2025 0.00     Lymphocytes Absolute 05/08/2025 0.65 (L)     Monocytes Absolute 05/08/2025 0.63     Eosinophils Absolute 05/08/2025 0.00     Basophils Absolute 05/08/2025 0.02     Glucose 05/08/2025 125 (H)     Sodium 05/08/2025 140     Potassium 05/08/2025 4.0     Chloride 05/08/2025 106     Bicarbonate 05/08/2025 24     Anion Gap 05/08/2025 14     Urea Nitrogen 05/08/2025 13     Creatinine 05/08/2025 0.93     eGFR 05/08/2025 >90     Calcium 05/08/2025 9.3     Albumin 05/08/2025 4.0     Alkaline Phosphatase 05/08/2025 90     Total Protein 05/08/2025 7.2     AST 05/08/2025 12     Bilirubin, Total 05/08/2025 0.4     ALT 05/08/2025 13     RBC Morphology 05/08/2025 See Below     Polychromasia 05/08/2025 Mild     Ovalocytes 05/08/2025 Few     Giant Platelets 05/08/2025 Few    There may be more visits with results that are not included.       Assessment/Plan   Psychiatric Risk Assessment  Violence  Risk Assessment: none  Acute Risk of Harm to Others is Considered: low   Suicide Risk Assessment: age > 65 yrs old and   Protective Factors against Suicide: adherence to  treatment, fear of suicide, moral objections to suicide, positive family relationships, and sense of responsibility toward family  Acute Risk of Harm to Self is Considered: low    Imminent Risk of Suicide or Serious Self-Injury: Low   Chronic Risk of Suicide of Serious Self-Injury: Low  Risk factors: Age, depression history and   Protective factors: Denies current suicidal ideation, denies history of suicide attempts , willingness to seek help and support , gender, access to a variety of clinical interventions , and receiving and engaged in care for mental, physical, and substance use disorders      Imminent Risk of Violence or Homicide: Low   Risk Factors: No significant risk factors identified on screening  Protective Factors: Lack of known history of harm to others , Lack of known history of violent ideation , and lack of known access to firearms.     Assessment & Plan    The FDA risks, benefits & alternatives to the medications prescribed were explained to you and your support person,  today. You & your support person(s) were able to understand & repeat these risks, benefits & alternatives to these prescribed medications. You and your support person(s) have agreed to proceed with treatment with the medications discussed based on the conclusion that the benefit outweighs the risks of this treatment regimen: we recommend stopping escitalopram 5 mg daily and replacing with duloxetine 20 mg twice daily.   Please follow up in 3 to 4 weeks.     Time:   Prep time on date of the patient encounter: 5 minutes.   Time spent directly with patient/family/caregiver: 60 minutes.   Additional time spent on patient care activities:  minutes.   Documentation time: 5 minutes.   Total time on date of patient encounter: 70 minutes.

## 2025-05-30 ENCOUNTER — INFUSION (OUTPATIENT)
Dept: HEMATOLOGY/ONCOLOGY | Facility: CLINIC | Age: 65
End: 2025-05-30
Payer: MEDICARE

## 2025-05-30 ENCOUNTER — LAB REQUISITION (OUTPATIENT)
Dept: LAB | Facility: CLINIC | Age: 65
End: 2025-05-30
Payer: MEDICARE

## 2025-05-30 ENCOUNTER — SOCIAL WORK (OUTPATIENT)
Dept: HEMATOLOGY/ONCOLOGY | Facility: HOSPITAL | Age: 65
End: 2025-05-30

## 2025-05-30 VITALS
DIASTOLIC BLOOD PRESSURE: 76 MMHG | RESPIRATION RATE: 16 BRPM | WEIGHT: 201.06 LBS | SYSTOLIC BLOOD PRESSURE: 112 MMHG | TEMPERATURE: 97.2 F | OXYGEN SATURATION: 94 % | BODY MASS INDEX: 28.08 KG/M2 | HEART RATE: 84 BPM

## 2025-05-30 DIAGNOSIS — Z76.82 STEM CELL TRANSPLANT CANDIDATE: ICD-10-CM

## 2025-05-30 DIAGNOSIS — F32.1 CURRENT MODERATE EPISODE OF MAJOR DEPRESSIVE DISORDER WITHOUT PRIOR EPISODE (MULTI): ICD-10-CM

## 2025-05-30 DIAGNOSIS — C92.00 ACUTE MYELOID LEUKEMIA NOT HAVING ACHIEVED REMISSION (MULTI): ICD-10-CM

## 2025-05-30 DIAGNOSIS — C92.00 ACUTE MYELOBLASTIC LEUKEMIA, NOT HAVING ACHIEVED REMISSION (MULTI): ICD-10-CM

## 2025-05-30 LAB
ADENOVIRUS QPCR,PLASMA, VIRC: NOT DETECTED COPIES/ML
ALBUMIN SERPL BCP-MCNC: 4.1 G/DL (ref 3.4–5)
ALP SERPL-CCNC: 87 U/L (ref 33–136)
ALT SERPL W P-5'-P-CCNC: 15 U/L (ref 10–52)
ANION GAP SERPL CALC-SCNC: 13 MMOL/L (ref 10–20)
AST SERPL W P-5'-P-CCNC: 16 U/L (ref 9–39)
BASOPHILS # BLD AUTO: 0.01 X10*3/UL (ref 0–0.1)
BASOPHILS NFR BLD AUTO: 0.2 %
BILIRUB SERPL-MCNC: 0.7 MG/DL (ref 0–1.2)
BUN SERPL-MCNC: 13 MG/DL (ref 6–23)
CALCIUM SERPL-MCNC: 9.5 MG/DL (ref 8.6–10.3)
CHLORIDE SERPL-SCNC: 104 MMOL/L (ref 98–107)
CMV IGM SERPL-ACNC: <8 AU/ML
CO2 SERPL-SCNC: 27 MMOL/L (ref 21–32)
CREAT SERPL-MCNC: 0.89 MG/DL (ref 0.5–1.3)
EGFRCR SERPLBLD CKD-EPI 2021: >90 ML/MIN/1.73M*2
EOSINOPHIL # BLD AUTO: 0.03 X10*3/UL (ref 0–0.7)
EOSINOPHIL NFR BLD AUTO: 0.5 %
ERYTHROCYTE [DISTWIDTH] IN BLOOD BY AUTOMATED COUNT: 19.9 % (ref 11.5–14.5)
GLUCOSE SERPL-MCNC: 90 MG/DL (ref 74–99)
HCT VFR BLD AUTO: 39.3 % (ref 41–52)
HGB BLD-MCNC: 13 G/DL (ref 13.5–17.5)
HLA CLS I TYP PNL BLD/T DONR HIGH RES: NORMAL
HLA CLS I TYP PNL BLD/T DONR HIGH RES: NORMAL
HLA RESULTS: NORMAL
HLA RESULTS: NORMAL
HLA-DP2 QL: NORMAL
HLA-DP2 QL: NORMAL
HLA-DQB1 HIGH RES: NORMAL
HLA-DQB1 HIGH RES: NORMAL
HLA-DRB1 HIGH RES: NORMAL
HLA-DRB1 HIGH RES: NORMAL
HTLV I+II AB SER QL IA: NEGATIVE
IMM GRANULOCYTES # BLD AUTO: 0.01 X10*3/UL (ref 0–0.7)
IMM GRANULOCYTES NFR BLD AUTO: 0.2 % (ref 0–0.9)
LDH SERPL L TO P-CCNC: 176 U/L (ref 84–246)
LYMPHOCYTES # BLD AUTO: 1 X10*3/UL (ref 1.2–4.8)
LYMPHOCYTES NFR BLD AUTO: 17.7 %
MCH RBC QN AUTO: 34.6 PG (ref 26–34)
MCHC RBC AUTO-ENTMCNC: 33.1 G/DL (ref 32–36)
MCV RBC AUTO: 105 FL (ref 80–100)
MONOCYTES # BLD AUTO: 0.69 X10*3/UL (ref 0.1–1)
MONOCYTES NFR BLD AUTO: 12.2 %
NEUTROPHILS # BLD AUTO: 3.9 X10*3/UL (ref 1.2–7.7)
NEUTROPHILS NFR BLD AUTO: 69.2 %
PLATELET # BLD AUTO: 262 X10*3/UL (ref 150–450)
POTASSIUM SERPL-SCNC: 4.2 MMOL/L (ref 3.5–5.3)
PROT SERPL-MCNC: 6.8 G/DL (ref 6.4–8.2)
RBC # BLD AUTO: 3.76 X10*6/UL (ref 4.5–5.9)
SODIUM SERPL-SCNC: 140 MMOL/L (ref 136–145)
WBC # BLD AUTO: 5.6 X10*3/UL (ref 4.4–11.3)

## 2025-05-30 PROCEDURE — 86900 BLOOD TYPING SEROLOGIC ABO: CPT

## 2025-05-30 PROCEDURE — 83615 LACTATE (LD) (LDH) ENZYME: CPT

## 2025-05-30 PROCEDURE — 85025 COMPLETE CBC W/AUTO DIFF WBC: CPT

## 2025-05-30 PROCEDURE — 80053 COMPREHEN METABOLIC PANEL: CPT

## 2025-05-30 PROCEDURE — 36415 COLL VENOUS BLD VENIPUNCTURE: CPT

## 2025-05-30 RX ORDER — DULOXETIN HYDROCHLORIDE 20 MG/1
20 CAPSULE, DELAYED RELEASE ORAL 2 TIMES DAILY
Qty: 60 CAPSULE | Refills: 1 | Status: SHIPPED | OUTPATIENT
Start: 2025-05-30 | End: 2025-07-29

## 2025-05-30 SDOH — ECONOMIC STABILITY: FOOD INSECURITY: WITHIN THE PAST 12 MONTHS, THE FOOD YOU BOUGHT JUST DIDN'T LAST AND YOU DIDN'T HAVE MONEY TO GET MORE.: NEVER TRUE

## 2025-05-30 SDOH — ECONOMIC STABILITY: FOOD INSECURITY: WITHIN THE PAST 12 MONTHS, YOU WORRIED THAT YOUR FOOD WOULD RUN OUT BEFORE YOU GOT MONEY TO BUY MORE.: NEVER TRUE

## 2025-05-30 SDOH — ECONOMIC STABILITY: INCOME INSECURITY: IN THE LAST 12 MONTHS, WAS THERE A TIME WHEN YOU WERE NOT ABLE TO PAY THE MORTGAGE OR RENT ON TIME?: NO

## 2025-05-30 SDOH — ECONOMIC STABILITY: TRANSPORTATION INSECURITY
IN THE PAST 12 MONTHS, HAS LACK OF TRANSPORTATION KEPT YOU FROM MEETINGS, WORK, OR FROM GETTING THINGS NEEDED FOR DAILY LIVING?: NO

## 2025-05-30 SDOH — ECONOMIC STABILITY: TRANSPORTATION INSECURITY
IN THE PAST 12 MONTHS, HAS THE LACK OF TRANSPORTATION KEPT YOU FROM MEDICAL APPOINTMENTS OR FROM GETTING MEDICATIONS?: NO

## 2025-05-30 ASSESSMENT — SOCIAL DETERMINANTS OF HEALTH (SDOH)
HOW HARD IS IT FOR YOU TO PAY FOR THE VERY BASICS LIKE FOOD, HOUSING, MEDICAL CARE, AND HEATING?: NOT HARD AT ALL
WITHIN THE LAST YEAR, HAVE TO BEEN RAPED OR FORCED TO HAVE ANY KIND OF SEXUAL ACTIVITY BY YOUR PARTNER OR EX-PARTNER?: NO
WITHIN THE LAST YEAR, HAVE YOU BEEN KICKED, HIT, SLAPPED, OR OTHERWISE PHYSICALLY HURT BY YOUR PARTNER OR EX-PARTNER?: NO
WITHIN THE LAST YEAR, HAVE YOU BEEN AFRAID OF YOUR PARTNER OR EX-PARTNER?: NO
WITHIN THE LAST YEAR, HAVE YOU BEEN HUMILIATED OR EMOTIONALLY ABUSED IN OTHER WAYS BY YOUR PARTNER OR EX-PARTNER?: NO
IN THE PAST 12 MONTHS, HAS THE ELECTRIC, GAS, OIL, OR WATER COMPANY THREATENED TO SHUT OFF SERVICE IN YOUR HOME?: NO

## 2025-05-30 ASSESSMENT — PAIN SCALES - GENERAL: PAINLEVEL_OUTOF10: 10-WORST PAIN EVER

## 2025-05-30 NOTE — PROGRESS NOTES
ALDEN received via email, [place] paperwork from daughter Ketty, requesting completion.  SW partially completed documentation which was then forwarded via email to Dr. Villatoro with request to complete remainder.  Will provide back to daughter once completed.

## 2025-05-31 LAB
ABO GROUP (TYPE) IN BLOOD: NORMAL
ANTIBODY SCREEN: NORMAL
RH FACTOR (ANTIGEN D): NORMAL

## 2025-06-02 ENCOUNTER — LAB REQUISITION (OUTPATIENT)
Dept: LAB | Facility: CLINIC | Age: 65
End: 2025-06-02
Payer: MEDICARE

## 2025-06-02 ENCOUNTER — INFUSION (OUTPATIENT)
Dept: HEMATOLOGY/ONCOLOGY | Facility: CLINIC | Age: 65
End: 2025-06-02
Payer: MEDICARE

## 2025-06-02 ENCOUNTER — TELEPHONE (OUTPATIENT)
Dept: CARDIOLOGY | Facility: HOSPITAL | Age: 65
End: 2025-06-02
Payer: MEDICARE

## 2025-06-02 VITALS
TEMPERATURE: 96.8 F | SYSTOLIC BLOOD PRESSURE: 134 MMHG | HEART RATE: 79 BPM | BODY MASS INDEX: 27.59 KG/M2 | WEIGHT: 197.53 LBS | DIASTOLIC BLOOD PRESSURE: 84 MMHG | RESPIRATION RATE: 16 BRPM | OXYGEN SATURATION: 94 %

## 2025-06-02 DIAGNOSIS — C92.00 ACUTE MYELOID LEUKEMIA NOT HAVING ACHIEVED REMISSION (MULTI): ICD-10-CM

## 2025-06-02 DIAGNOSIS — C92.00 ACUTE MYELOBLASTIC LEUKEMIA, NOT HAVING ACHIEVED REMISSION (MULTI): ICD-10-CM

## 2025-06-02 LAB
ALBUMIN SERPL BCP-MCNC: 4 G/DL (ref 3.4–5)
ALP SERPL-CCNC: 97 U/L (ref 33–136)
ALT SERPL W P-5'-P-CCNC: 16 U/L (ref 10–52)
ANION GAP SERPL CALC-SCNC: 12 MMOL/L (ref 10–20)
AST SERPL W P-5'-P-CCNC: 18 U/L (ref 9–39)
BASOPHILS # BLD AUTO: 0.01 X10*3/UL (ref 0–0.1)
BASOPHILS NFR BLD AUTO: 0.3 %
BILIRUB SERPL-MCNC: 0.7 MG/DL (ref 0–1.2)
BUN SERPL-MCNC: 15 MG/DL (ref 6–23)
CALCIUM SERPL-MCNC: 9.2 MG/DL (ref 8.6–10.3)
CHLORIDE SERPL-SCNC: 105 MMOL/L (ref 98–107)
CO2 SERPL-SCNC: 27 MMOL/L (ref 21–32)
CREAT SERPL-MCNC: 0.85 MG/DL (ref 0.5–1.3)
EGFRCR SERPLBLD CKD-EPI 2021: >90 ML/MIN/1.73M*2
EOSINOPHIL # BLD AUTO: 0.02 X10*3/UL (ref 0–0.7)
EOSINOPHIL NFR BLD AUTO: 0.5 %
ERYTHROCYTE [DISTWIDTH] IN BLOOD BY AUTOMATED COUNT: 19.5 % (ref 11.5–14.5)
GLUCOSE SERPL-MCNC: 113 MG/DL (ref 74–99)
HCT VFR BLD AUTO: 38.1 % (ref 41–52)
HGB BLD-MCNC: 12.7 G/DL (ref 13.5–17.5)
HLA CLS I TYP PNL BLD/T DONR HIGH RES: NORMAL
HLA RESULTS: NORMAL
HLA RESULTS: NORMAL
HLA-A+B+C AB NFR SER: NORMAL %
HLA-DP+DQ+DR AB NFR SER: NORMAL %
HLA-DP2 QL: NORMAL
HLA-DQB1 HIGH RES: NORMAL
HLA-DRB1 HIGH RES: NORMAL
IMM GRANULOCYTES # BLD AUTO: 0 X10*3/UL (ref 0–0.7)
IMM GRANULOCYTES NFR BLD AUTO: 0 % (ref 0–0.9)
LYMPHOCYTES # BLD AUTO: 1.16 X10*3/UL (ref 1.2–4.8)
LYMPHOCYTES NFR BLD AUTO: 30.1 %
MCH RBC QN AUTO: 34.9 PG (ref 26–34)
MCHC RBC AUTO-ENTMCNC: 33.3 G/DL (ref 32–36)
MCV RBC AUTO: 105 FL (ref 80–100)
MONOCYTES # BLD AUTO: 0.42 X10*3/UL (ref 0.1–1)
MONOCYTES NFR BLD AUTO: 10.9 %
NEUTROPHILS # BLD AUTO: 2.24 X10*3/UL (ref 1.2–7.7)
NEUTROPHILS NFR BLD AUTO: 58.2 %
PLATELET # BLD AUTO: 195 X10*3/UL (ref 150–450)
POTASSIUM SERPL-SCNC: 4.4 MMOL/L (ref 3.5–5.3)
PROT SERPL-MCNC: 6.7 G/DL (ref 6.4–8.2)
RBC # BLD AUTO: 3.64 X10*6/UL (ref 4.5–5.9)
SODIUM SERPL-SCNC: 140 MMOL/L (ref 136–145)
WBC # BLD AUTO: 3.9 X10*3/UL (ref 4.4–11.3)

## 2025-06-02 PROCEDURE — 86901 BLOOD TYPING SEROLOGIC RH(D): CPT

## 2025-06-02 PROCEDURE — 84075 ASSAY ALKALINE PHOSPHATASE: CPT

## 2025-06-02 PROCEDURE — 36415 COLL VENOUS BLD VENIPUNCTURE: CPT

## 2025-06-02 PROCEDURE — 85025 COMPLETE CBC W/AUTO DIFF WBC: CPT

## 2025-06-02 RX ORDER — HEPARIN 100 UNIT/ML
500 SYRINGE INTRAVENOUS AS NEEDED
Status: CANCELLED | OUTPATIENT
Start: 2025-06-02

## 2025-06-02 RX ORDER — HEPARIN SODIUM,PORCINE/PF 10 UNIT/ML
50 SYRINGE (ML) INTRAVENOUS AS NEEDED
Status: CANCELLED | OUTPATIENT
Start: 2025-06-02

## 2025-06-02 ASSESSMENT — PAIN SCALES - GENERAL: PAINLEVEL_OUTOF10: 10-WORST PAIN EVER

## 2025-06-03 ENCOUNTER — SPECIALTY PHARMACY (OUTPATIENT)
Dept: PHARMACY | Facility: CLINIC | Age: 65
End: 2025-06-03

## 2025-06-03 LAB
ABO GROUP (TYPE) IN BLOOD: NORMAL
ANTIBODY SCREEN: NORMAL
RH FACTOR (ANTIGEN D): NORMAL

## 2025-06-03 PROCEDURE — RXMED WILLOW AMBULATORY MEDICATION CHARGE

## 2025-06-03 NOTE — PROGRESS NOTES
CARDIO-ONCOLOGY NEW PATIENT OFFICE VISIT    Patient:  Link Inman  YOB: 1960  MRN: [id number]     REFERRING ONCOLOGIST:    Chief Complaint/Active Symptoms:       Link Inman is a 64 y.o. male who is being seen today at the request of Dr. Gomez/Shauna for evaluation of pretransplant cardio-oncology evaluation.    No chief complaint on file.      History of Present Illness:   HPI    Patient here for cardio-oncology evaluation prior to stem cell transplant. Patient followed by Dr. Enamorado as outpatient cardiologist for chronic systolic heart failure with recovered EF and paroxysmal atrial fibrillation.     Patient has been ill the past month with limited physical activity. Biggest problem is bilateral hip pain. As treatment progressed has developed SOB on exertion without orthopnea or PND. Now has SOB with low levels of physical exertion such as ADLS. Has become very sedentary. No chest pain or discomfort. No palpitations, syncope or near syncope. No orthopnea, PND, or edema.     Cancer Diagnosis: Acute myeloid leukemia Dx 4/3/25  Oncologist: Patricia Villatoro  Treatment: Venetoclax / azacitidine    Risk factors: CAD, CHF - recovered, PAF on anticoagulation    Testing:  Echo 6/4/25: prelim 45-50%  Echo 4/21/23: EF 50-55%  Echo 9/16/22: EF 35%  Echo 8/19/19: EF 45-50%  Echo 6/2019: EF 35%  Stress test 10/2022: Normal / low risk  Cardiac cath 6/19/19: Nonobstructive CAD of 50% LAD and Lcx. EF 35%.  EKG 6/4/25 NSR, normal EKG      Allergies:     Allergies[1]     Outpatient Medications:     Current Outpatient Medications   Medication Instructions    acetaminophen (TYLENOL) 1,000 mg, Every 8 hours PRN    acyclovir (ZOVIRAX) 400 mg, oral, 2 times daily    atorvastatin (LIPITOR) 20 mg, oral, Nightly    cholecalciferol (VITAMIN D3) 50 mcg, Daily    DULoxetine (CYMBALTA) 20 mg, oral, 2 times daily, Do not crush or chew.    Entresto 24-26 mg tablet 1 tablet, oral, 2 times daily    fluconazole (DIFLUCAN)  400 mg, oral, Daily    levoFLOXacin (LEVAQUIN) 500 mg, oral, Daily, When ANC < 500 or as directed by treatment team    metoprolol succinate XL (TOPROL-XL) 50 mg, oral, 2 times daily    multivitamin with minerals tablet 1 tablet, Daily    ondansetron (ZOFRAN) 8 mg, oral, Every 8 hours PRN    polyethylene glycol (GLYCOLAX, MIRALAX) 17 g, As needed    prochlorperazine (COMPAZINE) 10 mg, oral, Every 6 hours PRN    sennosides-docusate sodium (Melissa-Colace) 8.6-50 mg tablet 2 tablets, Daily    traMADol (ULTRAM) 50 mg, oral, Every 8 hours PRN    venetoclax (Venclexta) 100 mg tablet Take 2 tablets (200 mg total) by mouth once daily on days 1-21 of a 28 day cycle, or as instructed by treatment team.    Xarelto 20 mg, oral, Daily, HOLD when platelets are under 50 (review labs with treatment team/nursing at every visit)        Past Medical History:     Medical History[2]    Social History:     Social History[3]    Family History:      Family History[4]    Review of Systems:     Review of Systems   Constitutional:  Positive for activity change and fatigue.   Respiratory:  Positive for shortness of breath. Negative for cough, wheezing and stridor.    Cardiovascular: Negative.    Gastrointestinal: Negative.    Genitourinary: Negative.    Musculoskeletal:  Positive for arthralgias and gait problem.   Neurological:  Negative for dizziness, syncope, light-headedness and headaches.   Psychiatric/Behavioral:  The patient is nervous/anxious.    All other systems reviewed and are negative.      Objective:     Vitals:    06/04/25 0855   BP: 113/75   Pulse: 75   Resp: 14   Temp: 36.2 °C (97.2 °F)   SpO2: 100%     /75 (06/04/25 0855)    Temp 36.2 °C (97.2 °F) (06/04/25 0855)    Pulse 75 (06/04/25 0855)   Resp 14 (06/04/25 0855)    SpO2 100 % (06/04/25 0855)        Vitals:    06/04/25 0855   Weight: 89 kg (196 lb 1.6 oz)       Physical Examination:   GENERAL:  Well developed, well nourished, in no acute distress.  HEENT: NC AT EOMI  with anicteric sclera  NECK:  Supple, no JVD, no bruit.  CHEST:  Symmetric and nontender.  LUNGS:  Normal respiratory effort. Bilateral breath sounds clear to auscultation.   HEART:  PMI is not displaced. RRR with normal S1 and S2, no S3 or appreciable murmur.   PERIPHERAL VASCULAR:  Pulses present and equally palpable; 2+ throughout.  ABDOMEN: Soft, NT, ND without HSM or palpable organomegaly, no bruits, normoactive bowel sounds  MUSCULOSKELETAL: No significant joint or limb deformity, difficulty extending knees/hips. OA changes  EXTREMITIES:  Warm with good color, no clubbing or cyanosis.  There is no edema noted.  NEURO/PSYCH:  Alert and oriented times three with approppriate behavior and responses.  SKIN: No rashes on exposed skin, no reported skin lesions.     Lab:     CBC:   Lab Results   Component Value Date    WBC 3.9 (L) 06/02/2025    RBC 3.64 (L) 06/02/2025    HGB 12.7 (L) 06/02/2025    HCT 38.1 (L) 06/02/2025     06/02/2025      Lab Results   Component Value Date    WBC 3.9 (L) 06/02/2025    WBC 5.6 05/30/2025    WBC 5.3 05/28/2025    RBC 3.64 (L) 06/02/2025    RBC 3.76 (L) 05/30/2025    RBC 3.88 (L) 05/28/2025    HGB 12.7 (L) 06/02/2025    HGB 13.0 (L) 05/30/2025    HGB 13.4 (L) 05/28/2025    HCT 38.1 (L) 06/02/2025    HCT 39.3 (L) 05/30/2025    HCT 40.3 (L) 05/28/2025     (H) 06/02/2025     (H) 05/30/2025     (H) 05/28/2025    MCH 34.9 (H) 06/02/2025    MCH 34.6 (H) 05/30/2025    MCH 34.5 (H) 05/28/2025    MCHC 33.3 06/02/2025    MCHC 33.1 05/30/2025    MCHC 33.3 05/28/2025    RDW 19.5 (H) 06/02/2025    RDW 19.9 (H) 05/30/2025    RDW 19.7 (H) 05/28/2025     06/02/2025     05/30/2025     05/28/2025       CMP:    Lab Results   Component Value Date     06/02/2025    K 4.4 06/02/2025     06/02/2025    CO2 27 06/02/2025    BUN 15 06/02/2025    CREATININE 0.85 06/02/2025    GLUCOSE 113 (H) 06/02/2025    CALCIUM 9.2 06/02/2025     Lab Results  "  Component Value Date     06/02/2025     05/30/2025     05/28/2025    K 4.4 06/02/2025    K 4.2 05/30/2025    K 4.0 05/28/2025     06/02/2025     05/30/2025     05/28/2025    CO2 27 06/02/2025    CO2 27 05/30/2025    CO2 28 05/28/2025    BUN 15 06/02/2025    BUN 13 05/30/2025    BUN 13 05/28/2025    CREATININE 0.85 06/02/2025    CREATININE 0.89 05/30/2025    CREATININE 0.92 05/28/2025     Lab Results   Component Value Date    ALKPHOS 97 06/02/2025    ALT 16 06/02/2025    AST 18 06/02/2025    PROT 6.7 06/02/2025    BILITOT 0.7 06/02/2025       Magnesium:    Lab Results   Component Value Date    MG 1.90 04/03/2025       Lipid Profile:    Lab Results   Component Value Date    TRIG 117 04/29/2024    HDL 44.6 04/29/2024    LDLCALC 97 04/29/2024       TSH:    Lab Results   Component Value Date    TSH 1.54 04/29/2024       BNP:   Lab Results   Component Value Date    BNP 78 05/28/2025        PT/INR:    Lab Results   Component Value Date    PROTIME 21.4 (H) 05/28/2025    INR 1.9 (H) 05/28/2025       HgBA1c:    No results found for: \"HGBA1C\"    Cardiac Enzymes:    Lab Results   Component Value Date    TROPHS 6 05/28/2025    TROPHS 3 01/24/2024    TROPHS 3 01/24/2024     Labs reviewed.     Diagnostic Studies:     CV imaging reviewed and summarized in HPI    NM PET CT brain  Result Date: 3/31/2025  Interpreted By:  Link Brown,  and [place] STUDY: NM PET CT BRAIN;  3/31/2025 12:52 pm   INDICATION: Signs/Symptoms:Personality change and memory decline. FTD vs AD.   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   ACCESSION NUMBER(S): [id number]   ORDERING CLINICIAN: MARIA R AMBROSIO   TECHNIQUE: DIVISION OF NUCLEAR MEDICINE POSITRON EMISSION TOMOGRAPHY (PET-CT)   The patient received an intravenous dose of 9.5 mCi of Fluorine-18 fluorodeoxyglucose (FDG). The patient was " placed in a dark quiet room. Positron emission tomographic (PET) images of the brain were then acquired after approximately 45 minute delay. Also acquired was a contemporaneous low dose non-contrast CT scan performed for attenuation correction of PET images and anatomic localization.  The PET and CT images were digitally fused for display.  All images were acquired on a combined PET-CT scanner unit.  Some areas of FDG accumulation may be described in standardized uptake value (SUV) units.   CALIBRATION: Dose Injection-to-Scan Interval (mins): 47 min Blood glucose: 110 mg/dL   FINDINGS: There is decreased radiotracer uptake within the right-greater-than-left parietal and bilateral temporal lobes. No focal hypo- or hypermetabolic areas are seen.       Reduced metabolism in the parietal temporal regions. This pattern can be seen in alzheimer' s disease.   I personally reviewed the images/study and I agree with the findings as stated by Dr. Peter Bucio. This study was interpreted at [place], [place].   MACRO: None   Signed by: Link Brown 3/31/2025 2:55 PM Dictation workstation:   [id number]      No valid procedures specified.    Radiology:     No valid procedures specified.    Assessment:     Problem List Items Addressed This Visit       Arteriosclerotic coronary artery disease    Atrial fibrillation (Multi)    Combined hyperlipidemia    Acute myeloid leukemia not having achieved remission (Multi)    Stem cell transplant candidate - Primary    Heart failure with recovered ejection fraction (HFrecEF)       Plan:     AML, stem cell transplant candidate. No cardiac contraindication to proceeding as patient is well compensated without CHF and without angina. I suspect however, over the course of his transplant, decompensated heart failure could become an issue so would monitor weights and fluid status closely and get inpatient HF team involved if/when he starts having any  difficulties. Would continue his heart failure regimen for as long as possible, realizing that as he develops low BP the doses may need to be reduced and eventually held. Would also consider keeping him on telemetry given his history of atrial fibrillation, or, could wait until he develops a fib/rapid heart rates and transfer him to tele at that time. Anticoagulation has already been addressed by his Oncologist and should be held at their discretion based on counts and anemia. If you let us know his date of admission - we can notify our inpatient HF team regarding the patient.   HF with recovered EF. Todays EF slightly lower than his previous echo but in line with what has been seen. His current SOB I believe is more from deconditioning and anemia given his normal fluid status on exam and his normal BNP. Would continue his Rx as above as long as tolerated. Mild hypotension can be tolerated as long as asymptomatic and renal function is normal (SBP in 90's).   Paroxysmal atrial fibrillation. Currently in sinus rhythm and no palpitations. Would not be unexpected with stress of transplant to develop a fib during his hospitalization. Tele throughout may be helpful, or add tele if/when he has rapid HR as above.   CAD - patient with nonobstructive CAD with normal stress test and without angina. Follow clinically - no need for cath or intervention at this time.   HLD - statins may be placed on hold if needed during his transplant - continue if no contraindication.     We will see the patient back post-transplant with echo. Again, please let us know when admitted so we can notify the appropriate service. Please call if any more specific questions.   Irina Costa MD         [1]   Allergies  Allergen Reactions    Peanut Anaphylaxis    Tree Nuts Anaphylaxis    [place] Other    Sulfa (Sulfonamide Antibiotics) Rash    Sulfamethazine Rash   [2]   Past Medical History:  Diagnosis Date    Arrhythmia     atrial fibrillation     Personal history of (healed) traumatic fracture 03/28/2021    History of compression fracture of spine    Personal history of other diseases of the circulatory system 05/11/2019    History of pericarditis   [3]   Social History  Socioeconomic History    Marital status:    Tobacco Use    Smoking status: Never    Smokeless tobacco: Never   Substance and Sexual Activity    Alcohol use: Yes     Comment: MODERATE USE    Drug use: Not Currently     Social Drivers of Health     Financial Resource Strain: Low Risk  (5/30/2025)    Overall Financial Resource Strain (CARDIA)     Difficulty of Paying Living Expenses: Not hard at all   Food Insecurity: No Food Insecurity (5/30/2025)    Hunger Vital Sign     Worried About Running Out of Food in the Last Year: Never true     Ran Out of Food in the Last Year: Never true   Transportation Needs: No Transportation Needs (5/30/2025)    PRAPARE - Transportation     Lack of Transportation (Medical): No     Lack of Transportation (Non-Medical): No   Stress: Stress Concern Present (5/30/2025)    [employer] [place] [employer] - Occupational Stress Questionnaire     Feeling of Stress : Very much   Intimate Partner Violence: Not At Risk (5/30/2025)    Humiliation, Afraid, Rape, and Kick questionnaire     Fear of Current or Ex-Partner: No     Emotionally Abused: No     Physically Abused: No     Sexually Abused: No   Housing Stability: Low Risk  (5/30/2025)    Housing Stability Vital Sign     Unable to Pay for Housing in the Last Year: No     Number of Times Moved in the Last Year: 1     Homeless in the Last Year: No   [4]   Family History  Problem Relation Name Age of Onset    Other (cardiac disorder) Other      Hypertension Other

## 2025-06-04 ENCOUNTER — LAB (OUTPATIENT)
Dept: LAB | Facility: HOSPITAL | Age: 65
End: 2025-06-04
Payer: MEDICARE

## 2025-06-04 ENCOUNTER — HOSPITAL ENCOUNTER (OUTPATIENT)
Dept: CARDIOLOGY | Facility: HOSPITAL | Age: 65
Discharge: HOME | End: 2025-06-04
Payer: MEDICARE

## 2025-06-04 ENCOUNTER — OFFICE VISIT (OUTPATIENT)
Dept: CARDIOLOGY | Facility: HOSPITAL | Age: 65
End: 2025-06-04
Payer: MEDICARE

## 2025-06-04 ENCOUNTER — HOSPITAL ENCOUNTER (OUTPATIENT)
Dept: RESPIRATORY THERAPY | Facility: HOSPITAL | Age: 65
Discharge: HOME | End: 2025-06-04
Payer: MEDICARE

## 2025-06-04 VITALS
WEIGHT: 196.1 LBS | SYSTOLIC BLOOD PRESSURE: 113 MMHG | OXYGEN SATURATION: 100 % | BODY MASS INDEX: 27.39 KG/M2 | DIASTOLIC BLOOD PRESSURE: 75 MMHG | HEART RATE: 75 BPM | TEMPERATURE: 97.2 F | RESPIRATION RATE: 14 BRPM

## 2025-06-04 DIAGNOSIS — E78.2 COMBINED HYPERLIPIDEMIA: ICD-10-CM

## 2025-06-04 DIAGNOSIS — C92.00 ACUTE MYELOID LEUKEMIA NOT HAVING ACHIEVED REMISSION (MULTI): ICD-10-CM

## 2025-06-04 DIAGNOSIS — Z76.82 STEM CELL TRANSPLANT CANDIDATE: ICD-10-CM

## 2025-06-04 DIAGNOSIS — I25.10 ARTERIOSCLEROTIC CORONARY ARTERY DISEASE: ICD-10-CM

## 2025-06-04 DIAGNOSIS — Z87.81 HISTORY OF VERTEBRAL COMPRESSION FRACTURE: ICD-10-CM

## 2025-06-04 DIAGNOSIS — I50.32 HEART FAILURE WITH RECOVERED EJECTION FRACTION (HFRECEF): ICD-10-CM

## 2025-06-04 DIAGNOSIS — Z51.81 ENCOUNTER FOR MONITORING CARDIOTOXIC DRUG THERAPY: ICD-10-CM

## 2025-06-04 DIAGNOSIS — Z94.84 STEM CELLS TRANSPLANT STATUS (MULTI): ICD-10-CM

## 2025-06-04 DIAGNOSIS — M54.16 LUMBAR RADICULOPATHY, CHRONIC: ICD-10-CM

## 2025-06-04 DIAGNOSIS — Z76.82 STEM CELL TRANSPLANT CANDIDATE: Primary | ICD-10-CM

## 2025-06-04 DIAGNOSIS — Z79.899 ENCOUNTER FOR MONITORING CARDIOTOXIC DRUG THERAPY: ICD-10-CM

## 2025-06-04 DIAGNOSIS — I48.0 PAROXYSMAL ATRIAL FIBRILLATION (MULTI): ICD-10-CM

## 2025-06-04 DIAGNOSIS — C92.00 ACUTE MYELOID LEUKEMIA NOT HAVING ACHIEVED REMISSION (MULTI): Primary | ICD-10-CM

## 2025-06-04 LAB
ABO GROUP (TYPE) IN BLOOD: NORMAL
ALBUMIN SERPL BCP-MCNC: 3.9 G/DL (ref 3.4–5)
ALP SERPL-CCNC: 103 U/L (ref 33–136)
ALT SERPL W P-5'-P-CCNC: 16 U/L (ref 10–52)
ANION GAP SERPL CALC-SCNC: 10 MMOL/L (ref 10–20)
ANTIBODY SCREEN: NORMAL
AORTIC VALVE MEAN GRADIENT: 2 MMHG
AORTIC VALVE PEAK VELOCITY: 0.9 M/S
AST SERPL W P-5'-P-CCNC: 17 U/L (ref 9–39)
AV PEAK GRADIENT: 3 MMHG
AVA (PEAK VEL): 4.61 CM2
AVA (VTI): 4.8 CM2
BASOPHILS # BLD AUTO: 0.01 X10*3/UL (ref 0–0.1)
BASOPHILS NFR BLD AUTO: 0.3 %
BILIRUB SERPL-MCNC: 0.8 MG/DL (ref 0–1.2)
BUN SERPL-MCNC: 15 MG/DL (ref 6–23)
CALCIUM SERPL-MCNC: 9.3 MG/DL (ref 8.6–10.3)
CHLORIDE SERPL-SCNC: 104 MMOL/L (ref 98–107)
CO2 SERPL-SCNC: 29 MMOL/L (ref 21–32)
CREAT SERPL-MCNC: 0.88 MG/DL (ref 0.5–1.3)
EGFRCR SERPLBLD CKD-EPI 2021: >90 ML/MIN/1.73M*2
EJECTION FRACTION APICAL 4 CHAMBER: 43.1
EJECTION FRACTION: 46 %
EOSINOPHIL # BLD AUTO: 0.03 X10*3/UL (ref 0–0.7)
EOSINOPHIL NFR BLD AUTO: 0.8 %
ERYTHROCYTE [DISTWIDTH] IN BLOOD BY AUTOMATED COUNT: 19.1 % (ref 11.5–14.5)
GLUCOSE SERPL-MCNC: 91 MG/DL (ref 74–99)
HCT VFR BLD AUTO: 38.5 % (ref 41–52)
HGB BLD-MCNC: 12.8 G/DL (ref 13.5–17.5)
HLA CLS I TYP PNL BLD/T DONR HIGH RES: NORMAL
HLA RESULTS: NORMAL
HLA-DP2 QL: NORMAL
HLA-DQB1 HIGH RES: NORMAL
HLA-DRB1 HIGH RES: NORMAL
IMM GRANULOCYTES # BLD AUTO: 0.01 X10*3/UL (ref 0–0.7)
IMM GRANULOCYTES NFR BLD AUTO: 0.3 % (ref 0–0.9)
LDH SERPL L TO P-CCNC: 151 U/L (ref 84–246)
LEFT ATRIUM VOLUME AREA LENGTH INDEX BSA: 32.4 ML/M2
LEFT VENTRICLE INTERNAL DIMENSION DIASTOLE: 5.4 CM (ref 3.5–6)
LEFT VENTRICULAR OUTFLOW TRACT DIAMETER: 2.7 CM
LYMPHOCYTES # BLD AUTO: 1.17 X10*3/UL (ref 1.2–4.8)
LYMPHOCYTES NFR BLD AUTO: 32.1 %
MCH RBC QN AUTO: 34.7 PG (ref 26–34)
MCHC RBC AUTO-ENTMCNC: 33.2 G/DL (ref 32–36)
MCV RBC AUTO: 104 FL (ref 80–100)
MGC ASCENT PFT - FEV1 - PRE: 3.39
MGC ASCENT PFT - FEV1 - PREDICTED: 3.34
MGC ASCENT PFT - FVC - PRE: 4.92
MGC ASCENT PFT - FVC - PREDICTED: 4.4
MITRAL VALVE E/A RATIO: 0.51
MONOCYTES # BLD AUTO: 0.29 X10*3/UL (ref 0.1–1)
MONOCYTES NFR BLD AUTO: 8 %
NEUTROPHILS # BLD AUTO: 2.13 X10*3/UL (ref 1.2–7.7)
NEUTROPHILS NFR BLD AUTO: 58.5 %
NRBC BLD-RTO: 0 /100 WBCS (ref 0–0)
PLATELET # BLD AUTO: 156 X10*3/UL (ref 150–450)
POTASSIUM SERPL-SCNC: 4.4 MMOL/L (ref 3.5–5.3)
PROT SERPL-MCNC: 6.8 G/DL (ref 6.4–8.2)
RBC # BLD AUTO: 3.69 X10*6/UL (ref 4.5–5.9)
RH FACTOR (ANTIGEN D): NORMAL
RIGHT VENTRICLE FREE WALL PEAK S': 10 CM/S
SODIUM SERPL-SCNC: 139 MMOL/L (ref 136–145)
TRICUSPID ANNULAR PLANE SYSTOLIC EXCURSION: 2 CM
WBC # BLD AUTO: 3.6 X10*3/UL (ref 4.4–11.3)

## 2025-06-04 PROCEDURE — 93306 TTE W/DOPPLER COMPLETE: CPT | Performed by: INTERNAL MEDICINE

## 2025-06-04 PROCEDURE — 93005 ELECTROCARDIOGRAM TRACING: CPT

## 2025-06-04 PROCEDURE — 1159F MED LIST DOCD IN RCRD: CPT | Performed by: INTERNAL MEDICINE

## 2025-06-04 PROCEDURE — 1036F TOBACCO NON-USER: CPT | Performed by: INTERNAL MEDICINE

## 2025-06-04 PROCEDURE — 86901 BLOOD TYPING SEROLOGIC RH(D): CPT

## 2025-06-04 PROCEDURE — 36415 COLL VENOUS BLD VENIPUNCTURE: CPT

## 2025-06-04 PROCEDURE — 94010 BREATHING CAPACITY TEST: CPT

## 2025-06-04 PROCEDURE — 94729 DIFFUSING CAPACITY: CPT | Performed by: INTERNAL MEDICINE

## 2025-06-04 PROCEDURE — 83615 LACTATE (LD) (LDH) ENZYME: CPT

## 2025-06-04 PROCEDURE — 99215 OFFICE O/P EST HI 40 MIN: CPT | Performed by: INTERNAL MEDICINE

## 2025-06-04 PROCEDURE — 93306 TTE W/DOPPLER COMPLETE: CPT

## 2025-06-04 PROCEDURE — 1160F RVW MEDS BY RX/DR IN RCRD: CPT | Performed by: INTERNAL MEDICINE

## 2025-06-04 PROCEDURE — 1125F AMNT PAIN NOTED PAIN PRSNT: CPT | Performed by: INTERNAL MEDICINE

## 2025-06-04 PROCEDURE — 80053 COMPREHEN METABOLIC PANEL: CPT

## 2025-06-04 PROCEDURE — 94726 PLETHYSMOGRAPHY LUNG VOLUMES: CPT | Performed by: INTERNAL MEDICINE

## 2025-06-04 PROCEDURE — 85025 COMPLETE CBC W/AUTO DIFF WBC: CPT

## 2025-06-04 PROCEDURE — 94010 BREATHING CAPACITY TEST: CPT | Performed by: INTERNAL MEDICINE

## 2025-06-04 ASSESSMENT — ENCOUNTER SYMPTOMS
ACTIVITY CHANGE: 1
CARDIOVASCULAR NEGATIVE: 1
NERVOUS/ANXIOUS: 1
HEADACHES: 0
COUGH: 0
LIGHT-HEADEDNESS: 0
GASTROINTESTINAL NEGATIVE: 1
SHORTNESS OF BREATH: 1
STRIDOR: 0
ARTHRALGIAS: 1
WHEEZING: 0
DIZZINESS: 0
FATIGUE: 1

## 2025-06-04 ASSESSMENT — PAIN SCALES - GENERAL: PAINLEVEL_OUTOF10: 10-WORST PAIN EVER

## 2025-06-05 ENCOUNTER — DOCUMENTATION (OUTPATIENT)
Dept: HOME HEALTH SERVICES | Facility: HOME HEALTH | Age: 65
End: 2025-06-05
Payer: MEDICARE

## 2025-06-05 ENCOUNTER — INFUSION (OUTPATIENT)
Dept: HEMATOLOGY/ONCOLOGY | Facility: CLINIC | Age: 65
End: 2025-06-05
Payer: MEDICARE

## 2025-06-05 ENCOUNTER — HOSPITAL ENCOUNTER (OUTPATIENT)
Dept: RADIOLOGY | Facility: HOSPITAL | Age: 65
Discharge: HOME | End: 2025-06-05
Payer: MEDICARE

## 2025-06-05 ENCOUNTER — HOME HEALTH ADMISSION (OUTPATIENT)
Dept: HOME HEALTH SERVICES | Facility: HOME HEALTH | Age: 65
End: 2025-06-05
Payer: MEDICARE

## 2025-06-05 ENCOUNTER — TELEPHONE (OUTPATIENT)
Dept: HEMATOLOGY/ONCOLOGY | Facility: CLINIC | Age: 65
End: 2025-06-05

## 2025-06-05 ENCOUNTER — PHARMACY VISIT (OUTPATIENT)
Dept: PHARMACY | Facility: CLINIC | Age: 65
End: 2025-06-05
Payer: COMMERCIAL

## 2025-06-05 VITALS
TEMPERATURE: 97 F | HEART RATE: 89 BPM | SYSTOLIC BLOOD PRESSURE: 127 MMHG | OXYGEN SATURATION: 94 % | BODY MASS INDEX: 27.65 KG/M2 | DIASTOLIC BLOOD PRESSURE: 88 MMHG | WEIGHT: 197.97 LBS | RESPIRATION RATE: 16 BRPM

## 2025-06-05 DIAGNOSIS — C92.00 ACUTE MYELOID LEUKEMIA NOT HAVING ACHIEVED REMISSION (MULTI): Primary | ICD-10-CM

## 2025-06-05 DIAGNOSIS — Z76.82 STEM CELL TRANSPLANT CANDIDATE: ICD-10-CM

## 2025-06-05 DIAGNOSIS — C92.00 ACUTE MYELOID LEUKEMIA NOT HAVING ACHIEVED REMISSION (MULTI): ICD-10-CM

## 2025-06-05 LAB
ALBUMIN SERPL BCP-MCNC: 4 G/DL (ref 3.4–5)
ALP SERPL-CCNC: 91 U/L (ref 33–136)
ALT SERPL W P-5'-P-CCNC: 17 U/L (ref 10–52)
ANION GAP SERPL CALC-SCNC: 12 MMOL/L (ref 10–20)
AST SERPL W P-5'-P-CCNC: 16 U/L (ref 9–39)
BASOPHILS # BLD AUTO: 0.01 X10*3/UL (ref 0–0.1)
BASOPHILS NFR BLD AUTO: 0.3 %
BILIRUB SERPL-MCNC: 0.8 MG/DL (ref 0–1.2)
BUN SERPL-MCNC: 16 MG/DL (ref 6–23)
CALCIUM SERPL-MCNC: 9.3 MG/DL (ref 8.6–10.3)
CHLORIDE SERPL-SCNC: 104 MMOL/L (ref 98–107)
CO2 SERPL-SCNC: 26 MMOL/L (ref 21–32)
CREAT SERPL-MCNC: 0.84 MG/DL (ref 0.5–1.3)
EGFRCR SERPLBLD CKD-EPI 2021: >90 ML/MIN/1.73M*2
EOSINOPHIL # BLD AUTO: 0.03 X10*3/UL (ref 0–0.7)
EOSINOPHIL NFR BLD AUTO: 0.9 %
ERYTHROCYTE [DISTWIDTH] IN BLOOD BY AUTOMATED COUNT: 18.6 % (ref 11.5–14.5)
GLUCOSE SERPL-MCNC: 112 MG/DL (ref 74–99)
HCT VFR BLD AUTO: 37.8 % (ref 41–52)
HGB BLD-MCNC: 12.6 G/DL (ref 13.5–17.5)
IMM GRANULOCYTES # BLD AUTO: 0 X10*3/UL (ref 0–0.7)
IMM GRANULOCYTES NFR BLD AUTO: 0 % (ref 0–0.9)
LDH SERPL L TO P-CCNC: 157 U/L (ref 84–246)
LYMPHOCYTES # BLD AUTO: 0.98 X10*3/UL (ref 1.2–4.8)
LYMPHOCYTES NFR BLD AUTO: 29 %
MCH RBC QN AUTO: 34.9 PG (ref 26–34)
MCHC RBC AUTO-ENTMCNC: 33.3 G/DL (ref 32–36)
MCV RBC AUTO: 105 FL (ref 80–100)
MONOCYTES # BLD AUTO: 0.17 X10*3/UL (ref 0.1–1)
MONOCYTES NFR BLD AUTO: 5 %
NEUTROPHILS # BLD AUTO: 2.19 X10*3/UL (ref 1.2–7.7)
NEUTROPHILS NFR BLD AUTO: 64.8 %
PLATELET # BLD AUTO: 135 X10*3/UL (ref 150–450)
POTASSIUM SERPL-SCNC: 4.3 MMOL/L (ref 3.5–5.3)
PROT SERPL-MCNC: 6.7 G/DL (ref 6.4–8.2)
RBC # BLD AUTO: 3.61 X10*6/UL (ref 4.5–5.9)
SODIUM SERPL-SCNC: 138 MMOL/L (ref 136–145)
WBC # BLD AUTO: 3.4 X10*3/UL (ref 4.4–11.3)

## 2025-06-05 PROCEDURE — 80053 COMPREHEN METABOLIC PANEL: CPT

## 2025-06-05 PROCEDURE — 86901 BLOOD TYPING SEROLOGIC RH(D): CPT

## 2025-06-05 PROCEDURE — 36415 COLL VENOUS BLD VENIPUNCTURE: CPT

## 2025-06-05 PROCEDURE — 85025 COMPLETE CBC W/AUTO DIFF WBC: CPT

## 2025-06-05 PROCEDURE — 71250 CT THORAX DX C-: CPT

## 2025-06-05 PROCEDURE — 83615 LACTATE (LD) (LDH) ENZYME: CPT

## 2025-06-05 RX ORDER — HEPARIN SODIUM,PORCINE/PF 10 UNIT/ML
50 SYRINGE (ML) INTRAVENOUS AS NEEDED
OUTPATIENT
Start: 2025-06-05

## 2025-06-05 RX ORDER — ESCITALOPRAM OXALATE 5 MG/1
5 TABLET ORAL DAILY
COMMUNITY

## 2025-06-05 RX ORDER — HEPARIN 100 UNIT/ML
500 SYRINGE INTRAVENOUS AS NEEDED
OUTPATIENT
Start: 2025-06-05

## 2025-06-05 ASSESSMENT — PAIN SCALES - GENERAL: PAINLEVEL_OUTOF10: 10-WORST PAIN EVER

## 2025-06-05 NOTE — HH CARE COORDINATION
Home Care received a Referral for Physical Therapy and Occupational Therapy. We have processed the referral for a Start of Care on 24-48 HOURS .     If you have any questions or concerns, please feel free to contact us at [phone]. Follow the prompts, enter your five digit zip code, and you will be directed to your care team on [facility] 2.

## 2025-06-06 DIAGNOSIS — C92.01 ACUTE MYELOID LEUKEMIA IN REMISSION (MULTI): ICD-10-CM

## 2025-06-06 DIAGNOSIS — Z76.82 STEM CELL TRANSPLANT CANDIDATE: ICD-10-CM

## 2025-06-06 LAB
ABO GROUP (TYPE) IN BLOOD: NORMAL
ANTIBODY SCREEN: NORMAL
RH FACTOR (ANTIGEN D): NORMAL

## 2025-06-07 ENCOUNTER — HOME CARE VISIT (OUTPATIENT)
Dept: HOME HEALTH SERVICES | Facility: HOME HEALTH | Age: 65
End: 2025-06-07
Payer: MEDICARE

## 2025-06-07 VITALS — OXYGEN SATURATION: 97 % | TEMPERATURE: 97.7 F | HEART RATE: 78 BPM

## 2025-06-07 PROCEDURE — G0151 HHCP-SERV OF PT,EA 15 MIN: HCPCS | Mod: HHH

## 2025-06-07 PROCEDURE — 169592 NO-PAY CLAIM PROCEDURE

## 2025-06-07 SDOH — HEALTH STABILITY: PHYSICAL HEALTH: EXERCISE ACTIVITY: HIP FLEXION

## 2025-06-07 SDOH — HEALTH STABILITY: PHYSICAL HEALTH: PHYSICAL EXERCISE: SEATED

## 2025-06-07 SDOH — HEALTH STABILITY: PHYSICAL HEALTH: EXERCISE ACTIVITY: HIP ABD/ADDUCTION

## 2025-06-07 SDOH — HEALTH STABILITY: PHYSICAL HEALTH: EXERCISE ACTIVITY: KNEE EXTENSION

## 2025-06-07 SDOH — HEALTH STABILITY: PHYSICAL HEALTH: EXERCISE TYPE: INSTRUCTED AND DEMONSTRATED SBA SEATED THER EX PROGRAM, NEW HANDOUT PROVIDED

## 2025-06-07 SDOH — HEALTH STABILITY: PHYSICAL HEALTH: EXERCISE ACTIVITY: ANKLE PUMPS

## 2025-06-07 SDOH — HEALTH STABILITY: PHYSICAL HEALTH: EXERCISE ACTIVITY: KNEE FLEXION

## 2025-06-07 SDOH — HEALTH STABILITY: PHYSICAL HEALTH: EXERCISE ACTIVITY: SIT TO STAND

## 2025-06-07 ASSESSMENT — ENCOUNTER SYMPTOMS
PAIN LOCATION - PAIN SEVERITY: 4/10
HYPERTENSION: 1
HIGHEST PAIN SEVERITY IN PAST 24 HOURS: 10/10
SUBJECTIVE PAIN PROGRESSION: UNCHANGED
PAIN LOCATION - PAIN SEVERITY: 4/10
PAIN LOCATION - PAIN FREQUENCY: CONSTANT
OCCASIONAL FEELINGS OF UNSTEADINESS: 1
PAIN LOCATION: RIGHT LEG
PAIN LOCATION - PAIN FREQUENCY: CONSTANT
PAIN: 1
PAIN LOCATION - PAIN QUALITY: ACHING
PAIN LOCATION - PAIN QUALITY: ACHING
PAIN SEVERITY GOAL: 2/10
PERSON REPORTING PAIN: PATIENT
PAIN LOCATION: LEFT LEG
LOWEST PAIN SEVERITY IN PAST 24 HOURS: 4/10

## 2025-06-07 ASSESSMENT — ACTIVITIES OF DAILY LIVING (ADL)
ENTERING_EXITING_HOME: MINIMUM ASSIST
AMBULATION ASSISTANCE ON FLAT SURFACES: 1
OASIS_M1830: 03

## 2025-06-09 ENCOUNTER — OFFICE VISIT (OUTPATIENT)
Dept: PAIN MEDICINE | Facility: CLINIC | Age: 65
End: 2025-06-09
Payer: MEDICARE

## 2025-06-09 ENCOUNTER — INFUSION (OUTPATIENT)
Dept: HEMATOLOGY/ONCOLOGY | Facility: CLINIC | Age: 65
End: 2025-06-09
Payer: MEDICARE

## 2025-06-09 VITALS
WEIGHT: 198.75 LBS | TEMPERATURE: 97.5 F | OXYGEN SATURATION: 93 % | DIASTOLIC BLOOD PRESSURE: 77 MMHG | RESPIRATION RATE: 16 BRPM | BODY MASS INDEX: 27.76 KG/M2 | SYSTOLIC BLOOD PRESSURE: 125 MMHG | HEART RATE: 74 BPM

## 2025-06-09 DIAGNOSIS — Z76.82 STEM CELL TRANSPLANT CANDIDATE: ICD-10-CM

## 2025-06-09 DIAGNOSIS — M25.552 BILATERAL HIP PAIN: ICD-10-CM

## 2025-06-09 DIAGNOSIS — C92.00 ACUTE MYELOID LEUKEMIA NOT HAVING ACHIEVED REMISSION (MULTI): ICD-10-CM

## 2025-06-09 DIAGNOSIS — M25.551 BILATERAL HIP PAIN: ICD-10-CM

## 2025-06-09 LAB
ABO GROUP (TYPE) IN BLOOD: NORMAL
ALBUMIN SERPL BCP-MCNC: 4 G/DL (ref 3.4–5)
ALP SERPL-CCNC: 90 U/L (ref 33–136)
ALT SERPL W P-5'-P-CCNC: 14 U/L (ref 10–52)
ANION GAP SERPL CALC-SCNC: 12 MMOL/L (ref 10–20)
ANTIBODY SCREEN: NORMAL
AST SERPL W P-5'-P-CCNC: 14 U/L (ref 9–39)
BASOPHILS # BLD AUTO: 0 X10*3/UL (ref 0–0.1)
BASOPHILS NFR BLD AUTO: 0 %
BILIRUB SERPL-MCNC: 0.7 MG/DL (ref 0–1.2)
BUN SERPL-MCNC: 15 MG/DL (ref 6–23)
CALCIUM SERPL-MCNC: 9.4 MG/DL (ref 8.6–10.3)
CHLORIDE SERPL-SCNC: 106 MMOL/L (ref 98–107)
CO2 SERPL-SCNC: 26 MMOL/L (ref 21–32)
CREAT SERPL-MCNC: 0.75 MG/DL (ref 0.5–1.3)
EGFRCR SERPLBLD CKD-EPI 2021: >90 ML/MIN/1.73M*2
EOSINOPHIL # BLD AUTO: 0.03 X10*3/UL (ref 0–0.7)
EOSINOPHIL NFR BLD AUTO: 1.1 %
ERYTHROCYTE [DISTWIDTH] IN BLOOD BY AUTOMATED COUNT: 17.8 % (ref 11.5–14.5)
GLUCOSE SERPL-MCNC: 123 MG/DL (ref 74–99)
HCT VFR BLD AUTO: 34.8 % (ref 41–52)
HGB BLD-MCNC: 11.8 G/DL (ref 13.5–17.5)
IMM GRANULOCYTES # BLD AUTO: 0 X10*3/UL (ref 0–0.7)
IMM GRANULOCYTES NFR BLD AUTO: 0 % (ref 0–0.9)
LDH SERPL L TO P-CCNC: 149 U/L (ref 84–246)
LYMPHOCYTES # BLD AUTO: 1.05 X10*3/UL (ref 1.2–4.8)
LYMPHOCYTES NFR BLD AUTO: 39.5 %
MCH RBC QN AUTO: 35 PG (ref 26–34)
MCHC RBC AUTO-ENTMCNC: 33.9 G/DL (ref 32–36)
MCV RBC AUTO: 103 FL (ref 80–100)
MONOCYTES # BLD AUTO: 0.04 X10*3/UL (ref 0.1–1)
MONOCYTES NFR BLD AUTO: 1.5 %
NEUTROPHILS # BLD AUTO: 1.54 X10*3/UL (ref 1.2–7.7)
NEUTROPHILS NFR BLD AUTO: 57.9 %
NRBC BLD-RTO: ABNORMAL /100{WBCS}
OVALOCYTES BLD QL SMEAR: NORMAL
PLATELET # BLD AUTO: 66 X10*3/UL (ref 150–450)
POTASSIUM SERPL-SCNC: 4.2 MMOL/L (ref 3.5–5.3)
PROT SERPL-MCNC: 6.6 G/DL (ref 6.4–8.2)
RBC # BLD AUTO: 3.37 X10*6/UL (ref 4.5–5.9)
RBC MORPH BLD: NORMAL
RH FACTOR (ANTIGEN D): NORMAL
SODIUM SERPL-SCNC: 140 MMOL/L (ref 136–145)
WBC # BLD AUTO: 2.7 X10*3/UL (ref 4.4–11.3)

## 2025-06-09 PROCEDURE — 99204 OFFICE O/P NEW MOD 45 MIN: CPT | Performed by: PAIN MEDICINE

## 2025-06-09 PROCEDURE — G2211 COMPLEX E/M VISIT ADD ON: HCPCS | Performed by: PAIN MEDICINE

## 2025-06-09 PROCEDURE — 36415 COLL VENOUS BLD VENIPUNCTURE: CPT

## 2025-06-09 PROCEDURE — 83615 LACTATE (LD) (LDH) ENZYME: CPT

## 2025-06-09 PROCEDURE — 85025 COMPLETE CBC W/AUTO DIFF WBC: CPT

## 2025-06-09 PROCEDURE — 99214 OFFICE O/P EST MOD 30 MIN: CPT | Performed by: PAIN MEDICINE

## 2025-06-09 PROCEDURE — 80053 COMPREHEN METABOLIC PANEL: CPT

## 2025-06-09 PROCEDURE — 86901 BLOOD TYPING SEROLOGIC RH(D): CPT

## 2025-06-09 PROCEDURE — 1159F MED LIST DOCD IN RCRD: CPT | Performed by: PAIN MEDICINE

## 2025-06-09 PROCEDURE — 1125F AMNT PAIN NOTED PAIN PRSNT: CPT | Performed by: PAIN MEDICINE

## 2025-06-09 RX ORDER — HEPARIN 100 UNIT/ML
500 SYRINGE INTRAVENOUS AS NEEDED
OUTPATIENT
Start: 2025-06-09

## 2025-06-09 RX ORDER — SUZETRIGINE 50 MG/1
50 TABLET, FILM COATED ORAL 2 TIMES DAILY
Qty: 4 TABLET | Refills: 11 | Status: SHIPPED | OUTPATIENT
Start: 2025-06-09 | End: 2025-06-09

## 2025-06-09 RX ORDER — HEPARIN SODIUM,PORCINE/PF 10 UNIT/ML
50 SYRINGE (ML) INTRAVENOUS AS NEEDED
OUTPATIENT
Start: 2025-06-09

## 2025-06-09 ASSESSMENT — PAIN - FUNCTIONAL ASSESSMENT: PAIN_FUNCTIONAL_ASSESSMENT: 0-10

## 2025-06-09 ASSESSMENT — PAIN SCALES - GENERAL
PAINLEVEL_OUTOF10: 8
PAINLEVEL_OUTOF10: 10-WORST PAIN EVER

## 2025-06-09 NOTE — PROGRESS NOTES
Here for leg and hip pain. Was scheduled for hip surgery when discovered leukemia.  Surgery is canceled and is receiving cancer treatment. Has seen  Dr Cristobal in February .  Is on xeralto and Tramadol.

## 2025-06-09 NOTE — H&P
History Of Present Illness  Link Inman is a 64 y.o. male presenting with  leg and hip pain. Was scheduled for hip surgery when discovered leukemia.  Surgery is canceled and is receiving cancer treatment. Has seen  Dr Cristobal in February .  Is on xeralto and Tramadol.  When he was on Aleve he believed he achieved a better pain control now that he is on the tramadol he does not believe the tramadol is providing him with the same results.  Currently rating his hip pain at the level of 6 out of 10 pain improved somewhat during sitting and pain worsens during standing up and walking described as sharp burning sensation.  Was also tried on Celebrex which provided him with significant improvement but due to him being considered currently for a bone marrow transplant he was advised by the oncologist to stop all the usage of nonsteroidal anti-inflammatory.     Past Medical History  He has a past medical history of Arrhythmia, Personal history of (healed) traumatic fracture (03/28/2021), and Personal history of other diseases of the circulatory system (05/11/2019).    Surgical History  He has a past surgical history that includes Other surgical history (02/12/2021).     Social History  He reports that he has never smoked. He has never used smokeless tobacco. He reports current alcohol use. He reports that he does not currently use drugs.    Family History  Family History[1]     Allergies  Peanut, Tree nuts, [place], Sulfa (sulfonamide antibiotics), and Sulfamethazine    Review of Systems   12 Systems have been reviewed as follows.   Constitutional: Fever, weight gain, weight loss, appetite change, night sweats, fatigue, chills.  Eyes : blurry, double vision, vision, loss, tearing, redness, pain, sensitivity to light, glaucoma.  Ears, nose, mouth, and throat: Hearing loss, ringing in the ears, ear pain, nasal congestion, nasal drainage, nosebleeds, mouth, throat, irritation tooth problem.  Cardiovascular :chest pain, pressure,  heart tracing,palpaitations , sweating, leg swelling, high or low blood pressure  Pulmonary: Cough, yellow or green sputum, blood and sputum, shortness of breath, wheezing  Gastrointestinal: Nause, vomiting, diarrhea, constipation, pain, blood in stool, or vomitus, heartburn, difficulty swallowing  Genitourinary: incontinence, abnormal bleeding, abnormal discharge, urinary frequency, urinary hesitancy, pain, impotence sexual problem, infection, urinary retention  Musculoskeletal: Pain, stiffness, joint, redness or warmth, arthritis, back pain, weakness, muscle wasting, sprain or fracture  Neuro: Weight weakness, dizziness, change in voice, change in taste change in vision, change in hearing, loss, or change of sensation, trouble walking, balance problems coordination problems, shaking, speech problem  Endocrine , cold or heat intolerance, blood sugar problem, weight gain or loss missed periods hot flashes, sweats, change in body hair, change in libido, increased thirst, increased urination  Heme/lymph: Swelling, bleeding, problem anemia, bruising, enlarged lymph nodes  Allergic/immunologic: H. plus nasal drip, watery itchy eyes, nasal drainage, immunosuppressed  The above, were reviewed and noted negative except as noted.     Physical Exam   Vital signs reviewed, documented in chart     General:  Appears well, does not look in any major distress  Alert    HEENT:  Head atraumatic  Eyes normal inspection  PERRL  Normal ENT inspection  No signs of dehydration    NECK:  Normal inspection  Range of motion within normal     RESPIRATORY:  No respiratory distress    CVS:  Heart rate and rhythm regular    ABDOMEN/GI  Soft  Non-tender  No distention  No organomegaly      EXTREMITIES:  Limited  ROM on both hips and provocative of pain with any movement   Normal appearance  No Pedal edema  Power symmetrical , sensory examination preserved.    NEURO:  Alert and oriented X 3  CNS normal as tested without focal neurological  deficit   Sensation normal  Motor normal  Reflexes absent      PSYCH:  Mood normal  Affect normal    SKIN:  Color normal  No rash  Warm  Dry  no sign of skin marking supportive of IV drug usage /abuse.     Last Recorded Vitals  There were no vitals taken for this visit.    Relevant Results      Scheduled medications  Scheduled Medications[2]  Continuous medications  Continuous Medications[3]  PRN medications  PRN Medications[4]  Results for orders placed or performed in visit on 06/09/25 (from the past 24 hours)   CBC and Auto Differential   Result Value Ref Range    WBC 2.7 (L) 4.4 - 11.3 x10*3/uL    nRBC      RBC 3.37 (L) 4.50 - 5.90 x10*6/uL    Hemoglobin 11.8 (L) 13.5 - 17.5 g/dL    Hematocrit 34.8 (L) 41.0 - 52.0 %     (H) 80 - 100 fL    MCH 35.0 (H) 26.0 - 34.0 pg    MCHC 33.9 32.0 - 36.0 g/dL    RDW 17.8 (H) 11.5 - 14.5 %    Platelets 66 (L) 150 - 450 x10*3/uL    Neutrophils % 57.9 40.0 - 80.0 %    Immature Granulocytes %, Automated 0.0 0.0 - 0.9 %    Lymphocytes % 39.5 13.0 - 44.0 %    Monocytes % 1.5 2.0 - 10.0 %    Eosinophils % 1.1 0.0 - 6.0 %    Basophils % 0.0 0.0 - 2.0 %    Neutrophils Absolute 1.54 1.20 - 7.70 x10*3/uL    Immature Granulocytes Absolute, Automated 0.00 0.00 - 0.70 x10*3/uL    Lymphocytes Absolute 1.05 (L) 1.20 - 4.80 x10*3/uL    Monocytes Absolute 0.04 (L) 0.10 - 1.00 x10*3/uL    Eosinophils Absolute 0.03 0.00 - 0.70 x10*3/uL    Basophils Absolute 0.00 0.00 - 0.10 x10*3/uL   Comprehensive Metabolic Panel   Result Value Ref Range    Glucose 123 (H) 74 - 99 mg/dL    Sodium 140 136 - 145 mmol/L    Potassium 4.2 3.5 - 5.3 mmol/L    Chloride 106 98 - 107 mmol/L    Bicarbonate 26 21 - 32 mmol/L    Anion Gap 12 10 - 20 mmol/L    Urea Nitrogen 15 6 - 23 mg/dL    Creatinine 0.75 0.50 - 1.30 mg/dL    eGFR >90 >60 mL/min/1.73m*2    Calcium 9.4 8.6 - 10.3 mg/dL    Albumin 4.0 3.4 - 5.0 g/dL    Alkaline Phosphatase 90 33 - 136 U/L    Total Protein 6.6 6.4 - 8.2 g/dL    AST 14 9 - 39 U/L     Bilirubin, Total 0.7 0.0 - 1.2 mg/dL    ALT 14 10 - 52 U/L   Morphology   Result Value Ref Range    RBC Morphology See Below     Ovalocytes Few      XR hips bilateral 2 VW w pelvis when performed  Order: [id number]  Narrative     AP pelvis lateral x-rays of both hips shows that he has right hip has advanced degenerative changes with significant narrowing of the joint space with bone cyst formation seen within the acetabulum.  Advanced degenerative osteoarthritis.  X-rays of his left hip shows complete loss of the joint space with increased sclerosis and bone spur formation seen along with bone cyst formation seen within the acetabulum in the femoral head.  Severe degenerative osteoarthritis of the right hip joint.  No fracture seen.  Exam End: 01/07/25 08:47       NM PET CT bone skull base to mid thigh  Status: Final result     PACS Images - [id number]     Show images for NM PET CT bone skull base to mid thigh  Signed by    Signed Time Phone Pager   Link Brown MD 3/31/2025 15:02 [phone]     Exam Information    Status Exam Begun Exam Ended   Final 3/31/2025 12:01 3/31/2025 12:52     Study Result    Narrative & Impression   Interpreted By:  Link Brown,  and Fortunato Green   STUDY:  NM PET CT SKULL BASE TO MID THIGH;  3/31/2025 12:52 pm      INDICATION:  Signs/Symptoms:suspect ALL.      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      ACCESSION NUMBER(S):  [id number]      ORDERING CLINICIAN:  MARTHA RODRIGEZ      TECHNIQUE:  DIVISION OF NUCLEAR MEDICINE  POSITRON EMISSION TOMOGRAPHY (PET-CT)      The patient received an intravenous dose of 9.5 mCi of Fluorine-18  fluorodeoxyglucose (FDG).  The patient was placed in a dark quiet  room. Positron emission tomographic (PET) images from skull vertex to  the feet were then acquired after a one hour delay. Also acquired was  a contemporaneous low dose non-contrast CT scan performed  for  attenuation correction of PET images and anatomic localization.  The  PET and CT images were digitally fused for display.  All images were  acquired on a combined PET-CT scanner unit.  Some areas of FDG  accumulation may be described in standardized uptake value (SUV)  units.      CODING:  Initial Treatment Strategy (PI)      CALIBRATION:  Dose Injection-to-Scan Interval (mins): 72 min  Mediastinal bloodpool SUV (normal 1.5-2.5): 1.7  Blood glucose: 110 mg/dL      FINDINGS:  HEAD AND NECK:  No evidence of focal hypermetabolic lesion in the brain parenchyma,  noting that evaluation is limited because of the expected physiologic  diffuse FDG uptake in the brain. No focal hypermetabolic soft tissue  lesion is seen in the neck. No hypermetabolic cervical  lymphadenopathy is present.      CHEST:  No focal hypermetabolic lesion is seen in the lung parenchyma.  Mildly hypermetabolic foci about the right-greater-than-left luiz  without definitive CT correlate for measures approximately 1.3 cm  with SUV of 2.8 on the right and 1.2 cm on the left with SUV of 2.5.      ABDOMEN AND PELVIS:  No hypermetabolic soft tissue lesion is present in the abdomen and  pelvis. No evidence of hypermetabolic lymphadenopathy.  Physiologic radiotracer uptake is present in the liver and spleen  with excretion into the bowel loops and the genitourinary tract.      MUSCULOSKELETAL/EXTREMITIES:  No focal hypermetabolic lesion is seen in the axial or appendicular  to suggest osseous metastasis.      IMPRESSION:  Mildly hypermetabolic foci about the bilateral luiz likely  representing lymph nodes. Nonspecific, but favored reactive.  Otherwise, no significant enlarged/hypermetabolic lymph nodes to  suggest lymphoproliferative disease.      I personally reviewed the images/study and I agree with the findings  as stated by Dr. Peter Bucio. This study was interpreted at  [facility], [place], [place].       MACRO:  None      Signed by: Link Brown 3/31/2025 3:02 PM  Dictation workstation:   [id number]     Assessment/Plan     64 years old with history and physical examination supportive of bilateral hip pain currently being treated for leukemia and awaiting replacement of the hip after the completion of the treatment of the leukemia    Plan  Advised the patient that unfortunately he is not a candidate to be continued on the nonsteroidal anti-inflammatory secondary to the treatment of his leukemia and awaiting bone marrow transplant the patient would not like to be treated with opiate therapy for risk of opiate dependency he has failed the tramadol and therefore I would recommend for him to have a trial of Journavax at 50 mg twice per day I will be issuing for him a short prescription to determine if its efficacy and I advised him always take the least needed of any medication to keep his symptoms under control and he could be supplementing also with the usage of the Tylenol on as-needed basis I will be more than happy if he decided to try a short acting opiate such as of oxycodone to issue for him a short trial if he fails the other medication patient verbalized understanding and agreement with the plan and he is welcome to follow-up with the pain clinic on as-needed basis      The above clinical summary has been dictated with voice recognition software. It has not been proofread for grammatical errors, typographical mistakes, or other semantic inconsistencies.    Thank you for visiting our office today. It was our pleasure to take part in your healthcare.     Please do not hesitate to contact the pain clinic after your visit with any questions or concerns at  M-F 8-4 pm       Byron Izquierdo M.D.  Medical Director , Division of Pain Medicine [facility]   of Anesthesiology and Pain Medicine  [facility]  School of Medicine     [facility]  [address]  [address]  [place] [address]     Office: [phone]  Fax: [phone]         Byron Izquierdo MD         [1]   Family History  Problem Relation Name Age of Onset    Other (cardiac disorder) Other      Hypertension Other     [2] perflutren lipid microspheres, 0.5-10 mL of dilution, intravenous, Once    [3]    [4]

## 2025-06-10 ENCOUNTER — HOME CARE VISIT (OUTPATIENT)
Dept: HOME HEALTH SERVICES | Facility: HOME HEALTH | Age: 65
End: 2025-06-10
Payer: MEDICARE

## 2025-06-10 ENCOUNTER — NURSE TRIAGE (OUTPATIENT)
Dept: HEMATOLOGY/ONCOLOGY | Facility: HOSPITAL | Age: 65
End: 2025-06-10
Payer: MEDICARE

## 2025-06-10 ENCOUNTER — TELEPHONE (OUTPATIENT)
Dept: HEMATOLOGY/ONCOLOGY | Facility: HOSPITAL | Age: 65
End: 2025-06-10
Payer: MEDICARE

## 2025-06-10 ENCOUNTER — SOCIAL WORK (OUTPATIENT)
Dept: HEMATOLOGY/ONCOLOGY | Facility: HOSPITAL | Age: 65
End: 2025-06-10
Payer: MEDICARE

## 2025-06-10 VITALS — OXYGEN SATURATION: 96 % | DIASTOLIC BLOOD PRESSURE: 60 MMHG | HEART RATE: 90 BPM | SYSTOLIC BLOOD PRESSURE: 108 MMHG

## 2025-06-10 DIAGNOSIS — Z76.82 STEM CELL TRANSPLANT CANDIDATE: Primary | ICD-10-CM

## 2025-06-10 PROBLEM — R76.8 EBV SEROPOSITIVITY: Status: ACTIVE | Noted: 2025-06-10

## 2025-06-10 PROCEDURE — G0157 HHC PT ASSISTANT EA 15: HCPCS | Mod: CQ,HHH

## 2025-06-10 PROCEDURE — G0152 HHCP-SERV OF OT,EA 15 MIN: HCPCS | Mod: HHH

## 2025-06-10 ASSESSMENT — ACTIVITIES OF DAILY LIVING (ADL)
TOILETING: 1
DRESSING_LB_CURRENT_FUNCTION: STAND BY ASSIST
BATHING ASSESSED: 1
BATHING_CURRENT_FUNCTION: STAND BY ASSIST
TOILETING: SUPERVISION
DRESSING_UB_CURRENT_FUNCTION: SUPERVISION

## 2025-06-10 ASSESSMENT — ENCOUNTER SYMPTOMS
LOWEST PAIN SEVERITY IN PAST 24 HOURS: 5/10
PAIN LOCATION - RELIEVING FACTORS: POSITIONING/MEDS
PAIN LOCATION - EXACERBATING FACTORS: MOVEMENT
PAIN LOCATION - PAIN FREQUENCY: FREQUENT
PAIN LOCATION - PAIN SEVERITY: 6/10
PAIN LOCATION - EXACERBATING FACTORS: MOVEMENT
PAIN LOCATION - PAIN SEVERITY: 6/10
PAIN: 1
PAIN LOCATION: LEFT HIP
PERSON REPORTING PAIN: PATIENT
PAIN LOCATION: RIGHT HIP
HIGHEST PAIN SEVERITY IN PAST 24 HOURS: 10/10
PAIN LOCATION - PAIN SEVERITY: 5/10
PAIN LOCATION: LEFT HIP
PAIN LOCATION - PAIN SEVERITY: 5/10
PAIN: 1
SUBJECTIVE PAIN PROGRESSION: UNCHANGED
PERSON REPORTING PAIN: PATIENT
PAIN LOCATION: RIGHT HIP
HIGHEST PAIN SEVERITY IN PAST 24 HOURS: 10/10
PAIN LOCATION - PAIN FREQUENCY: FREQUENT

## 2025-06-10 NOTE — PROGRESS NOTES
SW received signed copy of physician form from [facility] Copay Assistance program by Dr. Villatoro.  This form was faxed back over to [facility] for processing to gain full approval for copay assistance program.

## 2025-06-11 ENCOUNTER — SOCIAL WORK (OUTPATIENT)
Dept: HEMATOLOGY/ONCOLOGY | Facility: CLINIC | Age: 65
End: 2025-06-11

## 2025-06-11 ENCOUNTER — SOCIAL WORK (OUTPATIENT)
Dept: HEMATOLOGY/ONCOLOGY | Facility: HOSPITAL | Age: 65
End: 2025-06-11

## 2025-06-11 ENCOUNTER — HOME CARE VISIT (OUTPATIENT)
Dept: HOME HEALTH SERVICES | Facility: HOME HEALTH | Age: 65
End: 2025-06-11
Payer: MEDICARE

## 2025-06-11 DIAGNOSIS — C92.00 ACUTE MYELOID LEUKEMIA NOT HAVING ACHIEVED REMISSION (MULTI): Primary | ICD-10-CM

## 2025-06-11 LAB
CHIMERISM INTERPRETATION: NORMAL
ELECTRONICALLY SIGNED BY: NORMAL

## 2025-06-11 RX ORDER — NYSTATIN 100000 [USP'U]/G
1 POWDER TOPICAL 2 TIMES DAILY
Qty: 30 G | Refills: 3 | Status: SHIPPED | OUTPATIENT
Start: 2025-06-11 | End: 2026-06-11

## 2025-06-11 NOTE — TELEPHONE ENCOUNTER
Spoke with the Police Department and they stated that since this is coming from a third party they are unable to incite violence or arrest unless it comes directly from the spouse. They said chief spoke with the spouse and she stated he was fine at this time.

## 2025-06-11 NOTE — TELEPHONE ENCOUNTER
"Spoke with spouse to inform her that script was sent to the pharmacy for patient and asked her if anything further was needed? She expressed that she was trying to get a hold of behavioral health all night and this morning for a sooner appointment for a change in her husbands demeanor and mental status. When discussing with her further she began to express that she is worried for his, others, and her safety. She says he has been sending threatening emails to the chief of [place] police - blaming them for his cancer. Patient is an ex-. Patient at this time was currently packing bags with multiple knives and pocket knives to take up to the police department when he gets arrested. Patient has a plan to have his cousin arrest him and other officers who are friends be present to witness this.  Spouse states patient has no guns in the home and she feels safe at this time. Call made to [place] [employer] informing them of the patients plan and our concern for others safety and his. Informed them that the spouse tried to call the ambulance one other time and they were unable to take him - informed them that patient would need to be pink slipped. [place] [employer] stated they will send a squad and ambulance to the patients residence. Call made to spouse to inform her she is having a friend come over right now so that someone is with her - advised her to leave and state she \"has to  a prescription\" if she does not feel safe. Verbalized understanding and agreed to plan of care. [place] Police will contact nurse back with an update and will call spouse later in the day to check on safety.   "

## 2025-06-11 NOTE — PROGRESS NOTES
"Social Work Note    Referral Source: Ketty Galvez  Meeting Location: Phone  Person(s) Present: daughter (Ketty), spouse, SW  Identified Needs: Mental Health Support/Coordination  Impression and Plan:   Patient's daughter, Ketty, sends email to this SW the evening of 6/10/25 requesting assistance with securing \"immediate mental health help\". Outlined in the email is the patient's current situation with [facility] with multiple government locations that he has a trespassing order to stay away from, as he has been sending delusional threatening communications around accusations to city officials that his \"leukemia was caused by 'contaminated' city water at their home in [place]\".  As of this morning, 6/11, police presented to patient's home, per patient's wife and daughter, Ketty, but police were unable to pink slip patient, as he was not presenting as a threat at the time of their arrival. Spouse says he is currently at home \"calmly enjoying pastries with family members, telling stories\".  SW provided space for spouse and daughter to process safety planning strategies (E.g., keep family members whom the patient confides in close by within the household throughout the day today, call 911 if there is ever a feeling/perception of any imminent danger to self/others, determine follow up plan for management of mental health symptoms at appointment at [facility] with Dr. Gomez on 6/12/25). Daughter explains that she is also aware of [facility] mobile crisis resource and knows how to access it if needed.  ALDEN contacted TRISTIAN Koenig, to update on current situation; she plans to be available at tomorrow's appointment with Dr. Gomez.    From a psychosocial perspective related to transplant planning, it will be important to make considerations re: transplant readiness as it relates to patient's mental state. Need to make a plan around stabilization in order to ensure that patient can adequately cope through " several week long admission as well as appropriate engagement with staff. Also address his identified memory issues (E.g., previous MOCA test performed that revealed a score of 22/30).    Interventions Provided: Assessment, Care Coordination, De-Escalation, Education, Grounding, Supportive Counseling, and Safety Planning  Estimated Time Spent: 45 minutes    This SW remains involved and available for psychosocial support.

## 2025-06-11 NOTE — TELEPHONE ENCOUNTER
"Spouse states patient told her today that he has \"jock itch\". States it is red and itchy between legs and scrotum. States there is some broken skin. Denies fever. States has been using an \"anti-itch spray\". States has been using depends due to incontinence. Message sent to fellow on call.   "

## 2025-06-11 NOTE — PROGRESS NOTES
LSW received update from medical team that the patient is currently at home exhibiting aggressive behavior and paranoid thoughts.  See phone notes from triage RN for collateral information, Patient has sent multiple emails and voice messages to city officials of [place] over the last two weeks. In January 2025 patient received a letter stating that he was banned from all [facility] buildings due to harassing and threatening communication he was sending city personnel.  Of note, patient was informed that if he were to found on any city building premise or in contact with personnel he could risk being arrested or charged.      Per emails sent from the patient, patient believes that his AML is caused by contaminated water supplied by the [facility].  Behaviors have not been noted while patient is in clinic but wife reached out to RN phone line today to state that patient is in need of psychiatric evaluation.  Patient has been seen by two psychiatric providers at  - most recently by Dr. Wilkins on 5/29/25.  Per staff communication, Dr. Wilkins did reach out to patient this afternoon to assess.  He felt that the patient did not have suicidal or homicidal ideation and that he has no thoughts on harming anyone now or ever.  Per MD the patient did share that he has a distrust of the police which could be paranoid thinking.  MD also stated that patient was calm during the conversation and that the wife did not wish to have any further interventions.     LSW reached out to the patient's wife as well for continued support during this situation.   Per wife, the patient had just finished a phone call with Dr. Wilkins (patient follows with him from psychiatry).  Wife states that she was only hearing one side of the conversation but she states that the patient was pleasant during the call and answered all questions.  She shared that she did hear him answer questions regarding emails that he had sent and messages lthat he  "left with [place] [employer].  Wife states that when the phone call was over though patient became very agitated again and was angry at the wife.  She states that he was repeating \"I need paperwork from them [police]\" wife would respond stating that she can go and get the paperwork as the patient is not allowed to go to the police station but patient stated \"you can't, it's paperwork about you, about the way you set me up and how you are out to get me\"  Wife states that he then went up stairs and is currently in their guest bedroom.      Wife states that the patient did pack belongings today.  He has a backpack currently packed in the guest bedroom with him that has about 18 \"pocket\" knives that have a 1 1/2 inch blade.  Wife states that the knives look \"toyish\" and she would think it would be used for carving soap.  She notes that there are also two bars of soap in the bag along with a bible, the patient's ID and passport, pencils, a note pad and toiletries. Wife also states that he has another canvas bag packed in the living room that has the patient's check book, paperwork, financial documents and a few books.  Patient stated to wife that he feels trapped at his home by his wife and that he will \"eventually go somewhere but I am trapped here now\"  Wife states that he does not have access to car keys but she does believe he wants to leave.  She states that if he were to try and leave (via walking or an uber) she would call 911.     Wife states that the patient does not have access to guns or any other weapons in the home.  Her friend is currently staying with her and she states that she does not feel that the patient is going to harm her or the friend, even though he is agitated when she speaks with him.  Wife states that she will be alone at the home over night, and she is not concerned about this.     LSW inquired about patient's behavioral and mental health changes.  Wife states that she noted personality " changes about 6 years ago.  They did try marriage counseling but she did not find it helpful.  They were also starting therapy around the start of COVID so that presented additional challenges in attending therapy.  She states that since October though, she has noted more memory concerns and agitation.  She is unsure if this is related to his illness, undiagnosed mental health concerns or a cognitive impairment such as dementia.  Wife did state that patient's mother had severe depression and she believes has also been diagnosed with bipolar.  Patient has never been diagnosed with a mental health diagnosis but he has always been worried about it due to the family history.    We discussed a plan going forward. Wife understands that she can call 911 or Mobile Crisis line [phone] 24/7 should she feel that the patient needs psychiatric intervention immediately or if she is concerned about the safety of herself, the patient or others. LSW offered to contact [employer] on behalf of patient today to request an evaluation but wife declined stating that she felt it would only agitate the patient further.    Patient does have  a follow up appointment tomorrow with Dr. Gomez.  Explained that Dr. Gomez can evaluate the patient and if he is exhibiting clear signs or concerns regarding psychiatric medical issue he could be pink slipped and sent to the emergency room.  However, the patient appears to be very controlled in masking his agitation and is appropriate in front of medical professionals, so if concerns are not noted tomorrow, the recommendation may also be that he would need to continue to follow up with outpatient psychiatry.  Wife stated her understanding.      Wife shared that she would reach out to 911, mobile crisis or physician line at  if additional concerns arise.  Social work updated medical team regarding the conversation.  LSW will plan to touch base with patient tomorrow.

## 2025-06-11 NOTE — TELEPHONE ENCOUNTER
"Spouse unsure when it started as patient just informed her 30 minutes ago that he has been experiencing \"jock itch\". States it is very \"red and angry looking\" and patient has been using \"anti itch spray\". Denies fever or chills. Denies any blisters or papules. Message sent to fellow on call. Discussed with spouse will likely need malheme apt first thing in the AM. Spouse agreeable and will callback first thing in the AM when office reopens.   "

## 2025-06-11 NOTE — TELEPHONE ENCOUNTER
"Follow up call made to spouse. She stated that after the phone call with Dr. Wilkins Benoit got mad at her and continued to yell quite extensively. She said she knows to walk away when he does this and not to react. She stated \"he then fell asleep and when he woke up he brought her and her friend cold drinks and sat on the porch with them and talked like old friends like nothing ever happened.\" She said she is looking forward to speaking with Dr. Gomez tomorrow so she can be honest regarding his symptoms stating that this is starting to take a toll on her. She was very appreciative of our office that someone is listening to her. Provided reassurance and that being a caregiver of someone with cancer is often times taxing and that she is being a good advocate for him at this time.   "
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
## 2025-06-11 NOTE — TELEPHONE ENCOUNTER
HEMATOLOGY AND MEDICAL ONCOLOGY  -----------------------------------------------------------------------------------  MALIGNANT HEMATOLOGY    The patient is a gentleman with a history of AML who is currently being treated with Azacitidine and Venetoclax. He recently completed the last dose of his second cycle of chemotherapy induction on May 28, 2025. The patient is a candidate for a stem cell transplant, which will be planned as soon as it becomes feasible.    Recently, the patient's wife called to report an inguinal rash that she believes is likely associated with the diaper. She described it as red, noting that it is very itchy but does not have any blisters, papules, or significant skin abrasions; this may be a case of tinea cruris. There is no fever, chills, chest pain, dyspnea, or any other important symptoms. The initial recommendation is for the patient to be evaluated at the [facility] clinic early in the morning to assess the rash and prescribe the appropriate treatment.    Now, the wife called again because the patient was experiencing a mental health crisis. He was aggressive and demanding towards her, prompting her to contact the . She also attempted to reach the patient's psychiatrist and is awaiting a callback. After a brief episode of agitation and aggression, the patient was able to fall asleep and is currently pleasantly sleeping. Her wife describes no additional significant symptoms.    Of note, the patient underwent a recent neuropsychiatric evaluation along with a psychiatric assessment on April 25, 2025, during which he scored 22 out of 30 on the MOCA, indicating borderline cognitive functioning. His blood counts have been stable, and there is no neutropenia or critical thrombocytopenia (PLTs have been >10). Additionally, his kidney and liver function tests have been reported wnl.    Our recommendation is to call 911 if the patient experiences a mental health crisis or poses a  risk of self-harm or harm to others. The wife indicated that she will do it and also continue to stay in touch with the local  and connect with the psychiatrist if needed. The wife mentioned that the patient had before episodes of mental crisis and refused being evaluated in the ED after called 911 at that time.  At this moment, the patient is sleeping, and there is no evidence of any signs of clinical decompensation. Regarding the rash, the patient and his wife confirmed they will go to the Mal Heme clinic in the morning for evaluation.  The wife expressed gratitude for our call and will continue to be in touch with our team if there are any additional questions, concerns, or changes in the patient's clinical condition.  I communicated with his Hematologists Oncologists Dr. Gomez to update him and obtain his insights about the patient's case.    -----------------------------------------------------------------------------------  Roderick Walker MD  Hematology and Medical Oncology Fellow  BMT/Malignant Heme Team [id number]

## 2025-06-12 ENCOUNTER — INFUSION (OUTPATIENT)
Dept: HEMATOLOGY/ONCOLOGY | Facility: CLINIC | Age: 65
End: 2025-06-12
Payer: MEDICARE

## 2025-06-12 ENCOUNTER — OFFICE VISIT (OUTPATIENT)
Dept: HEMATOLOGY/ONCOLOGY | Facility: CLINIC | Age: 65
End: 2025-06-12
Payer: MEDICARE

## 2025-06-12 VITALS
DIASTOLIC BLOOD PRESSURE: 85 MMHG | HEART RATE: 77 BPM | BODY MASS INDEX: 27.47 KG/M2 | TEMPERATURE: 97.5 F | OXYGEN SATURATION: 97 % | WEIGHT: 196.65 LBS | RESPIRATION RATE: 16 BRPM | SYSTOLIC BLOOD PRESSURE: 134 MMHG

## 2025-06-12 DIAGNOSIS — Z76.82 STEM CELL TRANSPLANT CANDIDATE: ICD-10-CM

## 2025-06-12 DIAGNOSIS — F69 BEHAVIOR CONCERN IN ADULT: ICD-10-CM

## 2025-06-12 DIAGNOSIS — C92.00 ACUTE MYELOID LEUKEMIA NOT HAVING ACHIEVED REMISSION (MULTI): Primary | ICD-10-CM

## 2025-06-12 DIAGNOSIS — C92.00 ACUTE MYELOID LEUKEMIA NOT HAVING ACHIEVED REMISSION (MULTI): ICD-10-CM

## 2025-06-12 LAB
ALBUMIN SERPL BCP-MCNC: 4.3 G/DL (ref 3.4–5)
ALP SERPL-CCNC: 105 U/L (ref 33–136)
ALT SERPL W P-5'-P-CCNC: 16 U/L (ref 10–52)
ANION GAP SERPL CALC-SCNC: 13 MMOL/L (ref 10–20)
AST SERPL W P-5'-P-CCNC: 15 U/L (ref 9–39)
BASOPHILS # BLD AUTO: 0 X10*3/UL (ref 0–0.1)
BASOPHILS NFR BLD AUTO: 0 %
BILIRUB SERPL-MCNC: 0.6 MG/DL (ref 0–1.2)
BUN SERPL-MCNC: 10 MG/DL (ref 6–23)
CALCIUM SERPL-MCNC: 9.7 MG/DL (ref 8.6–10.3)
CHLORIDE SERPL-SCNC: 104 MMOL/L (ref 98–107)
CO2 SERPL-SCNC: 25 MMOL/L (ref 21–32)
CREAT SERPL-MCNC: 0.86 MG/DL (ref 0.5–1.3)
EGFRCR SERPLBLD CKD-EPI 2021: >90 ML/MIN/1.73M*2
EOSINOPHIL # BLD AUTO: 0.01 X10*3/UL (ref 0–0.7)
EOSINOPHIL NFR BLD AUTO: 0.5 %
ERYTHROCYTE [DISTWIDTH] IN BLOOD BY AUTOMATED COUNT: 18 % (ref 11.5–14.5)
GLUCOSE SERPL-MCNC: 100 MG/DL (ref 74–99)
HCT VFR BLD AUTO: 37.4 % (ref 41–52)
HGB BLD-MCNC: 12.5 G/DL (ref 13.5–17.5)
IMM GRANULOCYTES # BLD AUTO: 0 X10*3/UL (ref 0–0.7)
IMM GRANULOCYTES NFR BLD AUTO: 0 % (ref 0–0.9)
LDH SERPL L TO P-CCNC: 156 U/L (ref 84–246)
LYMPHOCYTES # BLD AUTO: 1.37 X10*3/UL (ref 1.2–4.8)
LYMPHOCYTES NFR BLD AUTO: 65.2 %
MCH RBC QN AUTO: 34.8 PG (ref 26–34)
MCHC RBC AUTO-ENTMCNC: 33.4 G/DL (ref 32–36)
MCV RBC AUTO: 104 FL (ref 80–100)
MONOCYTES # BLD AUTO: 0.01 X10*3/UL (ref 0.1–1)
MONOCYTES NFR BLD AUTO: 0.5 %
NEUTROPHILS # BLD AUTO: 0.71 X10*3/UL (ref 1.2–7.7)
NEUTROPHILS NFR BLD AUTO: 33.8 %
NRBC BLD-RTO: ABNORMAL /100{WBCS}
PLATELET # BLD AUTO: 41 X10*3/UL (ref 150–450)
POTASSIUM SERPL-SCNC: 4 MMOL/L (ref 3.5–5.3)
PROT SERPL-MCNC: 7.4 G/DL (ref 6.4–8.2)
RBC # BLD AUTO: 3.59 X10*6/UL (ref 4.5–5.9)
SODIUM SERPL-SCNC: 138 MMOL/L (ref 136–145)
WBC # BLD AUTO: 2.1 X10*3/UL (ref 4.4–11.3)

## 2025-06-12 PROCEDURE — 1159F MED LIST DOCD IN RCRD: CPT | Performed by: INTERNAL MEDICINE

## 2025-06-12 PROCEDURE — 99215 OFFICE O/P EST HI 40 MIN: CPT | Performed by: INTERNAL MEDICINE

## 2025-06-12 PROCEDURE — 1160F RVW MEDS BY RX/DR IN RCRD: CPT | Performed by: INTERNAL MEDICINE

## 2025-06-12 PROCEDURE — G2211 COMPLEX E/M VISIT ADD ON: HCPCS | Performed by: INTERNAL MEDICINE

## 2025-06-12 PROCEDURE — 36415 COLL VENOUS BLD VENIPUNCTURE: CPT

## 2025-06-12 PROCEDURE — 83615 LACTATE (LD) (LDH) ENZYME: CPT

## 2025-06-12 PROCEDURE — 1125F AMNT PAIN NOTED PAIN PRSNT: CPT | Performed by: INTERNAL MEDICINE

## 2025-06-12 PROCEDURE — 80053 COMPREHEN METABOLIC PANEL: CPT

## 2025-06-12 PROCEDURE — 85025 COMPLETE CBC W/AUTO DIFF WBC: CPT

## 2025-06-12 RX ORDER — EPINEPHRINE 0.3 MG/.3ML
0.3 INJECTION SUBCUTANEOUS EVERY 5 MIN PRN
OUTPATIENT
Start: 2025-06-30

## 2025-06-12 RX ORDER — PROCHLORPERAZINE MALEATE 10 MG
10 TABLET ORAL EVERY 6 HOURS PRN
OUTPATIENT
Start: 2025-06-27

## 2025-06-12 RX ORDER — PROCHLORPERAZINE EDISYLATE 5 MG/ML
10 INJECTION INTRAMUSCULAR; INTRAVENOUS EVERY 6 HOURS PRN
OUTPATIENT
Start: 2025-07-02

## 2025-06-12 RX ORDER — ALBUTEROL SULFATE 0.83 MG/ML
3 SOLUTION RESPIRATORY (INHALATION) AS NEEDED
OUTPATIENT
Start: 2025-07-07

## 2025-06-12 RX ORDER — ONDANSETRON 8 MG/1
8 TABLET, FILM COATED ORAL ONCE
OUTPATIENT
Start: 2025-07-01

## 2025-06-12 RX ORDER — FAMOTIDINE 10 MG/ML
20 INJECTION, SOLUTION INTRAVENOUS ONCE AS NEEDED
OUTPATIENT
Start: 2025-07-01

## 2025-06-12 RX ORDER — ALBUTEROL SULFATE 0.83 MG/ML
3 SOLUTION RESPIRATORY (INHALATION) AS NEEDED
OUTPATIENT
Start: 2025-06-30

## 2025-06-12 RX ORDER — EPINEPHRINE 0.3 MG/.3ML
0.3 INJECTION SUBCUTANEOUS EVERY 5 MIN PRN
OUTPATIENT
Start: 2025-06-27

## 2025-06-12 RX ORDER — ALBUTEROL SULFATE 0.83 MG/ML
3 SOLUTION RESPIRATORY (INHALATION) AS NEEDED
OUTPATIENT
Start: 2025-06-27

## 2025-06-12 RX ORDER — ONDANSETRON 8 MG/1
8 TABLET, FILM COATED ORAL ONCE
OUTPATIENT
Start: 2025-06-30

## 2025-06-12 RX ORDER — EPINEPHRINE 0.3 MG/.3ML
0.3 INJECTION SUBCUTANEOUS EVERY 5 MIN PRN
OUTPATIENT
Start: 2025-07-03

## 2025-06-12 RX ORDER — PROCHLORPERAZINE EDISYLATE 5 MG/ML
10 INJECTION INTRAMUSCULAR; INTRAVENOUS EVERY 6 HOURS PRN
OUTPATIENT
Start: 2025-06-26

## 2025-06-12 RX ORDER — PROCHLORPERAZINE MALEATE 10 MG
10 TABLET ORAL EVERY 6 HOURS PRN
OUTPATIENT
Start: 2025-07-02

## 2025-06-12 RX ORDER — ONDANSETRON 8 MG/1
8 TABLET, FILM COATED ORAL ONCE
OUTPATIENT
Start: 2025-07-02

## 2025-06-12 RX ORDER — ALBUTEROL SULFATE 0.83 MG/ML
3 SOLUTION RESPIRATORY (INHALATION) AS NEEDED
OUTPATIENT
Start: 2025-07-02

## 2025-06-12 RX ORDER — FAMOTIDINE 10 MG/ML
20 INJECTION, SOLUTION INTRAVENOUS ONCE AS NEEDED
OUTPATIENT
Start: 2025-07-03

## 2025-06-12 RX ORDER — ONDANSETRON 8 MG/1
8 TABLET, FILM COATED ORAL ONCE
OUTPATIENT
Start: 2025-06-27

## 2025-06-12 RX ORDER — PROCHLORPERAZINE EDISYLATE 5 MG/ML
10 INJECTION INTRAMUSCULAR; INTRAVENOUS EVERY 6 HOURS PRN
OUTPATIENT
Start: 2025-07-07

## 2025-06-12 RX ORDER — PROCHLORPERAZINE MALEATE 10 MG
10 TABLET ORAL EVERY 6 HOURS PRN
OUTPATIENT
Start: 2025-07-03

## 2025-06-12 RX ORDER — PROCHLORPERAZINE EDISYLATE 5 MG/ML
10 INJECTION INTRAMUSCULAR; INTRAVENOUS EVERY 6 HOURS PRN
OUTPATIENT
Start: 2025-07-01

## 2025-06-12 RX ORDER — DIPHENHYDRAMINE HYDROCHLORIDE 50 MG/ML
50 INJECTION, SOLUTION INTRAMUSCULAR; INTRAVENOUS AS NEEDED
OUTPATIENT
Start: 2025-07-01

## 2025-06-12 RX ORDER — DIPHENHYDRAMINE HYDROCHLORIDE 50 MG/ML
50 INJECTION, SOLUTION INTRAMUSCULAR; INTRAVENOUS AS NEEDED
OUTPATIENT
Start: 2025-07-03

## 2025-06-12 RX ORDER — DIPHENHYDRAMINE HYDROCHLORIDE 50 MG/ML
50 INJECTION, SOLUTION INTRAMUSCULAR; INTRAVENOUS AS NEEDED
OUTPATIENT
Start: 2025-06-27

## 2025-06-12 RX ORDER — DIPHENHYDRAMINE HYDROCHLORIDE 50 MG/ML
50 INJECTION, SOLUTION INTRAMUSCULAR; INTRAVENOUS AS NEEDED
OUTPATIENT
Start: 2025-06-30

## 2025-06-12 RX ORDER — ONDANSETRON 8 MG/1
8 TABLET, FILM COATED ORAL ONCE
OUTPATIENT
Start: 2025-07-07

## 2025-06-12 RX ORDER — FAMOTIDINE 10 MG/ML
20 INJECTION, SOLUTION INTRAVENOUS ONCE AS NEEDED
OUTPATIENT
Start: 2025-06-26

## 2025-06-12 RX ORDER — ONDANSETRON 8 MG/1
8 TABLET, FILM COATED ORAL ONCE
OUTPATIENT
Start: 2025-07-03

## 2025-06-12 RX ORDER — FAMOTIDINE 10 MG/ML
20 INJECTION, SOLUTION INTRAVENOUS ONCE AS NEEDED
OUTPATIENT
Start: 2025-07-07

## 2025-06-12 RX ORDER — ONDANSETRON 8 MG/1
8 TABLET, FILM COATED ORAL ONCE
OUTPATIENT
Start: 2025-06-26

## 2025-06-12 RX ORDER — EPINEPHRINE 0.3 MG/.3ML
0.3 INJECTION SUBCUTANEOUS EVERY 5 MIN PRN
OUTPATIENT
Start: 2025-07-07

## 2025-06-12 RX ORDER — PROCHLORPERAZINE EDISYLATE 5 MG/ML
10 INJECTION INTRAMUSCULAR; INTRAVENOUS EVERY 6 HOURS PRN
OUTPATIENT
Start: 2025-07-03

## 2025-06-12 RX ORDER — PROCHLORPERAZINE EDISYLATE 5 MG/ML
10 INJECTION INTRAMUSCULAR; INTRAVENOUS EVERY 6 HOURS PRN
OUTPATIENT
Start: 2025-06-27

## 2025-06-12 RX ORDER — EPINEPHRINE 0.3 MG/.3ML
0.3 INJECTION SUBCUTANEOUS EVERY 5 MIN PRN
OUTPATIENT
Start: 2025-07-02

## 2025-06-12 RX ORDER — PROCHLORPERAZINE MALEATE 10 MG
10 TABLET ORAL EVERY 6 HOURS PRN
OUTPATIENT
Start: 2025-06-30

## 2025-06-12 RX ORDER — FAMOTIDINE 10 MG/ML
20 INJECTION, SOLUTION INTRAVENOUS ONCE AS NEEDED
OUTPATIENT
Start: 2025-06-30

## 2025-06-12 RX ORDER — PROCHLORPERAZINE MALEATE 10 MG
10 TABLET ORAL EVERY 6 HOURS PRN
OUTPATIENT
Start: 2025-06-26

## 2025-06-12 RX ORDER — EPINEPHRINE 0.3 MG/.3ML
0.3 INJECTION SUBCUTANEOUS EVERY 5 MIN PRN
OUTPATIENT
Start: 2025-07-01

## 2025-06-12 RX ORDER — PROCHLORPERAZINE MALEATE 10 MG
10 TABLET ORAL EVERY 6 HOURS PRN
OUTPATIENT
Start: 2025-07-07

## 2025-06-12 RX ORDER — PROCHLORPERAZINE MALEATE 10 MG
10 TABLET ORAL EVERY 6 HOURS PRN
OUTPATIENT
Start: 2025-07-01

## 2025-06-12 RX ORDER — FAMOTIDINE 10 MG/ML
20 INJECTION, SOLUTION INTRAVENOUS ONCE AS NEEDED
OUTPATIENT
Start: 2025-06-27

## 2025-06-12 RX ORDER — ALBUTEROL SULFATE 0.83 MG/ML
3 SOLUTION RESPIRATORY (INHALATION) AS NEEDED
OUTPATIENT
Start: 2025-07-03

## 2025-06-12 RX ORDER — ALBUTEROL SULFATE 0.83 MG/ML
3 SOLUTION RESPIRATORY (INHALATION) AS NEEDED
OUTPATIENT
Start: 2025-06-26

## 2025-06-12 RX ORDER — DIPHENHYDRAMINE HYDROCHLORIDE 50 MG/ML
50 INJECTION, SOLUTION INTRAMUSCULAR; INTRAVENOUS AS NEEDED
OUTPATIENT
Start: 2025-06-26

## 2025-06-12 RX ORDER — ALBUTEROL SULFATE 0.83 MG/ML
3 SOLUTION RESPIRATORY (INHALATION) AS NEEDED
OUTPATIENT
Start: 2025-07-01

## 2025-06-12 RX ORDER — DIPHENHYDRAMINE HYDROCHLORIDE 50 MG/ML
50 INJECTION, SOLUTION INTRAMUSCULAR; INTRAVENOUS AS NEEDED
OUTPATIENT
Start: 2025-07-07

## 2025-06-12 RX ORDER — PROCHLORPERAZINE EDISYLATE 5 MG/ML
10 INJECTION INTRAMUSCULAR; INTRAVENOUS EVERY 6 HOURS PRN
OUTPATIENT
Start: 2025-06-30

## 2025-06-12 RX ORDER — DIPHENHYDRAMINE HYDROCHLORIDE 50 MG/ML
50 INJECTION, SOLUTION INTRAMUSCULAR; INTRAVENOUS AS NEEDED
OUTPATIENT
Start: 2025-07-02

## 2025-06-12 RX ORDER — EPINEPHRINE 0.3 MG/.3ML
0.3 INJECTION SUBCUTANEOUS EVERY 5 MIN PRN
OUTPATIENT
Start: 2025-06-26

## 2025-06-12 RX ORDER — FAMOTIDINE 10 MG/ML
20 INJECTION, SOLUTION INTRAVENOUS ONCE AS NEEDED
OUTPATIENT
Start: 2025-07-02

## 2025-06-12 ASSESSMENT — PAIN SCALES - GENERAL: PAINLEVEL_OUTOF10: 8
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
## 2025-06-12 NOTE — ASSESSMENT & PLAN NOTE
6/12/2025: His blood counts are dropping at the end of the cycle, and so we may end up again reducing -however I would like to continue his treatment until such time that he is able to proceed to transplant  Diagnostics:  - none pending  Treatment:  - Cycle 3 of azacitidine 75 mg/m² for 7 days on days 1-7 (or 1-9 with a weekend off) combined with venetoclax 400 mg or equivalent (200 mg with his concomitant fluconazole) - reduce to days 1-14 given delay in cycle 3  Disease Monitoring:  -Minimum twice weekly CBCs while pending treatment start, low threshold to consider admission in the setting of complication  Supportive Care:  -Twice weekly infusion visits for supportive transfusions - use plts > 50 when taking rivaroxaban, but patient should HOLD when plts decrease.   Antimicrobial Prophylaxis:  -Acyclovir  -Fluconazole  -Levofloxacin  IV access:  -Peripheral IV for the moment; may consider PICC line

## 2025-06-12 NOTE — ASSESSMENT & PLAN NOTE
6/12/2025: Try to have an open-ended discussion with patient regarding his own personal insights to yesterday.  Overall, emphasized that I am not a mental health professional because of that I do not feel qualified to fully assess his diagnosis..  I am concerned that he seems to be making rash decisions around individuals he present procedures want him within the [facility], and his family emphasized that some of his behaviors could put his overall quality of life, and health at risk, for example he seems to have threatened to go to the police department or [employer] within [place] where he has a restraining order, at which point he could be arrested, and therefore not able to continue his treatments.      I expressed my concern that he could feel more trapped while a patient for transplant, and I think that is absolutely essential he have a coping mechanism and the plan and work treatment to manage the symptoms while he is admitted, and after has his priority should be focused on his health.    My general recommendation is that I do not think we should proceed to allogenic stem cell transplant, but will continue the treatment for his leukemia.  Delaying his transplant will have risks of potentially seeing his leukemia return, as well as potential risks of toxicity and/or complications from his treatment, though he has tolerated this well and hopefully is with continued monitoring will keep this risk as low as possible.  However, I reviewed that mental health challenges or unknown diagnosis/uncontrolled cognitive/mental health issues during his transplant could have severe consequences due to the strict nature of treatment and treatment regimen.  I advised that we feel he must complete neuropsychiatric testing prior to admission    Overall, Mr. Inman expressed a high level of frustration, indicated that he feels his life is just no longer under his control.

## 2025-06-12 NOTE — ASSESSMENT & PLAN NOTE
6/12/2025: See behavioral concern below, I informed him that I do not think we should admit for his transplant until he has a plan for coping in place.  Overall I think this is a risk-benefit assessment, as if his mental health not sufficiently managed, to where he has impulses to stop his treatment or put himself in a situation where he cannot access his treatment, transplant is more likely to shorten his life rather than it is to extend it.

## 2025-06-13 ENCOUNTER — APPOINTMENT (OUTPATIENT)
Dept: HEMATOLOGY/ONCOLOGY | Facility: CLINIC | Age: 65
End: 2025-06-13
Payer: MEDICARE

## 2025-06-13 ASSESSMENT — ENCOUNTER SYMPTOMS
FLANK PAIN: 0
APPETITE CHANGE: 0
SHORTNESS OF BREATH: 0
RHINORRHEA: 0
SINUS PAIN: 0
NAUSEA: 0
JOINT SWELLING: 0
FATIGUE: 1
FEVER: 0
ACTIVITY CHANGE: 0
DIFFICULTY URINATING: 0
COUGH: 0
SORE THROAT: 0
CONSTIPATION: 1
CONFUSION: 0
NUMBNESS: 0
SINUS PRESSURE: 0
DIARRHEA: 0
BACK PAIN: 0
ABDOMINAL PAIN: 0
DIAPHORESIS: 0
VOMITING: 0
ADENOPATHY: 0
NERVOUS/ANXIOUS: 0
CHILLS: 0
HEADACHES: 0
WEAKNESS: 0
UNEXPECTED WEIGHT CHANGE: 0
ARTHRALGIAS: 1
LIGHT-HEADEDNESS: 0
BRUISES/BLEEDS EASILY: 0
DYSPHORIC MOOD: 1

## 2025-06-16 ENCOUNTER — APPOINTMENT (OUTPATIENT)
Dept: HEMATOLOGY/ONCOLOGY | Facility: HOSPITAL | Age: 65
End: 2025-06-16
Payer: MEDICARE

## 2025-06-16 ENCOUNTER — APPOINTMENT (OUTPATIENT)
Dept: HEMATOLOGY/ONCOLOGY | Facility: CLINIC | Age: 65
End: 2025-06-16
Payer: MEDICARE

## 2025-06-16 LAB
ATRIAL RATE: 69 BPM
P AXIS: 51 DEGREES
P OFFSET: 195 MS
P ONSET: 130 MS
PR INTERVAL: 164 MS
Q ONSET: 212 MS
QRS COUNT: 12 BEATS
QRS DURATION: 92 MS
QT INTERVAL: 380 MS
QTC CALCULATION(BAZETT): 407 MS
QTC FREDERICIA: 398 MS
R AXIS: 3 DEGREES
T AXIS: 33 DEGREES
T OFFSET: 402 MS
VENTRICULAR RATE: 69 BPM

## 2025-06-17 ENCOUNTER — HOME CARE VISIT (OUTPATIENT)
Dept: HOME HEALTH SERVICES | Facility: HOME HEALTH | Age: 65
End: 2025-06-17
Payer: MEDICARE

## 2025-06-17 ENCOUNTER — APPOINTMENT (OUTPATIENT)
Dept: HEMATOLOGY/ONCOLOGY | Facility: CLINIC | Age: 65
End: 2025-06-17
Payer: MEDICARE

## 2025-06-17 ENCOUNTER — APPOINTMENT (OUTPATIENT)
Dept: RADIATION ONCOLOGY | Facility: CLINIC | Age: 65
End: 2025-06-17
Payer: MEDICARE

## 2025-06-17 PROCEDURE — G0157 HHC PT ASSISTANT EA 15: HCPCS | Mod: CQ,HHH

## 2025-06-17 ASSESSMENT — ENCOUNTER SYMPTOMS
PAIN LOCATION: LEFT HIP
PAIN: 1
PAIN LOCATION - PAIN SEVERITY: 10/10
PERSON REPORTING PAIN: PATIENT
PAIN LOCATION: RIGHT HIP
PAIN LOCATION - PAIN SEVERITY: 10/10

## 2025-06-18 ENCOUNTER — TELEPHONE (OUTPATIENT)
Dept: HEMATOLOGY/ONCOLOGY | Facility: CLINIC | Age: 65
End: 2025-06-18
Payer: MEDICARE

## 2025-06-18 ENCOUNTER — HOME CARE VISIT (OUTPATIENT)
Dept: HOME HEALTH SERVICES | Facility: HOME HEALTH | Age: 65
End: 2025-06-18
Payer: MEDICARE

## 2025-06-18 ENCOUNTER — APPOINTMENT (OUTPATIENT)
Dept: HEMATOLOGY/ONCOLOGY | Facility: CLINIC | Age: 65
End: 2025-06-18
Payer: MEDICARE

## 2025-06-18 NOTE — TELEPHONE ENCOUNTER
"Noted that patient's infusion apt was cancelled for tomorrow on MyChart by patient. I called Link to discuss, no answer, left voicemail to call office. I then called and spoke with his wife Emi - I updated her about infusion apt, she stated they accidentally cancelled the apt on MyChart. I let her know apt has been rescheduled  - she was appreciative of call.     Emi stated while on the phone she wanted to discuss how Link has been doing. She stated since seeing Dr. Gomez in clinic last Thursday he \"has been sad, not been doing well\" and seems \"not all there\". She states he is safe, no concerns for harming self or others. She states he has not been aggressive or angry. She is concerned his behavior/mood is stemming from the amount of pain he is experiencing from hip. She stated he has been following with pain management and trying their recommended medications (Journavax) but continues to have chronic pain. She stated last evening he was crying due to pain he is experiencing and had difficulty walking from bed to bath due to the pain. She states he is \"not coping well\", feels \"victimized\" and a \"loss of ability to have control over things.\" She stated this week patient is seeking second opinion at [facility]. Emotional support given to Emi. We discussed resources - SW, [facility], etc - she stated they are going to art therapy this evening. Patient has follow up with Dr. Wilkins next week 6/23. We discussed seeing mal hem provider tomorrow in clinic prior to infusion - Emi stated Benoit has a lot of questions, she is encouraging him to write down to discuss with Nikki LAND NP. Of note -  wife stated after visit with Dr. Gomez last week he was \"making paranoid comments: Dr. Gomez must be scared of me, he sat by the door and did not make eye contact the whole visit\". Wife stated she knew this was not correct as she was in the room and felt eye contact was with everyone in the room. " Dr. Gomez and Nikki Patel made aware of above information.  Appointment times for tomorrow reviewed. I reviewed note from pain management, states to follow up as on needed basis - I encouraged Emi to call their office to report his pain level. Phone number ([phone]) offered, Emi stated she has phone number. Emi was appreciative and had no other questions at this time.

## 2025-06-19 ENCOUNTER — OFFICE VISIT (OUTPATIENT)
Dept: HEMATOLOGY/ONCOLOGY | Facility: CLINIC | Age: 65
End: 2025-06-19
Payer: MEDICARE

## 2025-06-19 ENCOUNTER — APPOINTMENT (OUTPATIENT)
Dept: HEMATOLOGY/ONCOLOGY | Facility: CLINIC | Age: 65
End: 2025-06-19
Payer: MEDICARE

## 2025-06-19 ENCOUNTER — INFUSION (OUTPATIENT)
Dept: HEMATOLOGY/ONCOLOGY | Facility: CLINIC | Age: 65
End: 2025-06-19
Payer: MEDICARE

## 2025-06-19 ENCOUNTER — DOCUMENTATION (OUTPATIENT)
Dept: HEMATOLOGY/ONCOLOGY | Facility: HOSPITAL | Age: 65
End: 2025-06-19
Payer: MEDICARE

## 2025-06-19 VITALS
BODY MASS INDEX: 27.16 KG/M2 | SYSTOLIC BLOOD PRESSURE: 117 MMHG | HEART RATE: 97 BPM | DIASTOLIC BLOOD PRESSURE: 77 MMHG | WEIGHT: 194.45 LBS | OXYGEN SATURATION: 94 % | TEMPERATURE: 97.9 F | RESPIRATION RATE: 17 BRPM

## 2025-06-19 DIAGNOSIS — F69 BEHAVIOR CONCERN IN ADULT: ICD-10-CM

## 2025-06-19 DIAGNOSIS — C92.00 ACUTE MYELOID LEUKEMIA NOT HAVING ACHIEVED REMISSION (MULTI): ICD-10-CM

## 2025-06-19 DIAGNOSIS — C92.00 ACUTE MYELOID LEUKEMIA NOT HAVING ACHIEVED REMISSION (MULTI): Primary | ICD-10-CM

## 2025-06-19 DIAGNOSIS — Z76.82 STEM CELL TRANSPLANT CANDIDATE: ICD-10-CM

## 2025-06-19 PROBLEM — Z00.00 MEDICARE ANNUAL WELLNESS VISIT, SUBSEQUENT: Status: RESOLVED | Noted: 2025-04-22 | Resolved: 2025-06-19

## 2025-06-19 LAB
ALBUMIN SERPL BCP-MCNC: 4.1 G/DL (ref 3.4–5)
ALP SERPL-CCNC: 100 U/L (ref 33–136)
ALT SERPL W P-5'-P-CCNC: 13 U/L (ref 10–52)
ANION GAP SERPL CALC-SCNC: 13 MMOL/L (ref 10–20)
AST SERPL W P-5'-P-CCNC: 11 U/L (ref 9–39)
BASOPHILS # BLD AUTO: 0 X10*3/UL (ref 0–0.1)
BASOPHILS NFR BLD AUTO: 0 %
BILIRUB SERPL-MCNC: 0.4 MG/DL (ref 0–1.2)
BUN SERPL-MCNC: 21 MG/DL (ref 6–23)
CALCIUM SERPL-MCNC: 9.7 MG/DL (ref 8.6–10.3)
CHLORIDE SERPL-SCNC: 103 MMOL/L (ref 98–107)
CO2 SERPL-SCNC: 27 MMOL/L (ref 21–32)
CREAT SERPL-MCNC: 1.01 MG/DL (ref 0.5–1.3)
EGFRCR SERPLBLD CKD-EPI 2021: 82 ML/MIN/1.73M*2
EOSINOPHIL # BLD AUTO: 0.01 X10*3/UL (ref 0–0.7)
EOSINOPHIL NFR BLD AUTO: 1 %
ERYTHROCYTE [DISTWIDTH] IN BLOOD BY AUTOMATED COUNT: 16.8 % (ref 11.5–14.5)
GLUCOSE SERPL-MCNC: 144 MG/DL (ref 74–99)
HCT VFR BLD AUTO: 33.6 % (ref 41–52)
HGB BLD-MCNC: 11.4 G/DL (ref 13.5–17.5)
IMM GRANULOCYTES # BLD AUTO: 0 X10*3/UL (ref 0–0.7)
IMM GRANULOCYTES NFR BLD AUTO: 0 % (ref 0–0.9)
LYMPHOCYTES # BLD AUTO: 0.95 X10*3/UL (ref 1.2–4.8)
LYMPHOCYTES NFR BLD AUTO: 94.1 %
MCH RBC QN AUTO: 34.9 PG (ref 26–34)
MCHC RBC AUTO-ENTMCNC: 33.9 G/DL (ref 32–36)
MCV RBC AUTO: 103 FL (ref 80–100)
MONOCYTES # BLD AUTO: 0.03 X10*3/UL (ref 0.1–1)
MONOCYTES NFR BLD AUTO: 3 %
NEUTROPHILS # BLD AUTO: 0.02 X10*3/UL (ref 1.2–7.7)
NEUTROPHILS NFR BLD AUTO: 1.9 %
NRBC BLD-RTO: ABNORMAL /100{WBCS}
PLATELET # BLD AUTO: 51 X10*3/UL (ref 150–450)
POTASSIUM SERPL-SCNC: 4.2 MMOL/L (ref 3.5–5.3)
PROT SERPL-MCNC: 6.9 G/DL (ref 6.4–8.2)
RBC # BLD AUTO: 3.27 X10*6/UL (ref 4.5–5.9)
SODIUM SERPL-SCNC: 139 MMOL/L (ref 136–145)
WBC # BLD AUTO: 1 X10*3/UL (ref 4.4–11.3)

## 2025-06-19 PROCEDURE — 1125F AMNT PAIN NOTED PAIN PRSNT: CPT | Performed by: NURSE PRACTITIONER

## 2025-06-19 PROCEDURE — 99215 OFFICE O/P EST HI 40 MIN: CPT | Performed by: NURSE PRACTITIONER

## 2025-06-19 PROCEDURE — 1159F MED LIST DOCD IN RCRD: CPT | Performed by: NURSE PRACTITIONER

## 2025-06-19 PROCEDURE — 85025 COMPLETE CBC W/AUTO DIFF WBC: CPT

## 2025-06-19 PROCEDURE — 36415 COLL VENOUS BLD VENIPUNCTURE: CPT

## 2025-06-19 PROCEDURE — 1160F RVW MEDS BY RX/DR IN RCRD: CPT | Performed by: NURSE PRACTITIONER

## 2025-06-19 PROCEDURE — G2211 COMPLEX E/M VISIT ADD ON: HCPCS | Performed by: NURSE PRACTITIONER

## 2025-06-19 PROCEDURE — 80053 COMPREHEN METABOLIC PANEL: CPT

## 2025-06-19 ASSESSMENT — PAIN SCALES - GENERAL: PAINLEVEL_OUTOF10: 10-WORST PAIN EVER

## 2025-06-19 NOTE — PROGRESS NOTES
Patient ID: Link Inman is a 64 y.o. male.  Referring Physician: No referring provider defined for this encounter.  Primary Care Provider: Sukhdeep Lopez MD    Date of Service:  6/19/2025    SUBJECTIVE:  Patient presents today, accompanied by his wife, for follow up.  Reports that his only 2 complaints are his bilateral hip and leg pain and his insomnia.  The pain is constant and affects his ability to walk, especially long distances. He is trying to brace himself with pillows while sleeping , it helps slightly.  He sleeps about 2.5-3.5 hours per night.  Mostly related to the hip pain.  He did state that he feels like his freedoms are being taken away and that he has lost control of his life.  He is used to being able to make clear plans and now things are all up in the air.  He is concerned that he got his illness from benzenes in the water.        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  On review with patient has increasing fatigue and reduced activity since November 2024.  Underwent bone marrow biopsy 3/27/2025 that demonstrated:   BONE MARROW, LEFT ILIAC CREST, ASPIRATE WITH CLOT AND BIOPSY WITH TOUCH PREPARATION:   -- ACUTE MYELOID LEUKEMIA (estimated at 80% of marrow cellularity).  -- MARKEDLY DECREASED MATURING TRILINEAGE HEMATOPOIESIS.  Karyotype: 46 XY  FISH: FISH NEGATIVE for inverted 3/t(3;3), t(6;9), t(8;21), gain of RUNX1 or [id number], MLL (KMT2A) rearrangement, t(15;17), inverted 16/t(16;16), TP53 deletion and monosomy 17   NGS: TET2 p.* VAF: 32% TET2 p.* VAF: 32%        4/10/2025 -  Chemotherapy    Treatment:   I. Azacitidine 75m/m2 days 1-7, with venetoclax 400mg days 1-28  Cycle 1 - initiated 4/10/2025.  Overall well  tolerated with expected complications  Response: marrow completed 5/1/2025 with no evidence of leukemia  Cycle 2: plan to reduce cat to days 1-21, plan start when ANC > 1000  Venetoclax / AzaCITIDine, 28 Day Cycles - Induction / Consolidation        OBJECTIVE:  KPS: Karnofsky Score: 70 - Cares for self; unable to carry on normal activity or do normal work    VS:  /77 (BP Location: Right arm, Patient Position: Sitting, BP Cuff Size: Adult)   Pulse 97   Temp 36.6 °C (97.9 °F) (Temporal)   Resp 17   Wt 88.2 kg (194 lb 7.1 oz) Comment: weight w/ shoes  SpO2 94%   BMI 27.16 kg/m²   BSA: 2.1 meters squared    Physical Exam  Constitutional:       Appearance: Normal appearance.      Comments: In wheelchair   HENT:      Head: Normocephalic.      Mouth/Throat:      Mouth: Mucous membranes are moist.      Pharynx: Oropharynx is clear. No oropharyngeal exudate.   Eyes:      Pupils: Pupils are equal, round, and reactive to light.   Cardiovascular:      Rate and Rhythm: Normal rate and regular rhythm.      Pulses: Normal pulses.      Heart sounds: Normal heart sounds.   Pulmonary:      Effort: Pulmonary effort is normal.      Breath sounds: Normal breath sounds.   Abdominal:      General: Bowel sounds are normal.      Palpations: Abdomen is soft.   Musculoskeletal:         General: Normal range of motion.      Cervical back: Normal range of motion and neck supple.      Right lower leg: No edema.      Left lower leg: No edema.   Lymphadenopathy:      Comments: No lymphadenopathy   Skin:     General: Skin is warm and dry.      Findings: No lesion or rash.   Neurological:      General: No focal deficit present.      Mental Status: He is alert and oriented to person, place, and time. Mental status is at baseline.      Comments: No numbness or tingling   Psychiatric:         Mood and Affect: Mood normal.       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  When we are able to restart will decrease Zuhair to days 1-14.  If needing to delay 4 weeks (today is the 2nd delay), will repeat Bmbx    Diagnostics:  - none pending  Treatment:  - Cycle 3 of azacitidine 75 mg/m² for 7 days on days 1-7 (or 1-9 with a weekend off) combined with venetoclax 400 mg or equivalent (200 mg with his concomitant fluconazole) - reduce to days 1-14 given delay in cycle 3  Disease Monitoring:  -Minimum twice weekly CBCs while pending treatment start, low threshold to consider admission in the setting of complication  Supportive Care:  -Twice weekly infusion visits for supportive transfusions - use plts > 50 when taking rivaroxaban, but patient should HOLD when plts decrease.   Antimicrobial Prophylaxis:  -Acyclovir  -Fluconazole  -Levofloxacin  IV access:  -Peripheral IV for the moment; may consider PICC line  Behavior concern in adult  6/19/2025: We reviewed the discussion that was had with Dr. Gomez last week about not proceeding to transplant and continuing TX as we were.  We again discussed that transplant is a long and difficult process and we need him the best shape both physically and mentally. I told him that it would be helpful to get a diagnosis and then plan from there.  He is following with psych and will see them next week.  He seemed frustrated, but also understanding and agreeable with the plan to be evaluated by the psychiatry team.     RTC:  6/26 for potential start of C3D1  2 x weekly count checks    Nikki Patel, APRN-CNP

## 2025-06-20 ENCOUNTER — APPOINTMENT (OUTPATIENT)
Dept: HEMATOLOGY/ONCOLOGY | Facility: CLINIC | Age: 65
End: 2025-06-20
Payer: MEDICARE

## 2025-06-20 ENCOUNTER — APPOINTMENT (OUTPATIENT)
Dept: HEMATOLOGY/ONCOLOGY | Facility: HOSPITAL | Age: 65
End: 2025-06-20
Payer: MEDICARE

## 2025-06-22 DIAGNOSIS — F32.1 CURRENT MODERATE EPISODE OF MAJOR DEPRESSIVE DISORDER WITHOUT PRIOR EPISODE (MULTI): ICD-10-CM

## 2025-06-23 ENCOUNTER — APPOINTMENT (OUTPATIENT)
Dept: RADIATION ONCOLOGY | Facility: HOSPITAL | Age: 65
End: 2025-06-23
Payer: MEDICARE

## 2025-06-23 ENCOUNTER — HOME CARE VISIT (OUTPATIENT)
Dept: HOME HEALTH SERVICES | Facility: HOME HEALTH | Age: 65
End: 2025-06-23
Payer: MEDICARE

## 2025-06-23 ENCOUNTER — APPOINTMENT (OUTPATIENT)
Dept: BEHAVIORAL HEALTH | Facility: CLINIC | Age: 65
End: 2025-06-23
Payer: MEDICARE

## 2025-06-23 ENCOUNTER — APPOINTMENT (OUTPATIENT)
Dept: HEMATOLOGY/ONCOLOGY | Facility: HOSPITAL | Age: 65
End: 2025-06-23
Payer: MEDICARE

## 2025-06-23 ENCOUNTER — APPOINTMENT (OUTPATIENT)
Dept: PAIN MEDICINE | Facility: CLINIC | Age: 65
End: 2025-06-23
Payer: MEDICARE

## 2025-06-23 DIAGNOSIS — F39 PSYCHOTIC AFFECTIVE DISORDER: ICD-10-CM

## 2025-06-23 PROCEDURE — 99215 OFFICE O/P EST HI 40 MIN: CPT | Performed by: PSYCHIATRY & NEUROLOGY

## 2025-06-23 PROCEDURE — 1160F RVW MEDS BY RX/DR IN RCRD: CPT | Performed by: PSYCHIATRY & NEUROLOGY

## 2025-06-23 PROCEDURE — 1159F MED LIST DOCD IN RCRD: CPT | Performed by: PSYCHIATRY & NEUROLOGY

## 2025-06-23 RX ORDER — ARIPIPRAZOLE 2 MG/1
2 TABLET ORAL DAILY
Qty: 30 TABLET | Refills: 1 | Status: SHIPPED | OUTPATIENT
Start: 2025-06-23 | End: 2025-08-22

## 2025-06-23 RX ORDER — DULOXETIN HYDROCHLORIDE 20 MG/1
20 CAPSULE, DELAYED RELEASE ORAL 2 TIMES DAILY
Qty: 180 CAPSULE | Refills: 1 | OUTPATIENT
Start: 2025-06-23 | End: 2025-08-22

## 2025-06-23 SDOH — HEALTH STABILITY: PHYSICAL HEALTH: EXERCISE TYPE: INSTRUCTED AND DEMONSTRATED SUP INDEP SEATED THER EX PROGRAM USING HANDOUT PROVIDED

## 2025-06-23 ASSESSMENT — ACTIVITIES OF DAILY LIVING (ADL)
HOME_HEALTH_OASIS: 01
OASIS_M1830: 03

## 2025-06-24 ENCOUNTER — APPOINTMENT (OUTPATIENT)
Dept: HEMATOLOGY/ONCOLOGY | Facility: CLINIC | Age: 65
End: 2025-06-24
Payer: MEDICARE

## 2025-06-24 ENCOUNTER — APPOINTMENT (OUTPATIENT)
Dept: HEMATOLOGY/ONCOLOGY | Facility: HOSPITAL | Age: 65
End: 2025-06-24
Payer: MEDICARE

## 2025-06-24 ENCOUNTER — APPOINTMENT (OUTPATIENT)
Dept: HOME HEALTH SERVICES | Facility: HOME HEALTH | Age: 65
End: 2025-06-24
Payer: MEDICARE

## 2025-06-25 ENCOUNTER — APPOINTMENT (OUTPATIENT)
Dept: HEMATOLOGY/ONCOLOGY | Facility: CLINIC | Age: 65
End: 2025-06-25
Payer: MEDICARE

## 2025-06-26 ENCOUNTER — INFUSION (OUTPATIENT)
Dept: HEMATOLOGY/ONCOLOGY | Facility: CLINIC | Age: 65
End: 2025-06-26
Payer: MEDICARE

## 2025-06-26 VITALS
SYSTOLIC BLOOD PRESSURE: 108 MMHG | OXYGEN SATURATION: 96 % | WEIGHT: 192.68 LBS | RESPIRATION RATE: 17 BRPM | BODY MASS INDEX: 26.91 KG/M2 | HEART RATE: 100 BPM | DIASTOLIC BLOOD PRESSURE: 70 MMHG | TEMPERATURE: 98.4 F

## 2025-06-26 DIAGNOSIS — C92.00 ACUTE MYELOID LEUKEMIA NOT HAVING ACHIEVED REMISSION (MULTI): ICD-10-CM

## 2025-06-26 LAB
ALBUMIN SERPL BCP-MCNC: 3.9 G/DL (ref 3.4–5)
ALP SERPL-CCNC: 102 U/L (ref 33–136)
ALT SERPL W P-5'-P-CCNC: 11 U/L (ref 10–52)
ANION GAP SERPL CALC-SCNC: 13 MMOL/L (ref 10–20)
AST SERPL W P-5'-P-CCNC: 10 U/L (ref 9–39)
BILIRUB SERPL-MCNC: 0.3 MG/DL (ref 0–1.2)
BUN SERPL-MCNC: 16 MG/DL (ref 6–23)
CALCIUM SERPL-MCNC: 9.7 MG/DL (ref 8.6–10.3)
CHLORIDE SERPL-SCNC: 100 MMOL/L (ref 98–107)
CO2 SERPL-SCNC: 28 MMOL/L (ref 21–32)
CREAT SERPL-MCNC: 1.05 MG/DL (ref 0.5–1.3)
EGFRCR SERPLBLD CKD-EPI 2021: 78 ML/MIN/1.73M*2
GLUCOSE SERPL-MCNC: 137 MG/DL (ref 74–99)
POTASSIUM SERPL-SCNC: 4.3 MMOL/L (ref 3.5–5.3)
PROT SERPL-MCNC: 7 G/DL (ref 6.4–8.2)
SODIUM SERPL-SCNC: 137 MMOL/L (ref 136–145)

## 2025-06-26 PROCEDURE — 2500000004 HC RX 250 GENERAL PHARMACY W/ HCPCS (ALT 636 FOR OP/ED): Performed by: INTERNAL MEDICINE

## 2025-06-26 PROCEDURE — 80053 COMPREHEN METABOLIC PANEL: CPT

## 2025-06-26 PROCEDURE — 96401 CHEMO ANTI-NEOPL SQ/IM: CPT

## 2025-06-26 RX ORDER — HEPARIN 100 UNIT/ML
500 SYRINGE INTRAVENOUS AS NEEDED
OUTPATIENT
Start: 2025-06-26

## 2025-06-26 RX ORDER — ALBUTEROL SULFATE 0.83 MG/ML
3 SOLUTION RESPIRATORY (INHALATION) AS NEEDED
Status: DISCONTINUED | OUTPATIENT
Start: 2025-06-26 | End: 2025-06-26 | Stop reason: HOSPADM

## 2025-06-26 RX ORDER — EPINEPHRINE 0.3 MG/.3ML
0.3 INJECTION SUBCUTANEOUS EVERY 5 MIN PRN
Status: DISCONTINUED | OUTPATIENT
Start: 2025-06-26 | End: 2025-06-26 | Stop reason: HOSPADM

## 2025-06-26 RX ORDER — PROCHLORPERAZINE MALEATE 10 MG
10 TABLET ORAL EVERY 6 HOURS PRN
Status: DISCONTINUED | OUTPATIENT
Start: 2025-06-26 | End: 2025-06-26 | Stop reason: HOSPADM

## 2025-06-26 RX ORDER — FAMOTIDINE 10 MG/ML
20 INJECTION, SOLUTION INTRAVENOUS ONCE AS NEEDED
Status: DISCONTINUED | OUTPATIENT
Start: 2025-06-26 | End: 2025-06-26 | Stop reason: HOSPADM

## 2025-06-26 RX ORDER — ONDANSETRON 8 MG/1
8 TABLET, FILM COATED ORAL ONCE
Status: COMPLETED | OUTPATIENT
Start: 2025-06-26 | End: 2025-06-26

## 2025-06-26 RX ORDER — PROCHLORPERAZINE EDISYLATE 5 MG/ML
10 INJECTION INTRAMUSCULAR; INTRAVENOUS EVERY 6 HOURS PRN
Status: DISCONTINUED | OUTPATIENT
Start: 2025-06-26 | End: 2025-06-26 | Stop reason: HOSPADM

## 2025-06-26 RX ORDER — DIPHENHYDRAMINE HYDROCHLORIDE 50 MG/ML
50 INJECTION, SOLUTION INTRAMUSCULAR; INTRAVENOUS AS NEEDED
Status: DISCONTINUED | OUTPATIENT
Start: 2025-06-26 | End: 2025-06-26 | Stop reason: HOSPADM

## 2025-06-26 RX ORDER — HEPARIN SODIUM,PORCINE/PF 10 UNIT/ML
50 SYRINGE (ML) INTRAVENOUS AS NEEDED
OUTPATIENT
Start: 2025-06-26

## 2025-06-26 RX ADMIN — ONDANSETRON HYDROCHLORIDE 8 MG: 8 TABLET, FILM COATED ORAL at 10:01

## 2025-06-26 RX ADMIN — AZACITIDINE 170 MG: 100 INJECTION, POWDER, LYOPHILIZED, FOR SOLUTION INTRAVENOUS; SUBCUTANEOUS at 10:32

## 2025-06-26 ASSESSMENT — PAIN SCALES - GENERAL: PAINLEVEL_OUTOF10: 10-WORST PAIN EVER

## 2025-06-27 ENCOUNTER — APPOINTMENT (OUTPATIENT)
Dept: RADIOLOGY | Facility: HOSPITAL | Age: 65
End: 2025-06-27
Payer: MEDICARE

## 2025-06-27 ENCOUNTER — TELEPHONE (OUTPATIENT)
Dept: PAIN MEDICINE | Facility: CLINIC | Age: 65
End: 2025-06-27
Payer: MEDICARE

## 2025-06-27 ENCOUNTER — INFUSION (OUTPATIENT)
Dept: HEMATOLOGY/ONCOLOGY | Facility: CLINIC | Age: 65
End: 2025-06-27
Payer: MEDICARE

## 2025-06-27 ENCOUNTER — TELEPHONE (OUTPATIENT)
Dept: HEMATOLOGY/ONCOLOGY | Facility: HOSPITAL | Age: 65
End: 2025-06-27

## 2025-06-27 VITALS
TEMPERATURE: 97.3 F | HEART RATE: 94 BPM | WEIGHT: 193.56 LBS | DIASTOLIC BLOOD PRESSURE: 76 MMHG | RESPIRATION RATE: 16 BRPM | BODY MASS INDEX: 27.03 KG/M2 | OXYGEN SATURATION: 96 % | SYSTOLIC BLOOD PRESSURE: 119 MMHG

## 2025-06-27 DIAGNOSIS — C92.00 ACUTE MYELOID LEUKEMIA NOT HAVING ACHIEVED REMISSION (MULTI): ICD-10-CM

## 2025-06-27 DIAGNOSIS — M54.16 LUMBAR RADICULOPATHY, CHRONIC: Primary | ICD-10-CM

## 2025-06-27 DIAGNOSIS — R19.7 DIARRHEA, UNSPECIFIED TYPE: Primary | ICD-10-CM

## 2025-06-27 DIAGNOSIS — R19.7 DIARRHEA IN ADULT PATIENT: ICD-10-CM

## 2025-06-27 DIAGNOSIS — C92.00 ACUTE MYELOID LEUKEMIA NOT HAVING ACHIEVED REMISSION (MULTI): Primary | ICD-10-CM

## 2025-06-27 PROCEDURE — 2500000004 HC RX 250 GENERAL PHARMACY W/ HCPCS (ALT 636 FOR OP/ED): Performed by: INTERNAL MEDICINE

## 2025-06-27 PROCEDURE — 96401 CHEMO ANTI-NEOPL SQ/IM: CPT

## 2025-06-27 RX ORDER — ALBUTEROL SULFATE 0.83 MG/ML
3 SOLUTION RESPIRATORY (INHALATION) AS NEEDED
Status: DISCONTINUED | OUTPATIENT
Start: 2025-06-27 | End: 2025-06-27 | Stop reason: HOSPADM

## 2025-06-27 RX ORDER — FAMOTIDINE 10 MG/ML
20 INJECTION, SOLUTION INTRAVENOUS ONCE AS NEEDED
Status: DISCONTINUED | OUTPATIENT
Start: 2025-06-27 | End: 2025-06-27 | Stop reason: HOSPADM

## 2025-06-27 RX ORDER — ONDANSETRON 8 MG/1
8 TABLET, FILM COATED ORAL ONCE
Status: COMPLETED | OUTPATIENT
Start: 2025-06-27 | End: 2025-06-27

## 2025-06-27 RX ORDER — SUZETRIGINE 50 MG/1
50 TABLET, FILM COATED ORAL 2 TIMES DAILY
Qty: 10 TABLET | Refills: 3 | Status: SHIPPED | OUTPATIENT
Start: 2025-06-27 | End: 2025-06-27

## 2025-06-27 RX ORDER — EPINEPHRINE 0.3 MG/.3ML
0.3 INJECTION SUBCUTANEOUS EVERY 5 MIN PRN
Status: DISCONTINUED | OUTPATIENT
Start: 2025-06-27 | End: 2025-06-27 | Stop reason: HOSPADM

## 2025-06-27 RX ORDER — DIPHENHYDRAMINE HYDROCHLORIDE 50 MG/ML
50 INJECTION, SOLUTION INTRAMUSCULAR; INTRAVENOUS AS NEEDED
Status: DISCONTINUED | OUTPATIENT
Start: 2025-06-27 | End: 2025-06-27 | Stop reason: HOSPADM

## 2025-06-27 RX ADMIN — AZACITIDINE 170 MG: 100 INJECTION, POWDER, LYOPHILIZED, FOR SOLUTION INTRAVENOUS; SUBCUTANEOUS at 14:10

## 2025-06-27 RX ADMIN — ONDANSETRON HYDROCHLORIDE 8 MG: 8 TABLET, FILM COATED ORAL at 13:25

## 2025-06-27 ASSESSMENT — PAIN SCALES - GENERAL: PAINLEVEL_OUTOF10: 10-WORST PAIN EVER

## 2025-06-27 NOTE — TELEPHONE ENCOUNTER
Hi, this patient has left a message if you could send in a prescription for 4 doses of Journavax. I also see they did not make an appointment for today or Monday. I will call them to make a soonest available office visit per his request.

## 2025-06-27 NOTE — TELEPHONE ENCOUNTER
Call placed to patient's daughter Ketty regarding needing FMLA paperwork completed. Paperwork was completed and email sent to patient's daughter.

## 2025-06-30 ENCOUNTER — SPECIALTY PHARMACY (OUTPATIENT)
Dept: PHARMACY | Facility: CLINIC | Age: 65
End: 2025-06-30

## 2025-06-30 ENCOUNTER — INFUSION (OUTPATIENT)
Dept: HEMATOLOGY/ONCOLOGY | Facility: CLINIC | Age: 65
End: 2025-06-30
Payer: MEDICARE

## 2025-06-30 VITALS
WEIGHT: 194.45 LBS | HEART RATE: 99 BPM | BODY MASS INDEX: 27.16 KG/M2 | OXYGEN SATURATION: 96 % | DIASTOLIC BLOOD PRESSURE: 68 MMHG | RESPIRATION RATE: 16 BRPM | TEMPERATURE: 97.5 F | SYSTOLIC BLOOD PRESSURE: 112 MMHG

## 2025-06-30 DIAGNOSIS — C92.00 ACUTE MYELOID LEUKEMIA NOT HAVING ACHIEVED REMISSION (MULTI): ICD-10-CM

## 2025-06-30 LAB
ALBUMIN SERPL BCP-MCNC: 4.1 G/DL (ref 3.4–5)
ALP SERPL-CCNC: 111 U/L (ref 33–136)
ALT SERPL W P-5'-P-CCNC: 14 U/L (ref 10–52)
ANION GAP SERPL CALC-SCNC: 14 MMOL/L (ref 10–20)
AST SERPL W P-5'-P-CCNC: 13 U/L (ref 9–39)
BASOPHILS # BLD AUTO: 0.02 X10*3/UL (ref 0–0.1)
BASOPHILS NFR BLD AUTO: 0.3 %
BILIRUB SERPL-MCNC: 0.3 MG/DL (ref 0–1.2)
BUN SERPL-MCNC: 16 MG/DL (ref 6–23)
CALCIUM SERPL-MCNC: 9.8 MG/DL (ref 8.6–10.3)
CHLORIDE SERPL-SCNC: 99 MMOL/L (ref 98–107)
CO2 SERPL-SCNC: 28 MMOL/L (ref 21–32)
CREAT SERPL-MCNC: 1.02 MG/DL (ref 0.5–1.3)
EGFRCR SERPLBLD CKD-EPI 2021: 81 ML/MIN/1.73M*2
EOSINOPHIL # BLD AUTO: 0 X10*3/UL (ref 0–0.7)
EOSINOPHIL NFR BLD AUTO: 0 %
ERYTHROCYTE [DISTWIDTH] IN BLOOD BY AUTOMATED COUNT: 16.8 % (ref 11.5–14.5)
GLUCOSE SERPL-MCNC: 97 MG/DL (ref 74–99)
HCT VFR BLD AUTO: 31.6 % (ref 41–52)
HGB BLD-MCNC: 10.5 G/DL (ref 13.5–17.5)
IMM GRANULOCYTES # BLD AUTO: 0.02 X10*3/UL (ref 0–0.7)
IMM GRANULOCYTES NFR BLD AUTO: 0.3 % (ref 0–0.9)
LDH SERPL L TO P-CCNC: 182 U/L (ref 84–246)
LYMPHOCYTES # BLD AUTO: 1.69 X10*3/UL (ref 1.2–4.8)
LYMPHOCYTES NFR BLD AUTO: 28.4 %
MCH RBC QN AUTO: 34.9 PG (ref 26–34)
MCHC RBC AUTO-ENTMCNC: 33.2 G/DL (ref 32–36)
MCV RBC AUTO: 105 FL (ref 80–100)
MONOCYTES # BLD AUTO: 0.64 X10*3/UL (ref 0.1–1)
MONOCYTES NFR BLD AUTO: 10.7 %
NEUTROPHILS # BLD AUTO: 3.59 X10*3/UL (ref 1.2–7.7)
NEUTROPHILS NFR BLD AUTO: 60.3 %
NRBC BLD-RTO: ABNORMAL /100{WBCS}
PLATELET # BLD AUTO: 155 X10*3/UL (ref 150–450)
POTASSIUM SERPL-SCNC: 4.1 MMOL/L (ref 3.5–5.3)
PROT SERPL-MCNC: 7.6 G/DL (ref 6.4–8.2)
RBC # BLD AUTO: 3.01 X10*6/UL (ref 4.5–5.9)
SODIUM SERPL-SCNC: 137 MMOL/L (ref 136–145)
WBC # BLD AUTO: 6 X10*3/UL (ref 4.4–11.3)

## 2025-06-30 PROCEDURE — 83615 LACTATE (LD) (LDH) ENZYME: CPT

## 2025-06-30 PROCEDURE — 85025 COMPLETE CBC W/AUTO DIFF WBC: CPT

## 2025-06-30 PROCEDURE — 80053 COMPREHEN METABOLIC PANEL: CPT

## 2025-06-30 PROCEDURE — 86901 BLOOD TYPING SEROLOGIC RH(D): CPT

## 2025-06-30 PROCEDURE — 2500000004 HC RX 250 GENERAL PHARMACY W/ HCPCS (ALT 636 FOR OP/ED): Performed by: INTERNAL MEDICINE

## 2025-06-30 PROCEDURE — 96401 CHEMO ANTI-NEOPL SQ/IM: CPT

## 2025-06-30 RX ORDER — FAMOTIDINE 10 MG/ML
20 INJECTION, SOLUTION INTRAVENOUS ONCE AS NEEDED
Status: DISCONTINUED | OUTPATIENT
Start: 2025-06-30 | End: 2025-06-30 | Stop reason: HOSPADM

## 2025-06-30 RX ORDER — HEPARIN 100 UNIT/ML
500 SYRINGE INTRAVENOUS AS NEEDED
OUTPATIENT
Start: 2025-06-30

## 2025-06-30 RX ORDER — ALBUTEROL SULFATE 0.83 MG/ML
3 SOLUTION RESPIRATORY (INHALATION) AS NEEDED
Status: DISCONTINUED | OUTPATIENT
Start: 2025-06-30 | End: 2025-06-30 | Stop reason: HOSPADM

## 2025-06-30 RX ORDER — EPINEPHRINE 0.3 MG/.3ML
0.3 INJECTION SUBCUTANEOUS EVERY 5 MIN PRN
Status: DISCONTINUED | OUTPATIENT
Start: 2025-06-30 | End: 2025-06-30 | Stop reason: HOSPADM

## 2025-06-30 RX ORDER — ONDANSETRON 8 MG/1
8 TABLET, FILM COATED ORAL ONCE
Status: COMPLETED | OUTPATIENT
Start: 2025-06-30 | End: 2025-06-30

## 2025-06-30 RX ORDER — DIPHENHYDRAMINE HYDROCHLORIDE 50 MG/ML
50 INJECTION, SOLUTION INTRAMUSCULAR; INTRAVENOUS AS NEEDED
Status: DISCONTINUED | OUTPATIENT
Start: 2025-06-30 | End: 2025-06-30 | Stop reason: HOSPADM

## 2025-06-30 RX ORDER — HEPARIN SODIUM,PORCINE/PF 10 UNIT/ML
50 SYRINGE (ML) INTRAVENOUS AS NEEDED
OUTPATIENT
Start: 2025-06-30

## 2025-06-30 RX ADMIN — ONDANSETRON HYDROCHLORIDE 8 MG: 8 TABLET, FILM COATED ORAL at 12:31

## 2025-06-30 RX ADMIN — AZACITIDINE 170 MG: 100 INJECTION, POWDER, LYOPHILIZED, FOR SOLUTION INTRAVENOUS; SUBCUTANEOUS at 12:56

## 2025-06-30 ASSESSMENT — PAIN SCALES - GENERAL: PAINLEVEL_OUTOF10: 10-WORST PAIN EVER

## 2025-07-01 ENCOUNTER — APPOINTMENT (OUTPATIENT)
Dept: RADIATION ONCOLOGY | Facility: HOSPITAL | Age: 65
End: 2025-07-01
Payer: MEDICARE

## 2025-07-01 ENCOUNTER — INFUSION (OUTPATIENT)
Dept: HEMATOLOGY/ONCOLOGY | Facility: CLINIC | Age: 65
End: 2025-07-01
Payer: MEDICARE

## 2025-07-01 VITALS
RESPIRATION RATE: 16 BRPM | HEART RATE: 89 BPM | BODY MASS INDEX: 26.67 KG/M2 | OXYGEN SATURATION: 92 % | DIASTOLIC BLOOD PRESSURE: 78 MMHG | WEIGHT: 190.92 LBS | SYSTOLIC BLOOD PRESSURE: 116 MMHG | TEMPERATURE: 96.8 F

## 2025-07-01 DIAGNOSIS — C92.00 ACUTE MYELOID LEUKEMIA NOT HAVING ACHIEVED REMISSION (MULTI): ICD-10-CM

## 2025-07-01 LAB
ABO GROUP (TYPE) IN BLOOD: NORMAL
ANTIBODY SCREEN: NORMAL
RH FACTOR (ANTIGEN D): NORMAL

## 2025-07-01 PROCEDURE — 2500000004 HC RX 250 GENERAL PHARMACY W/ HCPCS (ALT 636 FOR OP/ED): Performed by: INTERNAL MEDICINE

## 2025-07-01 PROCEDURE — 96401 CHEMO ANTI-NEOPL SQ/IM: CPT

## 2025-07-01 RX ORDER — FAMOTIDINE 10 MG/ML
20 INJECTION, SOLUTION INTRAVENOUS ONCE AS NEEDED
Status: DISCONTINUED | OUTPATIENT
Start: 2025-07-01 | End: 2025-07-01 | Stop reason: HOSPADM

## 2025-07-01 RX ORDER — DIPHENHYDRAMINE HYDROCHLORIDE 50 MG/ML
50 INJECTION, SOLUTION INTRAMUSCULAR; INTRAVENOUS AS NEEDED
Status: DISCONTINUED | OUTPATIENT
Start: 2025-07-01 | End: 2025-07-01 | Stop reason: HOSPADM

## 2025-07-01 RX ORDER — ONDANSETRON 8 MG/1
8 TABLET, FILM COATED ORAL ONCE
Status: COMPLETED | OUTPATIENT
Start: 2025-07-01 | End: 2025-07-01

## 2025-07-01 RX ORDER — PROCHLORPERAZINE EDISYLATE 5 MG/ML
10 INJECTION INTRAMUSCULAR; INTRAVENOUS EVERY 6 HOURS PRN
Status: DISCONTINUED | OUTPATIENT
Start: 2025-07-01 | End: 2025-07-01 | Stop reason: HOSPADM

## 2025-07-01 RX ORDER — ALBUTEROL SULFATE 0.83 MG/ML
3 SOLUTION RESPIRATORY (INHALATION) AS NEEDED
Status: DISCONTINUED | OUTPATIENT
Start: 2025-07-01 | End: 2025-07-01 | Stop reason: HOSPADM

## 2025-07-01 RX ORDER — PROCHLORPERAZINE MALEATE 10 MG
10 TABLET ORAL EVERY 6 HOURS PRN
Status: DISCONTINUED | OUTPATIENT
Start: 2025-07-01 | End: 2025-07-01 | Stop reason: HOSPADM

## 2025-07-01 RX ORDER — EPINEPHRINE 0.3 MG/.3ML
0.3 INJECTION SUBCUTANEOUS EVERY 5 MIN PRN
Status: DISCONTINUED | OUTPATIENT
Start: 2025-07-01 | End: 2025-07-01 | Stop reason: HOSPADM

## 2025-07-01 RX ADMIN — AZACITIDINE 170 MG: 100 INJECTION, POWDER, LYOPHILIZED, FOR SOLUTION INTRAVENOUS; SUBCUTANEOUS at 15:56

## 2025-07-01 RX ADMIN — ONDANSETRON HYDROCHLORIDE 8 MG: 8 TABLET, FILM COATED ORAL at 15:39

## 2025-07-01 ASSESSMENT — PAIN SCALES - GENERAL: PAINLEVEL_OUTOF10: 10-WORST PAIN EVER

## 2025-07-02 ENCOUNTER — INFUSION (OUTPATIENT)
Dept: HEMATOLOGY/ONCOLOGY | Facility: CLINIC | Age: 65
End: 2025-07-02
Payer: MEDICARE

## 2025-07-02 ENCOUNTER — LAB (OUTPATIENT)
Dept: LAB | Facility: CLINIC | Age: 65
End: 2025-07-02
Payer: MEDICARE

## 2025-07-02 VITALS
WEIGHT: 192.24 LBS | HEART RATE: 100 BPM | DIASTOLIC BLOOD PRESSURE: 73 MMHG | SYSTOLIC BLOOD PRESSURE: 103 MMHG | TEMPERATURE: 97.7 F | OXYGEN SATURATION: 95 % | RESPIRATION RATE: 17 BRPM | BODY MASS INDEX: 26.85 KG/M2

## 2025-07-02 DIAGNOSIS — C92.00 ACUTE MYELOID LEUKEMIA NOT HAVING ACHIEVED REMISSION (MULTI): ICD-10-CM

## 2025-07-02 DIAGNOSIS — R53.81 PHYSICAL DEBILITY: Primary | ICD-10-CM

## 2025-07-02 LAB
ALBUMIN SERPL BCP-MCNC: 4.1 G/DL (ref 3.4–5)
ALP SERPL-CCNC: 107 U/L (ref 33–136)
ALT SERPL W P-5'-P-CCNC: 15 U/L (ref 10–52)
ANION GAP SERPL CALC-SCNC: 14 MMOL/L (ref 10–20)
AST SERPL W P-5'-P-CCNC: 13 U/L (ref 9–39)
BASOPHILS # BLD AUTO: 0.01 X10*3/UL (ref 0–0.1)
BASOPHILS NFR BLD AUTO: 0.2 %
BILIRUB SERPL-MCNC: 0.3 MG/DL (ref 0–1.2)
BUN SERPL-MCNC: 19 MG/DL (ref 6–23)
CALCIUM SERPL-MCNC: 9.7 MG/DL (ref 8.6–10.3)
CHLORIDE SERPL-SCNC: 98 MMOL/L (ref 98–107)
CO2 SERPL-SCNC: 28 MMOL/L (ref 21–32)
CREAT SERPL-MCNC: 1.16 MG/DL (ref 0.5–1.3)
EGFRCR SERPLBLD CKD-EPI 2021: 69 ML/MIN/1.73M*2
EOSINOPHIL # BLD AUTO: 0 X10*3/UL (ref 0–0.7)
EOSINOPHIL NFR BLD AUTO: 0 %
ERYTHROCYTE [DISTWIDTH] IN BLOOD BY AUTOMATED COUNT: 17 % (ref 11.5–14.5)
GLUCOSE SERPL-MCNC: 106 MG/DL (ref 74–99)
HCT VFR BLD AUTO: 32 % (ref 41–52)
HGB BLD-MCNC: 10.7 G/DL (ref 13.5–17.5)
IMM GRANULOCYTES # BLD AUTO: 0.01 X10*3/UL (ref 0–0.7)
IMM GRANULOCYTES NFR BLD AUTO: 0.2 % (ref 0–0.9)
LDH SERPL L TO P-CCNC: 162 U/L (ref 84–246)
LYMPHOCYTES # BLD AUTO: 1.64 X10*3/UL (ref 1.2–4.8)
LYMPHOCYTES NFR BLD AUTO: 29.5 %
MCH RBC QN AUTO: 34.9 PG (ref 26–34)
MCHC RBC AUTO-ENTMCNC: 33.4 G/DL (ref 32–36)
MCV RBC AUTO: 104 FL (ref 80–100)
MONOCYTES # BLD AUTO: 0.75 X10*3/UL (ref 0.1–1)
MONOCYTES NFR BLD AUTO: 13.5 %
NEUTROPHILS # BLD AUTO: 3.15 X10*3/UL (ref 1.2–7.7)
NEUTROPHILS NFR BLD AUTO: 56.6 %
PLATELET # BLD AUTO: 171 X10*3/UL (ref 150–450)
POTASSIUM SERPL-SCNC: 4.7 MMOL/L (ref 3.5–5.3)
PROT SERPL-MCNC: 7.4 G/DL (ref 6.4–8.2)
RBC # BLD AUTO: 3.07 X10*6/UL (ref 4.5–5.9)
SODIUM SERPL-SCNC: 135 MMOL/L (ref 136–145)
WBC # BLD AUTO: 5.6 X10*3/UL (ref 4.4–11.3)

## 2025-07-02 PROCEDURE — 36415 COLL VENOUS BLD VENIPUNCTURE: CPT

## 2025-07-02 PROCEDURE — 86850 RBC ANTIBODY SCREEN: CPT | Performed by: INTERNAL MEDICINE

## 2025-07-02 PROCEDURE — 2500000004 HC RX 250 GENERAL PHARMACY W/ HCPCS (ALT 636 FOR OP/ED): Performed by: INTERNAL MEDICINE

## 2025-07-02 PROCEDURE — 85025 COMPLETE CBC W/AUTO DIFF WBC: CPT

## 2025-07-02 PROCEDURE — 84075 ASSAY ALKALINE PHOSPHATASE: CPT

## 2025-07-02 PROCEDURE — 83615 LACTATE (LD) (LDH) ENZYME: CPT

## 2025-07-02 PROCEDURE — 96401 CHEMO ANTI-NEOPL SQ/IM: CPT

## 2025-07-02 RX ORDER — PROCHLORPERAZINE EDISYLATE 5 MG/ML
10 INJECTION INTRAMUSCULAR; INTRAVENOUS EVERY 6 HOURS PRN
Status: DISCONTINUED | OUTPATIENT
Start: 2025-07-02 | End: 2025-07-02 | Stop reason: HOSPADM

## 2025-07-02 RX ORDER — ALBUTEROL SULFATE 0.83 MG/ML
3 SOLUTION RESPIRATORY (INHALATION) AS NEEDED
Status: DISCONTINUED | OUTPATIENT
Start: 2025-07-02 | End: 2025-07-02 | Stop reason: HOSPADM

## 2025-07-02 RX ORDER — DIPHENHYDRAMINE HYDROCHLORIDE 50 MG/ML
50 INJECTION, SOLUTION INTRAMUSCULAR; INTRAVENOUS AS NEEDED
Status: DISCONTINUED | OUTPATIENT
Start: 2025-07-02 | End: 2025-07-02 | Stop reason: HOSPADM

## 2025-07-02 RX ORDER — EPINEPHRINE 0.3 MG/.3ML
0.3 INJECTION SUBCUTANEOUS EVERY 5 MIN PRN
Status: DISCONTINUED | OUTPATIENT
Start: 2025-07-02 | End: 2025-07-02 | Stop reason: HOSPADM

## 2025-07-02 RX ORDER — FAMOTIDINE 10 MG/ML
20 INJECTION, SOLUTION INTRAVENOUS ONCE AS NEEDED
Status: DISCONTINUED | OUTPATIENT
Start: 2025-07-02 | End: 2025-07-02 | Stop reason: HOSPADM

## 2025-07-02 RX ORDER — ONDANSETRON 8 MG/1
8 TABLET, FILM COATED ORAL ONCE
Status: COMPLETED | OUTPATIENT
Start: 2025-07-02 | End: 2025-07-02

## 2025-07-02 RX ORDER — PROCHLORPERAZINE MALEATE 10 MG
10 TABLET ORAL EVERY 6 HOURS PRN
Status: DISCONTINUED | OUTPATIENT
Start: 2025-07-02 | End: 2025-07-02 | Stop reason: HOSPADM

## 2025-07-02 RX ADMIN — ONDANSETRON HYDROCHLORIDE 8 MG: 8 TABLET, FILM COATED ORAL at 14:31

## 2025-07-02 RX ADMIN — AZACITIDINE 170 MG: 100 INJECTION, POWDER, LYOPHILIZED, FOR SOLUTION INTRAVENOUS; SUBCUTANEOUS at 14:57

## 2025-07-02 ASSESSMENT — PAIN SCALES - GENERAL: PAINLEVEL_OUTOF10: 10-WORST PAIN EVER

## 2025-07-03 ENCOUNTER — INFUSION (OUTPATIENT)
Dept: HEMATOLOGY/ONCOLOGY | Facility: CLINIC | Age: 65
End: 2025-07-03
Payer: MEDICARE

## 2025-07-03 VITALS
HEART RATE: 90 BPM | TEMPERATURE: 97.7 F | SYSTOLIC BLOOD PRESSURE: 114 MMHG | WEIGHT: 191.58 LBS | DIASTOLIC BLOOD PRESSURE: 73 MMHG | RESPIRATION RATE: 17 BRPM | BODY MASS INDEX: 26.76 KG/M2 | OXYGEN SATURATION: 94 %

## 2025-07-03 DIAGNOSIS — Z76.82 STEM CELL TRANSPLANT CANDIDATE: ICD-10-CM

## 2025-07-03 DIAGNOSIS — C92.00 ACUTE MYELOID LEUKEMIA NOT HAVING ACHIEVED REMISSION (MULTI): ICD-10-CM

## 2025-07-03 LAB
ABO GROUP (TYPE) IN BLOOD: NORMAL
ANTIBODY SCREEN: NORMAL
RH FACTOR (ANTIGEN D): NORMAL

## 2025-07-03 PROCEDURE — 2500000004 HC RX 250 GENERAL PHARMACY W/ HCPCS (ALT 636 FOR OP/ED): Performed by: INTERNAL MEDICINE

## 2025-07-03 RX ORDER — FAMOTIDINE 10 MG/ML
20 INJECTION, SOLUTION INTRAVENOUS ONCE AS NEEDED
Status: DISCONTINUED | OUTPATIENT
Start: 2025-07-03 | End: 2025-07-03 | Stop reason: HOSPADM

## 2025-07-03 RX ORDER — EPINEPHRINE 0.3 MG/.3ML
0.3 INJECTION SUBCUTANEOUS EVERY 5 MIN PRN
Status: DISCONTINUED | OUTPATIENT
Start: 2025-07-03 | End: 2025-07-03 | Stop reason: HOSPADM

## 2025-07-03 RX ORDER — ONDANSETRON 8 MG/1
8 TABLET, FILM COATED ORAL ONCE
Status: COMPLETED | OUTPATIENT
Start: 2025-07-03 | End: 2025-07-03

## 2025-07-03 RX ORDER — ACETAMINOPHEN, DIPHENHYDRAMINE HCL, PHENYLEPHRINE HCL 325; 25; 5 MG/1; MG/1; MG/1
2 TABLET ORAL NIGHTLY
COMMUNITY

## 2025-07-03 RX ORDER — DIPHENHYDRAMINE HYDROCHLORIDE 50 MG/ML
50 INJECTION, SOLUTION INTRAMUSCULAR; INTRAVENOUS AS NEEDED
Status: DISCONTINUED | OUTPATIENT
Start: 2025-07-03 | End: 2025-07-03 | Stop reason: HOSPADM

## 2025-07-03 RX ORDER — ALBUTEROL SULFATE 0.83 MG/ML
3 SOLUTION RESPIRATORY (INHALATION) AS NEEDED
Status: DISCONTINUED | OUTPATIENT
Start: 2025-07-03 | End: 2025-07-03 | Stop reason: HOSPADM

## 2025-07-03 RX ADMIN — AZACITIDINE 170 MG: 100 INJECTION, POWDER, LYOPHILIZED, FOR SOLUTION INTRAVENOUS; SUBCUTANEOUS at 10:47

## 2025-07-03 RX ADMIN — ONDANSETRON HYDROCHLORIDE 8 MG: 8 TABLET, FILM COATED ORAL at 10:23

## 2025-07-03 ASSESSMENT — PAIN SCALES - GENERAL: PAINLEVEL_OUTOF10: 10-WORST PAIN EVER

## 2025-07-07 ENCOUNTER — INFUSION (OUTPATIENT)
Dept: HEMATOLOGY/ONCOLOGY | Facility: CLINIC | Age: 65
End: 2025-07-07
Payer: MEDICARE

## 2025-07-07 ENCOUNTER — SPECIALTY PHARMACY (OUTPATIENT)
Dept: PHARMACY | Facility: CLINIC | Age: 65
End: 2025-07-07

## 2025-07-07 VITALS
TEMPERATURE: 97.7 F | HEART RATE: 85 BPM | SYSTOLIC BLOOD PRESSURE: 117 MMHG | RESPIRATION RATE: 16 BRPM | DIASTOLIC BLOOD PRESSURE: 72 MMHG | WEIGHT: 191.14 LBS | BODY MASS INDEX: 26.7 KG/M2 | OXYGEN SATURATION: 96 %

## 2025-07-07 DIAGNOSIS — C92.00 ACUTE MYELOID LEUKEMIA NOT HAVING ACHIEVED REMISSION (MULTI): ICD-10-CM

## 2025-07-07 LAB
ABO GROUP (TYPE) IN BLOOD: NORMAL
ALBUMIN SERPL BCP-MCNC: 4 G/DL (ref 3.4–5)
ALP SERPL-CCNC: 97 U/L (ref 33–136)
ALT SERPL W P-5'-P-CCNC: 13 U/L (ref 10–52)
ANION GAP SERPL CALC-SCNC: 15 MMOL/L (ref 10–20)
ANTIBODY SCREEN: NORMAL
AST SERPL W P-5'-P-CCNC: 12 U/L (ref 9–39)
BASOPHILS # BLD AUTO: 0.01 X10*3/UL (ref 0–0.1)
BASOPHILS NFR BLD AUTO: 0.2 %
BILIRUB SERPL-MCNC: 0.3 MG/DL (ref 0–1.2)
BUN SERPL-MCNC: 21 MG/DL (ref 6–23)
CALCIUM SERPL-MCNC: 9.3 MG/DL (ref 8.6–10.3)
CHLORIDE SERPL-SCNC: 107 MMOL/L (ref 98–107)
CO2 SERPL-SCNC: 24 MMOL/L (ref 21–32)
CREAT SERPL-MCNC: 0.95 MG/DL (ref 0.5–1.3)
EGFRCR SERPLBLD CKD-EPI 2021: 88 ML/MIN/1.73M*2
EOSINOPHIL # BLD AUTO: 0 X10*3/UL (ref 0–0.7)
EOSINOPHIL NFR BLD AUTO: 0 %
ERYTHROCYTE [DISTWIDTH] IN BLOOD BY AUTOMATED COUNT: 17.2 % (ref 11.5–14.5)
GLUCOSE SERPL-MCNC: 106 MG/DL (ref 74–99)
HCT VFR BLD AUTO: 30.8 % (ref 41–52)
HGB BLD-MCNC: 10.3 G/DL (ref 13.5–17.5)
IMM GRANULOCYTES # BLD AUTO: 0.01 X10*3/UL (ref 0–0.7)
IMM GRANULOCYTES NFR BLD AUTO: 0.2 % (ref 0–0.9)
LDH SERPL L TO P-CCNC: 165 U/L (ref 84–246)
LYMPHOCYTES # BLD AUTO: 1.17 X10*3/UL (ref 1.2–4.8)
LYMPHOCYTES NFR BLD AUTO: 22.1 %
MCH RBC QN AUTO: 35.2 PG (ref 26–34)
MCHC RBC AUTO-ENTMCNC: 33.4 G/DL (ref 32–36)
MCV RBC AUTO: 105 FL (ref 80–100)
MONOCYTES # BLD AUTO: 0.57 X10*3/UL (ref 0.1–1)
MONOCYTES NFR BLD AUTO: 10.8 %
NEUTROPHILS # BLD AUTO: 3.54 X10*3/UL (ref 1.2–7.7)
NEUTROPHILS NFR BLD AUTO: 66.7 %
PLATELET # BLD AUTO: 153 X10*3/UL (ref 150–450)
POTASSIUM SERPL-SCNC: 4.1 MMOL/L (ref 3.5–5.3)
PROT SERPL-MCNC: 7 G/DL (ref 6.4–8.2)
RBC # BLD AUTO: 2.93 X10*6/UL (ref 4.5–5.9)
RH FACTOR (ANTIGEN D): NORMAL
SODIUM SERPL-SCNC: 142 MMOL/L (ref 136–145)
WBC # BLD AUTO: 5.3 X10*3/UL (ref 4.4–11.3)

## 2025-07-07 PROCEDURE — RXMED WILLOW AMBULATORY MEDICATION CHARGE

## 2025-07-07 PROCEDURE — 96401 CHEMO ANTI-NEOPL SQ/IM: CPT

## 2025-07-07 PROCEDURE — 85025 COMPLETE CBC W/AUTO DIFF WBC: CPT

## 2025-07-07 PROCEDURE — 86901 BLOOD TYPING SEROLOGIC RH(D): CPT

## 2025-07-07 PROCEDURE — 80053 COMPREHEN METABOLIC PANEL: CPT

## 2025-07-07 PROCEDURE — 2500000004 HC RX 250 GENERAL PHARMACY W/ HCPCS (ALT 636 FOR OP/ED): Performed by: INTERNAL MEDICINE

## 2025-07-07 PROCEDURE — 83615 LACTATE (LD) (LDH) ENZYME: CPT

## 2025-07-07 RX ORDER — ONDANSETRON 8 MG/1
8 TABLET, FILM COATED ORAL ONCE
Status: COMPLETED | OUTPATIENT
Start: 2025-07-07 | End: 2025-07-07

## 2025-07-07 RX ORDER — HEPARIN 100 UNIT/ML
500 SYRINGE INTRAVENOUS AS NEEDED
Status: CANCELLED | OUTPATIENT
Start: 2025-07-07

## 2025-07-07 RX ORDER — HEPARIN SODIUM,PORCINE/PF 10 UNIT/ML
50 SYRINGE (ML) INTRAVENOUS AS NEEDED
Status: CANCELLED | OUTPATIENT
Start: 2025-07-07

## 2025-07-07 RX ADMIN — ONDANSETRON HYDROCHLORIDE 8 MG: 8 TABLET, FILM COATED ORAL at 09:54

## 2025-07-07 RX ADMIN — AZACITIDINE 170 MG: 100 INJECTION, POWDER, LYOPHILIZED, FOR SOLUTION INTRAVENOUS; SUBCUTANEOUS at 10:31

## 2025-07-07 ASSESSMENT — PAIN SCALES - GENERAL: PAINLEVEL_OUTOF10: 10-WORST PAIN EVER

## 2025-07-08 ENCOUNTER — TELEPHONE (OUTPATIENT)
Dept: PRIMARY CARE | Facility: CLINIC | Age: 65
End: 2025-07-08
Payer: MEDICARE

## 2025-07-08 NOTE — TELEPHONE ENCOUNTER
Patient called to let you know that he has filed a [facility] 2025 Claim for AML.  He is asking for me to send him a release of records form that will give us permission to release any records to the B.      Can I send him the RR form?

## 2025-07-09 ENCOUNTER — OFFICE VISIT (OUTPATIENT)
Dept: PAIN MEDICINE | Facility: CLINIC | Age: 65
End: 2025-07-09
Payer: MEDICARE

## 2025-07-09 VITALS
OXYGEN SATURATION: 94 % | RESPIRATION RATE: 18 BRPM | HEART RATE: 92 BPM | DIASTOLIC BLOOD PRESSURE: 65 MMHG | SYSTOLIC BLOOD PRESSURE: 102 MMHG

## 2025-07-09 DIAGNOSIS — M25.551 BILATERAL HIP PAIN: Primary | ICD-10-CM

## 2025-07-09 DIAGNOSIS — M25.552 BILATERAL HIP PAIN: Primary | ICD-10-CM

## 2025-07-09 PROCEDURE — 1159F MED LIST DOCD IN RCRD: CPT

## 2025-07-09 PROCEDURE — 99213 OFFICE O/P EST LOW 20 MIN: CPT

## 2025-07-09 RX ORDER — OXYCODONE AND ACETAMINOPHEN 5; 325 MG/1; MG/1
1 TABLET ORAL 2 TIMES DAILY PRN
Qty: 60 TABLET | Refills: 0 | Status: CANCELLED | OUTPATIENT
Start: 2025-07-10 | End: 2025-08-09

## 2025-07-09 RX ORDER — LIDOCAINE 50 MG/G
1 PATCH TOPICAL DAILY
Qty: 30 PATCH | Refills: 2 | Status: SHIPPED | OUTPATIENT
Start: 2025-07-09 | End: 2025-08-08

## 2025-07-09 ASSESSMENT — PAIN DESCRIPTION - DESCRIPTORS: DESCRIPTORS: ACHING;THROBBING;SHARP

## 2025-07-09 ASSESSMENT — PAIN SCALES - GENERAL: PAINLEVEL_OUTOF10: 10 - WORST POSSIBLE PAIN

## 2025-07-09 ASSESSMENT — PAIN - FUNCTIONAL ASSESSMENT: PAIN_FUNCTIONAL_ASSESSMENT: 0-10

## 2025-07-09 NOTE — PATIENT INSTRUCTIONS
May take 500mg of acetaminophen up to four times daily.     Chronic Opioid Treatment Fact Sheet  While you are using opioids from [facility] you may not consume Alcohol, Use Marijuana (recreational or medical), use any other illegal drugs or use controlled substances that have not been personally prescribed to you.  A breach in any of the above will cause us to no longer prescribe opioids for you.      Using opioid medicines to treat your pain  You and your doctor have decided that opioid pain medicine might help reduce your pain and improve your function. Opioids are not likely to make your pain go away completely.  It is important to understand that this treatment involves potential risks and benefits. It is also important that you follow the guidelines in this handout and let your doctor know what you expect from your treatment. Your doctor may ask you to sign an Opioid Patient Care Agreement     What are the goals and possible benefits of opioid treatment?  The goals of treatment are to reduce your pain and improve your daily function. The benefits of opioid medicines are different from person to person. Opioids typically reduce chronic pain by about 30%. Some people find that they can function better day to day, but research has shown that this is not typical.     Experts agree that opioids may actually make pain worse, especially at high doses. “Flare-ups” are common and should not usually be treated by increasing the dose or taking extra medicine.  Your doctor will monitor how you are doing by asking you to rate your pain level and your daily functioning. He or she may want to know how far you can walk, how long you can sit, if you are able to work or do housework, and what kinds of activities you do alone or with family and friends.     What are the common side effects and risks of opioids?  Opioids cause common side effects that can be unpleasant. They can also increase risks of serious health effects  that occur less often. Because opioids have risks that can be serious, your doctor may ask you for urine or blood sample to help protect your safety.     Side effects vary from person to person. You and your doctor will work together to monitor how opioids affect you. Your doctor may need to adjust your dose until you find the right balance between pain reduction, improved function, and side effects.     It is normal to develop physical dependence to opioids:   Physical dependence means your body has adapted to the medicine and you will experience tolerance and withdrawal.   Tolerance means you need to take more of the medicine to get the same effect.   Withdrawal means you will have symptoms when you stop using the medicine.   Opioids Induced Hyperalgesia (paradoxical hyperalgesia) is serious but less common side effect where the opioids medication became the source of increased pain. Your physician may have to wean you off the meds to help you with the pain.     Withdrawal symptoms are usually the opposite of the effects of the medicine.For example, if the medicine causes constipation, the withdrawal symptom would be diarrhea. If the medicine reduces pain, the symptom would be increased pain. Withdrawal from opioids is temporary and usually not dangerous.     Babies born to mothers taking opioids will be dependent on opioids at birth. You should not take opioids if you are trying to get pregnant. If you do get pregnant while taking opioids, let your doctor know right away.     People who have had problems with mental health, drugs, or alcohol are more likely to have problems with opioids. You must tell your doctor about any mental illness, substance abuse, or addiction of any type have experienced in the past. You must also tell your doctor if anyone in your family has had these problems. Research shows these problems sometimes run in families.  Experts agree that people with active substance abuse or addiction  problems should not use opioids for chronic non-cancer pain. If you have problems with substance abuse or addiction, it is important to let your doctor know so you can get the help you need. Tell your doctor right away if you feel you are becoming addicted to opioids.              Common side effects  Constipation  Opioid medicines cause constipation. You may need to be treated for this while you are taking opioids.  Sedation  Many opioid medications can make you feel drowsy, slow your reaction time, and cause loss of  coordination. They can also make it hard to concentrate and think clearly.  Do not drive or use dangerous equipment until you are sure that opioids do not affect your reaction time or thinking ability. It may take a week or longer before you know if you can drive safely while taking opioids.   If you are in a traffic accident while driving on opioids, you may be considered to be “driving  under the influence” (DUI).     Other side effects and withdrawal symptoms:     Other side effect Withdrawal Symptoms   Rash and/or Itching Sweating   Dry eyes Nausea   Blurred vision Abdominal pain/cramping   Nausea and vomiting Diarrhea   Inability to urinate Trouble sleeping   Low blood pressure Muscle ahes   Slow heart beat Fast heart beat   Depressed mood Anxiety   Slow breathing Runny nose   Problem with balance “Goose bumps”   Decrease sex drive (Decrease Testosterone)     Decrease Immune function     Swelling in hands and feet     Jerking of arms and legs     Increased Sensitivity to pain     Distruption of Normal sleep     Dental problems     Apathy     Falls resulting in fractures           Risk of serious bodily harm or death  Opioid pain medicines can cause serious bodily harm or death. Higher doses appear to cause more side effects, some of which can lead to injuries like serious fractures due to falls.   Higher doses increase the risk of overdose. An overdose of opioids, whether by accident or on  purpose, can cause serious bodily harm or death. Research continues to show more and more problems with long-term opioid use, especially at high doses.     Using more opioids than your doctor prescribes can cause you to become dangerously sedated, to stop breathing, or to overdose. Combining opioids with certain other medicines or with alcohol or drugs can have the same effect.              Some opioids have higher risks  There are special problems with some opioids. For example:  meperidine (Demerol) and tramadol (Ultram) are associated with increased seizure risk.  Methadone stays in the body for many days, which increases the risk of overdose. It can also cause heart rhythm problems.   Opioids, that contain acetaminophen, such as Vicodin and Percocet, can harm the liver when taken long term or at high doses.           Are there alternatives to opioid treatment for chronic non-cancer pain?  Your doctor may prescribe other treatments to help your pain and to help you do daily activities better.  These may include exercise, psychological counseling, and medicines that are not opioids. Please be sure to discuss these options with your doctor.     Questions?  Please ask your doctor any questions you have about taking opioids.

## 2025-07-09 NOTE — PROGRESS NOTES
Subjective   Patient ID: Link Inman is a 64 y.o. male who presents for Follow-up.  HPI    63 yo male presents today for medication follow up. He is known to this clinic for chronic bilateral hip pain. He was scheduled for hip surgery and was found to have leukemia during preadmission testing. He is reporting 10/10 bilateral hip pain. He is currently prescribed tramadol 50mg three times daily. He is reporting no pain relief from the medication. He was tried on Journavx, did not appreciate any relief, did experience tinnitus with this. He is taking acetaminophen with minimal pain reduction. Oncology has advised that he stop taking NSAIDs, he may be having bone marrow transplant next month. He is hesitant to start opiate therapy and has many concerns regarding safety and side effects. Goals for this patient include increasing mobility.     Review of Systems  All 13 systems were reviewed and are within normal levels except as noted below or per HPI. Positive and pertinent negative responses are noted below or in the HPI   Denied any fever or chills. No weight loss and no night sweats. No cough or sputum production. No diarrhea   Denies constipation.   No bladder and bowel incontinence and no other changes in bladder and bowel. No skin changes. Reports tiredness and fatigability only if the pain is not controlled.   Denied opioids diversion and abuse and denies alcoholism. Denies overuse of the pain medications.      Objective   Physical Exam  General   Alert and oriented x4, pleasant and cooperative.      HEENT  Pupils are equal and normal in size. Ears, nose, mouth, and throat appear to be WNL.  Head atraumatic, symmetric.      No signs of sedation or signs of withdrawal apparent.     Psychiatric   No signs of depression apparent. Appropriate mood and affect.     Neuro   No focal neurological deficit apparent. Using wheelchair.      Respiratory  No respiratory distress, respirations equal and unlabored.      Abdomen  Soft and nontender, no distention noted.     Skin  Warm, dry and intact. No skin markings supportive of recent IV drug usage .            Assessment/Plan     65 yo male with history and physical exam supportive of bilateral hip pain associated with osteoarthritis with joint replacement delayed due to leukemia diagnosis and treatment. Has tried and failed non-opiate pain relievers.     I have personally reviewed the [facility] report for this patient. I have considered the risks of abuse, dependence, addiction and diversion. I believe that it is clinically appropriate for this patient to be prescribed this medication based on documented diagnosis.  I believe the benefits of the continuation of the opiate outweighs the risk. Patient has described positive response to the present medication therapy. Patient denies any side effects associated with the usage of the medication. Patient did not elicit any signs supportive of misuse or abuse. The review of the [place] [employer] Reporting prescription is not suggestive of any worrisome pattern. Patient believes the usage of this medication has improved the quality of life and allow him to participate in day-to-day activity. Therefore  I recommend the continuation of this medication and I will be refilling the medication prescription.    The patient was counseled regarding diagnostic results, instructions for management, risk factor reductions, prognosis, patient and family education, impressions, risks and benefits of treatment options and importance of compliance with treatment.     Tramadol was filled on 06/19/25, he has 10 days remaining on his prescription. Explained to the patient that he will need to bring the remainder of the pills into the office to be destroyed before I can send in a new prescription of opiate medication. He is unable to bring the medication in today, he will drop off the medication on 07/10.     After tramadol is destroyed I will send in a  prescription for oxycodone- acetaminophen 5- 325mg twice daily as needed for severe pain. I will provide him with a 30 day supply and follow up in one month. Narcan kit provided in office, CSA signed today. Sporadic tox screen.     Advised use of lidocaine patches for additional pain relief.    Explained plan to this patient, and patient verbalized understanding and agreement with the plan.     Please do not hesitate to contact the pain clinic after your visit with any questions or concerns at  M-F 8-4 pm     Patient was reminded not to share medications, not to take prescription medications that were not prescribed to the patient, and not to increase or change dose without consulting the pain clinic. I advised the patient to always take the least amount of medication needed to keep symptoms under control.       Nikki Contreras, EVELYN-CNP 07/09/25 2:12 PM

## 2025-07-10 ENCOUNTER — INFUSION (OUTPATIENT)
Dept: HEMATOLOGY/ONCOLOGY | Facility: CLINIC | Age: 65
End: 2025-07-10
Payer: MEDICARE

## 2025-07-10 VITALS
DIASTOLIC BLOOD PRESSURE: 71 MMHG | RESPIRATION RATE: 16 BRPM | BODY MASS INDEX: 26.54 KG/M2 | TEMPERATURE: 97.2 F | WEIGHT: 190.04 LBS | HEART RATE: 103 BPM | OXYGEN SATURATION: 99 % | SYSTOLIC BLOOD PRESSURE: 108 MMHG

## 2025-07-10 DIAGNOSIS — M25.552 PAIN OF BOTH HIP JOINTS: Primary | ICD-10-CM

## 2025-07-10 DIAGNOSIS — M25.551 PAIN OF BOTH HIP JOINTS: Primary | ICD-10-CM

## 2025-07-10 DIAGNOSIS — C92.00 ACUTE MYELOID LEUKEMIA NOT HAVING ACHIEVED REMISSION (MULTI): ICD-10-CM

## 2025-07-10 DIAGNOSIS — Z76.82 STEM CELL TRANSPLANT CANDIDATE: ICD-10-CM

## 2025-07-10 LAB
ABO GROUP (TYPE) IN BLOOD: NORMAL
ALBUMIN SERPL BCP-MCNC: 4.2 G/DL (ref 3.4–5)
ALP SERPL-CCNC: 114 U/L (ref 33–136)
ALT SERPL W P-5'-P-CCNC: 13 U/L (ref 10–52)
ANION GAP SERPL CALC-SCNC: 13 MMOL/L (ref 10–20)
ANTIBODY SCREEN: NORMAL
AST SERPL W P-5'-P-CCNC: 13 U/L (ref 9–39)
BASOPHILS # BLD AUTO: 0.01 X10*3/UL (ref 0–0.1)
BASOPHILS NFR BLD AUTO: 0.2 %
BILIRUB SERPL-MCNC: 0.4 MG/DL (ref 0–1.2)
BUN SERPL-MCNC: 14 MG/DL (ref 6–23)
CALCIUM SERPL-MCNC: 9.8 MG/DL (ref 8.6–10.3)
CHLORIDE SERPL-SCNC: 102 MMOL/L (ref 98–107)
CO2 SERPL-SCNC: 27 MMOL/L (ref 21–32)
CREAT SERPL-MCNC: 1.05 MG/DL (ref 0.5–1.3)
EGFRCR SERPLBLD CKD-EPI 2021: 78 ML/MIN/1.73M*2
EOSINOPHIL # BLD AUTO: 0 X10*3/UL (ref 0–0.7)
EOSINOPHIL NFR BLD AUTO: 0 %
ERYTHROCYTE [DISTWIDTH] IN BLOOD BY AUTOMATED COUNT: 17.6 % (ref 11.5–14.5)
GLUCOSE SERPL-MCNC: 108 MG/DL (ref 74–99)
HCT VFR BLD AUTO: 33 % (ref 41–52)
HGB BLD-MCNC: 11.1 G/DL (ref 13.5–17.5)
IMM GRANULOCYTES # BLD AUTO: 0.01 X10*3/UL (ref 0–0.7)
IMM GRANULOCYTES NFR BLD AUTO: 0.2 % (ref 0–0.9)
LDH SERPL L TO P-CCNC: 192 U/L (ref 84–246)
LYMPHOCYTES # BLD AUTO: 1.16 X10*3/UL (ref 1.2–4.8)
LYMPHOCYTES NFR BLD AUTO: 20.1 %
MCH RBC QN AUTO: 35.8 PG (ref 26–34)
MCHC RBC AUTO-ENTMCNC: 33.6 G/DL (ref 32–36)
MCV RBC AUTO: 107 FL (ref 80–100)
MONOCYTES # BLD AUTO: 0.48 X10*3/UL (ref 0.1–1)
MONOCYTES NFR BLD AUTO: 8.3 %
NEUTROPHILS # BLD AUTO: 4.11 X10*3/UL (ref 1.2–7.7)
NEUTROPHILS NFR BLD AUTO: 71.2 %
PLATELET # BLD AUTO: 114 X10*3/UL (ref 150–450)
POTASSIUM SERPL-SCNC: 3.9 MMOL/L (ref 3.5–5.3)
PROT SERPL-MCNC: 7.1 G/DL (ref 6.4–8.2)
RBC # BLD AUTO: 3.1 X10*6/UL (ref 4.5–5.9)
RH FACTOR (ANTIGEN D): NORMAL
SODIUM SERPL-SCNC: 138 MMOL/L (ref 136–145)
WBC # BLD AUTO: 5.8 X10*3/UL (ref 4.4–11.3)

## 2025-07-10 PROCEDURE — 86901 BLOOD TYPING SEROLOGIC RH(D): CPT

## 2025-07-10 PROCEDURE — 83615 LACTATE (LD) (LDH) ENZYME: CPT

## 2025-07-10 PROCEDURE — 85025 COMPLETE CBC W/AUTO DIFF WBC: CPT

## 2025-07-10 PROCEDURE — 80053 COMPREHEN METABOLIC PANEL: CPT

## 2025-07-10 PROCEDURE — 36415 COLL VENOUS BLD VENIPUNCTURE: CPT

## 2025-07-10 RX ORDER — HEPARIN SODIUM,PORCINE/PF 10 UNIT/ML
50 SYRINGE (ML) INTRAVENOUS AS NEEDED
OUTPATIENT
Start: 2025-07-10

## 2025-07-10 RX ORDER — OXYCODONE AND ACETAMINOPHEN 5; 325 MG/1; MG/1
1 TABLET ORAL 2 TIMES DAILY PRN
Qty: 60 TABLET | Refills: 0 | Status: SHIPPED | OUTPATIENT
Start: 2025-07-10 | End: 2025-08-09

## 2025-07-10 RX ORDER — HEPARIN 100 UNIT/ML
500 SYRINGE INTRAVENOUS AS NEEDED
OUTPATIENT
Start: 2025-07-10

## 2025-07-10 ASSESSMENT — PAIN SCALES - GENERAL: PAINLEVEL_OUTOF10: 10-WORST PAIN EVER

## 2025-07-13 ENCOUNTER — TELEPHONE (OUTPATIENT)
Dept: PAIN MEDICINE | Facility: CLINIC | Age: 65
End: 2025-07-13
Payer: MEDICARE

## 2025-07-13 NOTE — TELEPHONE ENCOUNTER
On 7/10/2025, Mrs. Inman returned leftover Tramadol # 33.  He was prescribed Percocet 5/325 mg BID for pain good for 30 days. He is to follow up in 30 days, to evaluate his response to Percocet.

## 2025-07-14 ENCOUNTER — INFUSION (OUTPATIENT)
Dept: HEMATOLOGY/ONCOLOGY | Facility: CLINIC | Age: 65
End: 2025-07-14
Payer: MEDICARE

## 2025-07-14 VITALS
TEMPERATURE: 95.9 F | HEART RATE: 95 BPM | RESPIRATION RATE: 18 BRPM | SYSTOLIC BLOOD PRESSURE: 106 MMHG | BODY MASS INDEX: 26.53 KG/M2 | DIASTOLIC BLOOD PRESSURE: 69 MMHG | WEIGHT: 189.93 LBS | OXYGEN SATURATION: 93 %

## 2025-07-14 DIAGNOSIS — C92.00 ACUTE MYELOID LEUKEMIA NOT HAVING ACHIEVED REMISSION (MULTI): ICD-10-CM

## 2025-07-14 LAB
ALBUMIN SERPL BCP-MCNC: 3.8 G/DL (ref 3.4–5)
ALP SERPL-CCNC: 100 U/L (ref 33–136)
ALT SERPL W P-5'-P-CCNC: 12 U/L (ref 10–52)
ANION GAP SERPL CALC-SCNC: 12 MMOL/L (ref 10–20)
AST SERPL W P-5'-P-CCNC: 13 U/L (ref 9–39)
BASOPHILS # BLD AUTO: 0.01 X10*3/UL (ref 0–0.1)
BASOPHILS NFR BLD AUTO: 0.3 %
BILIRUB SERPL-MCNC: 0.4 MG/DL (ref 0–1.2)
BUN SERPL-MCNC: 10 MG/DL (ref 6–23)
CALCIUM SERPL-MCNC: 9.1 MG/DL (ref 8.6–10.3)
CHLORIDE SERPL-SCNC: 105 MMOL/L (ref 98–107)
CO2 SERPL-SCNC: 26 MMOL/L (ref 21–32)
CREAT SERPL-MCNC: 0.84 MG/DL (ref 0.5–1.3)
EGFRCR SERPLBLD CKD-EPI 2021: >90 ML/MIN/1.73M*2
EOSINOPHIL # BLD AUTO: 0 X10*3/UL (ref 0–0.7)
EOSINOPHIL NFR BLD AUTO: 0 %
ERYTHROCYTE [DISTWIDTH] IN BLOOD BY AUTOMATED COUNT: 18.8 % (ref 11.5–14.5)
GLUCOSE SERPL-MCNC: 128 MG/DL (ref 74–99)
HCT VFR BLD AUTO: 31.6 % (ref 41–52)
HGB BLD-MCNC: 10.5 G/DL (ref 13.5–17.5)
IMM GRANULOCYTES # BLD AUTO: 0.01 X10*3/UL (ref 0–0.7)
IMM GRANULOCYTES NFR BLD AUTO: 0.3 % (ref 0–0.9)
LDH SERPL L TO P-CCNC: 166 U/L (ref 84–246)
LYMPHOCYTES # BLD AUTO: 0.88 X10*3/UL (ref 1.2–4.8)
LYMPHOCYTES NFR BLD AUTO: 23.3 %
MCH RBC QN AUTO: 35.8 PG (ref 26–34)
MCHC RBC AUTO-ENTMCNC: 33.2 G/DL (ref 32–36)
MCV RBC AUTO: 108 FL (ref 80–100)
MONOCYTES # BLD AUTO: 0.28 X10*3/UL (ref 0.1–1)
MONOCYTES NFR BLD AUTO: 7.4 %
NEUTROPHILS # BLD AUTO: 2.6 X10*3/UL (ref 1.2–7.7)
NEUTROPHILS NFR BLD AUTO: 68.7 %
PLATELET # BLD AUTO: 93 X10*3/UL (ref 150–450)
POTASSIUM SERPL-SCNC: 4.1 MMOL/L (ref 3.5–5.3)
PROT SERPL-MCNC: 6.5 G/DL (ref 6.4–8.2)
RBC # BLD AUTO: 2.93 X10*6/UL (ref 4.5–5.9)
SODIUM SERPL-SCNC: 139 MMOL/L (ref 136–145)
WBC # BLD AUTO: 3.8 X10*3/UL (ref 4.4–11.3)

## 2025-07-14 PROCEDURE — 86901 BLOOD TYPING SEROLOGIC RH(D): CPT

## 2025-07-14 PROCEDURE — 36415 COLL VENOUS BLD VENIPUNCTURE: CPT

## 2025-07-14 PROCEDURE — 83615 LACTATE (LD) (LDH) ENZYME: CPT

## 2025-07-14 PROCEDURE — 85025 COMPLETE CBC W/AUTO DIFF WBC: CPT

## 2025-07-14 PROCEDURE — 84075 ASSAY ALKALINE PHOSPHATASE: CPT

## 2025-07-14 ASSESSMENT — PAIN SCALES - GENERAL: PAINLEVEL_OUTOF10: 10-WORST PAIN EVER

## 2025-07-15 DIAGNOSIS — F39 PSYCHOTIC AFFECTIVE DISORDER: ICD-10-CM

## 2025-07-15 LAB
ABO GROUP (TYPE) IN BLOOD: NORMAL
ANTIBODY SCREEN: NORMAL
RH FACTOR (ANTIGEN D): NORMAL

## 2025-07-15 RX ORDER — ARIPIPRAZOLE 2 MG/1
2 TABLET ORAL DAILY
Qty: 90 TABLET | Refills: 1 | OUTPATIENT
Start: 2025-07-15

## 2025-07-17 ENCOUNTER — INFUSION (OUTPATIENT)
Dept: HEMATOLOGY/ONCOLOGY | Facility: CLINIC | Age: 65
End: 2025-07-17
Payer: MEDICARE

## 2025-07-17 VITALS
TEMPERATURE: 97 F | WEIGHT: 190.15 LBS | OXYGEN SATURATION: 97 % | BODY MASS INDEX: 26.56 KG/M2 | SYSTOLIC BLOOD PRESSURE: 112 MMHG | DIASTOLIC BLOOD PRESSURE: 69 MMHG | HEART RATE: 89 BPM | RESPIRATION RATE: 16 BRPM

## 2025-07-17 DIAGNOSIS — C92.00 ACUTE MYELOID LEUKEMIA NOT HAVING ACHIEVED REMISSION (MULTI): ICD-10-CM

## 2025-07-17 LAB
ALBUMIN SERPL BCP-MCNC: 3.9 G/DL (ref 3.4–5)
ALP SERPL-CCNC: 98 U/L (ref 33–136)
ALT SERPL W P-5'-P-CCNC: 13 U/L (ref 10–52)
ANION GAP SERPL CALC-SCNC: 11 MMOL/L (ref 10–20)
AST SERPL W P-5'-P-CCNC: 12 U/L (ref 9–39)
BASOPHILS # BLD AUTO: 0.01 X10*3/UL (ref 0–0.1)
BASOPHILS NFR BLD AUTO: 0.4 %
BILIRUB SERPL-MCNC: 0.4 MG/DL (ref 0–1.2)
BUN SERPL-MCNC: 12 MG/DL (ref 6–23)
CALCIUM SERPL-MCNC: 8.9 MG/DL (ref 8.6–10.3)
CHLORIDE SERPL-SCNC: 104 MMOL/L (ref 98–107)
CO2 SERPL-SCNC: 27 MMOL/L (ref 21–32)
CREAT SERPL-MCNC: 0.8 MG/DL (ref 0.5–1.3)
EGFRCR SERPLBLD CKD-EPI 2021: >90 ML/MIN/1.73M*2
EOSINOPHIL # BLD AUTO: 0.01 X10*3/UL (ref 0–0.7)
EOSINOPHIL NFR BLD AUTO: 0.4 %
ERYTHROCYTE [DISTWIDTH] IN BLOOD BY AUTOMATED COUNT: 19.7 % (ref 11.5–14.5)
GLUCOSE SERPL-MCNC: 107 MG/DL (ref 74–99)
HCT VFR BLD AUTO: 32.5 % (ref 41–52)
HGB BLD-MCNC: 10.7 G/DL (ref 13.5–17.5)
IMM GRANULOCYTES # BLD AUTO: 0 X10*3/UL (ref 0–0.7)
IMM GRANULOCYTES NFR BLD AUTO: 0 % (ref 0–0.9)
LDH SERPL L TO P-CCNC: 170 U/L (ref 84–246)
LYMPHOCYTES # BLD AUTO: 0.86 X10*3/UL (ref 1.2–4.8)
LYMPHOCYTES NFR BLD AUTO: 33.9 %
MCH RBC QN AUTO: 35.8 PG (ref 26–34)
MCHC RBC AUTO-ENTMCNC: 32.9 G/DL (ref 32–36)
MCV RBC AUTO: 109 FL (ref 80–100)
MONOCYTES # BLD AUTO: 0.11 X10*3/UL (ref 0.1–1)
MONOCYTES NFR BLD AUTO: 4.3 %
NEUTROPHILS # BLD AUTO: 1.55 X10*3/UL (ref 1.2–7.7)
NEUTROPHILS NFR BLD AUTO: 61 %
PLATELET # BLD AUTO: 110 X10*3/UL (ref 150–450)
POTASSIUM SERPL-SCNC: 4.2 MMOL/L (ref 3.5–5.3)
PROT SERPL-MCNC: 6.6 G/DL (ref 6.4–8.2)
RBC # BLD AUTO: 2.99 X10*6/UL (ref 4.5–5.9)
SODIUM SERPL-SCNC: 138 MMOL/L (ref 136–145)
WBC # BLD AUTO: 2.5 X10*3/UL (ref 4.4–11.3)

## 2025-07-17 PROCEDURE — 85025 COMPLETE CBC W/AUTO DIFF WBC: CPT

## 2025-07-17 PROCEDURE — 80053 COMPREHEN METABOLIC PANEL: CPT

## 2025-07-17 PROCEDURE — 36415 COLL VENOUS BLD VENIPUNCTURE: CPT

## 2025-07-17 PROCEDURE — 83615 LACTATE (LD) (LDH) ENZYME: CPT

## 2025-07-17 RX ORDER — HEPARIN SODIUM,PORCINE/PF 10 UNIT/ML
50 SYRINGE (ML) INTRAVENOUS AS NEEDED
OUTPATIENT
Start: 2025-07-17

## 2025-07-17 RX ORDER — HEPARIN 100 UNIT/ML
500 SYRINGE INTRAVENOUS AS NEEDED
OUTPATIENT
Start: 2025-07-17

## 2025-07-17 ASSESSMENT — PAIN SCALES - GENERAL: PAINLEVEL_OUTOF10: 10-WORST PAIN EVER

## 2025-07-18 ENCOUNTER — PHARMACY VISIT (OUTPATIENT)
Dept: PHARMACY | Facility: CLINIC | Age: 65
End: 2025-07-18
Payer: COMMERCIAL

## 2025-07-18 ENCOUNTER — PATIENT MESSAGE (OUTPATIENT)
Dept: HEMATOLOGY/ONCOLOGY | Facility: CLINIC | Age: 65
End: 2025-07-18

## 2025-07-18 ENCOUNTER — APPOINTMENT (OUTPATIENT)
Dept: BEHAVIORAL HEALTH | Facility: CLINIC | Age: 65
End: 2025-07-18
Payer: MEDICARE

## 2025-07-18 DIAGNOSIS — F39 PSYCHOTIC AFFECTIVE DISORDER: ICD-10-CM

## 2025-07-18 PROCEDURE — 1159F MED LIST DOCD IN RCRD: CPT | Performed by: PSYCHIATRY & NEUROLOGY

## 2025-07-18 PROCEDURE — 1160F RVW MEDS BY RX/DR IN RCRD: CPT | Performed by: PSYCHIATRY & NEUROLOGY

## 2025-07-18 PROCEDURE — 99214 OFFICE O/P EST MOD 30 MIN: CPT | Performed by: PSYCHIATRY & NEUROLOGY

## 2025-07-18 RX ORDER — ESCITALOPRAM OXALATE 5 MG/1
5 TABLET ORAL DAILY
Qty: 90 TABLET | Refills: 1 | Status: SHIPPED | OUTPATIENT
Start: 2025-07-18 | End: 2026-01-14

## 2025-07-18 RX ORDER — ARIPIPRAZOLE 2 MG/1
2 TABLET ORAL DAILY
Qty: 90 TABLET | Refills: 1 | Status: SHIPPED | OUTPATIENT
Start: 2025-07-18 | End: 2026-01-14

## 2025-07-18 NOTE — PROGRESS NOTES
Subjective   Patient ID: Link Inman is a 64 y.o. male who presents for No chief complaint on file.Things are going well.    Virtual  Consent: The patient engaged in a telehealth session via Epic audio visual or phone with this provider practicing within the [facility]. The identity of the patient was verified by their date of birth and last four digits of their social security number. The provider demonstrated that confidentially was preserved at their location. The patient was informed that they were responsible for ensuring confidentially was secured at their location. The patient's location was documented for emergency purposes. The patient was informed of the necessary steps that would occur if an emergency was to occur or technology failed during session.   An interactive audio and video telecommunication system which permits real time communications between the patient (at the home of the patient) and provider (at the home office) was utilized to provide this telehealth service.   Verbal consent was requested and obtained from Link Inman on this date, 07/18/25 for a telehealth visit and the patient's location was confirmed at the time of the visit.     HPI: The patient is seen today with his wife, Emi, and daughter, Felicia, throughout the appointment today. All were in agreement that the patient is much improved since treatment with aripiprazole has commenced. The patient is though to be doing well by the patient himself as well as his family. He is ready now in his opinion to proceed with treatment for his leukemia.   We did review the risks of fluconazole with escitalopram which are the following and were explicitly discussed in detail as the following: Drug-Drug: escitalopram and fluconazole: Concurrent use of an agent which significantly inhibits [id number] may result in elevated concentrations and toxicity from citalopram and escitalopram, including risks for serotonin syndrome or prolongation of  the QTc interval.(1,2) Prolongation of the QT interval may result in life-threatening arrhythmias, including torsades de pointes.(5) Symptoms of serotonin syndrome may include tremor, agitation, diaphoresis, hyperreflexia, clonus, tachycardia, hyperthermia, and muscle rigidity. We recommended that the patient review an alternative with the provider prescribing fluconazole.  They expressed an interest in follow up in 3 to 4 weeks.      PPH; The patient reports that the only psychiatric treatment was with marriage counseling about two years ago as the only mental health care. The patient denies any psychiatric hospitalizations or suicide attempts.      Past Medical History:  No date: Arrhythmia      Comment:  atrial fibrillation  03/28/2021: Personal history of (healed) traumatic fracture      Comment:  History of compression fracture of spine  05/11/2019: Personal history of other diseases of the circulatory   system      Comment:  History of pericarditis  The patient has been diagnoses with leukemia and is in the process of being treated.      FH: The patient reports that there is depression with her mother's side. The patient's wife stated that his mother and her mother's two sisters may have had bipolar disorder. The maternal mother may have had dementia.      SH: The patient was born in [place], [place] and the patient denies any abuse as a child. He feels he had a good family life. The patient is  once and he has one son 36 years old and a daughter who has a civil engineering and environmental engineering and also a younger daughter. He is on good terms with his offspring. The patient has difference of opinion on political matters with is son. The patient has been working from 1982 through 2024 when he retired. 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 He was never in the .         Mental Status Exam     General: In no acute  distress.   Appearance: Calm  Attitude: Cooperative.   Behavior: Anxious and labile features are substantially improved with marked reduction in anger and improvement in mood.   Motor Activity: No agitation or retardation. No EPS/TD. Normal gait and station.   Speech: Regular rate, rhythm, volume and tone, spontaneous, fluent.   Mood: Anxious, irritable and labile features are improved.   Affect: Anxious, irritable and labile features are improved.    Thought Process: Compromise of rational thought due to suspiciousness and depressive features.   Thought Content: Appropriate  Thought Perception: Does not endorse auditory or visual hallucinations, does not appear to be responding to hallucinatory stimuli.   Cognition: Alert, oriented x3. No deficits noted. Adequate fund of knowledge. No deficit in recent and remote memory. No deficits in attention, concentration or language.    Insight: Insight is limited.   Judgment: Judgment is limited.       Appearance: Well-groomed.   Build: Average.   Demeanor: Average.  Eye Contact: Average..   Motor Activity: Average.   Speech: Clear.   Language: Neurologic language is intact.   Fund of Knowledge: Aware of current events,~fair fund of knowledge.   Delusions: The patient does evidence suspiciousness.   Self Harm: None reported or evidenced.   Aggressive: Labile features.   Mood: Anxious, irritable and labile features are improved.   Affect: Anxious, irritable and labile features are improved.    Orientation: Alert.   Manner: Cooperative.   Thought process: Goal-directed.   Thought association: Has been m  Content of thought: No suicidal or homicidal ideation or plans are evidenced.   Abstract/ Rational Thought: Relatively intact   Memory: Grossly intact.   Behavior: Calm.   Attention/Concentration: Normal.   Cognition: Intact.   Intelligence Estimate: Average.   Executive Function: Intact.   Insight: Limited.  Judgement: Limited.        Review of Systems   Neurological:          Mental Status Exam     General: In no acute distress.   Appearance: Calm  Attitude: Cooperative.   Behavior: Anxious and labile features.   Motor Activity: No agitation or retardation. No EPS/TD. Normal gait and station.   Speech: Regular rate, rhythm, volume and tone, spontaneous, fluent.   Mood: Anxious, irritable and labile features are improved.   Affect: Anxious, irritable and labile features are improved.    Thought Process: Compromise of rational thought due to suspiciousness and depressive features.   Thought Content: Appropriate  Thought Perception: Does not endorse auditory or visual hallucinations, does not appear to be responding to hallucinatory stimuli.   Cognition: Alert, oriented x3. No deficits noted. Adequate fund of knowledge. No deficit in recent and remote memory. No deficits in attention, concentration or language.    Insight: Insight is limited.   Judgment: Judgment is limited.       Appearance: Well-groomed.   Build: Average.   Demeanor: Average.  Eye Contact: Average..   Motor Activity: Average.   Speech: Clear.   Language: Neurologic language is intact.   Fund of Knowledge: Aware of current events,~fair fund of knowledge.   Delusions: The patient does evidence suspiciousness.   Self Harm: None reported or evidenced.   Aggressive: Labile features.   Mood: Anxious, irritable and labile features are improved.   Affect: Anxious, irritable and labile features are improved.    Orientation: Alert.   Manner: Cooperative.   Thought process: Goal-directed.   Thought association: Has been m  Content of thought: No suicidal or homicidal ideation or plans are evidenced.   Abstract/ Rational Thought: Relatively intact   Memory: Grossly intact.   Behavior: Calm.   Attention/Concentration: Normal.   Cognition: Intact.   Intelligence Estimate: Average.   Executive Function: Intact.   Insight: Limited.  Judgement: Limited.     Psychiatric/Behavioral:          Mental Status Exam     General: In no acute  distress.   Appearance: Calm  Attitude: Cooperative.   Behavior: Anxious and labile features.   Motor Activity: No agitation or retardation. No EPS/TD. Normal gait and station.   Speech: Regular rate, rhythm, volume and tone, spontaneous, fluent.   Mood: Anxious, irritable and labile features are improved.   Affect: Anxious, irritable and labile features are improved.    Thought Process: Compromise of rational thought due to suspiciousness and depressive features.   Thought Content: Appropriate  Thought Perception: Does not endorse auditory or visual hallucinations, does not appear to be responding to hallucinatory stimuli.   Cognition: Alert, oriented x3. No deficits noted. Adequate fund of knowledge. No deficit in recent and remote memory. No deficits in attention, concentration or language.    Insight: Insight is limited.   Judgment: Judgment is limited.       Appearance: Well-groomed.   Build: Average.   Demeanor: Average.  Eye Contact: Average..   Motor Activity: Average.   Speech: Clear.   Language: Neurologic language is intact.   Fund of Knowledge: Aware of current events,~fair fund of knowledge.   Delusions: The patient does evidence suspiciousness.   Self Harm: None reported or evidenced.   Aggressive: Labile features.   Mood: Anxious, irritable and labile features are improved.   Affect: Anxious, irritable and labile features are improved.    Orientation: Alert.   Manner: Cooperative.   Thought process: Goal-directed.   Thought association: Has been m  Content of thought: No suicidal or homicidal ideation or plans are evidenced.   Abstract/ Rational Thought: Relatively intact   Memory: Grossly intact.   Behavior: Calm.   Attention/Concentration: Normal.   Cognition: Intact.   Intelligence Estimate: Average.   Executive Function: Intact.   Insight: Limited.  Judgement: Limited.     All other systems reviewed and are negative.    Psych Review of Symptoms:    ADHD: Patient denied any symptoms.          Anxiety: Patient denied any symptoms.         Developmental and Sensory Concerns: Patient denied any symptoms.         Depressive Symptoms: Patient denied any symptoms.         Disruptive and Conduct Symptoms: Patient denied any symptoms.         Eating / Feeding Concerns: Patient denied any symptoms.         Elimination Symptoms: Patient denied any symptoms.         Obsessive-Compulsive Symptoms: Patient denied any symptoms.         Psychotic Symptoms: Patient denied any symptoms.           Trauma Related Symptoms: Patient denied any symptoms.           Sleep Concerns: Patient denied any symptoms.             Objective   Physical Exam    Neurological:      Mental Status: He is alert and oriented to person, place, and time.      Comments: Mental Status Exam     General: In no acute distress.   Appearance: Calm  Attitude: Cooperative.   Behavior: Anxious and labile features are substantially improved with marked reduction in anger and improvement in mood.   Motor Activity: No agitation or retardation. No EPS/TD. Normal gait and station.   Speech: Regular rate, rhythm, volume and tone, spontaneous, fluent.   Mood: Anxious, irritable and labile features are improved.   Affect: Anxious, irritable and labile features are improved.    Thought Process: Compromise of rational thought due to suspiciousness and depressive features.   Thought Content: Appropriate  Thought Perception: Does not endorse auditory or visual hallucinations, does not appear to be responding to hallucinatory stimuli.   Cognition: Alert, oriented x3. No deficits noted. Adequate fund of knowledge. No deficit in recent and remote memory. No deficits in attention, concentration or language.    Insight: Insight is limited.   Judgment: Judgment is limited.       Appearance: Well-groomed.   Build: Average.   Demeanor: Average.  Eye Contact: Average..   Motor Activity: Average.   Speech: Clear.   Language: Neurologic language is intact.   Fund of  Knowledge: Aware of current events,~fair fund of knowledge.   Delusions: The patient does evidence suspiciousness.   Self Harm: None reported or evidenced.   Aggressive: Labile features.   Mood: Anxious, irritable and labile features are improved.   Affect: Anxious, irritable and labile features are improved.    Orientation: Alert.   Manner: Cooperative.   Thought process: Goal-directed.   Thought association: Has been m  Content of thought: No suicidal or homicidal ideation or plans are evidenced.   Abstract/ Rational Thought: Relatively intact   Memory: Grossly intact.   Behavior: Calm.   Attention/Concentration: Normal.   Cognition: Intact.   Intelligence Estimate: Average.   Executive Function: Intact.   Insight: Limited.  Judgement: Limited.       Psychiatric:         Mood and Affect: Mood normal.      Comments: Mental Status Exam     General: In no acute distress.   Appearance: Calm  Attitude: Cooperative.   Behavior: Anxious and labile features are substantially improved with marked reduction in anger and improvement in mood.   Motor Activity: No agitation or retardation. No EPS/TD. Normal gait and station.   Speech: Regular rate, rhythm, volume and tone, spontaneous, fluent.   Mood: Anxious, irritable and labile features are improved.   Affect: Anxious, irritable and labile features are improved.    Thought Process: Compromise of rational thought due to suspiciousness and depressive features.   Thought Content: Appropriate  Thought Perception: Does not endorse auditory or visual hallucinations, does not appear to be responding to hallucinatory stimuli.   Cognition: Alert, oriented x3. No deficits noted. Adequate fund of knowledge. No deficit in recent and remote memory. No deficits in attention, concentration or language.    Insight: Insight is limited.   Judgment: Judgment is limited.       Appearance: Well-groomed.   Build: Average.   Demeanor: Average.  Eye Contact: Average..   Motor Activity: Average.    Speech: Clear.   Language: Neurologic language is intact.   Fund of Knowledge: Aware of current events,~fair fund of knowledge.   Delusions: The patient does evidence suspiciousness.   Self Harm: None reported or evidenced.   Aggressive: Labile features.   Mood: Anxious, irritable and labile features are improved.   Affect: Anxious, irritable and labile features are improved.    Orientation: Alert.   Manner: Cooperative.   Thought process: Goal-directed.   Thought association: Has been m  Content of thought: No suicidal or homicidal ideation or plans are evidenced.   Abstract/ Rational Thought: Relatively intact   Memory: Grossly intact.   Behavior: Calm.   Attention/Concentration: Normal.   Cognition: Intact.   Intelligence Estimate: Average.   Executive Function: Intact.   Insight: Limited.  Judgement: Limited.             Lab Review:   Infusion on 07/17/2025   Component Date Value    WBC 07/17/2025 2.5 (L)     RBC 07/17/2025 2.99 (L)     Hemoglobin 07/17/2025 10.7 (L)     Hematocrit 07/17/2025 32.5 (L)     MCV 07/17/2025 109 (H)     MCH 07/17/2025 35.8 (H)     MCHC 07/17/2025 32.9     RDW 07/17/2025 19.7 (H)     Platelets 07/17/2025 110 (L)     Neutrophils % 07/17/2025 61.0     Immature Granulocytes %,* 07/17/2025 0.0     Lymphocytes % 07/17/2025 33.9     Monocytes % 07/17/2025 4.3     Eosinophils % 07/17/2025 0.4     Basophils % 07/17/2025 0.4     Neutrophils Absolute 07/17/2025 1.55     Immature Granulocytes Ab* 07/17/2025 0.00     Lymphocytes Absolute 07/17/2025 0.86 (L)     Monocytes Absolute 07/17/2025 0.11     Eosinophils Absolute 07/17/2025 0.01     Basophils Absolute 07/17/2025 0.01     Glucose 07/17/2025 107 (H)     Sodium 07/17/2025 138     Potassium 07/17/2025 4.2     Chloride 07/17/2025 104     Bicarbonate 07/17/2025 27     Anion Gap 07/17/2025 11     Urea Nitrogen 07/17/2025 12     Creatinine 07/17/2025 0.80     eGFR 07/17/2025 >90     Calcium 07/17/2025 8.9     Albumin 07/17/2025 3.9     Alkaline  Phosphatase 07/17/2025 98     Total Protein 07/17/2025 6.6     AST 07/17/2025 12     Bilirubin, Total 07/17/2025 0.4     ALT 07/17/2025 13     LDH 07/17/2025 170    Infusion on 07/14/2025   Component Date Value    WBC 07/14/2025 3.8 (L)     RBC 07/14/2025 2.93 (L)     Hemoglobin 07/14/2025 10.5 (L)     Hematocrit 07/14/2025 31.6 (L)     MCV 07/14/2025 108 (H)     MCH 07/14/2025 35.8 (H)     MCHC 07/14/2025 33.2     RDW 07/14/2025 18.8 (H)     Platelets 07/14/2025 93 (L)     Neutrophils % 07/14/2025 68.7     Immature Granulocytes %,* 07/14/2025 0.3     Lymphocytes % 07/14/2025 23.3     Monocytes % 07/14/2025 7.4     Eosinophils % 07/14/2025 0.0     Basophils % 07/14/2025 0.3     Neutrophils Absolute 07/14/2025 2.60     Immature Granulocytes Ab* 07/14/2025 0.01     Lymphocytes Absolute 07/14/2025 0.88 (L)     Monocytes Absolute 07/14/2025 0.28     Eosinophils Absolute 07/14/2025 0.00     Basophils Absolute 07/14/2025 0.01     Glucose 07/14/2025 128 (H)     Sodium 07/14/2025 139     Potassium 07/14/2025 4.1     Chloride 07/14/2025 105     Bicarbonate 07/14/2025 26     Anion Gap 07/14/2025 12     Urea Nitrogen 07/14/2025 10     Creatinine 07/14/2025 0.84     eGFR 07/14/2025 >90     Calcium 07/14/2025 9.1     Albumin 07/14/2025 3.8     Alkaline Phosphatase 07/14/2025 100     Total Protein 07/14/2025 6.5     AST 07/14/2025 13     Bilirubin, Total 07/14/2025 0.4     ALT 07/14/2025 12     ABO TYPE 07/14/2025 O     Rh TYPE 07/14/2025 POS     ANTIBODY SCREEN 07/14/2025 NEG     LDH 07/14/2025 166    Infusion on 07/10/2025   Component Date Value    WBC 07/10/2025 5.8     RBC 07/10/2025 3.10 (L)     Hemoglobin 07/10/2025 11.1 (L)     Hematocrit 07/10/2025 33.0 (L)     MCV 07/10/2025 107 (H)     MCH 07/10/2025 35.8 (H)     MCHC 07/10/2025 33.6     RDW 07/10/2025 17.6 (H)     Platelets 07/10/2025 114 (L)     Neutrophils % 07/10/2025 71.2     Immature Granulocytes %,* 07/10/2025 0.2     Lymphocytes % 07/10/2025 20.1     Monocytes %  07/10/2025 8.3     Eosinophils % 07/10/2025 0.0     Basophils % 07/10/2025 0.2     Neutrophils Absolute 07/10/2025 4.11     Immature Granulocytes Ab* 07/10/2025 0.01     Lymphocytes Absolute 07/10/2025 1.16 (L)     Monocytes Absolute 07/10/2025 0.48     Eosinophils Absolute 07/10/2025 0.00     Basophils Absolute 07/10/2025 0.01     Glucose 07/10/2025 108 (H)     Sodium 07/10/2025 138     Potassium 07/10/2025 3.9     Chloride 07/10/2025 102     Bicarbonate 07/10/2025 27     Anion Gap 07/10/2025 13     Urea Nitrogen 07/10/2025 14     Creatinine 07/10/2025 1.05     eGFR 07/10/2025 78     Calcium 07/10/2025 9.8     Albumin 07/10/2025 4.2     Alkaline Phosphatase 07/10/2025 114     Total Protein 07/10/2025 7.1     AST 07/10/2025 13     Bilirubin, Total 07/10/2025 0.4     ALT 07/10/2025 13     ABO TYPE 07/10/2025 O     Rh TYPE 07/10/2025 POS     ANTIBODY SCREEN 07/10/2025 NEG     LDH 07/10/2025 192    Infusion on 07/07/2025   Component Date Value    WBC 07/07/2025 5.3     RBC 07/07/2025 2.93 (L)     Hemoglobin 07/07/2025 10.3 (L)     Hematocrit 07/07/2025 30.8 (L)     MCV 07/07/2025 105 (H)     MCH 07/07/2025 35.2 (H)     MCHC 07/07/2025 33.4     RDW 07/07/2025 17.2 (H)     Platelets 07/07/2025 153     Neutrophils % 07/07/2025 66.7     Immature Granulocytes %,* 07/07/2025 0.2     Lymphocytes % 07/07/2025 22.1     Monocytes % 07/07/2025 10.8     Eosinophils % 07/07/2025 0.0     Basophils % 07/07/2025 0.2     Neutrophils Absolute 07/07/2025 3.54     Immature Granulocytes Ab* 07/07/2025 0.01     Lymphocytes Absolute 07/07/2025 1.17 (L)     Monocytes Absolute 07/07/2025 0.57     Eosinophils Absolute 07/07/2025 0.00     Basophils Absolute 07/07/2025 0.01     Glucose 07/07/2025 106 (H)     Sodium 07/07/2025 142     Potassium 07/07/2025 4.1     Chloride 07/07/2025 107     Bicarbonate 07/07/2025 24     Anion Gap 07/07/2025 15     Urea Nitrogen 07/07/2025 21     Creatinine 07/07/2025 0.95     eGFR 07/07/2025 88     Calcium  07/07/2025 9.3     Albumin 07/07/2025 4.0     Alkaline Phosphatase 07/07/2025 97     Total Protein 07/07/2025 7.0     AST 07/07/2025 12     Bilirubin, Total 07/07/2025 0.3     ALT 07/07/2025 13     ABO TYPE 07/07/2025 O     Rh TYPE 07/07/2025 POS     ANTIBODY SCREEN 07/07/2025 NEG     LDH 07/07/2025 165    Lab on 07/02/2025   Component Date Value    WBC 07/02/2025 5.6     RBC 07/02/2025 3.07 (L)     Hemoglobin 07/02/2025 10.7 (L)     Hematocrit 07/02/2025 32.0 (L)     MCV 07/02/2025 104 (H)     MCH 07/02/2025 34.9 (H)     MCHC 07/02/2025 33.4     RDW 07/02/2025 17.0 (H)     Platelets 07/02/2025 171     Neutrophils % 07/02/2025 56.6     Immature Granulocytes %,* 07/02/2025 0.2     Lymphocytes % 07/02/2025 29.5     Monocytes % 07/02/2025 13.5     Eosinophils % 07/02/2025 0.0     Basophils % 07/02/2025 0.2     Neutrophils Absolute 07/02/2025 3.15     Immature Granulocytes Ab* 07/02/2025 0.01     Lymphocytes Absolute 07/02/2025 1.64     Monocytes Absolute 07/02/2025 0.75     Eosinophils Absolute 07/02/2025 0.00     Basophils Absolute 07/02/2025 0.01     Glucose 07/02/2025 106 (H)     Sodium 07/02/2025 135 (L)     Potassium 07/02/2025 4.7     Chloride 07/02/2025 98     Bicarbonate 07/02/2025 28     Anion Gap 07/02/2025 14     Urea Nitrogen 07/02/2025 19     Creatinine 07/02/2025 1.16     eGFR 07/02/2025 69     Calcium 07/02/2025 9.7     Albumin 07/02/2025 4.1     Alkaline Phosphatase 07/02/2025 107     Total Protein 07/02/2025 7.4     AST 07/02/2025 13     Bilirubin, Total 07/02/2025 0.3     ALT 07/02/2025 15     LDH 07/02/2025 162    Infusion on 06/30/2025   Component Date Value    WBC 06/30/2025 6.0     nRBC 06/30/2025      RBC 06/30/2025 3.01 (L)     Hemoglobin 06/30/2025 10.5 (L)     Hematocrit 06/30/2025 31.6 (L)     MCV 06/30/2025 105 (H)     MCH 06/30/2025 34.9 (H)     MCHC 06/30/2025 33.2     RDW 06/30/2025 16.8 (H)     Platelets 06/30/2025 155     Neutrophils % 06/30/2025 60.3     Immature Granulocytes %,*  06/30/2025 0.3     Lymphocytes % 06/30/2025 28.4     Monocytes % 06/30/2025 10.7     Eosinophils % 06/30/2025 0.0     Basophils % 06/30/2025 0.3     Neutrophils Absolute 06/30/2025 3.59     Immature Granulocytes Ab* 06/30/2025 0.02     Lymphocytes Absolute 06/30/2025 1.69     Monocytes Absolute 06/30/2025 0.64     Eosinophils Absolute 06/30/2025 0.00     Basophils Absolute 06/30/2025 0.02     Glucose 06/30/2025 97     Sodium 06/30/2025 137     Potassium 06/30/2025 4.1     Chloride 06/30/2025 99     Bicarbonate 06/30/2025 28     Anion Gap 06/30/2025 14     Urea Nitrogen 06/30/2025 16     Creatinine 06/30/2025 1.02     eGFR 06/30/2025 81     Calcium 06/30/2025 9.8     Albumin 06/30/2025 4.1     Alkaline Phosphatase 06/30/2025 111     Total Protein 06/30/2025 7.6     AST 06/30/2025 13     Bilirubin, Total 06/30/2025 0.3     ALT 06/30/2025 14     ABO TYPE 06/30/2025 O     Rh TYPE 06/30/2025 POS     ANTIBODY SCREEN 06/30/2025 NEG     LDH 06/30/2025 182    Infusion on 06/26/2025   Component Date Value    Glucose 06/26/2025 137 (H)     Sodium 06/26/2025 137     Potassium 06/26/2025 4.3     Chloride 06/26/2025 100     Bicarbonate 06/26/2025 28     Anion Gap 06/26/2025 13     Urea Nitrogen 06/26/2025 16     Creatinine 06/26/2025 1.05     eGFR 06/26/2025 78     Calcium 06/26/2025 9.7     Albumin 06/26/2025 3.9     Alkaline Phosphatase 06/26/2025 102     Total Protein 06/26/2025 7.0     AST 06/26/2025 10     Bilirubin, Total 06/26/2025 0.3     ALT 06/26/2025 11    Infusion on 06/19/2025   Component Date Value    WBC 06/19/2025 1.0 (LL)     nRBC 06/19/2025      RBC 06/19/2025 3.27 (L)     Hemoglobin 06/19/2025 11.4 (L)     Hematocrit 06/19/2025 33.6 (L)     MCV 06/19/2025 103 (H)     MCH 06/19/2025 34.9 (H)     MCHC 06/19/2025 33.9     RDW 06/19/2025 16.8 (H)     Platelets 06/19/2025 51 (L)     Neutrophils % 06/19/2025 1.9     Immature Granulocytes %,* 06/19/2025 0.0     Lymphocytes % 06/19/2025 94.1     Monocytes % 06/19/2025  3.0     Eosinophils % 06/19/2025 1.0     Basophils % 06/19/2025 0.0     Neutrophils Absolute 06/19/2025 0.02 (L)     Immature Granulocytes Ab* 06/19/2025 0.00     Lymphocytes Absolute 06/19/2025 0.95 (L)     Monocytes Absolute 06/19/2025 0.03 (L)     Eosinophils Absolute 06/19/2025 0.01     Basophils Absolute 06/19/2025 0.00     Glucose 06/19/2025 144 (H)     Sodium 06/19/2025 139     Potassium 06/19/2025 4.2     Chloride 06/19/2025 103     Bicarbonate 06/19/2025 27     Anion Gap 06/19/2025 13     Urea Nitrogen 06/19/2025 21     Creatinine 06/19/2025 1.01     eGFR 06/19/2025 82     Calcium 06/19/2025 9.7     Albumin 06/19/2025 4.1     Alkaline Phosphatase 06/19/2025 100     Total Protein 06/19/2025 6.9     AST 06/19/2025 11     Bilirubin, Total 06/19/2025 0.4     ALT 06/19/2025 13    Infusion on 06/12/2025   Component Date Value    LDH 06/12/2025 156     WBC 06/12/2025 2.1 (L)     nRBC 06/12/2025      RBC 06/12/2025 3.59 (L)     Hemoglobin 06/12/2025 12.5 (L)     Hematocrit 06/12/2025 37.4 (L)     MCV 06/12/2025 104 (H)     MCH 06/12/2025 34.8 (H)     MCHC 06/12/2025 33.4     RDW 06/12/2025 18.0 (H)     Platelets 06/12/2025 41 (L)     Neutrophils % 06/12/2025 33.8     Immature Granulocytes %,* 06/12/2025 0.0     Lymphocytes % 06/12/2025 65.2     Monocytes % 06/12/2025 0.5     Eosinophils % 06/12/2025 0.5     Basophils % 06/12/2025 0.0     Neutrophils Absolute 06/12/2025 0.71 (L)     Immature Granulocytes Ab* 06/12/2025 0.00     Lymphocytes Absolute 06/12/2025 1.37     Monocytes Absolute 06/12/2025 0.01 (L)     Eosinophils Absolute 06/12/2025 0.01     Basophils Absolute 06/12/2025 0.00     Glucose 06/12/2025 100 (H)     Sodium 06/12/2025 138     Potassium 06/12/2025 4.0     Chloride 06/12/2025 104     Bicarbonate 06/12/2025 25     Anion Gap 06/12/2025 13     Urea Nitrogen 06/12/2025 10     Creatinine 06/12/2025 0.86     eGFR 06/12/2025 >90     Calcium 06/12/2025 9.7     Albumin 06/12/2025 4.3     Alkaline Phosphatase  06/12/2025 105     Total Protein 06/12/2025 7.4     AST 06/12/2025 15     Bilirubin, Total 06/12/2025 0.6     ALT 06/12/2025 16    Infusion on 06/09/2025   Component Date Value    ABO TYPE 07/02/2025 O     Rh TYPE 07/02/2025 POS     ANTIBODY SCREEN 07/02/2025 NEG     WBC 06/09/2025 2.7 (L)     nRBC 06/09/2025      RBC 06/09/2025 3.37 (L)     Hemoglobin 06/09/2025 11.8 (L)     Hematocrit 06/09/2025 34.8 (L)     MCV 06/09/2025 103 (H)     MCH 06/09/2025 35.0 (H)     MCHC 06/09/2025 33.9     RDW 06/09/2025 17.8 (H)     Platelets 06/09/2025 66 (L)     Neutrophils % 06/09/2025 57.9     Immature Granulocytes %,* 06/09/2025 0.0     Lymphocytes % 06/09/2025 39.5     Monocytes % 06/09/2025 1.5     Eosinophils % 06/09/2025 1.1     Basophils % 06/09/2025 0.0     Neutrophils Absolute 06/09/2025 1.54     Immature Granulocytes Ab* 06/09/2025 0.00     Lymphocytes Absolute 06/09/2025 1.05 (L)     Monocytes Absolute 06/09/2025 0.04 (L)     Eosinophils Absolute 06/09/2025 0.03     Basophils Absolute 06/09/2025 0.00     Glucose 06/09/2025 123 (H)     Sodium 06/09/2025 140     Potassium 06/09/2025 4.2     Chloride 06/09/2025 106     Bicarbonate 06/09/2025 26     Anion Gap 06/09/2025 12     Urea Nitrogen 06/09/2025 15     Creatinine 06/09/2025 0.75     eGFR 06/09/2025 >90     Calcium 06/09/2025 9.4     Albumin 06/09/2025 4.0     Alkaline Phosphatase 06/09/2025 90     Total Protein 06/09/2025 6.6     AST 06/09/2025 14     Bilirubin, Total 06/09/2025 0.7     ALT 06/09/2025 14     ABO TYPE 06/09/2025 O     Rh TYPE 06/09/2025 POS     ANTIBODY SCREEN 06/09/2025 NEG     LDH 06/09/2025 149     RBC Morphology 06/09/2025 See Below     Ovalocytes 06/09/2025 Few    There may be more visits with results that are not included.       Assessment/Plan   Psychiatric Risk Assessment  Violence Risk Assessment: none  Acute Risk of Harm to Others is Considered: low   Suicide Risk Assessment: age > 65 yrs old and   Protective Factors against  Suicide: adherence to  treatment, fear of suicide, moral objections to suicide, positive family relationships, and sense of responsibility toward family  Acute Risk of Harm to Self is Considered: low    Imminent Risk of Suicide or Serious Self-Injury: Low   Chronic Risk of Suicide of Serious Self-Injury: Low  Risk factors: Age, depression history and   Protective factors: Denies current suicidal ideation, denies history of suicide attempts , willingness to seek help and support , gender, access to a variety of clinical interventions , and receiving and engaged in care for mental, physical, and substance use disorders      Imminent Risk of Violence or Homicide: Low   Risk Factors: No significant risk factors identified on screening  Protective Factors: Lack of known history of harm to others , Lack of known history of violent ideation , and lack of known access to firearms.     Assessment & Plan  Psychotic affective disorder  Diagnosis: paranoid psychotic affective disorder.     Treatment Plan:   The FDA risks, benefits & alternatives to the medications prescribed were explained to you and your support person, your wife and Felicia, your daughter, today. You & your support persons were able to understand & repeat these risks, benefits & alternatives to these prescribed medications. You and your support persons have agreed to proceed with treatment with the medications discussed based on the conclusion that the benefit outweighs the risks of this treatment regimen: continue escitalopram 5 mg daily and add aripiprazole 2 mg daily(the FDA risks were explicitly- reviewed with you, your wife Emi and your eldest daughter).     We did review the risks of fluconazole with escitalopram which are the following and were explicitly discussed in detail as the following: Drug-Drug: escitalopram and fluconazole: Concurrent use of an agent which significantly inhibits [id number] may result in elevated concentrations and toxicity from  citalopram and escitalopram, including risks for serotonin syndrome or prolongation of the QTc interval.(1,2) Prolongation of the QT interval may result in life-threatening arrhythmias, including torsades de pointes.(5) Symptoms of serotonin syndrome may include tremor, agitation, diaphoresis, hyperreflexia, clonus, tachycardia, hyperthermia, and muscle rigidity. We recommended that the patient review an alternative with the provider prescribing fluconazole.    Follow up in 3 to 4 weeks.  Orders:    escitalopram (Lexapro) 5 mg tablet; Take 1 tablet (5 mg) by mouth once daily.    ARIPiprazole (Abilify) 2 mg tablet; Take 1 tablet (2 mg) by mouth once daily.      Time:   Prep time on date of the patient encounter: 5 minutes.   Time spent directly with patient/family/caregiver: 20 minutes.   Additional time spent on patient care activities:  minutes.   Documentation time: 5 minutes.   Total time on date of patient encounter: 30 minutes.

## 2025-07-21 ENCOUNTER — INFUSION (OUTPATIENT)
Dept: HEMATOLOGY/ONCOLOGY | Facility: CLINIC | Age: 65
End: 2025-07-21
Payer: MEDICARE

## 2025-07-21 VITALS
WEIGHT: 193.34 LBS | RESPIRATION RATE: 16 BRPM | BODY MASS INDEX: 27 KG/M2 | HEART RATE: 90 BPM | OXYGEN SATURATION: 93 % | SYSTOLIC BLOOD PRESSURE: 119 MMHG | DIASTOLIC BLOOD PRESSURE: 74 MMHG | TEMPERATURE: 97.2 F

## 2025-07-21 DIAGNOSIS — C92.00 ACUTE MYELOID LEUKEMIA NOT HAVING ACHIEVED REMISSION (MULTI): ICD-10-CM

## 2025-07-21 LAB
ALBUMIN SERPL BCP-MCNC: 3.7 G/DL (ref 3.4–5)
ALP SERPL-CCNC: 91 U/L (ref 33–136)
ALT SERPL W P-5'-P-CCNC: 13 U/L (ref 10–52)
ANION GAP SERPL CALC-SCNC: 10 MMOL/L (ref 10–20)
AST SERPL W P-5'-P-CCNC: 13 U/L (ref 9–39)
BASOPHILS # BLD AUTO: 0.04 X10*3/UL (ref 0–0.1)
BASOPHILS NFR BLD AUTO: 1.5 %
BILIRUB SERPL-MCNC: 0.3 MG/DL (ref 0–1.2)
BUN SERPL-MCNC: 14 MG/DL (ref 6–23)
CALCIUM SERPL-MCNC: 8.6 MG/DL (ref 8.6–10.3)
CHLORIDE SERPL-SCNC: 106 MMOL/L (ref 98–107)
CO2 SERPL-SCNC: 26 MMOL/L (ref 21–32)
CREAT SERPL-MCNC: 0.84 MG/DL (ref 0.5–1.3)
EGFRCR SERPLBLD CKD-EPI 2021: >90 ML/MIN/1.73M*2
EOSINOPHIL # BLD AUTO: 0.02 X10*3/UL (ref 0–0.7)
EOSINOPHIL NFR BLD AUTO: 0.8 %
ERYTHROCYTE [DISTWIDTH] IN BLOOD BY AUTOMATED COUNT: 19.4 % (ref 11.5–14.5)
GLUCOSE SERPL-MCNC: 110 MG/DL (ref 74–99)
HCT VFR BLD AUTO: 32.9 % (ref 41–52)
HGB BLD-MCNC: 11 G/DL (ref 13.5–17.5)
IMM GRANULOCYTES # BLD AUTO: 0 X10*3/UL (ref 0–0.7)
IMM GRANULOCYTES NFR BLD AUTO: 0 % (ref 0–0.9)
LDH SERPL L TO P-CCNC: 157 U/L (ref 84–246)
LYMPHOCYTES # BLD AUTO: 0.82 X10*3/UL (ref 1.2–4.8)
LYMPHOCYTES NFR BLD AUTO: 31.2 %
MCH RBC QN AUTO: 36.7 PG (ref 26–34)
MCHC RBC AUTO-ENTMCNC: 33.4 G/DL (ref 32–36)
MCV RBC AUTO: 110 FL (ref 80–100)
MONOCYTES # BLD AUTO: 0.06 X10*3/UL (ref 0.1–1)
MONOCYTES NFR BLD AUTO: 2.3 %
NEUTROPHILS # BLD AUTO: 1.69 X10*3/UL (ref 1.2–7.7)
NEUTROPHILS NFR BLD AUTO: 64.2 %
NRBC BLD-RTO: ABNORMAL /100{WBCS}
PLATELET # BLD AUTO: 181 X10*3/UL (ref 150–450)
POTASSIUM SERPL-SCNC: 4 MMOL/L (ref 3.5–5.3)
PROT SERPL-MCNC: 6.3 G/DL (ref 6.4–8.2)
RBC # BLD AUTO: 3 X10*6/UL (ref 4.5–5.9)
SODIUM SERPL-SCNC: 138 MMOL/L (ref 136–145)
WBC # BLD AUTO: 2.6 X10*3/UL (ref 4.4–11.3)

## 2025-07-21 PROCEDURE — 86901 BLOOD TYPING SEROLOGIC RH(D): CPT

## 2025-07-21 PROCEDURE — 80053 COMPREHEN METABOLIC PANEL: CPT

## 2025-07-21 PROCEDURE — 36415 COLL VENOUS BLD VENIPUNCTURE: CPT

## 2025-07-21 PROCEDURE — 85025 COMPLETE CBC W/AUTO DIFF WBC: CPT

## 2025-07-21 PROCEDURE — 83615 LACTATE (LD) (LDH) ENZYME: CPT

## 2025-07-21 RX ORDER — HEPARIN SODIUM,PORCINE/PF 10 UNIT/ML
50 SYRINGE (ML) INTRAVENOUS AS NEEDED
Status: CANCELLED | OUTPATIENT
Start: 2025-07-21

## 2025-07-21 RX ORDER — HEPARIN 100 UNIT/ML
500 SYRINGE INTRAVENOUS AS NEEDED
Status: CANCELLED | OUTPATIENT
Start: 2025-07-21

## 2025-07-21 ASSESSMENT — PAIN SCALES - GENERAL: PAINLEVEL_OUTOF10: 10-WORST PAIN EVER

## 2025-07-21 NOTE — PROGRESS NOTES
Patient ID: Link Inman is a 64 y.o. male.  Referring Physician: Riky Gomez MD  [address] [place], [address] [place] [id number]  Primary Care Provider: Sukhdeep Lopez MD    Date of Service:  7/24/2025    SUBJECTIVE:  Patient presents today, accompanied by his wife, for follow up.  Reports feeling well, his hip is still causing him significant pain.  He was using percocet, but it doesn't seem to be helping.  He would prefer to use the tramadol as that helped more previously. He has alternating constipation and diarrhea.  Feels these are well managed. No fevers, chills, bleeding.       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  On review with patient has increasing fatigue and reduced activity since November 2024.  Underwent bone marrow biopsy 3/27/2025 that demonstrated:   BONE MARROW, LEFT ILIAC CREST, ASPIRATE WITH CLOT AND BIOPSY WITH TOUCH PREPARATION:   -- ACUTE MYELOID LEUKEMIA (estimated at 80% of marrow cellularity).  -- MARKEDLY DECREASED MATURING TRILINEAGE HEMATOPOIESIS.  Karyotype: 46 XY  FISH: FISH NEGATIVE for inverted 3/t(3;3), t(6;9), t(8;21), gain of RUNX1 or PRGB3R4, MLL (KMT2A) rearrangement, t(15;17), inverted 16/t(16;16), TP53 deletion and monosomy 17   NGS: TET2 p.* VAF: 32% TET2 p.* VAF: 32%        4/10/2025 -  Chemotherapy    Treatment:   I. Azacitidine 75m/m2 days 1-7, with venetoclax 400mg days 1-28  Cycle 1 - initiated 4/10/2025.  Overall well tolerated with expected complications  Response: marrow completed 5/1/2025 with no evidence of leukemia  Cycle 2: plan to reduce cat to days 1-21, plan start when ANC > 1000  Venetoclax / AzaCITIDine, 28 Day Cycles - Induction / Consolidation         OBJECTIVE:  KPS: Karnofsky Score: 70 - Cares for self; unable to carry on normal activity or do normal work      Physical Exam  Constitutional:       Appearance: Normal appearance.   HENT:      Mouth/Throat:      Mouth: Mucous membranes are moist.     Cardiovascular:      Rate and Rhythm: Normal rate and regular rhythm.      Heart sounds: Normal heart sounds.   Pulmonary:      Effort: Pulmonary effort is normal.      Breath sounds: Normal breath sounds.   Abdominal:      General: Bowel sounds are normal.      Palpations: Abdomen is soft.     Musculoskeletal:         General: Normal range of motion.      Cervical back: Neck supple.      Right lower leg: No edema.      Left lower leg: No edema.   Lymphadenopathy:      Comments: No lymphadenopathy     Skin:     General: Skin is warm and dry.      Findings: No lesion or rash.     Neurological:      General: No focal deficit present.      Mental Status: He is alert and oriented to person, place, and time. Mental status is at baseline.     Psychiatric:         Mood and Affect: Mood normal.       Assessment & Plan  Acute myeloid leukemia not having achieved remission (Multi)  7/24/2025: His ANC today is 680 so we will delay C4 until 7/31. If needing to delay 4 weeks (today is the 1st delay), will repeat Bmbx    Diagnostics:  - none pending. Potential Bmbx after cycle 4  Treatment:  - Delay Cycle 4 of azacitidine 75 mg/m² for 7 days on days 1-7 (or 1-9 with a weekend off) combined with venetoclax 400 mg or equivalent (200 mg with his concomitant fluconazole) - reduce to days 1-14 given delay in cycle 3  - Re-try to start C4 on 7/31  Disease Monitoring:  -Weekly CBCs   Supportive Care:  -Weekly infusion visits for supportive transfusions - use plts > 50 when taking rivaroxaban, but patient should HOLD when plts decrease.   Antimicrobial Prophylaxis:  -Acyclovir  -Fluconazole  -Levofloxacin when ANC < 500  IV access:  -Peripheral IV for the moment; may consider PICC  line    RTC:  Q  month MD/TAMANNA follow up    Nikki Patel, APRN-CNP

## 2025-07-21 NOTE — PROGRESS NOTES
"I called and spoke with Link provided Dr. Gomez's response to MyChart question: \"We are aware of these interaction and doses are adjusted\" -- reassurance provided to patient. Patient will see Nikki CARABALLO NP in clinic on Thursday and I let him know we can go over further at that time. Patient was appreciative. He did ask if we could respond on MyChart also.   "

## 2025-07-21 NOTE — ASSESSMENT & PLAN NOTE
7/24/2025: His ANC today is 680 so we will delay C4 until 7/31. If needing to delay 4 weeks (today is the 1st delay), will repeat Bmbx    Diagnostics:  - none pending. Potential Bmbx after cycle 4  Treatment:  - Delay Cycle 4 of azacitidine 75 mg/m² for 7 days on days 1-7 (or 1-9 with a weekend off) combined with venetoclax 400 mg or equivalent (200 mg with his concomitant fluconazole) - reduce to days 1-14 given delay in cycle 3  - Re-try to start C4 on 7/31  Disease Monitoring:  -Weekly CBCs   Supportive Care:  -Weekly infusion visits for supportive transfusions - use plts > 50 when taking rivaroxaban, but patient should HOLD when plts decrease.   Antimicrobial Prophylaxis:  -Acyclovir  -Fluconazole  -Levofloxacin when ANC < 500  IV access:  -Peripheral IV for the moment; may consider PICC line

## 2025-07-22 ENCOUNTER — APPOINTMENT (OUTPATIENT)
Dept: PAIN MEDICINE | Facility: CLINIC | Age: 65
End: 2025-07-22
Payer: MEDICARE

## 2025-07-22 LAB
ABO GROUP (TYPE) IN BLOOD: NORMAL
ANTIBODY SCREEN: NORMAL
RH FACTOR (ANTIGEN D): NORMAL

## 2025-07-24 ENCOUNTER — INFUSION (OUTPATIENT)
Dept: HEMATOLOGY/ONCOLOGY | Facility: CLINIC | Age: 65
End: 2025-07-24
Payer: MEDICARE

## 2025-07-24 ENCOUNTER — OFFICE VISIT (OUTPATIENT)
Dept: HEMATOLOGY/ONCOLOGY | Facility: CLINIC | Age: 65
End: 2025-07-24
Payer: MEDICARE

## 2025-07-24 VITALS
SYSTOLIC BLOOD PRESSURE: 123 MMHG | OXYGEN SATURATION: 94 % | RESPIRATION RATE: 16 BRPM | WEIGHT: 190.92 LBS | BODY MASS INDEX: 26.67 KG/M2 | HEART RATE: 107 BPM | TEMPERATURE: 97.9 F | DIASTOLIC BLOOD PRESSURE: 77 MMHG

## 2025-07-24 DIAGNOSIS — M54.50 CHRONIC LOW BACK PAIN, UNSPECIFIED BACK PAIN LATERALITY, UNSPECIFIED WHETHER SCIATICA PRESENT: ICD-10-CM

## 2025-07-24 DIAGNOSIS — C92.00 ACUTE MYELOID LEUKEMIA NOT HAVING ACHIEVED REMISSION (MULTI): Primary | ICD-10-CM

## 2025-07-24 DIAGNOSIS — G89.29 CHRONIC LOW BACK PAIN, UNSPECIFIED BACK PAIN LATERALITY, UNSPECIFIED WHETHER SCIATICA PRESENT: ICD-10-CM

## 2025-07-24 DIAGNOSIS — C92.00 ACUTE MYELOID LEUKEMIA NOT HAVING ACHIEVED REMISSION (MULTI): ICD-10-CM

## 2025-07-24 LAB
ALBUMIN SERPL BCP-MCNC: 4 G/DL (ref 3.4–5)
ALP SERPL-CCNC: 96 U/L (ref 33–136)
ALT SERPL W P-5'-P-CCNC: 13 U/L (ref 10–52)
ANION GAP SERPL CALC-SCNC: 12 MMOL/L (ref 10–20)
AST SERPL W P-5'-P-CCNC: 12 U/L (ref 9–39)
BASOPHILS # BLD AUTO: 0.02 X10*3/UL (ref 0–0.1)
BASOPHILS NFR BLD AUTO: 1.3 %
BILIRUB SERPL-MCNC: 0.4 MG/DL (ref 0–1.2)
BUN SERPL-MCNC: 15 MG/DL (ref 6–23)
CALCIUM SERPL-MCNC: 9 MG/DL (ref 8.6–10.3)
CHLORIDE SERPL-SCNC: 104 MMOL/L (ref 98–107)
CO2 SERPL-SCNC: 26 MMOL/L (ref 21–32)
CREAT SERPL-MCNC: 0.78 MG/DL (ref 0.5–1.3)
EGFRCR SERPLBLD CKD-EPI 2021: >90 ML/MIN/1.73M*2
EOSINOPHIL # BLD AUTO: 0.03 X10*3/UL (ref 0–0.7)
EOSINOPHIL NFR BLD AUTO: 1.9 %
ERYTHROCYTE [DISTWIDTH] IN BLOOD BY AUTOMATED COUNT: 19.1 % (ref 11.5–14.5)
GLUCOSE SERPL-MCNC: 122 MG/DL (ref 74–99)
HCT VFR BLD AUTO: 36.7 % (ref 41–52)
HGB BLD-MCNC: 12.3 G/DL (ref 13.5–17.5)
IMM GRANULOCYTES # BLD AUTO: 0 X10*3/UL (ref 0–0.7)
IMM GRANULOCYTES NFR BLD AUTO: 0 % (ref 0–0.9)
LYMPHOCYTES # BLD AUTO: 0.75 X10*3/UL (ref 1.2–4.8)
LYMPHOCYTES NFR BLD AUTO: 48.1 %
MCH RBC QN AUTO: 36.8 PG (ref 26–34)
MCHC RBC AUTO-ENTMCNC: 33.5 G/DL (ref 32–36)
MCV RBC AUTO: 110 FL (ref 80–100)
MONOCYTES # BLD AUTO: 0.08 X10*3/UL (ref 0.1–1)
MONOCYTES NFR BLD AUTO: 5.1 %
NEUTROPHILS # BLD AUTO: 0.68 X10*3/UL (ref 1.2–7.7)
NEUTROPHILS NFR BLD AUTO: 43.6 %
NRBC BLD-RTO: ABNORMAL /100{WBCS}
PLATELET # BLD AUTO: 247 X10*3/UL (ref 150–450)
POLYCHROMASIA BLD QL SMEAR: NORMAL
POTASSIUM SERPL-SCNC: 4 MMOL/L (ref 3.5–5.3)
PROT SERPL-MCNC: 6.9 G/DL (ref 6.4–8.2)
RBC # BLD AUTO: 3.34 X10*6/UL (ref 4.5–5.9)
RBC MORPH BLD: NORMAL
SODIUM SERPL-SCNC: 138 MMOL/L (ref 136–145)
WBC # BLD AUTO: 1.6 X10*3/UL (ref 4.4–11.3)

## 2025-07-24 PROCEDURE — 36415 COLL VENOUS BLD VENIPUNCTURE: CPT

## 2025-07-24 PROCEDURE — 1125F AMNT PAIN NOTED PAIN PRSNT: CPT | Performed by: NURSE PRACTITIONER

## 2025-07-24 PROCEDURE — 84075 ASSAY ALKALINE PHOSPHATASE: CPT

## 2025-07-24 PROCEDURE — 99215 OFFICE O/P EST HI 40 MIN: CPT | Performed by: NURSE PRACTITIONER

## 2025-07-24 PROCEDURE — G2211 COMPLEX E/M VISIT ADD ON: HCPCS | Performed by: NURSE PRACTITIONER

## 2025-07-24 PROCEDURE — 1160F RVW MEDS BY RX/DR IN RCRD: CPT | Performed by: NURSE PRACTITIONER

## 2025-07-24 PROCEDURE — 85025 COMPLETE CBC W/AUTO DIFF WBC: CPT

## 2025-07-24 PROCEDURE — 1159F MED LIST DOCD IN RCRD: CPT | Performed by: NURSE PRACTITIONER

## 2025-07-24 RX ORDER — HEPARIN SODIUM,PORCINE/PF 10 UNIT/ML
50 SYRINGE (ML) INTRAVENOUS AS NEEDED
OUTPATIENT
Start: 2025-07-24

## 2025-07-24 RX ORDER — TRAMADOL HYDROCHLORIDE 50 MG/1
50 TABLET, FILM COATED ORAL EVERY 8 HOURS PRN
Qty: 90 TABLET | Refills: 5 | Status: SHIPPED | OUTPATIENT
Start: 2025-07-24

## 2025-07-24 RX ORDER — HEPARIN 100 UNIT/ML
500 SYRINGE INTRAVENOUS AS NEEDED
OUTPATIENT
Start: 2025-07-24

## 2025-07-24 ASSESSMENT — PAIN SCALES - GENERAL: PAINLEVEL_OUTOF10: 10-WORST PAIN EVER

## 2025-07-25 ENCOUNTER — APPOINTMENT (OUTPATIENT)
Dept: HEMATOLOGY/ONCOLOGY | Facility: CLINIC | Age: 65
End: 2025-07-25
Payer: MEDICARE

## 2025-07-25 ENCOUNTER — NURSE TRIAGE (OUTPATIENT)
Dept: HEMATOLOGY/ONCOLOGY | Facility: HOSPITAL | Age: 65
End: 2025-07-25
Payer: MEDICARE

## 2025-07-25 ENCOUNTER — TELEPHONE (OUTPATIENT)
Dept: HEMATOLOGY/ONCOLOGY | Facility: HOSPITAL | Age: 65
End: 2025-07-25
Payer: MEDICARE

## 2025-07-25 DIAGNOSIS — C92.01 AML (ACUTE MYELOID LEUKEMIA) IN REMISSION (MULTI): Primary | ICD-10-CM

## 2025-07-25 DIAGNOSIS — R31.0 GROSS HEMATURIA: Primary | ICD-10-CM

## 2025-07-25 NOTE — TELEPHONE ENCOUNTER
Spouse states patient started having small amounts of diluted blood in urine today. Drinking lots of fluids. Incontinent at baseline. Denies any difficulty urinating, pain, fever, any other signs of bleeding. No dizziness, lightheadedness. States much more fatigued yesterday and today. Message sent to fellow on call.

## 2025-07-25 NOTE — TELEPHONE ENCOUNTER
Spouse advised per fellow, recommend to start taking the levofloxacin he has available for anc less than 500. If fatigue worsens or spikes fever should go to ed. Lab orders placed for UA and repeat CBC. Spouse states will take patient to  lab in [place] when they open tomorrow AM. Spouse verbalized understanding and agreeable.

## 2025-07-26 ENCOUNTER — TELEPHONE (OUTPATIENT)
Dept: HEMATOLOGY/ONCOLOGY | Facility: HOSPITAL | Age: 65
End: 2025-07-26

## 2025-07-26 DIAGNOSIS — C92.00 ACUTE MYELOID LEUKEMIA NOT HAVING ACHIEVED REMISSION (MULTI): Primary | ICD-10-CM

## 2025-07-26 DIAGNOSIS — R31.9 HEMATURIA, UNSPECIFIED TYPE: ICD-10-CM

## 2025-07-26 DIAGNOSIS — R31.9 HEMATURIA, UNSPECIFIED TYPE: Primary | ICD-10-CM

## 2025-07-26 NOTE — TELEPHONE ENCOUNTER
Patient called about scant blood and urine and new fatigue. Temperature of 37 C.  Recommended to start taking home Levofloxacin (ANC downtrending), recommended going to lab tomorrow for UA and repeat CBC

## 2025-07-26 NOTE — TELEPHONE ENCOUNTER
Received page that patient is at the lab and they cannot see orders. Fellow placed new orders and verified they are entered for [facility]. Called spouse to notify, no answer, left voicemail with callback number.

## 2025-07-28 ENCOUNTER — APPOINTMENT (OUTPATIENT)
Dept: HEMATOLOGY/ONCOLOGY | Facility: CLINIC | Age: 65
End: 2025-07-28
Payer: MEDICARE

## 2025-07-28 DIAGNOSIS — C92.00 ACUTE MYELOID LEUKEMIA NOT HAVING ACHIEVED REMISSION (MULTI): Primary | ICD-10-CM

## 2025-07-28 LAB
APPEARANCE UR: CLEAR
BACTERIA #/AREA URNS HPF: ABNORMAL /HPF
BACTERIA UR CULT: ABNORMAL
BACTERIA UR CULT: ABNORMAL
BASOPHILS # BLD AUTO: 50 CELLS/UL (ref 0–200)
BASOPHILS NFR BLD AUTO: 3.1 %
BILIRUB UR QL STRIP: NEGATIVE
COLOR UR: YELLOW
EOSINOPHIL # BLD AUTO: 59 CELLS/UL (ref 15–500)
EOSINOPHIL NFR BLD AUTO: 3.7 %
ERYTHROCYTE [DISTWIDTH] IN BLOOD BY AUTOMATED COUNT: 18.4 % (ref 11–15)
GLUCOSE UR QL STRIP: NEGATIVE
HCT VFR BLD AUTO: 37.4 % (ref 38.5–50)
HGB BLD-MCNC: 12.5 G/DL (ref 13.2–17.1)
HGB UR QL STRIP: ABNORMAL
HYALINE CASTS #/AREA URNS LPF: ABNORMAL /LPF
KETONES UR QL STRIP: NEGATIVE
LEUKOCYTE ESTERASE UR QL STRIP: ABNORMAL
LYMPHOCYTES # BLD AUTO: 875 CELLS/UL (ref 850–3900)
LYMPHOCYTES NFR BLD AUTO: 54.7 %
MCH RBC QN AUTO: 37.3 PG (ref 27–33)
MCHC RBC AUTO-ENTMCNC: 33.4 G/DL (ref 32–36)
MCV RBC AUTO: 111.6 FL (ref 80–100)
MONOCYTES # BLD AUTO: 139 CELLS/UL (ref 200–950)
MONOCYTES NFR BLD AUTO: 8.7 %
NEUTROPHILS # BLD AUTO: 477 CELLS/UL (ref 1500–7800)
NEUTROPHILS NFR BLD AUTO: 29.8 %
NITRITE UR QL STRIP: NEGATIVE
PH UR STRIP: 6.5 [PH] (ref 5–8)
PLATELET # BLD AUTO: 274 THOUSAND/UL (ref 140–400)
PMV BLD REES-ECKER: 9.3 FL (ref 7.5–12.5)
PROT UR QL STRIP: NEGATIVE
RBC # BLD AUTO: 3.35 MILLION/UL (ref 4.2–5.8)
RBC #/AREA URNS HPF: ABNORMAL /HPF
SERVICE CMNT-IMP: ABNORMAL
SERVICE CMNT-IMP: ABNORMAL
SP GR UR STRIP: 1.01 (ref 1–1.03)
SQUAMOUS #/AREA URNS HPF: ABNORMAL /HPF
WBC # BLD AUTO: 1.6 THOUSAND/UL (ref 3.8–10.8)
WBC #/AREA URNS HPF: ABNORMAL /HPF

## 2025-07-28 RX ORDER — NITROFURANTOIN 25; 75 MG/1; MG/1
100 CAPSULE ORAL 2 TIMES DAILY
Qty: 20 CAPSULE | Refills: 0 | Status: SHIPPED | OUTPATIENT
Start: 2025-07-28 | End: 2025-08-07

## 2025-07-29 ENCOUNTER — APPOINTMENT (OUTPATIENT)
Dept: HEMATOLOGY/ONCOLOGY | Facility: CLINIC | Age: 65
End: 2025-07-29
Payer: MEDICARE

## 2025-07-30 ENCOUNTER — APPOINTMENT (OUTPATIENT)
Dept: HEMATOLOGY/ONCOLOGY | Facility: CLINIC | Age: 65
End: 2025-07-30
Payer: MEDICARE

## 2025-07-31 ENCOUNTER — HOSPITAL ENCOUNTER (OUTPATIENT)
Dept: RADIOLOGY | Facility: CLINIC | Age: 65
Discharge: HOME | End: 2025-07-31
Payer: MEDICARE

## 2025-07-31 ENCOUNTER — INFUSION (OUTPATIENT)
Dept: HEMATOLOGY/ONCOLOGY | Facility: CLINIC | Age: 65
End: 2025-07-31
Payer: MEDICARE

## 2025-07-31 VITALS
TEMPERATURE: 97.3 F | RESPIRATION RATE: 16 BRPM | HEART RATE: 89 BPM | BODY MASS INDEX: 26.97 KG/M2 | WEIGHT: 193.12 LBS | OXYGEN SATURATION: 96 % | SYSTOLIC BLOOD PRESSURE: 144 MMHG | DIASTOLIC BLOOD PRESSURE: 91 MMHG

## 2025-07-31 DIAGNOSIS — Z76.82 STEM CELL TRANSPLANT CANDIDATE: ICD-10-CM

## 2025-07-31 DIAGNOSIS — Z51.81 ENCOUNTER FOR THERAPEUTIC DRUG LEVEL MONITORING: ICD-10-CM

## 2025-07-31 DIAGNOSIS — C92.00 ACUTE MYELOID LEUKEMIA NOT HAVING ACHIEVED REMISSION (MULTI): Primary | ICD-10-CM

## 2025-07-31 DIAGNOSIS — C92.00 ACUTE MYELOID LEUKEMIA NOT HAVING ACHIEVED REMISSION (MULTI): ICD-10-CM

## 2025-07-31 DIAGNOSIS — R60.0 EDEMA OF LEFT LOWER LEG: ICD-10-CM

## 2025-07-31 LAB
ALBUMIN SERPL BCP-MCNC: 4.1 G/DL (ref 3.4–5)
ALP SERPL-CCNC: 100 U/L (ref 33–136)
ALT SERPL W P-5'-P-CCNC: 11 U/L (ref 10–52)
ANION GAP SERPL CALC-SCNC: 12 MMOL/L (ref 10–20)
AST SERPL W P-5'-P-CCNC: 13 U/L (ref 9–39)
BASOPHILS # BLD AUTO: 0.02 X10*3/UL (ref 0–0.1)
BASOPHILS NFR BLD AUTO: 0.7 %
BILIRUB SERPL-MCNC: 0.4 MG/DL (ref 0–1.2)
BUN SERPL-MCNC: 15 MG/DL (ref 6–23)
CALCIUM SERPL-MCNC: 9.1 MG/DL (ref 8.6–10.3)
CHLORIDE SERPL-SCNC: 101 MMOL/L (ref 98–107)
CO2 SERPL-SCNC: 28 MMOL/L (ref 21–32)
CREAT SERPL-MCNC: 0.8 MG/DL (ref 0.5–1.3)
EGFRCR SERPLBLD CKD-EPI 2021: >90 ML/MIN/1.73M*2
EOSINOPHIL # BLD AUTO: 0.06 X10*3/UL (ref 0–0.7)
EOSINOPHIL NFR BLD AUTO: 2.2 %
ERYTHROCYTE [DISTWIDTH] IN BLOOD BY AUTOMATED COUNT: 17.4 % (ref 11.5–14.5)
GLUCOSE SERPL-MCNC: 86 MG/DL (ref 74–99)
HCT VFR BLD AUTO: 39.1 % (ref 41–52)
HGB BLD-MCNC: 13 G/DL (ref 13.5–17.5)
IMM GRANULOCYTES # BLD AUTO: 0 X10*3/UL (ref 0–0.7)
IMM GRANULOCYTES NFR BLD AUTO: 0 % (ref 0–0.9)
LDH SERPL L TO P-CCNC: 154 U/L (ref 84–246)
LYMPHOCYTES # BLD AUTO: 1.01 X10*3/UL (ref 1.2–4.8)
LYMPHOCYTES NFR BLD AUTO: 36.3 %
MCH RBC QN AUTO: 36.9 PG (ref 26–34)
MCHC RBC AUTO-ENTMCNC: 33.2 G/DL (ref 32–36)
MCV RBC AUTO: 111 FL (ref 80–100)
MONOCYTES # BLD AUTO: 0.37 X10*3/UL (ref 0.1–1)
MONOCYTES NFR BLD AUTO: 13.3 %
NEUTROPHILS # BLD AUTO: 1.32 X10*3/UL (ref 1.2–7.7)
NEUTROPHILS NFR BLD AUTO: 47.5 %
NRBC BLD-RTO: ABNORMAL /100{WBCS}
PLATELET # BLD AUTO: 160 X10*3/UL (ref 150–450)
POTASSIUM SERPL-SCNC: 4.2 MMOL/L (ref 3.5–5.3)
PROT SERPL-MCNC: 6.8 G/DL (ref 6.4–8.2)
RBC # BLD AUTO: 3.52 X10*6/UL (ref 4.5–5.9)
SODIUM SERPL-SCNC: 137 MMOL/L (ref 136–145)
WBC # BLD AUTO: 2.8 X10*3/UL (ref 4.4–11.3)

## 2025-07-31 PROCEDURE — 80053 COMPREHEN METABOLIC PANEL: CPT

## 2025-07-31 PROCEDURE — 2500000004 HC RX 250 GENERAL PHARMACY W/ HCPCS (ALT 636 FOR OP/ED): Performed by: INTERNAL MEDICINE

## 2025-07-31 PROCEDURE — 83615 LACTATE (LD) (LDH) ENZYME: CPT

## 2025-07-31 PROCEDURE — 93971 EXTREMITY STUDY: CPT

## 2025-07-31 PROCEDURE — 85025 COMPLETE CBC W/AUTO DIFF WBC: CPT

## 2025-07-31 PROCEDURE — 96401 CHEMO ANTI-NEOPL SQ/IM: CPT

## 2025-07-31 RX ORDER — PROCHLORPERAZINE MALEATE 10 MG
10 TABLET ORAL EVERY 6 HOURS PRN
OUTPATIENT
Start: 2025-08-04

## 2025-07-31 RX ORDER — ONDANSETRON 8 MG/1
8 TABLET, FILM COATED ORAL ONCE
Status: CANCELLED | OUTPATIENT
Start: 2025-07-31

## 2025-07-31 RX ORDER — EPINEPHRINE 0.3 MG/.3ML
0.3 INJECTION SUBCUTANEOUS EVERY 5 MIN PRN
Status: CANCELLED | OUTPATIENT
Start: 2025-07-31

## 2025-07-31 RX ORDER — PROCHLORPERAZINE EDISYLATE 5 MG/ML
10 INJECTION INTRAMUSCULAR; INTRAVENOUS EVERY 6 HOURS PRN
OUTPATIENT
Start: 2025-08-06

## 2025-07-31 RX ORDER — ALBUTEROL SULFATE 0.83 MG/ML
3 SOLUTION RESPIRATORY (INHALATION) AS NEEDED
Status: CANCELLED | OUTPATIENT
Start: 2025-08-01

## 2025-07-31 RX ORDER — ALBUTEROL SULFATE 0.83 MG/ML
3 SOLUTION RESPIRATORY (INHALATION) AS NEEDED
OUTPATIENT
Start: 2025-08-06

## 2025-07-31 RX ORDER — ALBUTEROL SULFATE 0.83 MG/ML
3 SOLUTION RESPIRATORY (INHALATION) AS NEEDED
OUTPATIENT
Start: 2025-08-08

## 2025-07-31 RX ORDER — EPINEPHRINE 0.3 MG/.3ML
0.3 INJECTION SUBCUTANEOUS EVERY 5 MIN PRN
Status: CANCELLED | OUTPATIENT
Start: 2025-08-01

## 2025-07-31 RX ORDER — PROCHLORPERAZINE MALEATE 10 MG
10 TABLET ORAL EVERY 6 HOURS PRN
Status: CANCELLED | OUTPATIENT
Start: 2025-07-31

## 2025-07-31 RX ORDER — PROCHLORPERAZINE MALEATE 10 MG
10 TABLET ORAL EVERY 6 HOURS PRN
OUTPATIENT
Start: 2025-08-06

## 2025-07-31 RX ORDER — EPINEPHRINE 0.3 MG/.3ML
0.3 INJECTION SUBCUTANEOUS EVERY 5 MIN PRN
OUTPATIENT
Start: 2025-08-05

## 2025-07-31 RX ORDER — ONDANSETRON 8 MG/1
8 TABLET, FILM COATED ORAL ONCE
Status: CANCELLED | OUTPATIENT
Start: 2025-08-01

## 2025-07-31 RX ORDER — ALBUTEROL SULFATE 0.83 MG/ML
3 SOLUTION RESPIRATORY (INHALATION) AS NEEDED
OUTPATIENT
Start: 2025-08-07

## 2025-07-31 RX ORDER — DIPHENHYDRAMINE HYDROCHLORIDE 50 MG/ML
50 INJECTION, SOLUTION INTRAMUSCULAR; INTRAVENOUS AS NEEDED
OUTPATIENT
Start: 2025-08-04

## 2025-07-31 RX ORDER — ONDANSETRON 8 MG/1
8 TABLET, FILM COATED ORAL ONCE
Status: COMPLETED | OUTPATIENT
Start: 2025-07-31 | End: 2025-07-31

## 2025-07-31 RX ORDER — PROCHLORPERAZINE EDISYLATE 5 MG/ML
10 INJECTION INTRAMUSCULAR; INTRAVENOUS EVERY 6 HOURS PRN
OUTPATIENT
Start: 2025-08-07

## 2025-07-31 RX ORDER — PROCHLORPERAZINE EDISYLATE 5 MG/ML
10 INJECTION INTRAMUSCULAR; INTRAVENOUS EVERY 6 HOURS PRN
Status: CANCELLED | OUTPATIENT
Start: 2025-07-31

## 2025-07-31 RX ORDER — PROCHLORPERAZINE MALEATE 10 MG
10 TABLET ORAL EVERY 6 HOURS PRN
OUTPATIENT
Start: 2025-08-07

## 2025-07-31 RX ORDER — ALBUTEROL SULFATE 0.83 MG/ML
3 SOLUTION RESPIRATORY (INHALATION) AS NEEDED
Status: DISCONTINUED | OUTPATIENT
Start: 2025-07-31 | End: 2025-07-31 | Stop reason: HOSPADM

## 2025-07-31 RX ORDER — ONDANSETRON 8 MG/1
8 TABLET, FILM COATED ORAL ONCE
OUTPATIENT
Start: 2025-08-06

## 2025-07-31 RX ORDER — FAMOTIDINE 10 MG/ML
20 INJECTION, SOLUTION INTRAVENOUS ONCE AS NEEDED
Status: CANCELLED | OUTPATIENT
Start: 2025-07-31

## 2025-07-31 RX ORDER — FAMOTIDINE 10 MG/ML
20 INJECTION, SOLUTION INTRAVENOUS ONCE AS NEEDED
OUTPATIENT
Start: 2025-08-08

## 2025-07-31 RX ORDER — PROCHLORPERAZINE MALEATE 10 MG
10 TABLET ORAL EVERY 6 HOURS PRN
Status: CANCELLED | OUTPATIENT
Start: 2025-08-01

## 2025-07-31 RX ORDER — DIPHENHYDRAMINE HYDROCHLORIDE 50 MG/ML
50 INJECTION, SOLUTION INTRAMUSCULAR; INTRAVENOUS AS NEEDED
OUTPATIENT
Start: 2025-08-06

## 2025-07-31 RX ORDER — DIPHENHYDRAMINE HYDROCHLORIDE 50 MG/ML
50 INJECTION, SOLUTION INTRAMUSCULAR; INTRAVENOUS AS NEEDED
OUTPATIENT
Start: 2025-08-05

## 2025-07-31 RX ORDER — ONDANSETRON 8 MG/1
8 TABLET, FILM COATED ORAL ONCE
OUTPATIENT
Start: 2025-08-05

## 2025-07-31 RX ORDER — ALBUTEROL SULFATE 0.83 MG/ML
3 SOLUTION RESPIRATORY (INHALATION) AS NEEDED
Status: CANCELLED | OUTPATIENT
Start: 2025-07-31

## 2025-07-31 RX ORDER — ONDANSETRON 8 MG/1
8 TABLET, FILM COATED ORAL ONCE
OUTPATIENT
Start: 2025-08-07

## 2025-07-31 RX ORDER — FAMOTIDINE 10 MG/ML
20 INJECTION, SOLUTION INTRAVENOUS ONCE AS NEEDED
OUTPATIENT
Start: 2025-08-04

## 2025-07-31 RX ORDER — EPINEPHRINE 0.3 MG/.3ML
0.3 INJECTION SUBCUTANEOUS EVERY 5 MIN PRN
OUTPATIENT
Start: 2025-08-07

## 2025-07-31 RX ORDER — PROCHLORPERAZINE EDISYLATE 5 MG/ML
10 INJECTION INTRAMUSCULAR; INTRAVENOUS EVERY 6 HOURS PRN
OUTPATIENT
Start: 2025-08-04

## 2025-07-31 RX ORDER — PROCHLORPERAZINE EDISYLATE 5 MG/ML
10 INJECTION INTRAMUSCULAR; INTRAVENOUS EVERY 6 HOURS PRN
OUTPATIENT
Start: 2025-08-08

## 2025-07-31 RX ORDER — EPINEPHRINE 0.3 MG/.3ML
0.3 INJECTION SUBCUTANEOUS EVERY 5 MIN PRN
OUTPATIENT
Start: 2025-08-08

## 2025-07-31 RX ORDER — FAMOTIDINE 10 MG/ML
20 INJECTION, SOLUTION INTRAVENOUS ONCE AS NEEDED
Status: CANCELLED | OUTPATIENT
Start: 2025-08-01

## 2025-07-31 RX ORDER — PROCHLORPERAZINE EDISYLATE 5 MG/ML
10 INJECTION INTRAMUSCULAR; INTRAVENOUS EVERY 6 HOURS PRN
Status: CANCELLED | OUTPATIENT
Start: 2025-08-01

## 2025-07-31 RX ORDER — FAMOTIDINE 10 MG/ML
20 INJECTION, SOLUTION INTRAVENOUS ONCE AS NEEDED
Status: DISCONTINUED | OUTPATIENT
Start: 2025-07-31 | End: 2025-07-31 | Stop reason: HOSPADM

## 2025-07-31 RX ORDER — PROCHLORPERAZINE MALEATE 10 MG
10 TABLET ORAL EVERY 6 HOURS PRN
OUTPATIENT
Start: 2025-08-05

## 2025-07-31 RX ORDER — PROCHLORPERAZINE EDISYLATE 5 MG/ML
10 INJECTION INTRAMUSCULAR; INTRAVENOUS EVERY 6 HOURS PRN
OUTPATIENT
Start: 2025-08-05

## 2025-07-31 RX ORDER — EPINEPHRINE 0.3 MG/.3ML
0.3 INJECTION SUBCUTANEOUS EVERY 5 MIN PRN
OUTPATIENT
Start: 2025-08-06

## 2025-07-31 RX ORDER — ONDANSETRON 8 MG/1
8 TABLET, FILM COATED ORAL ONCE
OUTPATIENT
Start: 2025-08-08

## 2025-07-31 RX ORDER — FAMOTIDINE 10 MG/ML
20 INJECTION, SOLUTION INTRAVENOUS ONCE AS NEEDED
OUTPATIENT
Start: 2025-08-05

## 2025-07-31 RX ORDER — ALBUTEROL SULFATE 0.83 MG/ML
3 SOLUTION RESPIRATORY (INHALATION) AS NEEDED
OUTPATIENT
Start: 2025-08-05

## 2025-07-31 RX ORDER — DIPHENHYDRAMINE HYDROCHLORIDE 50 MG/ML
50 INJECTION, SOLUTION INTRAMUSCULAR; INTRAVENOUS AS NEEDED
Status: CANCELLED | OUTPATIENT
Start: 2025-07-31

## 2025-07-31 RX ORDER — DIPHENHYDRAMINE HYDROCHLORIDE 50 MG/ML
50 INJECTION, SOLUTION INTRAMUSCULAR; INTRAVENOUS AS NEEDED
OUTPATIENT
Start: 2025-08-08

## 2025-07-31 RX ORDER — FAMOTIDINE 10 MG/ML
20 INJECTION, SOLUTION INTRAVENOUS ONCE AS NEEDED
OUTPATIENT
Start: 2025-08-07

## 2025-07-31 RX ORDER — PROCHLORPERAZINE MALEATE 10 MG
10 TABLET ORAL EVERY 6 HOURS PRN
OUTPATIENT
Start: 2025-08-08

## 2025-07-31 RX ORDER — DIPHENHYDRAMINE HYDROCHLORIDE 50 MG/ML
50 INJECTION, SOLUTION INTRAMUSCULAR; INTRAVENOUS AS NEEDED
Status: CANCELLED | OUTPATIENT
Start: 2025-08-01

## 2025-07-31 RX ORDER — EPINEPHRINE 0.3 MG/.3ML
0.3 INJECTION SUBCUTANEOUS EVERY 5 MIN PRN
Status: DISCONTINUED | OUTPATIENT
Start: 2025-07-31 | End: 2025-07-31 | Stop reason: HOSPADM

## 2025-07-31 RX ORDER — ALBUTEROL SULFATE 0.83 MG/ML
3 SOLUTION RESPIRATORY (INHALATION) AS NEEDED
OUTPATIENT
Start: 2025-08-04

## 2025-07-31 RX ORDER — FAMOTIDINE 10 MG/ML
20 INJECTION, SOLUTION INTRAVENOUS ONCE AS NEEDED
OUTPATIENT
Start: 2025-08-06

## 2025-07-31 RX ORDER — DIPHENHYDRAMINE HYDROCHLORIDE 50 MG/ML
50 INJECTION, SOLUTION INTRAMUSCULAR; INTRAVENOUS AS NEEDED
Status: DISCONTINUED | OUTPATIENT
Start: 2025-07-31 | End: 2025-07-31 | Stop reason: HOSPADM

## 2025-07-31 RX ORDER — EPINEPHRINE 0.3 MG/.3ML
0.3 INJECTION SUBCUTANEOUS EVERY 5 MIN PRN
OUTPATIENT
Start: 2025-08-04

## 2025-07-31 RX ORDER — DIPHENHYDRAMINE HYDROCHLORIDE 50 MG/ML
50 INJECTION, SOLUTION INTRAMUSCULAR; INTRAVENOUS AS NEEDED
OUTPATIENT
Start: 2025-08-07

## 2025-07-31 RX ORDER — ONDANSETRON 8 MG/1
8 TABLET, FILM COATED ORAL ONCE
OUTPATIENT
Start: 2025-08-04

## 2025-07-31 RX ADMIN — ONDANSETRON HYDROCHLORIDE 8 MG: 8 TABLET, FILM COATED ORAL at 10:09

## 2025-07-31 RX ADMIN — AZACITIDINE 157.5 MG: 100 INJECTION, POWDER, LYOPHILIZED, FOR SOLUTION INTRAVENOUS; SUBCUTANEOUS at 10:33

## 2025-07-31 ASSESSMENT — PAIN SCALES - GENERAL: PAINLEVEL_OUTOF10: 10-WORST PAIN EVER

## 2025-08-01 ENCOUNTER — APPOINTMENT (OUTPATIENT)
Dept: BEHAVIORAL HEALTH | Facility: CLINIC | Age: 65
End: 2025-08-01
Payer: MEDICARE

## 2025-08-01 ENCOUNTER — INFUSION (OUTPATIENT)
Dept: HEMATOLOGY/ONCOLOGY | Facility: CLINIC | Age: 65
End: 2025-08-01
Payer: MEDICARE

## 2025-08-01 VITALS
DIASTOLIC BLOOD PRESSURE: 85 MMHG | HEART RATE: 84 BPM | RESPIRATION RATE: 16 BRPM | BODY MASS INDEX: 27 KG/M2 | OXYGEN SATURATION: 94 % | TEMPERATURE: 97.3 F | SYSTOLIC BLOOD PRESSURE: 127 MMHG | WEIGHT: 193.34 LBS

## 2025-08-01 DIAGNOSIS — C92.00 ACUTE MYELOID LEUKEMIA NOT HAVING ACHIEVED REMISSION (MULTI): ICD-10-CM

## 2025-08-01 DIAGNOSIS — F39 PSYCHOTIC AFFECTIVE DISORDER: ICD-10-CM

## 2025-08-01 DIAGNOSIS — F41.8 MIXED ANXIETY AND DEPRESSIVE DISORDER: ICD-10-CM

## 2025-08-01 PROCEDURE — 96401 CHEMO ANTI-NEOPL SQ/IM: CPT

## 2025-08-01 PROCEDURE — 99214 OFFICE O/P EST MOD 30 MIN: CPT | Performed by: PSYCHIATRY & NEUROLOGY

## 2025-08-01 PROCEDURE — 2500000004 HC RX 250 GENERAL PHARMACY W/ HCPCS (ALT 636 FOR OP/ED): Performed by: INTERNAL MEDICINE

## 2025-08-01 PROCEDURE — 2500000004 HC RX 250 GENERAL PHARMACY W/ HCPCS (ALT 636 FOR OP/ED): Mod: JW | Performed by: INTERNAL MEDICINE

## 2025-08-01 RX ORDER — DIPHENHYDRAMINE HYDROCHLORIDE 50 MG/ML
50 INJECTION, SOLUTION INTRAMUSCULAR; INTRAVENOUS AS NEEDED
Status: DISCONTINUED | OUTPATIENT
Start: 2025-08-01 | End: 2025-08-01 | Stop reason: HOSPADM

## 2025-08-01 RX ORDER — ONDANSETRON 8 MG/1
8 TABLET, FILM COATED ORAL ONCE
Status: COMPLETED | OUTPATIENT
Start: 2025-08-01 | End: 2025-08-01

## 2025-08-01 RX ORDER — EPINEPHRINE 0.3 MG/.3ML
0.3 INJECTION SUBCUTANEOUS EVERY 5 MIN PRN
Status: DISCONTINUED | OUTPATIENT
Start: 2025-08-01 | End: 2025-08-01 | Stop reason: HOSPADM

## 2025-08-01 RX ORDER — ALBUTEROL SULFATE 0.83 MG/ML
3 SOLUTION RESPIRATORY (INHALATION) AS NEEDED
Status: DISCONTINUED | OUTPATIENT
Start: 2025-08-01 | End: 2025-08-01 | Stop reason: HOSPADM

## 2025-08-01 RX ORDER — FAMOTIDINE 10 MG/ML
20 INJECTION, SOLUTION INTRAVENOUS ONCE AS NEEDED
Status: DISCONTINUED | OUTPATIENT
Start: 2025-08-01 | End: 2025-08-01 | Stop reason: HOSPADM

## 2025-08-01 RX ADMIN — ONDANSETRON HYDROCHLORIDE 8 MG: 8 TABLET, FILM COATED ORAL at 09:20

## 2025-08-01 RX ADMIN — AZACITIDINE 157.5 MG: 100 INJECTION, POWDER, LYOPHILIZED, FOR SOLUTION INTRAVENOUS; SUBCUTANEOUS at 09:56

## 2025-08-01 ASSESSMENT — PAIN SCALES - GENERAL: PAINLEVEL_OUTOF10: 10-WORST PAIN EVER

## 2025-08-01 NOTE — ASSESSMENT & PLAN NOTE
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     Treatment Plan:   The FDA risks, benefits & alternatives to the medications prescribed were explained to you and your support person, your wife and Felicia, your daughter, today. You & your support persons were able to understand & repeat these risks, benefits & alternatives to these prescribed medications. You and your support persons have agreed to proceed with treatment with the medications discussed based on the conclusion that the benefit outweighs the risks of this treatment regimen: continue escitalopram 5 mg daily and continue aripiprazole 2 mg daily(the FDA risks were explicitly- reviewed with you, your wife Emi and your eldest daughter).      We did review the risks of fluconazole with escitalopram which are the following and were explicitly discussed in detail as the following: Drug-Drug: escitalopram and fluconazole: Concurrent use of an agent which significantly inhibits [id number] may result in elevated concentrations and toxicity from citalopram and escitalopram, including risks for serotonin syndrome or prolongation of the QTc interval.(1,2) Prolongation of the QT interval may result in life-threatening arrhythmias, including torsades de pointes.(5) Symptoms of serotonin syndrome may include tremor, agitation, diaphoresis, hyperreflexia, clonus, tachycardia, hyperthermia, and muscle rigidity. We recommended that the patient review an alternative with the provider prescribing fluconazole.     Follow up in 3 to 4 weeks.

## 2025-08-01 NOTE — PROGRESS NOTES
Subjective   Patient ID: Link Inman is a 64 y.o. male who presents for No chief complaint on file.I am doing well and I have good support     Virtual  Consent: The patient engaged in a telehealth session via Epic audio visual or phone with this provider practicing within the [facility]. The identity of the patient was verified by their date of birth and last four digits of their social security number. The provider demonstrated that confidentially was preserved at their location. The patient was informed that they were responsible for ensuring confidentially was secured at their location. The patient's location was documented for emergency purposes. The patient was informed of the necessary steps that would occur if an emergency was to occur or technology failed during session.   An interactive audio and video telecommunication system which permits real time communications between the patient (at the home of the patient) and provider (at the home office) was utilized to provide this telehealth service.   Verbal consent was requested and obtained from Link Inman on this date, 08/1/25 for a telehealth visit and the patient's location was confirmed at the time of the visit.     HPI: The patient is seen today with his wife, Emi, and daughter, Felciia, throughout the appointment today. All were in agreement that the patient is much improved since treatment with aripiprazole has commenced. The patient is though to be doing well by the patient himself as well as his family. He is ready now in his opinion to proceed with treatment for his leukemia.   We did review the risks of fluconazole with escitalopram which are the following and were explicitly discussed in detail as the following: Drug-Drug: escitalopram and fluconazole: Concurrent use of an agent which significantly inhibits [id number] may result in elevated concentrations and toxicity from citalopram and escitalopram, including risks for serotonin syndrome or  prolongation of the QTc interval.(1,2) Prolongation of the QT interval may result in life-threatening arrhythmias, including torsades de pointes.(5) Symptoms of serotonin syndrome may include tremor, agitation, diaphoresis, hyperreflexia, clonus, tachycardia, hyperthermia, and muscle rigidity. We recommended that the patient review an alternative with the provider prescribing fluconazole.  They expressed an interest in follow up in 3 to 4 weeks.      PPH; The patient reports that the only psychiatric treatment was with marriage counseling about two years ago as the only mental health care. The patient denies any psychiatric hospitalizations or suicide attempts.      Past Medical History:  No date: Arrhythmia      Comment:  atrial fibrillation  03/28/2021: Personal history of (healed) traumatic fracture      Comment:  History of compression fracture of spine  05/11/2019: Personal history of other diseases of the circulatory   system      Comment:  History of pericarditis  The patient has been diagnoses with leukemia and is in the process of being treated.      FH: The patient reports that there is depression with her mother's side. The patient's wife stated that his mother and her mother's two sisters may have had bipolar disorder. The maternal mother may have had dementia.      SH: The patient was born in [place], [place] and the patient denies any abuse as a child. He feels he had a good family life. The patient is  once and he has one son 36 years old and a daughter who has a civil engineering and environmental engineering and also a younger daughter. He is on good terms with his offspring. The patient has difference of opinion on political matters with is son. The patient has been working from 1982 through 2024 when he retired. 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 He was never in the .         Mental Status Exam      General: In no acute distress.   Appearance: Calm  Attitude: Cooperative.   Behavior: Anxious and labile features are substantially improved with marked reduction in anger and improvement in mood.   Motor Activity: No agitation or retardation. No EPS/TD. Normal gait and station.   Speech: Regular rate, rhythm, volume and tone, spontaneous, fluent.   Mood: Anxious, irritable and labile features are improved.   Affect: Anxious, irritable and labile features are improved.    Thought Process: Compromise of rational thought due to suspiciousness and depressive features.   Thought Content: Appropriate  Thought Perception: Does not endorse auditory or visual hallucinations, does not appear to be responding to hallucinatory stimuli.   Cognition: Alert, oriented x3. No deficits noted. Adequate fund of knowledge. No deficit in recent and remote memory. No deficits in attention, concentration or language.    Insight: Insight is limited.   Judgment: Judgment is limited.       Appearance: Well-groomed.   Build: Average.   Demeanor: Average.  Eye Contact: Average..   Motor Activity: Average.   Speech: Clear.   Language: Neurologic language is intact.   Fund of Knowledge: Aware of current events,~fair fund of knowledge.   Delusions: The patient does evidence suspiciousness.   Self Harm: None reported or evidenced.   Aggressive: Labile features.   Mood: Anxious, irritable and labile features are improved.   Affect: Anxious, irritable and labile features are improved.    Orientation: Alert.   Manner: Cooperative.   Thought process: Goal-directed.   Thought association: Has been m  Content of thought: No suicidal or homicidal ideation or plans are evidenced.   Abstract/ Rational Thought: Relatively intact   Memory: Grossly intact.   Behavior: Calm.   Attention/Concentration: Normal.   Cognition: Intact.   Intelligence Estimate: Average.   Executive Function: Intact.   Insight: Limited.  Judgement: Limited.        Review of Systems    Neurological:         Mental Status Exam     General: In no acute distress.   Appearance: Calm  Attitude: Cooperative.   Behavior: Anxious and labile features are substantially improved with marked reduction in anger and improvement in mood.   Motor Activity: No agitation or retardation. No EPS/TD. Normal gait and station.   Speech: Regular rate, rhythm, volume and tone, spontaneous, fluent.   Mood: Anxious, irritable and labile features are improved.   Affect: Anxious, irritable and labile features are improved.    Thought Process: Compromise of rational thought due to suspiciousness and depressive features.   Thought Content: Appropriate  Thought Perception: Does not endorse auditory or visual hallucinations, does not appear to be responding to hallucinatory stimuli.   Cognition: Alert, oriented x3. No deficits noted. Adequate fund of knowledge. No deficit in recent and remote memory. No deficits in attention, concentration or language.    Insight: Insight is limited.   Judgment: Judgment is limited.       Appearance: Well-groomed.   Build: Average.   Demeanor: Average.  Eye Contact: Average..   Motor Activity: Average.   Speech: Clear.   Language: Neurologic language is intact.   Fund of Knowledge: Aware of current events,~fair fund of knowledge.   Delusions: The patient does evidence suspiciousness.   Self Harm: None reported or evidenced.   Aggressive: Labile features.   Mood: Anxious, irritable and labile features are improved.   Affect: Anxious, irritable and labile features are improved.    Orientation: Alert.   Manner: Cooperative.   Thought process: Goal-directed.   Thought association: Has been m  Content of thought: No suicidal or homicidal ideation or plans are evidenced.   Abstract/ Rational Thought: Relatively intact   Memory: Grossly intact.   Behavior: Calm.   Attention/Concentration: Normal.   Cognition: Intact.   Intelligence Estimate: Average.   Executive Function: Intact.   Insight:  Limited.  Judgement: Limited.     Psychiatric/Behavioral:          Mental Status Exam     General: In no acute distress.   Appearance: Calm  Attitude: Cooperative.   Behavior: Anxious and labile features are substantially improved with marked reduction in anger and improvement in mood.   Motor Activity: No agitation or retardation. No EPS/TD. Normal gait and station.   Speech: Regular rate, rhythm, volume and tone, spontaneous, fluent.   Mood: Anxious, irritable and labile features are improved.   Affect: Anxious, irritable and labile features are improved.    Thought Process: Compromise of rational thought due to suspiciousness and depressive features.   Thought Content: Appropriate  Thought Perception: Does not endorse auditory or visual hallucinations, does not appear to be responding to hallucinatory stimuli.   Cognition: Alert, oriented x3. No deficits noted. Adequate fund of knowledge. No deficit in recent and remote memory. No deficits in attention, concentration or language.    Insight: Insight is limited.   Judgment: Judgment is limited.       Appearance: Well-groomed.   Build: Average.   Demeanor: Average.  Eye Contact: Average..   Motor Activity: Average.   Speech: Clear.   Language: Neurologic language is intact.   Fund of Knowledge: Aware of current events,~fair fund of knowledge.   Delusions: The patient does evidence suspiciousness.   Self Harm: None reported or evidenced.   Aggressive: Labile features.   Mood: Anxious, irritable and labile features are improved.   Affect: Anxious, irritable and labile features are improved.    Orientation: Alert.   Manner: Cooperative.   Thought process: Goal-directed.   Thought association: Has been m  Content of thought: No suicidal or homicidal ideation or plans are evidenced.   Abstract/ Rational Thought: Relatively intact   Memory: Grossly intact.   Behavior: Calm.   Attention/Concentration: Normal.   Cognition: Intact.   Intelligence Estimate: Average.    Executive Function: Intact.   Insight: Limited.  Judgement: Limited.     All other systems reviewed and are negative.    Psych Review of Symptoms:    ADHD: Patient denied any symptoms.         Anxiety: Patient denied any symptoms.         Developmental and Sensory Concerns: Patient denied any symptoms.         Depressive Symptoms: Patient denied any symptoms.         Disruptive and Conduct Symptoms: Patient denied any symptoms.         Eating / Feeding Concerns: Patient denied any symptoms.         Elimination Symptoms: Patient denied any symptoms.         Manic Symptoms: Patient denied any symptoms.         Obsessive-Compulsive Symptoms: Patient denied any symptoms.         Psychotic Symptoms: Patient denied any symptoms.           Trauma Related Symptoms: Patient denied any symptoms.           Sleep Concerns: Patient denied any symptoms.             Objective   Physical Exam    Neurological:      Mental Status: He is alert and oriented to person, place, and time.      Comments: Mental Status Exam     General: In no acute distress.   Appearance: Calm  Attitude: Cooperative.   Behavior: Anxious and labile features are substantially improved with marked reduction in anger and improvement in mood.   Motor Activity: No agitation or retardation. No EPS/TD. Normal gait and station.   Speech: Regular rate, rhythm, volume and tone, spontaneous, fluent.   Mood: Anxious, irritable and labile features are improved.   Affect: Anxious, irritable and labile features are improved.    Thought Process: Compromise of rational thought due to suspiciousness and depressive features.   Thought Content: Appropriate  Thought Perception: Does not endorse auditory or visual hallucinations, does not appear to be responding to hallucinatory stimuli.   Cognition: Alert, oriented x3. No deficits noted. Adequate fund of knowledge. No deficit in recent and remote memory. No deficits in attention, concentration or language.    Insight: Insight  is limited.   Judgment: Judgment is limited.       Appearance: Well-groomed.   Build: Average.   Demeanor: Average.  Eye Contact: Average..   Motor Activity: Average.   Speech: Clear.   Language: Neurologic language is intact.   Fund of Knowledge: Aware of current events,~fair fund of knowledge.   Delusions: The patient does evidence suspiciousness.   Self Harm: None reported or evidenced.   Aggressive: Labile features.   Mood: Anxious, irritable and labile features are improved.   Affect: Anxious, irritable and labile features are improved.    Orientation: Alert.   Manner: Cooperative.   Thought process: Goal-directed.   Thought association: Has been m  Content of thought: No suicidal or homicidal ideation or plans are evidenced.   Abstract/ Rational Thought: Relatively intact   Memory: Grossly intact.   Behavior: Calm.   Attention/Concentration: Normal.   Cognition: Intact.   Intelligence Estimate: Average.   Executive Function: Intact.   Insight: Limited.  Judgement: Limited.     Psychiatric:      Comments: Mental Status Exam     General: In no acute distress.   Appearance: Calm  Attitude: Cooperative.   Behavior: Anxious and labile features are substantially improved with marked reduction in anger and improvement in mood.   Motor Activity: No agitation or retardation. No EPS/TD. Normal gait and station.   Speech: Regular rate, rhythm, volume and tone, spontaneous, fluent.   Mood: Anxious, irritable and labile features are improved.   Affect: Anxious, irritable and labile features are improved.    Thought Process: Compromise of rational thought due to suspiciousness and depressive features.   Thought Content: Appropriate  Thought Perception: Does not endorse auditory or visual hallucinations, does not appear to be responding to hallucinatory stimuli.   Cognition: Alert, oriented x3. No deficits noted. Adequate fund of knowledge. No deficit in recent and remote memory. No deficits in attention, concentration or  language.    Insight: Insight is limited.   Judgment: Judgment is limited.       Appearance: Well-groomed.   Build: Average.   Demeanor: Average.  Eye Contact: Average..   Motor Activity: Average.   Speech: Clear.   Language: Neurologic language is intact.   Fund of Knowledge: Aware of current events,~fair fund of knowledge.   Delusions: The patient does evidence suspiciousness.   Self Harm: None reported or evidenced.   Aggressive: Labile features.   Mood: Anxious, irritable and labile features are improved.   Affect: Anxious, irritable and labile features are improved.    Orientation: Alert.   Manner: Cooperative.   Thought process: Goal-directed.   Thought association: Has been m  Content of thought: No suicidal or homicidal ideation or plans are evidenced.   Abstract/ Rational Thought: Relatively intact   Memory: Grossly intact.   Behavior: Calm.   Attention/Concentration: Normal.   Cognition: Intact.   Intelligence Estimate: Average.   Executive Function: Intact.   Insight: Limited.  Judgement: Limited.           Lab Review:   Infusion on 07/31/2025   Component Date Value    WBC 07/31/2025 2.8 (L)     nRBC 07/31/2025      RBC 07/31/2025 3.52 (L)     Hemoglobin 07/31/2025 13.0 (L)     Hematocrit 07/31/2025 39.1 (L)     MCV 07/31/2025 111 (H)     MCH 07/31/2025 36.9 (H)     MCHC 07/31/2025 33.2     RDW 07/31/2025 17.4 (H)     Platelets 07/31/2025 160     Neutrophils % 07/31/2025 47.5     Immature Granulocytes %,* 07/31/2025 0.0     Lymphocytes % 07/31/2025 36.3     Monocytes % 07/31/2025 13.3     Eosinophils % 07/31/2025 2.2     Basophils % 07/31/2025 0.7     Neutrophils Absolute 07/31/2025 1.32     Immature Granulocytes Ab* 07/31/2025 0.00     Lymphocytes Absolute 07/31/2025 1.01 (L)     Monocytes Absolute 07/31/2025 0.37     Eosinophils Absolute 07/31/2025 0.06     Basophils Absolute 07/31/2025 0.02     Glucose 07/31/2025 86     Sodium 07/31/2025 137     Potassium 07/31/2025 4.2     Chloride 07/31/2025 101      Bicarbonate 07/31/2025 28     Anion Gap 07/31/2025 12     Urea Nitrogen 07/31/2025 15     Creatinine 07/31/2025 0.80     eGFR 07/31/2025 >90     Calcium 07/31/2025 9.1     Albumin 07/31/2025 4.1     Alkaline Phosphatase 07/31/2025 100     Total Protein 07/31/2025 6.8     AST 07/31/2025 13     Bilirubin, Total 07/31/2025 0.4     ALT 07/31/2025 11     LDH 07/31/2025 154    Orders Only on 07/26/2025   Component Date Value    COLOR 07/26/2025 YELLOW     APPEARANCE 07/26/2025 CLEAR     SPECIFIC GRAVITY 07/26/2025 1.011     PH 07/26/2025 6.5     GLUCOSE 07/26/2025 NEGATIVE     BILIRUBIN 07/26/2025 NEGATIVE     KETONES 07/26/2025 NEGATIVE     OCCULT BLOOD 07/26/2025 1+ (A)     PROTEIN 07/26/2025 NEGATIVE     NITRITE 07/26/2025 NEGATIVE     LEUKOCYTE ESTERASE 07/26/2025 3+ (A)     WBC 07/26/2025 > OR = 60 (A)     RBC 07/26/2025 10-20 (A)     SQUAMOUS EPITHELIAL CELLS 07/26/2025 NONE SEEN     BACTERIA 07/26/2025 NONE SEEN     HYALINE CAST 07/26/2025 NONE SEEN     NOTE 07/26/2025      REFLEXIVE URINE CULTURE 07/26/2025      CULTURE, URINE, ROUTINE 07/26/2025 SEE NOTE     WHITE BLOOD CELL COUNT 07/26/2025 1.6 (L)     RED BLOOD CELL COUNT 07/26/2025 3.35 (L)     HEMOGLOBIN 07/26/2025 12.5 (L)     HEMATOCRIT 07/26/2025 37.4 (L)     MCV 07/26/2025 111.6 (H)     MCH 07/26/2025 37.3 (H)     MCHC 07/26/2025 33.4     RDW 07/26/2025 18.4 (H)     PLATELET COUNT 07/26/2025 274     MPV 07/26/2025 9.3     ABSOLUTE NEUTROPHILS 07/26/2025 477 (LL)     ABSOLUTE LYMPHOCYTES 07/26/2025 875     ABSOLUTE MONOCYTES 07/26/2025 139 (L)     ABSOLUTE EOSINOPHILS 07/26/2025 59     ABSOLUTE BASOPHILS 07/26/2025 50     NEUTROPHILS 07/26/2025 29.8     LYMPHOCYTES 07/26/2025 54.7     MONOCYTES 07/26/2025 8.7     EOSINOPHILS 07/26/2025 3.7     BASOPHILS 07/26/2025 3.1     COMMENT(S) 07/26/2025     Infusion on 07/24/2025   Component Date Value    WBC 07/24/2025 1.6 (L)     nRBC 07/24/2025      RBC 07/24/2025 3.34 (L)     Hemoglobin 07/24/2025 12.3 (L)      Hematocrit 07/24/2025 36.7 (L)     MCV 07/24/2025 110 (H)     MCH 07/24/2025 36.8 (H)     MCHC 07/24/2025 33.5     RDW 07/24/2025 19.1 (H)     Platelets 07/24/2025 247     Neutrophils % 07/24/2025 43.6     Immature Granulocytes %,* 07/24/2025 0.0     Lymphocytes % 07/24/2025 48.1     Monocytes % 07/24/2025 5.1     Eosinophils % 07/24/2025 1.9     Basophils % 07/24/2025 1.3     Neutrophils Absolute 07/24/2025 0.68 (L)     Immature Granulocytes Ab* 07/24/2025 0.00     Lymphocytes Absolute 07/24/2025 0.75 (L)     Monocytes Absolute 07/24/2025 0.08 (L)     Eosinophils Absolute 07/24/2025 0.03     Basophils Absolute 07/24/2025 0.02     Glucose 07/24/2025 122 (H)     Sodium 07/24/2025 138     Potassium 07/24/2025 4.0     Chloride 07/24/2025 104     Bicarbonate 07/24/2025 26     Anion Gap 07/24/2025 12     Urea Nitrogen 07/24/2025 15     Creatinine 07/24/2025 0.78     eGFR 07/24/2025 >90     Calcium 07/24/2025 9.0     Albumin 07/24/2025 4.0     Alkaline Phosphatase 07/24/2025 96     Total Protein 07/24/2025 6.9     AST 07/24/2025 12     Bilirubin, Total 07/24/2025 0.4     ALT 07/24/2025 13     RBC Morphology 07/24/2025 See Below     Polychromasia 07/24/2025 Mild    Infusion on 07/21/2025   Component Date Value    WBC 07/21/2025 2.6 (L)     nRBC 07/21/2025      RBC 07/21/2025 3.00 (L)     Hemoglobin 07/21/2025 11.0 (L)     Hematocrit 07/21/2025 32.9 (L)     MCV 07/21/2025 110 (H)     MCH 07/21/2025 36.7 (H)     MCHC 07/21/2025 33.4     RDW 07/21/2025 19.4 (H)     Platelets 07/21/2025 181     Neutrophils % 07/21/2025 64.2     Immature Granulocytes %,* 07/21/2025 0.0     Lymphocytes % 07/21/2025 31.2     Monocytes % 07/21/2025 2.3     Eosinophils % 07/21/2025 0.8     Basophils % 07/21/2025 1.5     Neutrophils Absolute 07/21/2025 1.69     Immature Granulocytes Ab* 07/21/2025 0.00     Lymphocytes Absolute 07/21/2025 0.82 (L)     Monocytes Absolute 07/21/2025 0.06 (L)     Eosinophils Absolute 07/21/2025 0.02     Basophils  Absolute 07/21/2025 0.04     Glucose 07/21/2025 110 (H)     Sodium 07/21/2025 138     Potassium 07/21/2025 4.0     Chloride 07/21/2025 106     Bicarbonate 07/21/2025 26     Anion Gap 07/21/2025 10     Urea Nitrogen 07/21/2025 14     Creatinine 07/21/2025 0.84     eGFR 07/21/2025 >90     Calcium 07/21/2025 8.6     Albumin 07/21/2025 3.7     Alkaline Phosphatase 07/21/2025 91     Total Protein 07/21/2025 6.3 (L)     AST 07/21/2025 13     Bilirubin, Total 07/21/2025 0.3     ALT 07/21/2025 13     ABO TYPE 07/21/2025 O     Rh TYPE 07/21/2025 POS     ANTIBODY SCREEN 07/21/2025 NEG     LDH 07/21/2025 157    Infusion on 07/17/2025   Component Date Value    WBC 07/17/2025 2.5 (L)     RBC 07/17/2025 2.99 (L)     Hemoglobin 07/17/2025 10.7 (L)     Hematocrit 07/17/2025 32.5 (L)     MCV 07/17/2025 109 (H)     MCH 07/17/2025 35.8 (H)     MCHC 07/17/2025 32.9     RDW 07/17/2025 19.7 (H)     Platelets 07/17/2025 110 (L)     Neutrophils % 07/17/2025 61.0     Immature Granulocytes %,* 07/17/2025 0.0     Lymphocytes % 07/17/2025 33.9     Monocytes % 07/17/2025 4.3     Eosinophils % 07/17/2025 0.4     Basophils % 07/17/2025 0.4     Neutrophils Absolute 07/17/2025 1.55     Immature Granulocytes Ab* 07/17/2025 0.00     Lymphocytes Absolute 07/17/2025 0.86 (L)     Monocytes Absolute 07/17/2025 0.11     Eosinophils Absolute 07/17/2025 0.01     Basophils Absolute 07/17/2025 0.01     Glucose 07/17/2025 107 (H)     Sodium 07/17/2025 138     Potassium 07/17/2025 4.2     Chloride 07/17/2025 104     Bicarbonate 07/17/2025 27     Anion Gap 07/17/2025 11     Urea Nitrogen 07/17/2025 12     Creatinine 07/17/2025 0.80     eGFR 07/17/2025 >90     Calcium 07/17/2025 8.9     Albumin 07/17/2025 3.9     Alkaline Phosphatase 07/17/2025 98     Total Protein 07/17/2025 6.6     AST 07/17/2025 12     Bilirubin, Total 07/17/2025 0.4     ALT 07/17/2025 13     LDH 07/17/2025 170    Infusion on 07/14/2025   Component Date Value    WBC 07/14/2025 3.8 (L)     RBC  07/14/2025 2.93 (L)     Hemoglobin 07/14/2025 10.5 (L)     Hematocrit 07/14/2025 31.6 (L)     MCV 07/14/2025 108 (H)     MCH 07/14/2025 35.8 (H)     MCHC 07/14/2025 33.2     RDW 07/14/2025 18.8 (H)     Platelets 07/14/2025 93 (L)     Neutrophils % 07/14/2025 68.7     Immature Granulocytes %,* 07/14/2025 0.3     Lymphocytes % 07/14/2025 23.3     Monocytes % 07/14/2025 7.4     Eosinophils % 07/14/2025 0.0     Basophils % 07/14/2025 0.3     Neutrophils Absolute 07/14/2025 2.60     Immature Granulocytes Ab* 07/14/2025 0.01     Lymphocytes Absolute 07/14/2025 0.88 (L)     Monocytes Absolute 07/14/2025 0.28     Eosinophils Absolute 07/14/2025 0.00     Basophils Absolute 07/14/2025 0.01     Glucose 07/14/2025 128 (H)     Sodium 07/14/2025 139     Potassium 07/14/2025 4.1     Chloride 07/14/2025 105     Bicarbonate 07/14/2025 26     Anion Gap 07/14/2025 12     Urea Nitrogen 07/14/2025 10     Creatinine 07/14/2025 0.84     eGFR 07/14/2025 >90     Calcium 07/14/2025 9.1     Albumin 07/14/2025 3.8     Alkaline Phosphatase 07/14/2025 100     Total Protein 07/14/2025 6.5     AST 07/14/2025 13     Bilirubin, Total 07/14/2025 0.4     ALT 07/14/2025 12     ABO TYPE 07/14/2025 O     Rh TYPE 07/14/2025 POS     ANTIBODY SCREEN 07/14/2025 NEG     LDH 07/14/2025 166    Infusion on 07/10/2025   Component Date Value    WBC 07/10/2025 5.8     RBC 07/10/2025 3.10 (L)     Hemoglobin 07/10/2025 11.1 (L)     Hematocrit 07/10/2025 33.0 (L)     MCV 07/10/2025 107 (H)     MCH 07/10/2025 35.8 (H)     MCHC 07/10/2025 33.6     RDW 07/10/2025 17.6 (H)     Platelets 07/10/2025 114 (L)     Neutrophils % 07/10/2025 71.2     Immature Granulocytes %,* 07/10/2025 0.2     Lymphocytes % 07/10/2025 20.1     Monocytes % 07/10/2025 8.3     Eosinophils % 07/10/2025 0.0     Basophils % 07/10/2025 0.2     Neutrophils Absolute 07/10/2025 4.11     Immature Granulocytes Ab* 07/10/2025 0.01     Lymphocytes Absolute 07/10/2025 1.16 (L)     Monocytes Absolute 07/10/2025  0.48     Eosinophils Absolute 07/10/2025 0.00     Basophils Absolute 07/10/2025 0.01     Glucose 07/10/2025 108 (H)     Sodium 07/10/2025 138     Potassium 07/10/2025 3.9     Chloride 07/10/2025 102     Bicarbonate 07/10/2025 27     Anion Gap 07/10/2025 13     Urea Nitrogen 07/10/2025 14     Creatinine 07/10/2025 1.05     eGFR 07/10/2025 78     Calcium 07/10/2025 9.8     Albumin 07/10/2025 4.2     Alkaline Phosphatase 07/10/2025 114     Total Protein 07/10/2025 7.1     AST 07/10/2025 13     Bilirubin, Total 07/10/2025 0.4     ALT 07/10/2025 13     ABO TYPE 07/10/2025 O     Rh TYPE 07/10/2025 POS     ANTIBODY SCREEN 07/10/2025 NEG     LDH 07/10/2025 192    Infusion on 07/07/2025   Component Date Value    WBC 07/07/2025 5.3     RBC 07/07/2025 2.93 (L)     Hemoglobin 07/07/2025 10.3 (L)     Hematocrit 07/07/2025 30.8 (L)     MCV 07/07/2025 105 (H)     MCH 07/07/2025 35.2 (H)     MCHC 07/07/2025 33.4     RDW 07/07/2025 17.2 (H)     Platelets 07/07/2025 153     Neutrophils % 07/07/2025 66.7     Immature Granulocytes %,* 07/07/2025 0.2     Lymphocytes % 07/07/2025 22.1     Monocytes % 07/07/2025 10.8     Eosinophils % 07/07/2025 0.0     Basophils % 07/07/2025 0.2     Neutrophils Absolute 07/07/2025 3.54     Immature Granulocytes Ab* 07/07/2025 0.01     Lymphocytes Absolute 07/07/2025 1.17 (L)     Monocytes Absolute 07/07/2025 0.57     Eosinophils Absolute 07/07/2025 0.00     Basophils Absolute 07/07/2025 0.01     Glucose 07/07/2025 106 (H)     Sodium 07/07/2025 142     Potassium 07/07/2025 4.1     Chloride 07/07/2025 107     Bicarbonate 07/07/2025 24     Anion Gap 07/07/2025 15     Urea Nitrogen 07/07/2025 21     Creatinine 07/07/2025 0.95     eGFR 07/07/2025 88     Calcium 07/07/2025 9.3     Albumin 07/07/2025 4.0     Alkaline Phosphatase 07/07/2025 97     Total Protein 07/07/2025 7.0     AST 07/07/2025 12     Bilirubin, Total 07/07/2025 0.3     ALT 07/07/2025 13     ABO TYPE 07/07/2025 O     Rh TYPE 07/07/2025 POS      ANTIBODY SCREEN 07/07/2025 NEG     LDH 07/07/2025 165    Lab on 07/02/2025   Component Date Value    WBC 07/02/2025 5.6     RBC 07/02/2025 3.07 (L)     Hemoglobin 07/02/2025 10.7 (L)     Hematocrit 07/02/2025 32.0 (L)     MCV 07/02/2025 104 (H)     MCH 07/02/2025 34.9 (H)     MCHC 07/02/2025 33.4     RDW 07/02/2025 17.0 (H)     Platelets 07/02/2025 171     Neutrophils % 07/02/2025 56.6     Immature Granulocytes %,* 07/02/2025 0.2     Lymphocytes % 07/02/2025 29.5     Monocytes % 07/02/2025 13.5     Eosinophils % 07/02/2025 0.0     Basophils % 07/02/2025 0.2     Neutrophils Absolute 07/02/2025 3.15     Immature Granulocytes Ab* 07/02/2025 0.01     Lymphocytes Absolute 07/02/2025 1.64     Monocytes Absolute 07/02/2025 0.75     Eosinophils Absolute 07/02/2025 0.00     Basophils Absolute 07/02/2025 0.01     Glucose 07/02/2025 106 (H)     Sodium 07/02/2025 135 (L)     Potassium 07/02/2025 4.7     Chloride 07/02/2025 98     Bicarbonate 07/02/2025 28     Anion Gap 07/02/2025 14     Urea Nitrogen 07/02/2025 19     Creatinine 07/02/2025 1.16     eGFR 07/02/2025 69     Calcium 07/02/2025 9.7     Albumin 07/02/2025 4.1     Alkaline Phosphatase 07/02/2025 107     Total Protein 07/02/2025 7.4     AST 07/02/2025 13     Bilirubin, Total 07/02/2025 0.3     ALT 07/02/2025 15     LDH 07/02/2025 162    Infusion on 06/30/2025   Component Date Value    WBC 06/30/2025 6.0     nRBC 06/30/2025      RBC 06/30/2025 3.01 (L)     Hemoglobin 06/30/2025 10.5 (L)     Hematocrit 06/30/2025 31.6 (L)     MCV 06/30/2025 105 (H)     MCH 06/30/2025 34.9 (H)     MCHC 06/30/2025 33.2     RDW 06/30/2025 16.8 (H)     Platelets 06/30/2025 155     Neutrophils % 06/30/2025 60.3     Immature Granulocytes %,* 06/30/2025 0.3     Lymphocytes % 06/30/2025 28.4     Monocytes % 06/30/2025 10.7     Eosinophils % 06/30/2025 0.0     Basophils % 06/30/2025 0.3     Neutrophils Absolute 06/30/2025 3.59     Immature Granulocytes Ab* 06/30/2025 0.02     Lymphocytes Absolute  06/30/2025 1.69     Monocytes Absolute 06/30/2025 0.64     Eosinophils Absolute 06/30/2025 0.00     Basophils Absolute 06/30/2025 0.02     Glucose 06/30/2025 97     Sodium 06/30/2025 137     Potassium 06/30/2025 4.1     Chloride 06/30/2025 99     Bicarbonate 06/30/2025 28     Anion Gap 06/30/2025 14     Urea Nitrogen 06/30/2025 16     Creatinine 06/30/2025 1.02     eGFR 06/30/2025 81     Calcium 06/30/2025 9.8     Albumin 06/30/2025 4.1     Alkaline Phosphatase 06/30/2025 111     Total Protein 06/30/2025 7.6     AST 06/30/2025 13     Bilirubin, Total 06/30/2025 0.3     ALT 06/30/2025 14     ABO TYPE 06/30/2025 O     Rh TYPE 06/30/2025 POS     ANTIBODY SCREEN 06/30/2025 NEG     LDH 06/30/2025 182    There may be more visits with results that are not included.       Assessment/Plan   Psychiatric Risk Assessment  Violence Risk Assessment: none  Acute Risk of Harm to Others is Considered: low   Suicide Risk Assessment: age > 65 yrs old and   Protective Factors against Suicide: adherence to  treatment, fear of suicide, moral objections to suicide, positive family relationships, and sense of responsibility toward family  Acute Risk of Harm to Self is Considered: low    Imminent Risk of Suicide or Serious Self-Injury: Low   Chronic Risk of Suicide of Serious Self-Injury: Low  Risk factors: Age, depression history and   Protective factors: Denies current suicidal ideation, denies history of suicide attempts , willingness to seek help and support , gender, access to a variety of clinical interventions , and receiving and engaged in care for mental, physical, and substance use disorders      Imminent Risk of Violence or Homicide: Low   Risk Factors: No significant risk factors identified on screening  Protective Factors: Lack of known history of harm to others , Lack of known history of violent ideation , and lack of known access to firearms.     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     Treatment Plan:   The FDA risks, benefits & alternatives to the medications prescribed were explained to you and your support person, your wife and Duarte, your daughter, today. You & your support persons were able to understand & repeat these risks, benefits & alternatives to these prescribed medications. You and your support persons have agreed to proceed with treatment with the medications discussed based on the conclusion that the benefit outweighs the risks of this treatment regimen: continue escitalopram 5 mg daily and continue aripiprazole 2 mg daily(the FDA risks were explicitly- reviewed with you, your wife Emi and your eldest daughter).      We did review the risks of fluconazole with escitalopram which are the following and were explicitly discussed in detail as the following: Drug-Drug: escitalopram and fluconazole: Concurrent use of an agent which significantly inhibits [id number] may result in elevated concentrations and toxicity from citalopram and escitalopram, including risks for serotonin syndrome or prolongation of the QTc interval.(1,2) Prolongation of the QT interval may result in life-threatening arrhythmias, including torsades de pointes.(5) Symptoms of serotonin syndrome may include tremor, agitation, diaphoresis, hyperreflexia, clonus, tachycardia, hyperthermia, and muscle rigidity. We recommended that the patient review an alternative with the provider prescribing fluconazole.     Follow up in 3 to 4 weeks.       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     Treatment Plan:   The FDA risks, benefits & alternatives to the medications prescribed were explained to you and your support person, your wife and Duarte, your daughter, today.  You & your support persons were able to understand & repeat these risks, benefits & alternatives to these prescribed medications. You and your support persons have agreed to proceed with treatment with the medications discussed based on the conclusion that the benefit outweighs the risks of this treatment regimen: continue escitalopram 5 mg daily and continue aripiprazole 2 mg daily(the FDA risks were explicitly- reviewed with you, your wife Emi and your eldest daughter).      We did review the risks of fluconazole with escitalopram which are the following and were explicitly discussed in detail as the following: Drug-Drug: escitalopram and fluconazole: Concurrent use of an agent which significantly inhibits [id number] may result in elevated concentrations and toxicity from citalopram and escitalopram, including risks for serotonin syndrome or prolongation of the QTc interval.(1,2) Prolongation of the QT interval may result in life-threatening arrhythmias, including torsades de pointes.(5) Symptoms of serotonin syndrome may include tremor, agitation, diaphoresis, hyperreflexia, clonus, tachycardia, hyperthermia, and muscle rigidity. We recommended that the patient review an alternative with the provider prescribing fluconazole.     Follow up in 3 to 4 weeks.       Time:   Prep time on date of the patient encounter: 5 minutes.   Time spent directly with patient/family/caregiver: 20 minutes.   Additional time spent on patient care activities:  minutes.   Documentation time: 5 minutes.   Total time on date of patient encounter: 30 minutes.

## 2025-08-01 NOTE — ASSESSMENT & PLAN NOTE
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     Treatment Plan:   The FDA risks, benefits & alternatives to the medications prescribed were explained to you and your support person, your wife and Felicia, your daughter, today. You & your support persons were able to understand & repeat these risks, benefits & alternatives to these prescribed medications. You and your support persons have agreed to proceed with treatment with the medications discussed based on the conclusion that the benefit outweighs the risks of this treatment regimen: continue escitalopram 5 mg daily and continue aripiprazole 2 mg daily(the FDA risks were explicitly- reviewed with you, your wife Emi and your eldest daughter).      We did review the risks of fluconazole with escitalopram which are the following and were explicitly discussed in detail as the following: Drug-Drug: escitalopram and fluconazole: Concurrent use of an agent which significantly inhibits [id number] may result in elevated concentrations and toxicity from citalopram and escitalopram, including risks for serotonin syndrome or prolongation of the QTc interval.(1,2) Prolongation of the QT interval may result in life-threatening arrhythmias, including torsades de pointes.(5) Symptoms of serotonin syndrome may include tremor, agitation, diaphoresis, hyperreflexia, clonus, tachycardia, hyperthermia, and muscle rigidity. We recommended that the patient review an alternative with the provider prescribing fluconazole.     Follow up in 3 to 4 weeks.

## 2025-08-04 ENCOUNTER — INFUSION (OUTPATIENT)
Dept: HEMATOLOGY/ONCOLOGY | Facility: CLINIC | Age: 65
End: 2025-08-04
Payer: MEDICARE

## 2025-08-04 VITALS
BODY MASS INDEX: 26.67 KG/M2 | SYSTOLIC BLOOD PRESSURE: 133 MMHG | HEART RATE: 87 BPM | RESPIRATION RATE: 16 BRPM | TEMPERATURE: 96.8 F | WEIGHT: 190.92 LBS | OXYGEN SATURATION: 92 % | DIASTOLIC BLOOD PRESSURE: 78 MMHG

## 2025-08-04 DIAGNOSIS — C92.00 ACUTE MYELOID LEUKEMIA NOT HAVING ACHIEVED REMISSION (MULTI): ICD-10-CM

## 2025-08-04 LAB
ALBUMIN SERPL BCP-MCNC: 4.1 G/DL (ref 3.4–5)
ALP SERPL-CCNC: 104 U/L (ref 33–136)
ALT SERPL W P-5'-P-CCNC: 13 U/L (ref 10–52)
ANION GAP SERPL CALC-SCNC: 13 MMOL/L (ref 10–20)
AST SERPL W P-5'-P-CCNC: 14 U/L (ref 9–39)
BASOPHILS # BLD AUTO: 0 X10*3/UL (ref 0–0.1)
BASOPHILS NFR BLD AUTO: 0 %
BILIRUB SERPL-MCNC: 0.4 MG/DL (ref 0–1.2)
BUN SERPL-MCNC: 15 MG/DL (ref 6–23)
CALCIUM SERPL-MCNC: 9.4 MG/DL (ref 8.6–10.3)
CHLORIDE SERPL-SCNC: 104 MMOL/L (ref 98–107)
CO2 SERPL-SCNC: 27 MMOL/L (ref 21–32)
CREAT SERPL-MCNC: 0.89 MG/DL (ref 0.5–1.3)
EGFRCR SERPLBLD CKD-EPI 2021: >90 ML/MIN/1.73M*2
EOSINOPHIL # BLD AUTO: 0.11 X10*3/UL (ref 0–0.7)
EOSINOPHIL NFR BLD AUTO: 4.2 %
ERYTHROCYTE [DISTWIDTH] IN BLOOD BY AUTOMATED COUNT: 16.9 % (ref 11.5–14.5)
GLUCOSE SERPL-MCNC: 115 MG/DL (ref 74–99)
HCT VFR BLD AUTO: 39.8 % (ref 41–52)
HGB BLD-MCNC: 13.3 G/DL (ref 13.5–17.5)
IMM GRANULOCYTES # BLD AUTO: 0 X10*3/UL (ref 0–0.7)
IMM GRANULOCYTES NFR BLD AUTO: 0 % (ref 0–0.9)
LDH SERPL L TO P-CCNC: 154 U/L (ref 84–246)
LYMPHOCYTES # BLD AUTO: 1.12 X10*3/UL (ref 1.2–4.8)
LYMPHOCYTES NFR BLD AUTO: 42.3 %
MCH RBC QN AUTO: 37 PG (ref 26–34)
MCHC RBC AUTO-ENTMCNC: 33.4 G/DL (ref 32–36)
MCV RBC AUTO: 111 FL (ref 80–100)
MONOCYTES # BLD AUTO: 0.27 X10*3/UL (ref 0.1–1)
MONOCYTES NFR BLD AUTO: 10.2 %
NEUTROPHILS # BLD AUTO: 1.15 X10*3/UL (ref 1.2–7.7)
NEUTROPHILS NFR BLD AUTO: 43.3 %
NRBC BLD-RTO: ABNORMAL /100{WBCS}
PLATELET # BLD AUTO: 115 X10*3/UL (ref 150–450)
POTASSIUM SERPL-SCNC: 4.1 MMOL/L (ref 3.5–5.3)
PROT SERPL-MCNC: 6.7 G/DL (ref 6.4–8.2)
RBC # BLD AUTO: 3.59 X10*6/UL (ref 4.5–5.9)
SODIUM SERPL-SCNC: 140 MMOL/L (ref 136–145)
WBC # BLD AUTO: 2.7 X10*3/UL (ref 4.4–11.3)

## 2025-08-04 PROCEDURE — 2500000004 HC RX 250 GENERAL PHARMACY W/ HCPCS (ALT 636 FOR OP/ED): Performed by: INTERNAL MEDICINE

## 2025-08-04 PROCEDURE — 85025 COMPLETE CBC W/AUTO DIFF WBC: CPT

## 2025-08-04 PROCEDURE — 83615 LACTATE (LD) (LDH) ENZYME: CPT

## 2025-08-04 PROCEDURE — 86850 RBC ANTIBODY SCREEN: CPT

## 2025-08-04 PROCEDURE — 80053 COMPREHEN METABOLIC PANEL: CPT

## 2025-08-04 PROCEDURE — 2500000004 HC RX 250 GENERAL PHARMACY W/ HCPCS (ALT 636 FOR OP/ED): Mod: JW | Performed by: INTERNAL MEDICINE

## 2025-08-04 PROCEDURE — 96401 CHEMO ANTI-NEOPL SQ/IM: CPT

## 2025-08-04 RX ORDER — FAMOTIDINE 10 MG/ML
20 INJECTION, SOLUTION INTRAVENOUS ONCE AS NEEDED
Status: DISCONTINUED | OUTPATIENT
Start: 2025-08-04 | End: 2025-08-04 | Stop reason: HOSPADM

## 2025-08-04 RX ORDER — EPINEPHRINE 0.3 MG/.3ML
0.3 INJECTION SUBCUTANEOUS EVERY 5 MIN PRN
Status: DISCONTINUED | OUTPATIENT
Start: 2025-08-04 | End: 2025-08-04 | Stop reason: HOSPADM

## 2025-08-04 RX ORDER — ONDANSETRON 8 MG/1
8 TABLET, FILM COATED ORAL ONCE
Status: COMPLETED | OUTPATIENT
Start: 2025-08-04 | End: 2025-08-04

## 2025-08-04 RX ORDER — HEPARIN 100 UNIT/ML
500 SYRINGE INTRAVENOUS AS NEEDED
Status: CANCELLED | OUTPATIENT
Start: 2025-08-04

## 2025-08-04 RX ORDER — DIPHENHYDRAMINE HYDROCHLORIDE 50 MG/ML
50 INJECTION, SOLUTION INTRAMUSCULAR; INTRAVENOUS AS NEEDED
Status: DISCONTINUED | OUTPATIENT
Start: 2025-08-04 | End: 2025-08-04 | Stop reason: HOSPADM

## 2025-08-04 RX ORDER — ALBUTEROL SULFATE 0.83 MG/ML
3 SOLUTION RESPIRATORY (INHALATION) AS NEEDED
Status: DISCONTINUED | OUTPATIENT
Start: 2025-08-04 | End: 2025-08-04 | Stop reason: HOSPADM

## 2025-08-04 RX ORDER — HEPARIN SODIUM,PORCINE/PF 10 UNIT/ML
50 SYRINGE (ML) INTRAVENOUS AS NEEDED
Status: CANCELLED | OUTPATIENT
Start: 2025-08-04

## 2025-08-04 RX ADMIN — ONDANSETRON HYDROCHLORIDE 8 MG: 8 TABLET, FILM COATED ORAL at 09:11

## 2025-08-04 RX ADMIN — AZACITIDINE 157.5 MG: 100 INJECTION, POWDER, LYOPHILIZED, FOR SOLUTION INTRAVENOUS; SUBCUTANEOUS at 09:44

## 2025-08-04 ASSESSMENT — PAIN SCALES - GENERAL: PAINLEVEL_OUTOF10: 10-WORST PAIN EVER

## 2025-08-05 ENCOUNTER — INFUSION (OUTPATIENT)
Dept: HEMATOLOGY/ONCOLOGY | Facility: CLINIC | Age: 65
End: 2025-08-05
Payer: MEDICARE

## 2025-08-05 ENCOUNTER — NUTRITION (OUTPATIENT)
Dept: HEMATOLOGY/ONCOLOGY | Facility: CLINIC | Age: 65
End: 2025-08-05

## 2025-08-05 VITALS
DIASTOLIC BLOOD PRESSURE: 78 MMHG | OXYGEN SATURATION: 96 % | HEART RATE: 79 BPM | SYSTOLIC BLOOD PRESSURE: 133 MMHG | WEIGHT: 192.24 LBS | BODY MASS INDEX: 26.85 KG/M2 | RESPIRATION RATE: 16 BRPM | TEMPERATURE: 97.3 F

## 2025-08-05 VITALS — BODY MASS INDEX: 26.67 KG/M2 | HEIGHT: 71 IN

## 2025-08-05 DIAGNOSIS — C92.00 ACUTE MYELOID LEUKEMIA NOT HAVING ACHIEVED REMISSION (MULTI): ICD-10-CM

## 2025-08-05 LAB
ABO GROUP (TYPE) IN BLOOD: NORMAL
ANTIBODY SCREEN: NORMAL
RH FACTOR (ANTIGEN D): NORMAL

## 2025-08-05 PROCEDURE — 2500000004 HC RX 250 GENERAL PHARMACY W/ HCPCS (ALT 636 FOR OP/ED): Performed by: INTERNAL MEDICINE

## 2025-08-05 PROCEDURE — 96401 CHEMO ANTI-NEOPL SQ/IM: CPT

## 2025-08-05 RX ORDER — ONDANSETRON 8 MG/1
8 TABLET, FILM COATED ORAL ONCE
Status: COMPLETED | OUTPATIENT
Start: 2025-08-05 | End: 2025-08-05

## 2025-08-05 RX ORDER — DIPHENHYDRAMINE HYDROCHLORIDE 50 MG/ML
50 INJECTION, SOLUTION INTRAMUSCULAR; INTRAVENOUS AS NEEDED
Status: DISCONTINUED | OUTPATIENT
Start: 2025-08-05 | End: 2025-08-05 | Stop reason: HOSPADM

## 2025-08-05 RX ORDER — FAMOTIDINE 10 MG/ML
20 INJECTION, SOLUTION INTRAVENOUS ONCE AS NEEDED
Status: DISCONTINUED | OUTPATIENT
Start: 2025-08-05 | End: 2025-08-05 | Stop reason: HOSPADM

## 2025-08-05 RX ORDER — ALBUTEROL SULFATE 0.83 MG/ML
3 SOLUTION RESPIRATORY (INHALATION) AS NEEDED
Status: DISCONTINUED | OUTPATIENT
Start: 2025-08-05 | End: 2025-08-05 | Stop reason: HOSPADM

## 2025-08-05 RX ORDER — EPINEPHRINE 0.3 MG/.3ML
0.3 INJECTION SUBCUTANEOUS EVERY 5 MIN PRN
Status: DISCONTINUED | OUTPATIENT
Start: 2025-08-05 | End: 2025-08-05 | Stop reason: HOSPADM

## 2025-08-05 RX ADMIN — AZACITIDINE 157.5 MG: 100 INJECTION, POWDER, LYOPHILIZED, FOR SOLUTION INTRAVENOUS; SUBCUTANEOUS at 15:09

## 2025-08-05 RX ADMIN — ONDANSETRON HYDROCHLORIDE 8 MG: 8 TABLET, FILM COATED ORAL at 14:19

## 2025-08-05 ASSESSMENT — PAIN SCALES - GENERAL: PAINLEVEL_OUTOF10: 10-WORST PAIN EVER

## 2025-08-06 ENCOUNTER — INFUSION (OUTPATIENT)
Dept: HEMATOLOGY/ONCOLOGY | Facility: CLINIC | Age: 65
End: 2025-08-06
Payer: MEDICARE

## 2025-08-06 VITALS
OXYGEN SATURATION: 93 % | RESPIRATION RATE: 18 BRPM | WEIGHT: 192.4 LBS | HEART RATE: 81 BPM | TEMPERATURE: 97.7 F | DIASTOLIC BLOOD PRESSURE: 79 MMHG | SYSTOLIC BLOOD PRESSURE: 121 MMHG | BODY MASS INDEX: 26.88 KG/M2

## 2025-08-06 DIAGNOSIS — C92.00 ACUTE MYELOID LEUKEMIA NOT HAVING ACHIEVED REMISSION (MULTI): ICD-10-CM

## 2025-08-06 PROCEDURE — 2500000004 HC RX 250 GENERAL PHARMACY W/ HCPCS (ALT 636 FOR OP/ED): Performed by: INTERNAL MEDICINE

## 2025-08-06 PROCEDURE — 96401 CHEMO ANTI-NEOPL SQ/IM: CPT

## 2025-08-06 RX ORDER — PROCHLORPERAZINE EDISYLATE 5 MG/ML
10 INJECTION INTRAMUSCULAR; INTRAVENOUS EVERY 6 HOURS PRN
Status: DISCONTINUED | OUTPATIENT
Start: 2025-08-06 | End: 2025-08-06 | Stop reason: HOSPADM

## 2025-08-06 RX ORDER — PROCHLORPERAZINE MALEATE 10 MG
10 TABLET ORAL EVERY 6 HOURS PRN
Status: DISCONTINUED | OUTPATIENT
Start: 2025-08-06 | End: 2025-08-06 | Stop reason: HOSPADM

## 2025-08-06 RX ORDER — EPINEPHRINE 0.3 MG/.3ML
0.3 INJECTION SUBCUTANEOUS EVERY 5 MIN PRN
Status: DISCONTINUED | OUTPATIENT
Start: 2025-08-06 | End: 2025-08-06 | Stop reason: HOSPADM

## 2025-08-06 RX ORDER — ALBUTEROL SULFATE 0.83 MG/ML
3 SOLUTION RESPIRATORY (INHALATION) AS NEEDED
Status: DISCONTINUED | OUTPATIENT
Start: 2025-08-06 | End: 2025-08-06 | Stop reason: HOSPADM

## 2025-08-06 RX ORDER — ONDANSETRON 8 MG/1
8 TABLET, FILM COATED ORAL ONCE
Status: COMPLETED | OUTPATIENT
Start: 2025-08-06 | End: 2025-08-06

## 2025-08-06 RX ORDER — FAMOTIDINE 10 MG/ML
20 INJECTION, SOLUTION INTRAVENOUS ONCE AS NEEDED
Status: DISCONTINUED | OUTPATIENT
Start: 2025-08-06 | End: 2025-08-06 | Stop reason: HOSPADM

## 2025-08-06 RX ORDER — DIPHENHYDRAMINE HYDROCHLORIDE 50 MG/ML
50 INJECTION, SOLUTION INTRAMUSCULAR; INTRAVENOUS AS NEEDED
Status: DISCONTINUED | OUTPATIENT
Start: 2025-08-06 | End: 2025-08-06 | Stop reason: HOSPADM

## 2025-08-06 RX ADMIN — ONDANSETRON HYDROCHLORIDE 8 MG: 8 TABLET, FILM COATED ORAL at 14:33

## 2025-08-06 RX ADMIN — AZACITIDINE 157.5 MG: 100 INJECTION, POWDER, LYOPHILIZED, FOR SOLUTION INTRAVENOUS; SUBCUTANEOUS at 14:50

## 2025-08-06 ASSESSMENT — PAIN SCALES - GENERAL: PAINLEVEL_OUTOF10: 10-WORST PAIN EVER

## 2025-08-07 ENCOUNTER — INFUSION (OUTPATIENT)
Dept: HEMATOLOGY/ONCOLOGY | Facility: CLINIC | Age: 65
End: 2025-08-07
Payer: MEDICARE

## 2025-08-07 VITALS
HEART RATE: 82 BPM | DIASTOLIC BLOOD PRESSURE: 77 MMHG | WEIGHT: 191.58 LBS | RESPIRATION RATE: 16 BRPM | TEMPERATURE: 97.2 F | BODY MASS INDEX: 26.76 KG/M2 | SYSTOLIC BLOOD PRESSURE: 131 MMHG | OXYGEN SATURATION: 95 %

## 2025-08-07 DIAGNOSIS — C92.00 ACUTE MYELOID LEUKEMIA NOT HAVING ACHIEVED REMISSION (MULTI): ICD-10-CM

## 2025-08-07 LAB
ALBUMIN SERPL BCP-MCNC: 4 G/DL (ref 3.4–5)
ALP SERPL-CCNC: 98 U/L (ref 33–136)
ALT SERPL W P-5'-P-CCNC: 12 U/L (ref 10–52)
ANION GAP SERPL CALC-SCNC: 12 MMOL/L (ref 10–20)
AST SERPL W P-5'-P-CCNC: 12 U/L (ref 9–39)
BASOPHILS # BLD AUTO: 0 X10*3/UL (ref 0–0.1)
BASOPHILS NFR BLD AUTO: 0 %
BILIRUB SERPL-MCNC: 0.5 MG/DL (ref 0–1.2)
BUN SERPL-MCNC: 16 MG/DL (ref 6–23)
CALCIUM SERPL-MCNC: 9.5 MG/DL (ref 8.6–10.3)
CHLORIDE SERPL-SCNC: 103 MMOL/L (ref 98–107)
CO2 SERPL-SCNC: 27 MMOL/L (ref 21–32)
CREAT SERPL-MCNC: 0.95 MG/DL (ref 0.5–1.3)
EGFRCR SERPLBLD CKD-EPI 2021: 88 ML/MIN/1.73M*2
EOSINOPHIL # BLD AUTO: 0.13 X10*3/UL (ref 0–0.7)
EOSINOPHIL NFR BLD AUTO: 4.4 %
ERYTHROCYTE [DISTWIDTH] IN BLOOD BY AUTOMATED COUNT: 16.2 % (ref 11.5–14.5)
GLUCOSE SERPL-MCNC: 146 MG/DL (ref 74–99)
HCT VFR BLD AUTO: 39.5 % (ref 41–52)
HGB BLD-MCNC: 13.1 G/DL (ref 13.5–17.5)
IMM GRANULOCYTES # BLD AUTO: 0 X10*3/UL (ref 0–0.7)
IMM GRANULOCYTES NFR BLD AUTO: 0 % (ref 0–0.9)
LDH SERPL L TO P-CCNC: 149 U/L (ref 84–246)
LYMPHOCYTES # BLD AUTO: 0.98 X10*3/UL (ref 1.2–4.8)
LYMPHOCYTES NFR BLD AUTO: 33.2 %
MCH RBC QN AUTO: 36.7 PG (ref 26–34)
MCHC RBC AUTO-ENTMCNC: 33.2 G/DL (ref 32–36)
MCV RBC AUTO: 111 FL (ref 80–100)
MONOCYTES # BLD AUTO: 0.25 X10*3/UL (ref 0.1–1)
MONOCYTES NFR BLD AUTO: 8.5 %
NEUTROPHILS # BLD AUTO: 1.59 X10*3/UL (ref 1.2–7.7)
NEUTROPHILS NFR BLD AUTO: 53.9 %
NRBC BLD-RTO: ABNORMAL /100{WBCS}
PLATELET # BLD AUTO: 73 X10*3/UL (ref 150–450)
POTASSIUM SERPL-SCNC: 4 MMOL/L (ref 3.5–5.3)
PROT SERPL-MCNC: 6.7 G/DL (ref 6.4–8.2)
RBC # BLD AUTO: 3.57 X10*6/UL (ref 4.5–5.9)
SODIUM SERPL-SCNC: 138 MMOL/L (ref 136–145)
WBC # BLD AUTO: 3 X10*3/UL (ref 4.4–11.3)

## 2025-08-07 PROCEDURE — 86901 BLOOD TYPING SEROLOGIC RH(D): CPT

## 2025-08-07 PROCEDURE — 83615 LACTATE (LD) (LDH) ENZYME: CPT

## 2025-08-07 PROCEDURE — 2500000004 HC RX 250 GENERAL PHARMACY W/ HCPCS (ALT 636 FOR OP/ED): Mod: JW | Performed by: INTERNAL MEDICINE

## 2025-08-07 PROCEDURE — 84075 ASSAY ALKALINE PHOSPHATASE: CPT

## 2025-08-07 PROCEDURE — 2500000004 HC RX 250 GENERAL PHARMACY W/ HCPCS (ALT 636 FOR OP/ED): Performed by: INTERNAL MEDICINE

## 2025-08-07 PROCEDURE — 96401 CHEMO ANTI-NEOPL SQ/IM: CPT

## 2025-08-07 PROCEDURE — 85025 COMPLETE CBC W/AUTO DIFF WBC: CPT

## 2025-08-07 RX ORDER — ONDANSETRON 8 MG/1
8 TABLET, FILM COATED ORAL ONCE
Status: COMPLETED | OUTPATIENT
Start: 2025-08-07 | End: 2025-08-07

## 2025-08-07 RX ORDER — EPINEPHRINE 0.3 MG/.3ML
0.3 INJECTION SUBCUTANEOUS EVERY 5 MIN PRN
Status: DISCONTINUED | OUTPATIENT
Start: 2025-08-07 | End: 2025-08-07 | Stop reason: HOSPADM

## 2025-08-07 RX ORDER — ALBUTEROL SULFATE 0.83 MG/ML
3 SOLUTION RESPIRATORY (INHALATION) AS NEEDED
Status: DISCONTINUED | OUTPATIENT
Start: 2025-08-07 | End: 2025-08-07 | Stop reason: HOSPADM

## 2025-08-07 RX ORDER — DIPHENHYDRAMINE HYDROCHLORIDE 50 MG/ML
50 INJECTION, SOLUTION INTRAMUSCULAR; INTRAVENOUS AS NEEDED
Status: DISCONTINUED | OUTPATIENT
Start: 2025-08-07 | End: 2025-08-07 | Stop reason: HOSPADM

## 2025-08-07 RX ORDER — HEPARIN SODIUM,PORCINE/PF 10 UNIT/ML
50 SYRINGE (ML) INTRAVENOUS AS NEEDED
OUTPATIENT
Start: 2025-08-07

## 2025-08-07 RX ORDER — PROCHLORPERAZINE EDISYLATE 5 MG/ML
10 INJECTION INTRAMUSCULAR; INTRAVENOUS EVERY 6 HOURS PRN
Status: DISCONTINUED | OUTPATIENT
Start: 2025-08-07 | End: 2025-08-07 | Stop reason: HOSPADM

## 2025-08-07 RX ORDER — HEPARIN 100 UNIT/ML
500 SYRINGE INTRAVENOUS AS NEEDED
OUTPATIENT
Start: 2025-08-07

## 2025-08-07 RX ORDER — FAMOTIDINE 10 MG/ML
20 INJECTION, SOLUTION INTRAVENOUS ONCE AS NEEDED
Status: DISCONTINUED | OUTPATIENT
Start: 2025-08-07 | End: 2025-08-07 | Stop reason: HOSPADM

## 2025-08-07 RX ORDER — PROCHLORPERAZINE MALEATE 10 MG
10 TABLET ORAL EVERY 6 HOURS PRN
Status: DISCONTINUED | OUTPATIENT
Start: 2025-08-07 | End: 2025-08-07 | Stop reason: HOSPADM

## 2025-08-07 RX ADMIN — ONDANSETRON HYDROCHLORIDE 8 MG: 8 TABLET, FILM COATED ORAL at 09:42

## 2025-08-07 RX ADMIN — AZACITIDINE 157.5 MG: 100 INJECTION, POWDER, LYOPHILIZED, FOR SOLUTION INTRAVENOUS; SUBCUTANEOUS at 09:57

## 2025-08-07 ASSESSMENT — PAIN SCALES - GENERAL: PAINLEVEL_OUTOF10: 10-WORST PAIN EVER

## 2025-08-08 ENCOUNTER — INFUSION (OUTPATIENT)
Dept: HEMATOLOGY/ONCOLOGY | Facility: CLINIC | Age: 65
End: 2025-08-08
Payer: MEDICARE

## 2025-08-08 VITALS
HEART RATE: 82 BPM | DIASTOLIC BLOOD PRESSURE: 74 MMHG | SYSTOLIC BLOOD PRESSURE: 111 MMHG | WEIGHT: 192.24 LBS | RESPIRATION RATE: 16 BRPM | OXYGEN SATURATION: 93 % | BODY MASS INDEX: 26.85 KG/M2 | TEMPERATURE: 97 F

## 2025-08-08 DIAGNOSIS — C92.00 ACUTE MYELOID LEUKEMIA NOT HAVING ACHIEVED REMISSION (MULTI): ICD-10-CM

## 2025-08-08 LAB
ABO GROUP (TYPE) IN BLOOD: NORMAL
ANTIBODY SCREEN: NORMAL
RH FACTOR (ANTIGEN D): NORMAL

## 2025-08-08 PROCEDURE — 2500000004 HC RX 250 GENERAL PHARMACY W/ HCPCS (ALT 636 FOR OP/ED): Performed by: INTERNAL MEDICINE

## 2025-08-08 PROCEDURE — 96401 CHEMO ANTI-NEOPL SQ/IM: CPT

## 2025-08-08 RX ORDER — EPINEPHRINE 0.3 MG/.3ML
0.3 INJECTION SUBCUTANEOUS EVERY 5 MIN PRN
Status: DISCONTINUED | OUTPATIENT
Start: 2025-08-08 | End: 2025-08-08 | Stop reason: HOSPADM

## 2025-08-08 RX ORDER — DIPHENHYDRAMINE HYDROCHLORIDE 50 MG/ML
50 INJECTION, SOLUTION INTRAMUSCULAR; INTRAVENOUS AS NEEDED
Status: DISCONTINUED | OUTPATIENT
Start: 2025-08-08 | End: 2025-08-08 | Stop reason: HOSPADM

## 2025-08-08 RX ORDER — ONDANSETRON 8 MG/1
8 TABLET, FILM COATED ORAL ONCE
Status: COMPLETED | OUTPATIENT
Start: 2025-08-08 | End: 2025-08-08

## 2025-08-08 RX ORDER — FAMOTIDINE 10 MG/ML
20 INJECTION, SOLUTION INTRAVENOUS ONCE AS NEEDED
Status: DISCONTINUED | OUTPATIENT
Start: 2025-08-08 | End: 2025-08-08 | Stop reason: HOSPADM

## 2025-08-08 RX ORDER — ALBUTEROL SULFATE 0.83 MG/ML
3 SOLUTION RESPIRATORY (INHALATION) AS NEEDED
Status: DISCONTINUED | OUTPATIENT
Start: 2025-08-08 | End: 2025-08-08 | Stop reason: HOSPADM

## 2025-08-08 RX ADMIN — ONDANSETRON HYDROCHLORIDE 8 MG: 8 TABLET, FILM COATED ORAL at 09:34

## 2025-08-08 RX ADMIN — AZACITIDINE 157.5 MG: 100 INJECTION, POWDER, LYOPHILIZED, FOR SOLUTION INTRAVENOUS; SUBCUTANEOUS at 10:15

## 2025-08-08 ASSESSMENT — PAIN SCALES - GENERAL: PAINLEVEL_OUTOF10: 10-WORST PAIN EVER

## 2025-08-14 ENCOUNTER — INFUSION (OUTPATIENT)
Dept: HEMATOLOGY/ONCOLOGY | Facility: CLINIC | Age: 65
End: 2025-08-14
Payer: MEDICARE

## 2025-08-14 ENCOUNTER — APPOINTMENT (OUTPATIENT)
Dept: HEMATOLOGY/ONCOLOGY | Facility: CLINIC | Age: 65
End: 2025-08-14
Payer: MEDICARE

## 2025-08-14 ENCOUNTER — DOCUMENTATION (OUTPATIENT)
Dept: HEMATOLOGY/ONCOLOGY | Facility: HOSPITAL | Age: 65
End: 2025-08-14
Payer: MEDICARE

## 2025-08-14 ENCOUNTER — OFFICE VISIT (OUTPATIENT)
Dept: HEMATOLOGY/ONCOLOGY | Facility: CLINIC | Age: 65
End: 2025-08-14
Payer: MEDICARE

## 2025-08-14 ENCOUNTER — SOCIAL WORK (OUTPATIENT)
Dept: HEMATOLOGY/ONCOLOGY | Facility: HOSPITAL | Age: 65
End: 2025-08-14
Payer: MEDICARE

## 2025-08-14 VITALS
WEIGHT: 195.33 LBS | RESPIRATION RATE: 16 BRPM | BODY MASS INDEX: 27.28 KG/M2 | OXYGEN SATURATION: 92 % | TEMPERATURE: 98.1 F | SYSTOLIC BLOOD PRESSURE: 145 MMHG | HEART RATE: 96 BPM | DIASTOLIC BLOOD PRESSURE: 87 MMHG

## 2025-08-14 DIAGNOSIS — F39 PSYCHOTIC AFFECTIVE DISORDER: ICD-10-CM

## 2025-08-14 DIAGNOSIS — Z76.82 STEM CELL TRANSPLANT CANDIDATE: ICD-10-CM

## 2025-08-14 DIAGNOSIS — C92.00 ACUTE MYELOID LEUKEMIA NOT HAVING ACHIEVED REMISSION (MULTI): ICD-10-CM

## 2025-08-14 DIAGNOSIS — G89.29 CHRONIC LOW BACK PAIN, UNSPECIFIED BACK PAIN LATERALITY, UNSPECIFIED WHETHER SCIATICA PRESENT: ICD-10-CM

## 2025-08-14 DIAGNOSIS — Z51.81 ENCOUNTER FOR THERAPEUTIC DRUG LEVEL MONITORING: ICD-10-CM

## 2025-08-14 DIAGNOSIS — C92.00 ACUTE MYELOID LEUKEMIA NOT HAVING ACHIEVED REMISSION (MULTI): Primary | ICD-10-CM

## 2025-08-14 DIAGNOSIS — M54.50 CHRONIC LOW BACK PAIN, UNSPECIFIED BACK PAIN LATERALITY, UNSPECIFIED WHETHER SCIATICA PRESENT: ICD-10-CM

## 2025-08-14 LAB
ABO GROUP (TYPE) IN BLOOD: NORMAL
ALBUMIN SERPL BCP-MCNC: 3.8 G/DL (ref 3.4–5)
ALP SERPL-CCNC: 96 U/L (ref 33–136)
ALT SERPL W P-5'-P-CCNC: 12 U/L (ref 10–52)
AMPHETAMINES UR QL SCN: NORMAL
ANION GAP SERPL CALC-SCNC: 11 MMOL/L (ref 10–20)
ANTIBODY SCREEN: NORMAL
AST SERPL W P-5'-P-CCNC: 13 U/L (ref 9–39)
BARBITURATES UR QL SCN: NORMAL
BASOPHILS # BLD AUTO: 0 X10*3/UL (ref 0–0.1)
BASOPHILS NFR BLD AUTO: 0 %
BENZODIAZ UR QL SCN: NORMAL
BILIRUB SERPL-MCNC: 0.4 MG/DL (ref 0–1.2)
BUN SERPL-MCNC: 16 MG/DL (ref 6–23)
BZE UR QL SCN: NORMAL
CALCIUM SERPL-MCNC: 9.1 MG/DL (ref 8.6–10.3)
CANNABINOIDS UR QL SCN: NORMAL
CHLORIDE SERPL-SCNC: 106 MMOL/L (ref 98–107)
CMV IGG AVIDITY SERPL IA-RTO: NONREACTIVE %
CO2 SERPL-SCNC: 29 MMOL/L (ref 21–32)
CREAT SERPL-MCNC: 0.81 MG/DL (ref 0.5–1.3)
EBV EA IGG SER QL: ABNORMAL
EBV NA AB SER QL: POSITIVE
EBV VCA IGG SER IA-ACNC: POSITIVE
EBV VCA IGM SER IA-ACNC: NEGATIVE
EGFRCR SERPLBLD CKD-EPI 2021: >90 ML/MIN/1.73M*2
EOSINOPHIL # BLD AUTO: 0.01 X10*3/UL (ref 0–0.7)
EOSINOPHIL NFR BLD AUTO: 0.4 %
ERYTHROCYTE [DISTWIDTH] IN BLOOD BY AUTOMATED COUNT: 15.3 % (ref 11.5–14.5)
FENTANYL+NORFENTANYL UR QL SCN: NORMAL
GLUCOSE SERPL-MCNC: 107 MG/DL (ref 74–99)
HBV CORE AB SER QL: NONREACTIVE
HBV CORE IGM SER QL: NONREACTIVE
HBV SURFACE AB SER-ACNC: <3.1 MIU/ML
HBV SURFACE AG SERPL QL IA: NONREACTIVE
HCT VFR BLD AUTO: 33.9 % (ref 41–52)
HCV AB SER QL: NONREACTIVE
HERPES SIMPLEX VIRUS 1 IGG: <0.2 INDEX
HERPES SIMPLEX VIRUS 2 IGG: <0.2 INDEX
HGB BLD-MCNC: 11.8 G/DL (ref 13.5–17.5)
HIV 1+2 AB+HIV1 P24 AG SERPL QL IA: NONREACTIVE
IMM GRANULOCYTES # BLD AUTO: 0 X10*3/UL (ref 0–0.7)
IMM GRANULOCYTES NFR BLD AUTO: 0 % (ref 0–0.9)
LDH SERPL L TO P-CCNC: 166 U/L (ref 84–246)
LYMPHOCYTES # BLD AUTO: 0.95 X10*3/UL (ref 1.2–4.8)
LYMPHOCYTES NFR BLD AUTO: 37.1 %
MCH RBC QN AUTO: 37.5 PG (ref 26–34)
MCHC RBC AUTO-ENTMCNC: 34.8 G/DL (ref 32–36)
MCV RBC AUTO: 108 FL (ref 80–100)
METHADONE UR QL SCN: NORMAL
MONOCYTES # BLD AUTO: 0.16 X10*3/UL (ref 0.1–1)
MONOCYTES NFR BLD AUTO: 6.3 %
NEUTROPHILS # BLD AUTO: 1.44 X10*3/UL (ref 1.2–7.7)
NEUTROPHILS NFR BLD AUTO: 56.2 %
NRBC BLD-RTO: ABNORMAL /100{WBCS}
OPIATES UR QL SCN: NORMAL
OXYCODONE+OXYMORPHONE UR QL SCN: NORMAL
PCP UR QL SCN: NORMAL
PLATELET # BLD AUTO: 17 X10*3/UL (ref 150–450)
POTASSIUM SERPL-SCNC: 4 MMOL/L (ref 3.5–5.3)
PROT SERPL-MCNC: 6.2 G/DL (ref 6.4–8.2)
RBC # BLD AUTO: 3.15 X10*6/UL (ref 4.5–5.9)
RH FACTOR (ANTIGEN D): NORMAL
SODIUM SERPL-SCNC: 142 MMOL/L (ref 136–145)
T GONDII IGG SER-ACNC: NONREACTIVE
TREPONEMA PALLIDUM IGG+IGM AB [PRESENCE] IN SERUM OR PLASMA BY IMMUNOASSAY: NONREACTIVE
VARICELLA ZOSTER IGG INDEX: 6.9 AI
VZV IGG SER QL IA: POSITIVE
WBC # BLD AUTO: 2.6 X10*3/UL (ref 4.4–11.3)

## 2025-08-14 PROCEDURE — 86705 HEP B CORE ANTIBODY IGM: CPT

## 2025-08-14 PROCEDURE — 87340 HEPATITIS B SURFACE AG IA: CPT

## 2025-08-14 PROCEDURE — 87389 HIV-1 AG W/HIV-1&-2 AB AG IA: CPT

## 2025-08-14 PROCEDURE — 86644 CMV ANTIBODY: CPT

## 2025-08-14 PROCEDURE — 86803 HEPATITIS C AB TEST: CPT

## 2025-08-14 PROCEDURE — 85025 COMPLETE CBC W/AUTO DIFF WBC: CPT

## 2025-08-14 PROCEDURE — 83615 LACTATE (LD) (LDH) ENZYME: CPT

## 2025-08-14 PROCEDURE — 80307 DRUG TEST PRSMV CHEM ANLYZR: CPT

## 2025-08-14 PROCEDURE — G2211 COMPLEX E/M VISIT ADD ON: HCPCS | Performed by: INTERNAL MEDICINE

## 2025-08-14 PROCEDURE — 80053 COMPREHEN METABOLIC PANEL: CPT

## 2025-08-14 PROCEDURE — 86645 CMV ANTIBODY IGM: CPT

## 2025-08-14 PROCEDURE — 86780 TREPONEMA PALLIDUM: CPT

## 2025-08-14 PROCEDURE — 86706 HEP B SURFACE ANTIBODY: CPT

## 2025-08-14 PROCEDURE — 99214 OFFICE O/P EST MOD 30 MIN: CPT | Performed by: INTERNAL MEDICINE

## 2025-08-14 PROCEDURE — 1159F MED LIST DOCD IN RCRD: CPT | Performed by: INTERNAL MEDICINE

## 2025-08-14 PROCEDURE — 86704 HEP B CORE ANTIBODY TOTAL: CPT

## 2025-08-14 PROCEDURE — 86663 EPSTEIN-BARR ANTIBODY: CPT

## 2025-08-14 PROCEDURE — 86901 BLOOD TYPING SEROLOGIC RH(D): CPT

## 2025-08-14 PROCEDURE — 86787 VARICELLA-ZOSTER ANTIBODY: CPT

## 2025-08-14 PROCEDURE — 86790 VIRUS ANTIBODY NOS: CPT

## 2025-08-14 PROCEDURE — 36415 COLL VENOUS BLD VENIPUNCTURE: CPT

## 2025-08-14 PROCEDURE — 86695 HERPES SIMPLEX TYPE 1 TEST: CPT

## 2025-08-14 PROCEDURE — 1160F RVW MEDS BY RX/DR IN RCRD: CPT | Performed by: INTERNAL MEDICINE

## 2025-08-14 PROCEDURE — 86777 TOXOPLASMA ANTIBODY: CPT

## 2025-08-14 RX ORDER — HEPARIN 100 UNIT/ML
500 SYRINGE INTRAVENOUS AS NEEDED
OUTPATIENT
Start: 2025-08-14

## 2025-08-14 RX ORDER — TRAMADOL HYDROCHLORIDE 50 MG/1
50 TABLET, FILM COATED ORAL 3 TIMES DAILY PRN
Qty: 90 TABLET | Refills: 5 | Status: SHIPPED | OUTPATIENT
Start: 2025-08-14

## 2025-08-14 RX ORDER — HEPARIN SODIUM,PORCINE/PF 10 UNIT/ML
50 SYRINGE (ML) INTRAVENOUS AS NEEDED
OUTPATIENT
Start: 2025-08-14

## 2025-08-14 ASSESSMENT — PAIN SCALES - GENERAL: PAINLEVEL_OUTOF10: 10-WORST PAIN EVER

## 2025-08-17 LAB
CMV IGM SERPL-ACNC: <8 AU/ML
HTLV I+II AB SER QL IA: NEGATIVE

## 2025-08-18 ENCOUNTER — INFUSION (OUTPATIENT)
Dept: HEMATOLOGY/ONCOLOGY | Facility: CLINIC | Age: 65
End: 2025-08-18
Payer: MEDICARE

## 2025-08-18 VITALS
DIASTOLIC BLOOD PRESSURE: 88 MMHG | SYSTOLIC BLOOD PRESSURE: 136 MMHG | OXYGEN SATURATION: 96 % | HEART RATE: 74 BPM | BODY MASS INDEX: 26.98 KG/M2 | TEMPERATURE: 97.9 F | RESPIRATION RATE: 18 BRPM | WEIGHT: 193.12 LBS

## 2025-08-18 DIAGNOSIS — C92.00 ACUTE MYELOID LEUKEMIA NOT HAVING ACHIEVED REMISSION (MULTI): ICD-10-CM

## 2025-08-18 LAB
ABO GROUP (TYPE) IN BLOOD: NORMAL
ALBUMIN SERPL BCP-MCNC: 3.8 G/DL (ref 3.4–5)
ALP SERPL-CCNC: 98 U/L (ref 33–136)
ALT SERPL W P-5'-P-CCNC: 14 U/L (ref 10–52)
ANION GAP SERPL CALC-SCNC: 14 MMOL/L (ref 10–20)
ANTIBODY SCREEN: NORMAL
AST SERPL W P-5'-P-CCNC: 13 U/L (ref 9–39)
BASOPHILS # BLD AUTO: 0 X10*3/UL (ref 0–0.1)
BASOPHILS NFR BLD AUTO: 0 %
BILIRUB SERPL-MCNC: 0.6 MG/DL (ref 0–1.2)
BLOOD EXPIRATION DATE: NORMAL
BUN SERPL-MCNC: 12 MG/DL (ref 6–23)
CALCIUM SERPL-MCNC: 8.9 MG/DL (ref 8.6–10.3)
CHLORIDE SERPL-SCNC: 108 MMOL/L (ref 98–107)
CO2 SERPL-SCNC: 24 MMOL/L (ref 21–32)
CREAT SERPL-MCNC: 0.75 MG/DL (ref 0.5–1.3)
DISPENSE STATUS: NORMAL
EGFRCR SERPLBLD CKD-EPI 2021: >90 ML/MIN/1.73M*2
EOSINOPHIL # BLD AUTO: 0 X10*3/UL (ref 0–0.7)
EOSINOPHIL NFR BLD AUTO: 0 %
ERYTHROCYTE [DISTWIDTH] IN BLOOD BY AUTOMATED COUNT: 15.7 % (ref 11.5–14.5)
GLUCOSE SERPL-MCNC: 144 MG/DL (ref 74–99)
HCT VFR BLD AUTO: 33.1 % (ref 41–52)
HGB BLD-MCNC: 11.4 G/DL (ref 13.5–17.5)
HLA RESULTS: NORMAL
HLA-A+B+C AB NFR SER: NORMAL %
HLA-DP+DQ+DR AB NFR SER: NORMAL %
IMM GRANULOCYTES # BLD AUTO: 0 X10*3/UL (ref 0–0.7)
IMM GRANULOCYTES NFR BLD AUTO: 0 % (ref 0–0.9)
LDH SERPL L TO P-CCNC: 194 U/L (ref 84–246)
LYMPHOCYTES # BLD AUTO: 0.85 X10*3/UL (ref 1.2–4.8)
LYMPHOCYTES NFR BLD AUTO: 39.4 %
MCH RBC QN AUTO: 36.9 PG (ref 26–34)
MCHC RBC AUTO-ENTMCNC: 34.4 G/DL (ref 32–36)
MCV RBC AUTO: 107 FL (ref 80–100)
MONOCYTES # BLD AUTO: 0.05 X10*3/UL (ref 0.1–1)
MONOCYTES NFR BLD AUTO: 2.3 %
NEUTROPHILS # BLD AUTO: 1.26 X10*3/UL (ref 1.2–7.7)
NEUTROPHILS NFR BLD AUTO: 58.3 %
NRBC BLD-RTO: ABNORMAL /100{WBCS}
OVALOCYTES BLD QL SMEAR: NORMAL
PLATELET # BLD AUTO: 24 X10*3/UL (ref 150–450)
POLYCHROMASIA BLD QL SMEAR: NORMAL
POTASSIUM SERPL-SCNC: 3.8 MMOL/L (ref 3.5–5.3)
PRODUCT BLOOD TYPE: 5100
PRODUCT CODE: NORMAL
PROT SERPL-MCNC: 6.3 G/DL (ref 6.4–8.2)
RBC # BLD AUTO: 3.09 X10*6/UL (ref 4.5–5.9)
RBC MORPH BLD: NORMAL
RH FACTOR (ANTIGEN D): NORMAL
SODIUM SERPL-SCNC: 142 MMOL/L (ref 136–145)
UNIT ABO: NORMAL
UNIT NUMBER: NORMAL
UNIT RH: NORMAL
UNIT VOLUME: 266
WBC # BLD AUTO: 2.2 X10*3/UL (ref 4.4–11.3)

## 2025-08-18 PROCEDURE — 86850 RBC ANTIBODY SCREEN: CPT

## 2025-08-18 PROCEDURE — 36430 TRANSFUSION BLD/BLD COMPNT: CPT

## 2025-08-18 PROCEDURE — 80053 COMPREHEN METABOLIC PANEL: CPT

## 2025-08-18 PROCEDURE — 85025 COMPLETE CBC W/AUTO DIFF WBC: CPT

## 2025-08-18 PROCEDURE — 83615 LACTATE (LD) (LDH) ENZYME: CPT

## 2025-08-18 PROCEDURE — P9073 PLATELETS PHERESIS PATH REDU: HCPCS

## 2025-08-18 RX ORDER — HEPARIN SODIUM,PORCINE/PF 10 UNIT/ML
50 SYRINGE (ML) INTRAVENOUS AS NEEDED
Status: CANCELLED | OUTPATIENT
Start: 2025-08-18

## 2025-08-18 RX ORDER — DIPHENHYDRAMINE HYDROCHLORIDE 50 MG/ML
50 INJECTION, SOLUTION INTRAMUSCULAR; INTRAVENOUS AS NEEDED
OUTPATIENT
Start: 2025-08-18

## 2025-08-18 RX ORDER — HEPARIN 100 UNIT/ML
500 SYRINGE INTRAVENOUS AS NEEDED
Status: CANCELLED | OUTPATIENT
Start: 2025-08-18

## 2025-08-18 RX ORDER — FAMOTIDINE 10 MG/ML
20 INJECTION, SOLUTION INTRAVENOUS ONCE AS NEEDED
OUTPATIENT
Start: 2025-08-18

## 2025-08-18 RX ORDER — ALBUTEROL SULFATE 0.83 MG/ML
3 SOLUTION RESPIRATORY (INHALATION) AS NEEDED
OUTPATIENT
Start: 2025-08-18

## 2025-08-18 RX ORDER — EPINEPHRINE 0.3 MG/.3ML
0.3 INJECTION SUBCUTANEOUS EVERY 5 MIN PRN
OUTPATIENT
Start: 2025-08-18

## 2025-08-18 ASSESSMENT — PAIN SCALES - GENERAL: PAINLEVEL_OUTOF10: 10-WORST PAIN EVER

## 2025-08-21 ENCOUNTER — INFUSION (OUTPATIENT)
Dept: HEMATOLOGY/ONCOLOGY | Facility: CLINIC | Age: 65
End: 2025-08-21
Payer: MEDICARE

## 2025-08-21 VITALS
RESPIRATION RATE: 16 BRPM | DIASTOLIC BLOOD PRESSURE: 85 MMHG | HEART RATE: 94 BPM | WEIGHT: 192.24 LBS | TEMPERATURE: 97.9 F | BODY MASS INDEX: 26.85 KG/M2 | OXYGEN SATURATION: 95 % | SYSTOLIC BLOOD PRESSURE: 125 MMHG

## 2025-08-21 DIAGNOSIS — Z01.818 ENCOUNTER FOR ECHOCARDIOGRAM BEFORE INITIATION OF CHEMOTHERAPY: ICD-10-CM

## 2025-08-21 DIAGNOSIS — C92.00 ACUTE MYELOID LEUKEMIA NOT HAVING ACHIEVED REMISSION (MULTI): ICD-10-CM

## 2025-08-21 DIAGNOSIS — Z76.82 STEM CELL TRANSPLANT CANDIDATE: ICD-10-CM

## 2025-08-21 LAB
ABO GROUP (TYPE) IN BLOOD: NORMAL
ALBUMIN SERPL BCP-MCNC: 4 G/DL (ref 3.4–5)
ALP SERPL-CCNC: 96 U/L (ref 33–136)
ALT SERPL W P-5'-P-CCNC: 14 U/L (ref 10–52)
ANION GAP SERPL CALC-SCNC: 11 MMOL/L (ref 10–20)
ANTIBODY SCREEN: NORMAL
AST SERPL W P-5'-P-CCNC: 16 U/L (ref 9–39)
BASOPHILS # BLD AUTO: 0 X10*3/UL (ref 0–0.1)
BASOPHILS NFR BLD AUTO: 0 %
BILIRUB SERPL-MCNC: 0.7 MG/DL (ref 0–1.2)
BUN SERPL-MCNC: 12 MG/DL (ref 6–23)
CALCIUM SERPL-MCNC: 9.4 MG/DL (ref 8.6–10.3)
CHLORIDE SERPL-SCNC: 104 MMOL/L (ref 98–107)
CO2 SERPL-SCNC: 29 MMOL/L (ref 21–32)
CREAT SERPL-MCNC: 0.76 MG/DL (ref 0.5–1.3)
EGFRCR SERPLBLD CKD-EPI 2021: >90 ML/MIN/1.73M*2
EOSINOPHIL # BLD AUTO: 0 X10*3/UL (ref 0–0.7)
EOSINOPHIL NFR BLD AUTO: 0 %
ERYTHROCYTE [DISTWIDTH] IN BLOOD BY AUTOMATED COUNT: 15.7 % (ref 11.5–14.5)
GLUCOSE SERPL-MCNC: 107 MG/DL (ref 74–99)
HCT VFR BLD AUTO: 35.1 % (ref 41–52)
HGB BLD-MCNC: 12 G/DL (ref 13.5–17.5)
IMM GRANULOCYTES # BLD AUTO: 0 X10*3/UL (ref 0–0.7)
IMM GRANULOCYTES NFR BLD AUTO: 0 % (ref 0–0.9)
LDH SERPL L TO P-CCNC: 222 U/L (ref 84–246)
LYMPHOCYTES # BLD AUTO: 0.92 X10*3/UL (ref 1.2–4.8)
LYMPHOCYTES NFR BLD AUTO: 52 %
MCH RBC QN AUTO: 37 PG (ref 26–34)
MCHC RBC AUTO-ENTMCNC: 34.2 G/DL (ref 32–36)
MCV RBC AUTO: 108 FL (ref 80–100)
MONOCYTES # BLD AUTO: 0.02 X10*3/UL (ref 0.1–1)
MONOCYTES NFR BLD AUTO: 1.1 %
NEUTROPHILS # BLD AUTO: 0.83 X10*3/UL (ref 1.2–7.7)
NEUTROPHILS NFR BLD AUTO: 46.9 %
NRBC BLD-RTO: ABNORMAL /100{WBCS}
OVALOCYTES BLD QL SMEAR: NORMAL
PLATELET # BLD AUTO: 55 X10*3/UL (ref 150–450)
POLYCHROMASIA BLD QL SMEAR: NORMAL
POTASSIUM SERPL-SCNC: 4.2 MMOL/L (ref 3.5–5.3)
PROT SERPL-MCNC: 6.7 G/DL (ref 6.4–8.2)
RBC # BLD AUTO: 3.24 X10*6/UL (ref 4.5–5.9)
RBC MORPH BLD: NORMAL
RH FACTOR (ANTIGEN D): NORMAL
SODIUM SERPL-SCNC: 140 MMOL/L (ref 136–145)
WBC # BLD AUTO: 1.8 X10*3/UL (ref 4.4–11.3)

## 2025-08-21 PROCEDURE — 83615 LACTATE (LD) (LDH) ENZYME: CPT

## 2025-08-21 PROCEDURE — 86850 RBC ANTIBODY SCREEN: CPT

## 2025-08-21 PROCEDURE — 36415 COLL VENOUS BLD VENIPUNCTURE: CPT

## 2025-08-21 PROCEDURE — 85025 COMPLETE CBC W/AUTO DIFF WBC: CPT

## 2025-08-21 PROCEDURE — 84075 ASSAY ALKALINE PHOSPHATASE: CPT

## 2025-08-21 RX ORDER — HEPARIN SODIUM,PORCINE/PF 10 UNIT/ML
50 SYRINGE (ML) INTRAVENOUS AS NEEDED
OUTPATIENT
Start: 2025-08-21

## 2025-08-21 RX ORDER — HEPARIN 100 UNIT/ML
500 SYRINGE INTRAVENOUS AS NEEDED
OUTPATIENT
Start: 2025-08-21

## 2025-08-21 ASSESSMENT — ENCOUNTER SYMPTOMS
SHORTNESS OF BREATH: 0
APPETITE CHANGE: 0
LIGHT-HEADEDNESS: 0
NAUSEA: 0
NERVOUS/ANXIOUS: 0
DIFFICULTY URINATING: 0
HEADACHES: 0
VOMITING: 0
BACK PAIN: 0
RHINORRHEA: 0
SINUS PAIN: 0
ABDOMINAL PAIN: 0
WEAKNESS: 0
FATIGUE: 1
JOINT SWELLING: 0
FEVER: 0
ARTHRALGIAS: 1
CHILLS: 0
SORE THROAT: 0
UNEXPECTED WEIGHT CHANGE: 0
CONFUSION: 0
ADENOPATHY: 0
SINUS PRESSURE: 0
NUMBNESS: 0
FLANK PAIN: 0
DIARRHEA: 0
ACTIVITY CHANGE: 0
DIAPHORESIS: 0
COUGH: 0
BRUISES/BLEEDS EASILY: 0

## 2025-08-21 ASSESSMENT — PAIN SCALES - GENERAL: PAINLEVEL_OUTOF10: 10-WORST PAIN EVER

## 2025-08-22 ENCOUNTER — APPOINTMENT (OUTPATIENT)
Dept: BEHAVIORAL HEALTH | Facility: CLINIC | Age: 65
End: 2025-08-22
Payer: MEDICARE

## 2025-08-22 DIAGNOSIS — F41.8 MIXED ANXIETY AND DEPRESSIVE DISORDER: ICD-10-CM

## 2025-08-22 DIAGNOSIS — F39 PSYCHOTIC AFFECTIVE DISORDER: ICD-10-CM

## 2025-08-22 PROCEDURE — 99214 OFFICE O/P EST MOD 30 MIN: CPT | Performed by: PSYCHIATRY & NEUROLOGY

## 2025-08-25 ENCOUNTER — INFUSION (OUTPATIENT)
Dept: HEMATOLOGY/ONCOLOGY | Facility: CLINIC | Age: 65
End: 2025-08-25
Payer: MEDICARE

## 2025-08-25 VITALS
SYSTOLIC BLOOD PRESSURE: 138 MMHG | OXYGEN SATURATION: 95 % | WEIGHT: 193.56 LBS | DIASTOLIC BLOOD PRESSURE: 84 MMHG | HEART RATE: 100 BPM | TEMPERATURE: 98.2 F | RESPIRATION RATE: 17 BRPM | BODY MASS INDEX: 27.04 KG/M2

## 2025-08-25 DIAGNOSIS — C92.00 ACUTE MYELOID LEUKEMIA NOT HAVING ACHIEVED REMISSION (MULTI): ICD-10-CM

## 2025-08-25 LAB
ALBUMIN SERPL BCP-MCNC: 3.9 G/DL (ref 3.4–5)
ALP SERPL-CCNC: 98 U/L (ref 33–136)
ALT SERPL W P-5'-P-CCNC: 13 U/L (ref 10–52)
ANION GAP SERPL CALC-SCNC: 11 MMOL/L (ref 10–20)
AST SERPL W P-5'-P-CCNC: 12 U/L (ref 9–39)
BASOPHILS # BLD AUTO: 0.01 X10*3/UL (ref 0–0.1)
BASOPHILS NFR BLD AUTO: 0.7 %
BILIRUB SERPL-MCNC: 0.6 MG/DL (ref 0–1.2)
BUN SERPL-MCNC: 15 MG/DL (ref 6–23)
CALCIUM SERPL-MCNC: 9.1 MG/DL (ref 8.6–10.3)
CHLORIDE SERPL-SCNC: 104 MMOL/L (ref 98–107)
CO2 SERPL-SCNC: 28 MMOL/L (ref 21–32)
CREAT SERPL-MCNC: 0.74 MG/DL (ref 0.5–1.3)
EGFRCR SERPLBLD CKD-EPI 2021: >90 ML/MIN/1.73M*2
EOSINOPHIL # BLD AUTO: 0.01 X10*3/UL (ref 0–0.7)
EOSINOPHIL NFR BLD AUTO: 0.7 %
ERYTHROCYTE [DISTWIDTH] IN BLOOD BY AUTOMATED COUNT: 15.6 % (ref 11.5–14.5)
GLUCOSE SERPL-MCNC: 123 MG/DL (ref 74–99)
HCT VFR BLD AUTO: 32.9 % (ref 41–52)
HGB BLD-MCNC: 11.4 G/DL (ref 13.5–17.5)
IMM GRANULOCYTES # BLD AUTO: 0 X10*3/UL (ref 0–0.7)
IMM GRANULOCYTES NFR BLD AUTO: 0 % (ref 0–0.9)
LDH SERPL L TO P-CCNC: 200 U/L (ref 84–246)
LYMPHOCYTES # BLD AUTO: 1.04 X10*3/UL (ref 1.2–4.8)
LYMPHOCYTES NFR BLD AUTO: 68.9 %
MCH RBC QN AUTO: 37.5 PG (ref 26–34)
MCHC RBC AUTO-ENTMCNC: 34.7 G/DL (ref 32–36)
MCV RBC AUTO: 108 FL (ref 80–100)
MONOCYTES # BLD AUTO: 0.03 X10*3/UL (ref 0.1–1)
MONOCYTES NFR BLD AUTO: 2 %
NEUTROPHILS # BLD AUTO: 0.42 X10*3/UL (ref 1.2–7.7)
NEUTROPHILS NFR BLD AUTO: 27.7 %
NRBC BLD-RTO: ABNORMAL /100{WBCS}
OVALOCYTES BLD QL SMEAR: NORMAL
PLATELET # BLD AUTO: 68 X10*3/UL (ref 150–450)
POTASSIUM SERPL-SCNC: 3.9 MMOL/L (ref 3.5–5.3)
PROT SERPL-MCNC: 6.6 G/DL (ref 6.4–8.2)
RBC # BLD AUTO: 3.04 X10*6/UL (ref 4.5–5.9)
RBC MORPH BLD: NORMAL
SODIUM SERPL-SCNC: 139 MMOL/L (ref 136–145)
WBC # BLD AUTO: 1.5 X10*3/UL (ref 4.4–11.3)

## 2025-08-25 PROCEDURE — 85025 COMPLETE CBC W/AUTO DIFF WBC: CPT

## 2025-08-25 PROCEDURE — 80053 COMPREHEN METABOLIC PANEL: CPT

## 2025-08-25 PROCEDURE — 36415 COLL VENOUS BLD VENIPUNCTURE: CPT

## 2025-08-25 PROCEDURE — 83615 LACTATE (LD) (LDH) ENZYME: CPT

## 2025-08-25 RX ORDER — HEPARIN SODIUM,PORCINE/PF 10 UNIT/ML
50 SYRINGE (ML) INTRAVENOUS AS NEEDED
Status: DISCONTINUED | OUTPATIENT
Start: 2025-08-25 | End: 2025-08-25 | Stop reason: HOSPADM

## 2025-08-25 RX ORDER — HEPARIN 100 UNIT/ML
500 SYRINGE INTRAVENOUS AS NEEDED
Status: DISCONTINUED | OUTPATIENT
Start: 2025-08-25 | End: 2025-08-25 | Stop reason: HOSPADM

## 2025-08-25 RX ORDER — HEPARIN 100 UNIT/ML
500 SYRINGE INTRAVENOUS AS NEEDED
OUTPATIENT
Start: 2025-08-25

## 2025-08-25 RX ORDER — HEPARIN SODIUM,PORCINE/PF 10 UNIT/ML
50 SYRINGE (ML) INTRAVENOUS AS NEEDED
OUTPATIENT
Start: 2025-08-25

## 2025-08-25 ASSESSMENT — PAIN SCALES - GENERAL: PAINLEVEL_OUTOF10: 10-WORST PAIN EVER

## 2025-08-27 ENCOUNTER — LAB (OUTPATIENT)
Dept: LAB | Facility: HOSPITAL | Age: 65
End: 2025-08-27
Payer: MEDICARE

## 2025-08-27 ENCOUNTER — PROCEDURE VISIT (OUTPATIENT)
Dept: HEMATOLOGY/ONCOLOGY | Facility: HOSPITAL | Age: 65
End: 2025-08-27
Payer: MEDICARE

## 2025-08-27 VITALS
BODY MASS INDEX: 27.13 KG/M2 | OXYGEN SATURATION: 99 % | DIASTOLIC BLOOD PRESSURE: 82 MMHG | RESPIRATION RATE: 18 BRPM | TEMPERATURE: 97.2 F | SYSTOLIC BLOOD PRESSURE: 131 MMHG | HEART RATE: 95 BPM | WEIGHT: 194.22 LBS

## 2025-08-27 DIAGNOSIS — C92.00 ACUTE MYELOID LEUKEMIA NOT HAVING ACHIEVED REMISSION (MULTI): ICD-10-CM

## 2025-08-27 DIAGNOSIS — C92.00 ACUTE MYELOID LEUKEMIA NOT HAVING ACHIEVED REMISSION (MULTI): Primary | ICD-10-CM

## 2025-08-27 LAB
ABO GROUP (TYPE) IN BLOOD: NORMAL
ALBUMIN SERPL BCP-MCNC: 3.9 G/DL (ref 3.4–5)
ALP SERPL-CCNC: 104 U/L (ref 33–136)
ALT SERPL W P-5'-P-CCNC: 13 U/L (ref 10–52)
ANION GAP SERPL CALC-SCNC: 11 MMOL/L (ref 10–20)
ANTIBODY SCREEN: NORMAL
AST SERPL W P-5'-P-CCNC: 13 U/L (ref 9–39)
BASOPHILS # BLD AUTO: 0.01 X10*3/UL (ref 0–0.1)
BASOPHILS NFR BLD AUTO: 0.7 %
BILIRUB SERPL-MCNC: 0.5 MG/DL (ref 0–1.2)
BUN SERPL-MCNC: 14 MG/DL (ref 6–23)
CALCIUM SERPL-MCNC: 9.3 MG/DL (ref 8.6–10.3)
CHLORIDE SERPL-SCNC: 104 MMOL/L (ref 98–107)
CO2 SERPL-SCNC: 28 MMOL/L (ref 21–32)
CREAT SERPL-MCNC: 0.77 MG/DL (ref 0.5–1.3)
DACRYOCYTES BLD QL SMEAR: NORMAL
EGFRCR SERPLBLD CKD-EPI 2021: >90 ML/MIN/1.73M*2
EOSINOPHIL # BLD AUTO: 0 X10*3/UL (ref 0–0.7)
EOSINOPHIL NFR BLD AUTO: 0 %
ERYTHROCYTE [DISTWIDTH] IN BLOOD BY AUTOMATED COUNT: 15.8 % (ref 11.5–14.5)
GLUCOSE SERPL-MCNC: 106 MG/DL (ref 74–99)
HCT VFR BLD AUTO: 32 % (ref 41–52)
HGB BLD-MCNC: 11.4 G/DL (ref 13.5–17.5)
IMM GRANULOCYTES # BLD AUTO: 0 X10*3/UL (ref 0–0.7)
IMM GRANULOCYTES NFR BLD AUTO: 0 % (ref 0–0.9)
LDH SERPL L TO P-CCNC: 167 U/L (ref 84–246)
LYMPHOCYTES # BLD AUTO: 1.3 X10*3/UL (ref 1.2–4.8)
LYMPHOCYTES NFR BLD AUTO: 87.8 %
MCH RBC QN AUTO: 37.9 PG (ref 26–34)
MCHC RBC AUTO-ENTMCNC: 35.6 G/DL (ref 32–36)
MCV RBC AUTO: 106 FL (ref 80–100)
MONOCYTES # BLD AUTO: 0.01 X10*3/UL (ref 0.1–1)
MONOCYTES NFR BLD AUTO: 0.7 %
NEUTROPHILS # BLD AUTO: 0.16 X10*3/UL (ref 1.2–7.7)
NEUTROPHILS NFR BLD AUTO: 10.8 %
NRBC BLD-RTO: 0 /100 WBCS (ref 0–0)
OVALOCYTES BLD QL SMEAR: NORMAL
PLATELET # BLD AUTO: 61 X10*3/UL (ref 150–450)
POLYCHROMASIA BLD QL SMEAR: NORMAL
POTASSIUM SERPL-SCNC: 4.3 MMOL/L (ref 3.5–5.3)
PROT SERPL-MCNC: 6.6 G/DL (ref 6.4–8.2)
RBC # BLD AUTO: 3.01 X10*6/UL (ref 4.5–5.9)
RBC MORPH BLD: NORMAL
RH FACTOR (ANTIGEN D): NORMAL
SODIUM SERPL-SCNC: 139 MMOL/L (ref 136–145)
WBC # BLD AUTO: 1.5 X10*3/UL (ref 4.4–11.3)

## 2025-08-27 PROCEDURE — 36415 COLL VENOUS BLD VENIPUNCTURE: CPT | Performed by: PHYSICIAN ASSISTANT

## 2025-08-27 PROCEDURE — 38222 DX BONE MARROW BX & ASPIR: CPT | Mod: LT | Performed by: PHYSICIAN ASSISTANT

## 2025-08-27 PROCEDURE — 83615 LACTATE (LD) (LDH) ENZYME: CPT

## 2025-08-27 PROCEDURE — 36415 COLL VENOUS BLD VENIPUNCTURE: CPT

## 2025-08-27 PROCEDURE — 85025 COMPLETE CBC W/AUTO DIFF WBC: CPT

## 2025-08-27 PROCEDURE — 86901 BLOOD TYPING SEROLOGIC RH(D): CPT

## 2025-08-27 PROCEDURE — 84075 ASSAY ALKALINE PHOSPHATASE: CPT

## 2025-08-27 PROCEDURE — 88185 FLOWCYTOMETRY/TC ADD-ON: CPT | Mod: TC | Performed by: PHYSICIAN ASSISTANT

## 2025-08-28 ENCOUNTER — INFUSION (OUTPATIENT)
Dept: HEMATOLOGY/ONCOLOGY | Facility: CLINIC | Age: 65
End: 2025-08-28
Payer: MEDICARE

## 2025-08-28 ENCOUNTER — OFFICE VISIT (OUTPATIENT)
Dept: HEMATOLOGY/ONCOLOGY | Facility: CLINIC | Age: 65
End: 2025-08-28
Payer: MEDICARE

## 2025-08-28 VITALS
OXYGEN SATURATION: 97 % | WEIGHT: 191.9 LBS | DIASTOLIC BLOOD PRESSURE: 77 MMHG | TEMPERATURE: 97.5 F | RESPIRATION RATE: 18 BRPM | HEART RATE: 84 BPM | SYSTOLIC BLOOD PRESSURE: 126 MMHG | BODY MASS INDEX: 26.81 KG/M2

## 2025-08-28 DIAGNOSIS — C92.00 ACUTE MYELOID LEUKEMIA NOT HAVING ACHIEVED REMISSION (MULTI): Primary | ICD-10-CM

## 2025-08-28 DIAGNOSIS — Z76.82 STEM CELL TRANSPLANT CANDIDATE: ICD-10-CM

## 2025-08-28 DIAGNOSIS — C92.00 ACUTE MYELOID LEUKEMIA NOT HAVING ACHIEVED REMISSION (MULTI): ICD-10-CM

## 2025-08-28 LAB
CELL COUNT (BLOOD): 0.71 X10*3/UL
CELL POPULATIONS: NORMAL
DIAGNOSIS: NORMAL
FLOW DIFFERENTIAL: NORMAL
FLOW TEST ORDERED: NORMAL
LAB TEST METHOD: NORMAL
NUMBER OF CELLS COLLECTED: NORMAL
PATH REPORT.TOTAL CANCER: NORMAL
SIGNATURE COMMENT: NORMAL
SPECIMEN VIABILITY: NORMAL

## 2025-08-28 PROCEDURE — 1125F AMNT PAIN NOTED PAIN PRSNT: CPT | Performed by: NURSE PRACTITIONER

## 2025-08-28 PROCEDURE — 1160F RVW MEDS BY RX/DR IN RCRD: CPT | Performed by: NURSE PRACTITIONER

## 2025-08-28 PROCEDURE — 99214 OFFICE O/P EST MOD 30 MIN: CPT | Performed by: NURSE PRACTITIONER

## 2025-08-28 PROCEDURE — 1159F MED LIST DOCD IN RCRD: CPT | Performed by: NURSE PRACTITIONER

## 2025-08-28 ASSESSMENT — PAIN SCALES - GENERAL: PAINLEVEL_OUTOF10: 10-WORST PAIN EVER

## 2025-08-29 ENCOUNTER — APPOINTMENT (OUTPATIENT)
Dept: HEMATOLOGY/ONCOLOGY | Facility: CLINIC | Age: 65
End: 2025-08-29
Payer: MEDICARE

## 2025-08-29 LAB
PATH REPORT.COMMENTS IMP SPEC: NORMAL
PATH REPORT.FINAL DX SPEC: NORMAL
PATH REPORT.GROSS SPEC: NORMAL
PATH REPORT.MICROSCOPIC SPEC OTHER STN: NORMAL
PATH REPORT.RELEVANT HX SPEC: NORMAL
PATH REPORT.TOTAL CANCER: NORMAL

## 2025-09-02 ENCOUNTER — APPOINTMENT (OUTPATIENT)
Dept: HEMATOLOGY/ONCOLOGY | Facility: CLINIC | Age: 65
End: 2025-09-02
Payer: MEDICARE

## 2025-09-03 ENCOUNTER — SOCIAL WORK (OUTPATIENT)
Dept: HEMATOLOGY/ONCOLOGY | Facility: HOSPITAL | Age: 65
End: 2025-09-03
Payer: MEDICARE

## 2025-09-03 ENCOUNTER — APPOINTMENT (OUTPATIENT)
Dept: HEMATOLOGY/ONCOLOGY | Facility: CLINIC | Age: 65
End: 2025-09-03
Payer: MEDICARE

## 2025-09-04 ENCOUNTER — LAB (OUTPATIENT)
Dept: LAB | Facility: CLINIC | Age: 65
End: 2025-09-04
Payer: MEDICARE

## 2025-09-04 ENCOUNTER — INFUSION (OUTPATIENT)
Dept: HEMATOLOGY/ONCOLOGY | Facility: CLINIC | Age: 65
End: 2025-09-04
Payer: MEDICARE

## 2025-09-04 VITALS
SYSTOLIC BLOOD PRESSURE: 135 MMHG | WEIGHT: 191.58 LBS | RESPIRATION RATE: 16 BRPM | HEART RATE: 82 BPM | OXYGEN SATURATION: 94 % | BODY MASS INDEX: 26.76 KG/M2 | TEMPERATURE: 97.2 F | DIASTOLIC BLOOD PRESSURE: 82 MMHG

## 2025-09-04 DIAGNOSIS — Z51.89 ENCOUNTER FOR THERAPEUTIC PROCEDURE: ICD-10-CM

## 2025-09-04 DIAGNOSIS — Z76.82 STEM CELL TRANSPLANT CANDIDATE: ICD-10-CM

## 2025-09-04 DIAGNOSIS — R06.9 UNSPECIFIED ABNORMALITIES OF BREATHING: ICD-10-CM

## 2025-09-04 DIAGNOSIS — Z63.0 MARITAL RELATIONSHIP PROBLEM: ICD-10-CM

## 2025-09-04 DIAGNOSIS — C92.00 ACUTE MYELOID LEUKEMIA NOT HAVING ACHIEVED REMISSION (MULTI): ICD-10-CM

## 2025-09-04 DIAGNOSIS — Z79.899 OTHER LONG TERM (CURRENT) DRUG THERAPY: ICD-10-CM

## 2025-09-04 LAB
ALBUMIN SERPL BCP-MCNC: 3.9 G/DL (ref 3.4–5)
ALP SERPL-CCNC: 104 U/L (ref 33–136)
ALT SERPL W P-5'-P-CCNC: 10 U/L (ref 10–52)
AMPHETAMINES UR QL SCN: NORMAL
ANION GAP SERPL CALC-SCNC: 12 MMOL/L (ref 10–20)
APTT PPP: 36 SECONDS (ref 26–36)
AST SERPL W P-5'-P-CCNC: 12 U/L (ref 9–39)
BARBITURATES UR QL SCN: NORMAL
BASOPHILS # BLD AUTO: 0 X10*3/UL (ref 0–0.1)
BASOPHILS NFR BLD AUTO: 0 %
BENZODIAZ UR QL SCN: NORMAL
BILIRUB SERPL-MCNC: 0.4 MG/DL (ref 0–1.2)
BNP SERPL-MCNC: 120 PG/ML (ref 0–99)
BUN SERPL-MCNC: 17 MG/DL (ref 6–23)
BZE UR QL SCN: NORMAL
CALCIUM SERPL-MCNC: 9.3 MG/DL (ref 8.6–10.3)
CANNABINOIDS UR QL SCN: NORMAL
CARDIAC TROPONIN I PNL SERPL HS: 5 NG/L (ref 0–20)
CHLORIDE SERPL-SCNC: 106 MMOL/L (ref 98–107)
CO2 SERPL-SCNC: 28 MMOL/L (ref 21–32)
CREAT SERPL-MCNC: 0.82 MG/DL (ref 0.5–1.3)
CRP SERPL-MCNC: 1.88 MG/DL
EGFRCR SERPLBLD CKD-EPI 2021: >90 ML/MIN/1.73M*2
EOSINOPHIL # BLD AUTO: 0.03 X10*3/UL (ref 0–0.7)
EOSINOPHIL NFR BLD AUTO: 1.5 %
ERYTHROCYTE [DISTWIDTH] IN BLOOD BY AUTOMATED COUNT: 15.3 % (ref 11.5–14.5)
FENTANYL+NORFENTANYL UR QL SCN: NORMAL
FERRITIN SERPL-MCNC: 1059 NG/ML (ref 20–300)
GLUCOSE SERPL-MCNC: 108 MG/DL (ref 74–99)
HCT VFR BLD AUTO: 29.4 % (ref 41–52)
HGB BLD-MCNC: 10.2 G/DL (ref 13.5–17.5)
IMM GRANULOCYTES # BLD AUTO: 0 X10*3/UL (ref 0–0.7)
IMM GRANULOCYTES NFR BLD AUTO: 0 % (ref 0–0.9)
INR PPP: 1.4 (ref 0.9–1.1)
LDH SERPL L TO P-CCNC: 164 U/L (ref 84–246)
LYMPHOCYTES # BLD AUTO: 1.64 X10*3/UL (ref 1.2–4.8)
LYMPHOCYTES NFR BLD AUTO: 80.4 %
MAGNESIUM SERPL-MCNC: 2 MG/DL (ref 1.6–2.4)
MCH RBC QN AUTO: 37.9 PG (ref 26–34)
MCHC RBC AUTO-ENTMCNC: 34.7 G/DL (ref 32–36)
MCV RBC AUTO: 109 FL (ref 80–100)
METHADONE UR QL SCN: NORMAL
MONOCYTES # BLD AUTO: 0.15 X10*3/UL (ref 0.1–1)
MONOCYTES NFR BLD AUTO: 7.4 %
NEUTROPHILS # BLD AUTO: 0.22 X10*3/UL (ref 1.2–7.7)
NEUTROPHILS NFR BLD AUTO: 10.7 %
OPIATES UR QL SCN: NORMAL
OXYCODONE+OXYMORPHONE UR QL SCN: NORMAL
PCP UR QL SCN: NORMAL
PLATELET # BLD AUTO: 46 X10*3/UL (ref 150–450)
POTASSIUM SERPL-SCNC: 3.8 MMOL/L (ref 3.5–5.3)
PROT SERPL-MCNC: 6.5 G/DL (ref 6.4–8.2)
PROTHROMBIN TIME: 15.3 SECONDS (ref 9.8–12.4)
RBC # BLD AUTO: 2.69 X10*6/UL (ref 4.5–5.9)
SODIUM SERPL-SCNC: 142 MMOL/L (ref 136–145)
URATE SERPL-MCNC: 4.8 MG/DL (ref 4–7.5)
WBC # BLD AUTO: 2 X10*3/UL (ref 4.4–11.3)

## 2025-09-04 PROCEDURE — 83735 ASSAY OF MAGNESIUM: CPT

## 2025-09-04 PROCEDURE — 80053 COMPREHEN METABOLIC PANEL: CPT

## 2025-09-04 PROCEDURE — 36591 DRAW BLOOD OFF VENOUS DEVICE: CPT

## 2025-09-04 PROCEDURE — 36415 COLL VENOUS BLD VENIPUNCTURE: CPT

## 2025-09-04 PROCEDURE — 87799 DETECT AGENT NOS DNA QUANT: CPT

## 2025-09-04 PROCEDURE — 84550 ASSAY OF BLOOD/URIC ACID: CPT

## 2025-09-04 PROCEDURE — 86901 BLOOD TYPING SEROLOGIC RH(D): CPT

## 2025-09-04 PROCEDURE — 82728 ASSAY OF FERRITIN: CPT

## 2025-09-04 PROCEDURE — 86140 C-REACTIVE PROTEIN: CPT

## 2025-09-04 PROCEDURE — 85730 THROMBOPLASTIN TIME PARTIAL: CPT

## 2025-09-04 PROCEDURE — 80307 DRUG TEST PRSMV CHEM ANLYZR: CPT

## 2025-09-04 PROCEDURE — 85025 COMPLETE CBC W/AUTO DIFF WBC: CPT

## 2025-09-04 PROCEDURE — 84484 ASSAY OF TROPONIN QUANT: CPT

## 2025-09-04 PROCEDURE — 83615 LACTATE (LD) (LDH) ENZYME: CPT

## 2025-09-04 PROCEDURE — 83880 ASSAY OF NATRIURETIC PEPTIDE: CPT

## 2025-09-04 SDOH — SOCIAL STABILITY - SOCIAL INSECURITY: PROBLEMS IN RELATIONSHIP WITH SPOUSE OR PARTNER: Z63.0

## 2025-09-04 ASSESSMENT — PAIN SCALES - GENERAL: PAINLEVEL_OUTOF10: 10-WORST PAIN EVER

## 2025-09-05 ENCOUNTER — SPECIALTY PHARMACY (OUTPATIENT)
Dept: HEMATOLOGY/ONCOLOGY | Facility: HOSPITAL | Age: 65
End: 2025-09-05
Payer: MEDICARE

## 2025-09-05 ENCOUNTER — HOSPITAL ENCOUNTER (OUTPATIENT)
Dept: CARDIOLOGY | Facility: HOSPITAL | Age: 65
Discharge: HOME | End: 2025-09-05
Payer: MEDICARE

## 2025-09-05 ENCOUNTER — DOCUMENTATION (OUTPATIENT)
Dept: OTHER | Facility: HOSPITAL | Age: 65
End: 2025-09-05

## 2025-09-05 ENCOUNTER — OFFICE VISIT (OUTPATIENT)
Dept: HEMATOLOGY/ONCOLOGY | Facility: HOSPITAL | Age: 65
End: 2025-09-05
Payer: MEDICARE

## 2025-09-05 ENCOUNTER — APPOINTMENT (OUTPATIENT)
Dept: HEMATOLOGY/ONCOLOGY | Facility: CLINIC | Age: 65
End: 2025-09-05
Payer: MEDICARE

## 2025-09-05 VITALS
OXYGEN SATURATION: 97 % | BODY MASS INDEX: 26.58 KG/M2 | WEIGHT: 190.3 LBS | TEMPERATURE: 97.2 F | RESPIRATION RATE: 14 BRPM | SYSTOLIC BLOOD PRESSURE: 125 MMHG | HEART RATE: 87 BPM | DIASTOLIC BLOOD PRESSURE: 83 MMHG

## 2025-09-05 DIAGNOSIS — Z01.818 ENCOUNTER FOR ECHOCARDIOGRAM BEFORE INITIATION OF CHEMOTHERAPY: ICD-10-CM

## 2025-09-05 DIAGNOSIS — Z76.82 STEM CELL TRANSPLANT CANDIDATE: ICD-10-CM

## 2025-09-05 DIAGNOSIS — C92.00 ACUTE MYELOID LEUKEMIA NOT HAVING ACHIEVED REMISSION (MULTI): ICD-10-CM

## 2025-09-05 LAB
ABO GROUP (TYPE) IN BLOOD: NORMAL
ANTIBODY SCREEN: NORMAL
AORTIC VALVE PEAK VELOCITY: 1 M/S
AV PEAK GRADIENT: 4 MMHG
AVA (PEAK VEL): 3.27 CM2
CMV DNA SERPL NAA+PROBE-LOG IU: NORMAL {LOG_IU}/ML
EBV DNA SPEC NAA+PROBE-LOG#: NORMAL {LOG_COPIES}/ML
EJECTION FRACTION APICAL 4 CHAMBER: 52.2
EJECTION FRACTION: 53 %
LABORATORY COMMENT REPORT: NOT DETECTED
LABORATORY COMMENT REPORT: NOT DETECTED
LEFT ATRIUM VOLUME AREA LENGTH INDEX BSA: 27.7 ML/M2
LEFT VENTRICLE INTERNAL DIMENSION DIASTOLE: 4.9 CM (ref 3.5–6)
LEFT VENTRICULAR OUTFLOW TRACT DIAMETER: 2.32 CM
MITRAL VALVE E/A RATIO: 0.37
RH FACTOR (ANTIGEN D): NORMAL
RIGHT VENTRICLE FREE WALL PEAK S': 10 CM/S

## 2025-09-05 PROCEDURE — 93306 TTE W/DOPPLER COMPLETE: CPT

## 2025-09-05 PROCEDURE — 93306 TTE W/DOPPLER COMPLETE: CPT | Performed by: INTERNAL MEDICINE

## 2025-09-05 PROCEDURE — 93010 ELECTROCARDIOGRAM REPORT: CPT | Performed by: INTERNAL MEDICINE

## 2025-09-05 PROCEDURE — 93005 ELECTROCARDIOGRAM TRACING: CPT

## 2025-09-05 ASSESSMENT — ENCOUNTER SYMPTOMS
SHORTNESS OF BREATH: 0
FATIGUE: 0
WEAKNESS: 0
HEADACHES: 0
SINUS PRESSURE: 0
CONFUSION: 0
APPETITE CHANGE: 0
SORE THROAT: 0
ACTIVITY CHANGE: 0
COUGH: 0
ADENOPATHY: 0
RHINORRHEA: 0
FEVER: 0
JOINT SWELLING: 0
BACK PAIN: 0
ABDOMINAL PAIN: 0
CHILLS: 0
DYSPHORIC MOOD: 0
UNEXPECTED WEIGHT CHANGE: 0
SINUS PAIN: 0
LIGHT-HEADEDNESS: 0
NUMBNESS: 0
NERVOUS/ANXIOUS: 0
DIARRHEA: 0
VOMITING: 0
CONSTIPATION: 0
BRUISES/BLEEDS EASILY: 0
NAUSEA: 0
DIFFICULTY URINATING: 0
DIAPHORESIS: 0
FLANK PAIN: 0

## 2025-09-05 ASSESSMENT — PAIN SCALES - GENERAL: PAINLEVEL_OUTOF10: 0-NO PAIN

## 2025-09-06 LAB — ADENOVIRUS QPCR,PLASMA, VIRC: NOT DETECTED COPIES/ML

## 2025-09-07 LAB
ATRIAL RATE: 77 BPM
P AXIS: 14 DEGREES
P OFFSET: 194 MS
P ONSET: 138 MS
PR INTERVAL: 154 MS
Q ONSET: 215 MS
QRS COUNT: 13 BEATS
QRS DURATION: 92 MS
QT INTERVAL: 418 MS
QTC CALCULATION(BAZETT): 473 MS
QTC FREDERICIA: 454 MS
R AXIS: -8 DEGREES
T AXIS: -6 DEGREES
T OFFSET: 424 MS
VENTRICULAR RATE: 77 BPM

## 2025-09-08 ENCOUNTER — APPOINTMENT (OUTPATIENT)
Dept: BEHAVIORAL HEALTH | Facility: CLINIC | Age: 65
End: 2025-09-08
Payer: MEDICARE

## 2025-09-08 ENCOUNTER — APPOINTMENT (OUTPATIENT)
Dept: HEMATOLOGY/ONCOLOGY | Facility: CLINIC | Age: 65
End: 2025-09-08
Payer: MEDICARE

## 2025-09-09 ENCOUNTER — APPOINTMENT (OUTPATIENT)
Dept: HEMATOLOGY/ONCOLOGY | Facility: CLINIC | Age: 65
End: 2025-09-09
Payer: MEDICARE

## 2025-09-10 ENCOUNTER — APPOINTMENT (OUTPATIENT)
Dept: HEMATOLOGY/ONCOLOGY | Facility: CLINIC | Age: 65
End: 2025-09-10
Payer: MEDICARE

## 2025-09-11 ENCOUNTER — APPOINTMENT (OUTPATIENT)
Dept: RADIOLOGY | Facility: HOSPITAL | Age: 65
End: 2025-09-11
Payer: MEDICARE

## 2025-09-22 ENCOUNTER — APPOINTMENT (OUTPATIENT)
Dept: DERMATOLOGY | Facility: CLINIC | Age: 65
End: 2025-09-22
Payer: MEDICARE

## 2025-09-24 ENCOUNTER — APPOINTMENT (OUTPATIENT)
Dept: BEHAVIORAL HEALTH | Facility: CLINIC | Age: 65
End: 2025-09-24
Payer: MEDICARE

## 2025-09-29 ENCOUNTER — APPOINTMENT (OUTPATIENT)
Dept: CARDIOLOGY | Facility: HOSPITAL | Age: 65
End: 2025-09-29
Payer: MEDICARE

## 2025-09-29 ENCOUNTER — APPOINTMENT (OUTPATIENT)
Dept: HEMATOLOGY/ONCOLOGY | Facility: CLINIC | Age: 65
End: 2025-09-29
Payer: MEDICARE

## 2025-10-02 ENCOUNTER — APPOINTMENT (OUTPATIENT)
Dept: HEMATOLOGY/ONCOLOGY | Facility: CLINIC | Age: 65
End: 2025-10-02
Payer: MEDICARE

## 2025-10-20 ENCOUNTER — APPOINTMENT (OUTPATIENT)
Dept: PRIMARY CARE | Facility: CLINIC | Age: 65
End: 2025-10-20
Payer: MEDICARE

## 2026-06-01 ENCOUNTER — APPOINTMENT (OUTPATIENT)
Dept: PRIMARY CARE | Facility: CLINIC | Age: 66
End: 2026-06-01
Payer: MEDICARE